# Patient Record
Sex: FEMALE | Race: WHITE | NOT HISPANIC OR LATINO | Employment: UNEMPLOYED | URBAN - METROPOLITAN AREA
[De-identification: names, ages, dates, MRNs, and addresses within clinical notes are randomized per-mention and may not be internally consistent; named-entity substitution may affect disease eponyms.]

---

## 2024-01-01 ENCOUNTER — HOSPITAL ENCOUNTER (INPATIENT)
Facility: HOSPITAL | Age: 79
LOS: 12 days | End: 2024-08-26
Attending: INTERNAL MEDICINE | Admitting: INTERNAL MEDICINE
Payer: COMMERCIAL

## 2024-01-01 PROCEDURE — 25010000002 HYDROMORPHONE 1 MG/ML SOLUTION: Performed by: NURSE PRACTITIONER

## 2024-01-01 PROCEDURE — 25010000002 HYDROMORPHONE PER 4 MG: Performed by: NURSE PRACTITIONER

## 2024-01-01 PROCEDURE — 25010000002 MIDAZOLAM PER 1 MG: Performed by: NURSE PRACTITIONER

## 2024-01-01 PROCEDURE — 25010000002 HALOPERIDOL LACTATE PER 5 MG: Performed by: NURSE PRACTITIONER

## 2024-01-01 PROCEDURE — 25010000002 FUROSEMIDE PER 20 MG: Performed by: NURSE PRACTITIONER

## 2024-01-01 PROCEDURE — 25010000002 ONDANSETRON PER 1 MG: Performed by: NURSE PRACTITIONER

## 2024-01-01 RX ORDER — POLYETHYLENE GLYCOL 3350 17 G/17G
17 POWDER, FOR SOLUTION ORAL DAILY PRN
Status: DISCONTINUED | OUTPATIENT
Start: 2024-01-01 | End: 2024-01-01

## 2024-01-01 RX ORDER — HYDROMORPHONE HYDROCHLORIDE 1 MG/ML
0.5 INJECTION, SOLUTION INTRAMUSCULAR; INTRAVENOUS; SUBCUTANEOUS EVERY 4 HOURS
Status: DISCONTINUED | OUTPATIENT
Start: 2024-01-01 | End: 2024-01-01 | Stop reason: HOSPADM

## 2024-01-01 RX ORDER — HYDROMORPHONE HYDROCHLORIDE 1 MG/ML
0.25 INJECTION, SOLUTION INTRAMUSCULAR; INTRAVENOUS; SUBCUTANEOUS
Status: DISCONTINUED | OUTPATIENT
Start: 2024-01-01 | End: 2024-01-01

## 2024-01-01 RX ORDER — FUROSEMIDE 10 MG/ML
20 INJECTION INTRAMUSCULAR; INTRAVENOUS EVERY 6 HOURS PRN
Status: DISCONTINUED | OUTPATIENT
Start: 2024-01-01 | End: 2024-01-01 | Stop reason: HOSPADM

## 2024-01-01 RX ORDER — MIDAZOLAM HYDROCHLORIDE 1 MG/ML
1 INJECTION INTRAMUSCULAR; INTRAVENOUS EVERY 4 HOURS PRN
Status: DISCONTINUED | OUTPATIENT
Start: 2024-01-01 | End: 2024-01-01

## 2024-01-01 RX ORDER — ONDANSETRON 2 MG/ML
4 INJECTION INTRAMUSCULAR; INTRAVENOUS EVERY 6 HOURS PRN
Status: DISCONTINUED | OUTPATIENT
Start: 2024-01-01 | End: 2024-01-01 | Stop reason: HOSPADM

## 2024-01-01 RX ORDER — ACETAMINOPHEN 325 MG/1
650 TABLET ORAL EVERY 4 HOURS PRN
Status: DISCONTINUED | OUTPATIENT
Start: 2024-01-01 | End: 2024-01-01 | Stop reason: HOSPADM

## 2024-01-01 RX ORDER — BISACODYL 5 MG/1
5 TABLET, DELAYED RELEASE ORAL DAILY PRN
Status: DISCONTINUED | OUTPATIENT
Start: 2024-01-01 | End: 2024-01-01

## 2024-01-01 RX ORDER — NICOTINE 21 MG/24HR
1 PATCH, TRANSDERMAL 24 HOURS TRANSDERMAL
Status: DISCONTINUED | OUTPATIENT
Start: 2024-01-01 | End: 2024-01-01

## 2024-01-01 RX ORDER — SODIUM CHLORIDE 0.9 % (FLUSH) 0.9 %
10 SYRINGE (ML) INJECTION AS NEEDED
Status: DISCONTINUED | OUTPATIENT
Start: 2024-01-01 | End: 2024-01-01 | Stop reason: HOSPADM

## 2024-01-01 RX ORDER — HALOPERIDOL 5 MG/ML
1 INJECTION INTRAMUSCULAR EVERY 6 HOURS PRN
Status: DISCONTINUED | OUTPATIENT
Start: 2024-01-01 | End: 2024-01-01

## 2024-01-01 RX ORDER — MIDAZOLAM HYDROCHLORIDE 1 MG/ML
0.5 INJECTION INTRAMUSCULAR; INTRAVENOUS EVERY 6 HOURS SCHEDULED
Status: DISCONTINUED | OUTPATIENT
Start: 2024-01-01 | End: 2024-01-01

## 2024-01-01 RX ORDER — AMOXICILLIN 250 MG
2 CAPSULE ORAL 2 TIMES DAILY PRN
Status: DISCONTINUED | OUTPATIENT
Start: 2024-01-01 | End: 2024-01-01

## 2024-01-01 RX ORDER — SCOLOPAMINE TRANSDERMAL SYSTEM 1 MG/1
1 PATCH, EXTENDED RELEASE TRANSDERMAL
Status: DISCONTINUED | OUTPATIENT
Start: 2024-01-01 | End: 2024-01-01 | Stop reason: HOSPADM

## 2024-01-01 RX ORDER — BISACODYL 10 MG
10 SUPPOSITORY, RECTAL RECTAL DAILY PRN
Status: DISCONTINUED | OUTPATIENT
Start: 2024-01-01 | End: 2024-01-01

## 2024-01-01 RX ORDER — WATER 10 ML/10ML
INJECTION INTRAMUSCULAR; INTRAVENOUS; SUBCUTANEOUS
Status: ACTIVE
Start: 2024-01-01 | End: 2024-01-01

## 2024-01-01 RX ORDER — ACETAMINOPHEN 160 MG/5ML
650 SOLUTION ORAL EVERY 4 HOURS PRN
Status: DISCONTINUED | OUTPATIENT
Start: 2024-01-01 | End: 2024-01-01 | Stop reason: HOSPADM

## 2024-01-01 RX ORDER — ACETAMINOPHEN 325 MG/1
650 TABLET ORAL EVERY 4 HOURS PRN
Status: DISCONTINUED | OUTPATIENT
Start: 2024-01-01 | End: 2024-01-01 | Stop reason: SDUPTHER

## 2024-01-01 RX ORDER — HYDROMORPHONE HYDROCHLORIDE 1 MG/ML
0.5 INJECTION, SOLUTION INTRAMUSCULAR; INTRAVENOUS; SUBCUTANEOUS
Status: DISCONTINUED | OUTPATIENT
Start: 2024-01-01 | End: 2024-01-01 | Stop reason: HOSPADM

## 2024-01-01 RX ORDER — MIDAZOLAM HYDROCHLORIDE 1 MG/ML
1 INJECTION INTRAMUSCULAR; INTRAVENOUS EVERY 6 HOURS SCHEDULED
Status: DISCONTINUED | OUTPATIENT
Start: 2024-01-01 | End: 2024-01-01 | Stop reason: HOSPADM

## 2024-01-01 RX ORDER — MIDAZOLAM HYDROCHLORIDE 1 MG/ML
1 INJECTION INTRAMUSCULAR; INTRAVENOUS
Status: DISCONTINUED | OUTPATIENT
Start: 2024-01-01 | End: 2024-01-01 | Stop reason: HOSPADM

## 2024-01-01 RX ORDER — HYDROMORPHONE HYDROCHLORIDE 1 MG/ML
0.25 INJECTION, SOLUTION INTRAMUSCULAR; INTRAVENOUS; SUBCUTANEOUS EVERY 8 HOURS
Status: DISCONTINUED | OUTPATIENT
Start: 2024-01-01 | End: 2024-01-01

## 2024-01-01 RX ORDER — HALOPERIDOL 5 MG/ML
2 INJECTION INTRAMUSCULAR EVERY 6 HOURS PRN
Status: DISCONTINUED | OUTPATIENT
Start: 2024-01-01 | End: 2024-01-01 | Stop reason: HOSPADM

## 2024-01-01 RX ORDER — HALOPERIDOL 5 MG/ML
0.5 INJECTION INTRAMUSCULAR EVERY 6 HOURS PRN
Status: DISCONTINUED | OUTPATIENT
Start: 2024-01-01 | End: 2024-01-01

## 2024-01-01 RX ORDER — HYDROMORPHONE HYDROCHLORIDE 1 MG/ML
0.5 INJECTION, SOLUTION INTRAMUSCULAR; INTRAVENOUS; SUBCUTANEOUS EVERY 6 HOURS
Status: DISCONTINUED | OUTPATIENT
Start: 2024-01-01 | End: 2024-01-01

## 2024-01-01 RX ORDER — MIRTAZAPINE 15 MG/1
15 TABLET, FILM COATED ORAL NIGHTLY
Status: DISCONTINUED | OUTPATIENT
Start: 2024-01-01 | End: 2024-01-01

## 2024-01-01 RX ORDER — MIDAZOLAM HYDROCHLORIDE 1 MG/ML
0.5 INJECTION INTRAMUSCULAR; INTRAVENOUS EVERY 4 HOURS PRN
Status: DISCONTINUED | OUTPATIENT
Start: 2024-01-01 | End: 2024-01-01

## 2024-01-01 RX ORDER — ACETAMINOPHEN 650 MG/1
650 SUPPOSITORY RECTAL EVERY 4 HOURS PRN
Status: DISCONTINUED | OUTPATIENT
Start: 2024-01-01 | End: 2024-01-01 | Stop reason: HOSPADM

## 2024-01-01 RX ORDER — PROCHLORPERAZINE EDISYLATE 5 MG/ML
5 INJECTION INTRAMUSCULAR; INTRAVENOUS EVERY 6 HOURS PRN
Status: DISCONTINUED | OUTPATIENT
Start: 2024-01-01 | End: 2024-01-01

## 2024-01-01 RX ADMIN — HYDROMORPHONE HYDROCHLORIDE 0.5 MG: 1 INJECTION, SOLUTION INTRAMUSCULAR; INTRAVENOUS; SUBCUTANEOUS at 22:29

## 2024-01-01 RX ADMIN — MIDAZOLAM HYDROCHLORIDE 0.5 MG: 1 INJECTION, SOLUTION INTRAMUSCULAR; INTRAVENOUS at 05:10

## 2024-01-01 RX ADMIN — HYDROMORPHONE HYDROCHLORIDE 0.25 MG: 1 INJECTION, SOLUTION INTRAMUSCULAR; INTRAVENOUS; SUBCUTANEOUS at 03:16

## 2024-01-01 RX ADMIN — HYDROMORPHONE HYDROCHLORIDE 0.5 MG: 1 INJECTION, SOLUTION INTRAMUSCULAR; INTRAVENOUS; SUBCUTANEOUS at 21:22

## 2024-01-01 RX ADMIN — FUROSEMIDE 20 MG: 10 INJECTION, SOLUTION INTRAMUSCULAR; INTRAVENOUS at 01:12

## 2024-01-01 RX ADMIN — MIDAZOLAM HYDROCHLORIDE 1 MG: 1 INJECTION, SOLUTION INTRAMUSCULAR; INTRAVENOUS at 11:26

## 2024-01-01 RX ADMIN — HYDROMORPHONE HYDROCHLORIDE 0.5 MG: 1 INJECTION, SOLUTION INTRAMUSCULAR; INTRAVENOUS; SUBCUTANEOUS at 13:53

## 2024-01-01 RX ADMIN — HYDROMORPHONE HYDROCHLORIDE 0.5 MG: 1 INJECTION, SOLUTION INTRAMUSCULAR; INTRAVENOUS; SUBCUTANEOUS at 08:51

## 2024-01-01 RX ADMIN — Medication 5 MG: at 21:00

## 2024-01-01 RX ADMIN — MIRTAZAPINE 15 MG: 15 TABLET, FILM COATED ORAL at 21:00

## 2024-01-01 RX ADMIN — MIDAZOLAM HYDROCHLORIDE 1 MG: 1 INJECTION, SOLUTION INTRAMUSCULAR; INTRAVENOUS at 03:46

## 2024-01-01 RX ADMIN — MIDAZOLAM HYDROCHLORIDE 1 MG: 1 INJECTION, SOLUTION INTRAMUSCULAR; INTRAVENOUS at 09:22

## 2024-01-01 RX ADMIN — HYDROMORPHONE HYDROCHLORIDE 0.5 MG: 1 INJECTION, SOLUTION INTRAMUSCULAR; INTRAVENOUS; SUBCUTANEOUS at 05:10

## 2024-01-01 RX ADMIN — HYDROMORPHONE HYDROCHLORIDE 0.5 MG: 1 INJECTION, SOLUTION INTRAMUSCULAR; INTRAVENOUS; SUBCUTANEOUS at 13:44

## 2024-01-01 RX ADMIN — HYDROMORPHONE HYDROCHLORIDE 0.5 MG: 1 INJECTION, SOLUTION INTRAMUSCULAR; INTRAVENOUS; SUBCUTANEOUS at 04:22

## 2024-01-01 RX ADMIN — HYDROMORPHONE HYDROCHLORIDE 0.25 MG: 1 INJECTION, SOLUTION INTRAMUSCULAR; INTRAVENOUS; SUBCUTANEOUS at 08:59

## 2024-01-01 RX ADMIN — MIRTAZAPINE 15 MG: 15 TABLET, FILM COATED ORAL at 22:47

## 2024-01-01 RX ADMIN — HYDROMORPHONE HYDROCHLORIDE 0.5 MG: 1 INJECTION, SOLUTION INTRAMUSCULAR; INTRAVENOUS; SUBCUTANEOUS at 10:43

## 2024-01-01 RX ADMIN — HYDROMORPHONE HYDROCHLORIDE 0.5 MG: 1 INJECTION, SOLUTION INTRAMUSCULAR; INTRAVENOUS; SUBCUTANEOUS at 23:32

## 2024-01-01 RX ADMIN — ONDANSETRON 4 MG: 2 INJECTION INTRAMUSCULAR; INTRAVENOUS at 10:42

## 2024-01-01 RX ADMIN — HYDROMORPHONE HYDROCHLORIDE 0.5 MG: 1 INJECTION, SOLUTION INTRAMUSCULAR; INTRAVENOUS; SUBCUTANEOUS at 10:42

## 2024-01-01 RX ADMIN — HYDROMORPHONE HYDROCHLORIDE 0.5 MG: 1 INJECTION, SOLUTION INTRAMUSCULAR; INTRAVENOUS; SUBCUTANEOUS at 11:44

## 2024-01-01 RX ADMIN — HYDROMORPHONE HYDROCHLORIDE 0.5 MG: 1 INJECTION, SOLUTION INTRAMUSCULAR; INTRAVENOUS; SUBCUTANEOUS at 21:00

## 2024-01-01 RX ADMIN — MIDAZOLAM HYDROCHLORIDE 1 MG: 1 INJECTION, SOLUTION INTRAMUSCULAR; INTRAVENOUS at 23:32

## 2024-01-01 RX ADMIN — HYDROMORPHONE HYDROCHLORIDE 0.25 MG: 1 INJECTION, SOLUTION INTRAMUSCULAR; INTRAVENOUS; SUBCUTANEOUS at 11:29

## 2024-01-01 RX ADMIN — MIDAZOLAM HYDROCHLORIDE 0.5 MG: 1 INJECTION, SOLUTION INTRAMUSCULAR; INTRAVENOUS at 17:12

## 2024-01-01 RX ADMIN — HYDROMORPHONE HYDROCHLORIDE 0.5 MG: 1 INJECTION, SOLUTION INTRAMUSCULAR; INTRAVENOUS; SUBCUTANEOUS at 10:31

## 2024-01-01 RX ADMIN — MIRTAZAPINE 15 MG: 15 TABLET, FILM COATED ORAL at 20:29

## 2024-01-01 RX ADMIN — HYDROMORPHONE HYDROCHLORIDE 0.5 MG: 1 INJECTION, SOLUTION INTRAMUSCULAR; INTRAVENOUS; SUBCUTANEOUS at 18:17

## 2024-01-01 RX ADMIN — FUROSEMIDE 20 MG: 10 INJECTION, SOLUTION INTRAMUSCULAR; INTRAVENOUS at 11:29

## 2024-01-01 RX ADMIN — MIDAZOLAM HYDROCHLORIDE 1 MG: 1 INJECTION, SOLUTION INTRAMUSCULAR; INTRAVENOUS at 18:25

## 2024-01-01 RX ADMIN — BISACODYL 10 MG: 10 SUPPOSITORY RECTAL at 10:01

## 2024-01-01 RX ADMIN — MIDAZOLAM HYDROCHLORIDE 1 MG: 1 INJECTION, SOLUTION INTRAMUSCULAR; INTRAVENOUS at 17:51

## 2024-01-01 RX ADMIN — HYDROMORPHONE HYDROCHLORIDE 0.25 MG: 1 INJECTION, SOLUTION INTRAMUSCULAR; INTRAVENOUS; SUBCUTANEOUS at 05:44

## 2024-01-01 RX ADMIN — MIDAZOLAM HYDROCHLORIDE 0.5 MG: 1 INJECTION, SOLUTION INTRAMUSCULAR; INTRAVENOUS at 03:16

## 2024-01-01 RX ADMIN — MIDAZOLAM HYDROCHLORIDE 1 MG: 1 INJECTION, SOLUTION INTRAMUSCULAR; INTRAVENOUS at 04:12

## 2024-01-01 RX ADMIN — MIDAZOLAM HYDROCHLORIDE 0.5 MG: 1 INJECTION, SOLUTION INTRAMUSCULAR; INTRAVENOUS at 17:54

## 2024-01-01 RX ADMIN — HYDROMORPHONE HYDROCHLORIDE 0.5 MG: 1 INJECTION, SOLUTION INTRAMUSCULAR; INTRAVENOUS; SUBCUTANEOUS at 22:34

## 2024-01-01 RX ADMIN — MIDAZOLAM HYDROCHLORIDE 1 MG: 1 INJECTION, SOLUTION INTRAMUSCULAR; INTRAVENOUS at 23:04

## 2024-01-01 RX ADMIN — MIRTAZAPINE 15 MG: 15 TABLET, FILM COATED ORAL at 21:22

## 2024-01-01 RX ADMIN — HYDROMORPHONE HYDROCHLORIDE 0.5 MG: 1 INJECTION, SOLUTION INTRAMUSCULAR; INTRAVENOUS; SUBCUTANEOUS at 23:36

## 2024-01-01 RX ADMIN — MIDAZOLAM HYDROCHLORIDE 1 MG: 1 INJECTION, SOLUTION INTRAMUSCULAR; INTRAVENOUS at 13:27

## 2024-01-01 RX ADMIN — HYDROMORPHONE HYDROCHLORIDE 0.5 MG: 1 INJECTION, SOLUTION INTRAMUSCULAR; INTRAVENOUS; SUBCUTANEOUS at 14:40

## 2024-01-01 RX ADMIN — MIDAZOLAM HYDROCHLORIDE 1 MG: 1 INJECTION, SOLUTION INTRAMUSCULAR; INTRAVENOUS at 22:29

## 2024-01-01 RX ADMIN — MIDAZOLAM HYDROCHLORIDE 1 MG: 1 INJECTION, SOLUTION INTRAMUSCULAR; INTRAVENOUS at 04:11

## 2024-01-01 RX ADMIN — MIDAZOLAM HYDROCHLORIDE 1 MG: 1 INJECTION, SOLUTION INTRAMUSCULAR; INTRAVENOUS at 20:27

## 2024-01-01 RX ADMIN — HYDROMORPHONE HYDROCHLORIDE 0.5 MG: 1 INJECTION, SOLUTION INTRAMUSCULAR; INTRAVENOUS; SUBCUTANEOUS at 21:19

## 2024-01-01 RX ADMIN — NICOTINE 1 PATCH: 21 PATCH TRANSDERMAL at 09:46

## 2024-01-01 RX ADMIN — HYDROMORPHONE HYDROCHLORIDE 0.5 MG: 1 INJECTION, SOLUTION INTRAMUSCULAR; INTRAVENOUS; SUBCUTANEOUS at 16:48

## 2024-01-01 RX ADMIN — HYDROMORPHONE HYDROCHLORIDE 0.5 MG: 1 INJECTION, SOLUTION INTRAMUSCULAR; INTRAVENOUS; SUBCUTANEOUS at 17:10

## 2024-01-01 RX ADMIN — HYDROMORPHONE HYDROCHLORIDE 0.25 MG: 1 INJECTION, SOLUTION INTRAMUSCULAR; INTRAVENOUS; SUBCUTANEOUS at 22:48

## 2024-01-01 RX ADMIN — MIDAZOLAM HYDROCHLORIDE 1 MG: 1 INJECTION, SOLUTION INTRAMUSCULAR; INTRAVENOUS at 05:41

## 2024-01-01 RX ADMIN — MIDAZOLAM HYDROCHLORIDE 1 MG: 1 INJECTION, SOLUTION INTRAMUSCULAR; INTRAVENOUS at 22:34

## 2024-01-01 RX ADMIN — MINERAL OIL: 1000 LIQUID ORAL at 12:04

## 2024-01-01 RX ADMIN — MIDAZOLAM HYDROCHLORIDE 0.5 MG: 1 INJECTION, SOLUTION INTRAMUSCULAR; INTRAVENOUS at 23:31

## 2024-01-01 RX ADMIN — HYDROMORPHONE HYDROCHLORIDE 0.5 MG: 1 INJECTION, SOLUTION INTRAMUSCULAR; INTRAVENOUS; SUBCUTANEOUS at 10:36

## 2024-01-01 RX ADMIN — MIDAZOLAM HYDROCHLORIDE 1 MG: 1 INJECTION, SOLUTION INTRAMUSCULAR; INTRAVENOUS at 10:18

## 2024-01-01 RX ADMIN — HYDROMORPHONE HYDROCHLORIDE 0.5 MG: 1 INJECTION, SOLUTION INTRAMUSCULAR; INTRAVENOUS; SUBCUTANEOUS at 17:51

## 2024-01-01 RX ADMIN — HYDROMORPHONE HYDROCHLORIDE 0.5 MG: 1 INJECTION, SOLUTION INTRAMUSCULAR; INTRAVENOUS; SUBCUTANEOUS at 15:47

## 2024-01-01 RX ADMIN — HYDROMORPHONE HYDROCHLORIDE 0.5 MG: 1 INJECTION, SOLUTION INTRAMUSCULAR; INTRAVENOUS; SUBCUTANEOUS at 03:53

## 2024-01-01 RX ADMIN — HYDROMORPHONE HYDROCHLORIDE 0.5 MG: 1 INJECTION, SOLUTION INTRAMUSCULAR; INTRAVENOUS; SUBCUTANEOUS at 10:41

## 2024-01-01 RX ADMIN — HYDROMORPHONE HYDROCHLORIDE 0.5 MG: 1 INJECTION, SOLUTION INTRAMUSCULAR; INTRAVENOUS; SUBCUTANEOUS at 18:25

## 2024-01-01 RX ADMIN — MIDAZOLAM HYDROCHLORIDE 1 MG: 1 INJECTION, SOLUTION INTRAMUSCULAR; INTRAVENOUS at 18:17

## 2024-01-01 RX ADMIN — SCOPOLAMINE 1 PATCH: 1.5 PATCH, EXTENDED RELEASE TRANSDERMAL at 13:27

## 2024-01-01 RX ADMIN — HYDROMORPHONE HYDROCHLORIDE 0.5 MG: 1 INJECTION, SOLUTION INTRAMUSCULAR; INTRAVENOUS; SUBCUTANEOUS at 15:49

## 2024-01-01 RX ADMIN — MIDAZOLAM HYDROCHLORIDE 1 MG: 1 INJECTION, SOLUTION INTRAMUSCULAR; INTRAVENOUS at 13:42

## 2024-01-01 RX ADMIN — HALOPERIDOL LACTATE 0.5 MG: 5 INJECTION, SOLUTION INTRAMUSCULAR at 09:11

## 2024-01-01 RX ADMIN — MIDAZOLAM HYDROCHLORIDE 1 MG: 1 INJECTION, SOLUTION INTRAMUSCULAR; INTRAVENOUS at 15:47

## 2024-01-01 RX ADMIN — HYDROMORPHONE HYDROCHLORIDE 0.5 MG: 1 INJECTION, SOLUTION INTRAMUSCULAR; INTRAVENOUS; SUBCUTANEOUS at 01:07

## 2024-01-01 RX ADMIN — MIDAZOLAM HYDROCHLORIDE 1 MG: 1 INJECTION, SOLUTION INTRAMUSCULAR; INTRAVENOUS at 09:28

## 2024-01-01 RX ADMIN — HYDROMORPHONE HYDROCHLORIDE 0.25 MG: 1 INJECTION, SOLUTION INTRAMUSCULAR; INTRAVENOUS; SUBCUTANEOUS at 17:12

## 2024-01-01 RX ADMIN — HYDROMORPHONE HYDROCHLORIDE 0.5 MG: 1 INJECTION, SOLUTION INTRAMUSCULAR; INTRAVENOUS; SUBCUTANEOUS at 03:01

## 2024-01-01 RX ADMIN — HYDROMORPHONE HYDROCHLORIDE 0.5 MG: 1 INJECTION, SOLUTION INTRAMUSCULAR; INTRAVENOUS; SUBCUTANEOUS at 20:27

## 2024-01-01 RX ADMIN — HYDROMORPHONE HYDROCHLORIDE 0.5 MG: 1 INJECTION, SOLUTION INTRAMUSCULAR; INTRAVENOUS; SUBCUTANEOUS at 23:00

## 2024-01-01 RX ADMIN — HYDROMORPHONE HYDROCHLORIDE 0.5 MG: 1 INJECTION, SOLUTION INTRAMUSCULAR; INTRAVENOUS; SUBCUTANEOUS at 17:54

## 2024-01-01 RX ADMIN — MIDAZOLAM HYDROCHLORIDE 1 MG: 1 INJECTION, SOLUTION INTRAMUSCULAR; INTRAVENOUS at 01:06

## 2024-01-01 RX ADMIN — MIDAZOLAM HYDROCHLORIDE 1 MG: 1 INJECTION, SOLUTION INTRAMUSCULAR; INTRAVENOUS at 02:58

## 2024-01-01 RX ADMIN — HYDROMORPHONE HYDROCHLORIDE 0.5 MG: 1 INJECTION, SOLUTION INTRAMUSCULAR; INTRAVENOUS; SUBCUTANEOUS at 22:58

## 2024-01-01 RX ADMIN — HYDROMORPHONE HYDROCHLORIDE 0.5 MG: 1 INJECTION, SOLUTION INTRAMUSCULAR; INTRAVENOUS; SUBCUTANEOUS at 22:14

## 2024-01-01 RX ADMIN — MIDAZOLAM HYDROCHLORIDE 1 MG: 1 INJECTION, SOLUTION INTRAMUSCULAR; INTRAVENOUS at 15:11

## 2024-01-01 RX ADMIN — HYDROMORPHONE HYDROCHLORIDE 0.5 MG: 1 INJECTION, SOLUTION INTRAMUSCULAR; INTRAVENOUS; SUBCUTANEOUS at 01:06

## 2024-01-01 RX ADMIN — HYDROMORPHONE HYDROCHLORIDE 0.5 MG: 1 INJECTION, SOLUTION INTRAMUSCULAR; INTRAVENOUS; SUBCUTANEOUS at 05:41

## 2024-01-01 RX ADMIN — FUROSEMIDE 20 MG: 10 INJECTION, SOLUTION INTRAMUSCULAR; INTRAVENOUS at 18:00

## 2024-01-01 RX ADMIN — Medication 10 ML: at 11:26

## 2024-01-01 RX ADMIN — MIDAZOLAM HYDROCHLORIDE 1 MG: 1 INJECTION, SOLUTION INTRAMUSCULAR; INTRAVENOUS at 19:56

## 2024-01-01 RX ADMIN — HYDROMORPHONE HYDROCHLORIDE 0.5 MG: 1 INJECTION, SOLUTION INTRAMUSCULAR; INTRAVENOUS; SUBCUTANEOUS at 09:59

## 2024-01-01 RX ADMIN — MIDAZOLAM HYDROCHLORIDE 1 MG: 1 INJECTION, SOLUTION INTRAMUSCULAR; INTRAVENOUS at 20:42

## 2024-01-01 RX ADMIN — Medication 5 MG: at 21:22

## 2024-01-01 RX ADMIN — HYDROMORPHONE HYDROCHLORIDE 0.5 MG: 1 INJECTION, SOLUTION INTRAMUSCULAR; INTRAVENOUS; SUBCUTANEOUS at 01:18

## 2024-01-01 RX ADMIN — HYDROMORPHONE HYDROCHLORIDE 0.5 MG: 1 INJECTION, SOLUTION INTRAMUSCULAR; INTRAVENOUS; SUBCUTANEOUS at 17:56

## 2024-01-01 RX ADMIN — HYDROMORPHONE HYDROCHLORIDE 0.5 MG: 1 INJECTION, SOLUTION INTRAMUSCULAR; INTRAVENOUS; SUBCUTANEOUS at 10:52

## 2024-01-01 RX ADMIN — Medication 10 ML: at 07:53

## 2024-01-01 RX ADMIN — NICOTINE 1 PATCH: 21 PATCH TRANSDERMAL at 09:01

## 2024-01-01 RX ADMIN — MIDAZOLAM HYDROCHLORIDE 1 MG: 1 INJECTION, SOLUTION INTRAMUSCULAR; INTRAVENOUS at 12:04

## 2024-01-01 RX ADMIN — HYDROMORPHONE HYDROCHLORIDE 0.5 MG: 1 INJECTION, SOLUTION INTRAMUSCULAR; INTRAVENOUS; SUBCUTANEOUS at 09:28

## 2024-01-01 RX ADMIN — MIDAZOLAM HYDROCHLORIDE 1 MG: 1 INJECTION, SOLUTION INTRAMUSCULAR; INTRAVENOUS at 03:53

## 2024-01-01 RX ADMIN — MIDAZOLAM HYDROCHLORIDE 1 MG: 1 INJECTION, SOLUTION INTRAMUSCULAR; INTRAVENOUS at 05:14

## 2024-01-01 RX ADMIN — HALOPERIDOL LACTATE 1 MG: 5 INJECTION, SOLUTION INTRAMUSCULAR at 20:21

## 2024-01-01 RX ADMIN — MINERAL OIL: 1000 LIQUID ORAL at 17:51

## 2024-01-01 RX ADMIN — HYDROMORPHONE HYDROCHLORIDE 0.5 MG: 1 INJECTION, SOLUTION INTRAMUSCULAR; INTRAVENOUS; SUBCUTANEOUS at 05:14

## 2024-01-01 RX ADMIN — HYDROMORPHONE HYDROCHLORIDE 0.5 MG: 1 INJECTION, SOLUTION INTRAMUSCULAR; INTRAVENOUS; SUBCUTANEOUS at 09:16

## 2024-01-01 RX ADMIN — HYDROMORPHONE HYDROCHLORIDE 0.5 MG: 1 INJECTION, SOLUTION INTRAMUSCULAR; INTRAVENOUS; SUBCUTANEOUS at 04:45

## 2024-01-01 RX ADMIN — MIDAZOLAM HYDROCHLORIDE 1 MG: 1 INJECTION, SOLUTION INTRAMUSCULAR; INTRAVENOUS at 01:50

## 2024-01-01 RX ADMIN — HYDROMORPHONE HYDROCHLORIDE 0.5 MG: 1 INJECTION, SOLUTION INTRAMUSCULAR; INTRAVENOUS; SUBCUTANEOUS at 07:54

## 2024-01-01 RX ADMIN — HYDROMORPHONE HYDROCHLORIDE 0.5 MG: 1 INJECTION, SOLUTION INTRAMUSCULAR; INTRAVENOUS; SUBCUTANEOUS at 17:11

## 2024-01-01 RX ADMIN — HYDROMORPHONE HYDROCHLORIDE 0.5 MG: 1 INJECTION, SOLUTION INTRAMUSCULAR; INTRAVENOUS; SUBCUTANEOUS at 04:12

## 2024-01-01 RX ADMIN — NICOTINE 1 PATCH: 21 PATCH TRANSDERMAL at 09:09

## 2024-01-01 RX ADMIN — FUROSEMIDE 20 MG: 10 INJECTION, SOLUTION INTRAMUSCULAR; INTRAVENOUS at 19:56

## 2024-01-01 RX ADMIN — MIDAZOLAM HYDROCHLORIDE 1 MG: 1 INJECTION, SOLUTION INTRAMUSCULAR; INTRAVENOUS at 13:54

## 2024-01-01 RX ADMIN — HYDROMORPHONE HYDROCHLORIDE 0.5 MG: 1 INJECTION, SOLUTION INTRAMUSCULAR; INTRAVENOUS; SUBCUTANEOUS at 10:01

## 2024-01-01 RX ADMIN — HALOPERIDOL LACTATE 1 MG: 5 INJECTION, SOLUTION INTRAMUSCULAR at 12:05

## 2024-01-01 RX ADMIN — HYDROMORPHONE HYDROCHLORIDE 0.5 MG: 1 INJECTION, SOLUTION INTRAMUSCULAR; INTRAVENOUS; SUBCUTANEOUS at 15:18

## 2024-01-01 RX ADMIN — NICOTINE 1 PATCH: 21 PATCH TRANSDERMAL at 10:20

## 2024-01-01 RX ADMIN — FUROSEMIDE 20 MG: 10 INJECTION, SOLUTION INTRAMUSCULAR; INTRAVENOUS at 22:29

## 2024-01-01 RX ADMIN — HYDROMORPHONE HYDROCHLORIDE 0.5 MG: 1 INJECTION, SOLUTION INTRAMUSCULAR; INTRAVENOUS; SUBCUTANEOUS at 21:37

## 2024-01-01 RX ADMIN — HALOPERIDOL LACTATE 1 MG: 5 INJECTION, SOLUTION INTRAMUSCULAR at 17:20

## 2024-01-01 RX ADMIN — HYDROMORPHONE HYDROCHLORIDE 0.5 MG: 1 INJECTION, SOLUTION INTRAMUSCULAR; INTRAVENOUS; SUBCUTANEOUS at 03:57

## 2024-01-01 RX ADMIN — ONDANSETRON 4 MG: 2 INJECTION INTRAMUSCULAR; INTRAVENOUS at 14:09

## 2024-01-01 RX ADMIN — HYDROMORPHONE HYDROCHLORIDE 0.5 MG: 1 INJECTION, SOLUTION INTRAMUSCULAR; INTRAVENOUS; SUBCUTANEOUS at 20:42

## 2024-01-01 RX ADMIN — HYDROMORPHONE HYDROCHLORIDE 0.5 MG: 1 INJECTION, SOLUTION INTRAMUSCULAR; INTRAVENOUS; SUBCUTANEOUS at 22:06

## 2024-01-01 RX ADMIN — HYDROMORPHONE HYDROCHLORIDE 0.5 MG: 1 INJECTION, SOLUTION INTRAMUSCULAR; INTRAVENOUS; SUBCUTANEOUS at 03:46

## 2024-01-01 RX ADMIN — MIDAZOLAM HYDROCHLORIDE 1 MG: 1 INJECTION, SOLUTION INTRAMUSCULAR; INTRAVENOUS at 14:44

## 2024-01-01 RX ADMIN — MIRTAZAPINE 15 MG: 15 TABLET, FILM COATED ORAL at 20:21

## 2024-01-01 RX ADMIN — HYDROMORPHONE HYDROCHLORIDE 0.25 MG: 1 INJECTION, SOLUTION INTRAMUSCULAR; INTRAVENOUS; SUBCUTANEOUS at 20:29

## 2024-01-01 RX ADMIN — HYDROMORPHONE HYDROCHLORIDE 0.5 MG: 1 INJECTION, SOLUTION INTRAMUSCULAR; INTRAVENOUS; SUBCUTANEOUS at 13:27

## 2024-01-01 RX ADMIN — HYDROMORPHONE HYDROCHLORIDE 0.5 MG: 1 INJECTION, SOLUTION INTRAMUSCULAR; INTRAVENOUS; SUBCUTANEOUS at 10:39

## 2024-06-28 ENCOUNTER — HOSPITAL ENCOUNTER (INPATIENT)
Facility: HOSPITAL | Age: 79
LOS: 44 days | DRG: 884 | End: 2024-08-14
Attending: EMERGENCY MEDICINE | Admitting: STUDENT IN AN ORGANIZED HEALTH CARE EDUCATION/TRAINING PROGRAM
Payer: MEDICARE

## 2024-06-28 DIAGNOSIS — R55 SYNCOPE, UNSPECIFIED SYNCOPE TYPE: Primary | ICD-10-CM

## 2024-06-28 DIAGNOSIS — E88.89 KETOSIS: ICD-10-CM

## 2024-06-28 PROCEDURE — 93005 ELECTROCARDIOGRAM TRACING: CPT | Performed by: EMERGENCY MEDICINE

## 2024-06-28 PROCEDURE — 99285 EMERGENCY DEPT VISIT HI MDM: CPT

## 2024-06-28 PROCEDURE — P9612 CATHETERIZE FOR URINE SPEC: HCPCS

## 2024-06-28 RX ORDER — SODIUM CHLORIDE 0.9 % (FLUSH) 0.9 %
10 SYRINGE (ML) INJECTION AS NEEDED
Status: DISCONTINUED | OUTPATIENT
Start: 2024-06-28 | End: 2024-07-03

## 2024-06-28 NOTE — Clinical Note
Level of Care: Telemetry [5]   Diagnosis: Syncope [206001]   Admitting Physician: KATHLEEN BURLESON [[id number]]   Attending Physician: KATHLEEN BURLESON [[id number]]

## 2024-06-28 NOTE — LETTER
[facility]  [address] [facility] [address]  [phone]        July 23, 2024      Patient: Arcelia Walter  YOB: 1945  Date of Visit: 6/28/2024      For admission to inpatient hospice  Referring Leela Mays  Certifying Dr Raisa Lopez

## 2024-06-29 ENCOUNTER — APPOINTMENT (OUTPATIENT)
Dept: CT IMAGING | Facility: HOSPITAL | Age: 79
DRG: 884 | End: 2024-06-29
Payer: MEDICARE

## 2024-06-29 ENCOUNTER — APPOINTMENT (OUTPATIENT)
Dept: GENERAL RADIOLOGY | Facility: HOSPITAL | Age: 79
DRG: 884 | End: 2024-06-29
Payer: MEDICARE

## 2024-06-29 PROBLEM — R70.0 ELEVATED SED RATE: Status: ACTIVE | Noted: 2024-06-29

## 2024-06-29 PROBLEM — E87.6 HYPOKALEMIA: Status: ACTIVE | Noted: 2024-01-01

## 2024-06-29 PROBLEM — R79.89 ELEVATED TROPONIN: Status: ACTIVE | Noted: 2024-01-01

## 2024-06-29 PROBLEM — R79.82 ELEVATED C-REACTIVE PROTEIN (CRP): Status: ACTIVE | Noted: 2024-01-01

## 2024-06-29 PROBLEM — R55 SYNCOPE: Status: ACTIVE | Noted: 2024-06-29

## 2024-06-29 PROBLEM — D72.829 LEUKOCYTOSIS: Status: ACTIVE | Noted: 2024-01-01

## 2024-06-29 LAB
25(OH)D3 SERPL-MCNC: 9.6 NG/ML (ref 30–100)
ALBUMIN SERPL-MCNC: 2.6 G/DL (ref 3.5–5.2)
ALBUMIN/GLOB SERPL: 0.9 G/DL
ALP SERPL-CCNC: 127 U/L (ref 39–117)
ALT SERPL W P-5'-P-CCNC: 15 U/L (ref 1–33)
ANION GAP SERPL CALCULATED.3IONS-SCNC: 14 MMOL/L (ref 5–15)
ANION GAP SERPL CALCULATED.3IONS-SCNC: 15 MMOL/L (ref 5–15)
APTT PPP: 40.4 SECONDS (ref 60–90)
AST SERPL-CCNC: 28 U/L (ref 1–32)
BASOPHILS # BLD AUTO: 0.06 10*3/MM3 (ref 0–0.2)
BASOPHILS # BLD AUTO: 0.09 10*3/MM3 (ref 0–0.2)
BASOPHILS NFR BLD AUTO: 0.4 % (ref 0–1.5)
BASOPHILS NFR BLD AUTO: 0.4 % (ref 0–1.5)
BILIRUB SERPL-MCNC: 0.3 MG/DL (ref 0–1.2)
BILIRUB UR QL STRIP: NEGATIVE
BUN SERPL-MCNC: 10 MG/DL (ref 8–23)
BUN SERPL-MCNC: 10 MG/DL (ref 8–23)
BUN/CREAT SERPL: 29.4 (ref 7–25)
BUN/CREAT SERPL: 34.5 (ref 7–25)
CALCIUM SPEC-SCNC: 8.3 MG/DL (ref 8.6–10.5)
CALCIUM SPEC-SCNC: 8.4 MG/DL (ref 8.6–10.5)
CHLORIDE SERPL-SCNC: 100 MMOL/L (ref 98–107)
CHLORIDE SERPL-SCNC: 101 MMOL/L (ref 98–107)
CHOLEST SERPL-MCNC: 173 MG/DL (ref 0–200)
CK SERPL-CCNC: 117 U/L (ref 20–180)
CLARITY UR: CLEAR
CO2 SERPL-SCNC: 23 MMOL/L (ref 22–29)
CO2 SERPL-SCNC: 25 MMOL/L (ref 22–29)
COLOR UR: YELLOW
CREAT SERPL-MCNC: 0.29 MG/DL (ref 0.57–1)
CREAT SERPL-MCNC: 0.34 MG/DL (ref 0.57–1)
CRP SERPL-MCNC: 16.13 MG/DL (ref 0–0.5)
D-LACTATE SERPL-SCNC: 1.8 MMOL/L (ref 0.5–2)
DEPRECATED RDW RBC AUTO: 52.7 FL (ref 37–54)
DEPRECATED RDW RBC AUTO: 53.1 FL (ref 37–54)
EGFRCR SERPLBLD CKD-EPI 2021: 105.5 ML/MIN/1.73
EGFRCR SERPLBLD CKD-EPI 2021: 109.6 ML/MIN/1.73
EOSINOPHIL # BLD AUTO: 0.01 10*3/MM3 (ref 0–0.4)
EOSINOPHIL # BLD AUTO: 0.02 10*3/MM3 (ref 0–0.4)
EOSINOPHIL NFR BLD AUTO: 0 % (ref 0.3–6.2)
EOSINOPHIL NFR BLD AUTO: 0.1 % (ref 0.3–6.2)
ERYTHROCYTE [DISTWIDTH] IN BLOOD BY AUTOMATED COUNT: 15.3 % (ref 12.3–15.4)
ERYTHROCYTE [DISTWIDTH] IN BLOOD BY AUTOMATED COUNT: 15.5 % (ref 12.3–15.4)
ERYTHROCYTE [SEDIMENTATION RATE] IN BLOOD: 40 MM/HR (ref 0–30)
ETHANOL BLD-MCNC: <10 MG/DL (ref 0–10)
GEN 5 2HR TROPONIN T REFLEX: 59 NG/L
GLOBULIN UR ELPH-MCNC: 3 GM/DL
GLUCOSE BLDC GLUCOMTR-MCNC: 162 MG/DL (ref 70–130)
GLUCOSE SERPL-MCNC: 174 MG/DL (ref 65–99)
GLUCOSE SERPL-MCNC: 90 MG/DL (ref 65–99)
GLUCOSE UR STRIP-MCNC: ABNORMAL MG/DL
HBA1C MFR BLD: 5.2 % (ref 4.8–5.6)
HCT VFR BLD AUTO: 33.4 % (ref 34–46.6)
HCT VFR BLD AUTO: 37.5 % (ref 34–46.6)
HDLC SERPL-MCNC: 54 MG/DL (ref 40–60)
HGB BLD-MCNC: 11.2 G/DL (ref 12–15.9)
HGB BLD-MCNC: 12.3 G/DL (ref 12–15.9)
HGB UR QL STRIP.AUTO: NEGATIVE
HOLD SPECIMEN: NORMAL
IMM GRANULOCYTES # BLD AUTO: 0.08 10*3/MM3 (ref 0–0.05)
IMM GRANULOCYTES # BLD AUTO: 0.1 10*3/MM3 (ref 0–0.05)
IMM GRANULOCYTES NFR BLD AUTO: 0.5 % (ref 0–0.5)
IMM GRANULOCYTES NFR BLD AUTO: 0.5 % (ref 0–0.5)
INR PPP: 1.09 (ref 0.89–1.12)
KETONES UR QL STRIP: ABNORMAL
LDLC SERPL CALC-MCNC: 93 MG/DL (ref 0–100)
LDLC/HDLC SERPL: 1.66 {RATIO}
LEUKOCYTE ESTERASE UR QL STRIP.AUTO: NEGATIVE
LIPASE SERPL-CCNC: 15 U/L (ref 13–60)
LYMPHOCYTES # BLD AUTO: 0.82 10*3/MM3 (ref 0.7–3.1)
LYMPHOCYTES # BLD AUTO: 0.91 10*3/MM3 (ref 0.7–3.1)
LYMPHOCYTES NFR BLD AUTO: 4.4 % (ref 19.6–45.3)
LYMPHOCYTES NFR BLD AUTO: 5.3 % (ref 19.6–45.3)
MAGNESIUM SERPL-MCNC: 1.8 MG/DL (ref 1.6–2.4)
MAGNESIUM SERPL-MCNC: 2.1 MG/DL (ref 1.6–2.4)
MCH RBC QN AUTO: 30.6 PG (ref 26.6–33)
MCH RBC QN AUTO: 31.2 PG (ref 26.6–33)
MCHC RBC AUTO-ENTMCNC: 32.8 G/DL (ref 31.5–35.7)
MCHC RBC AUTO-ENTMCNC: 33.5 G/DL (ref 31.5–35.7)
MCV RBC AUTO: 93 FL (ref 79–97)
MCV RBC AUTO: 93.3 FL (ref 79–97)
MONOCYTES # BLD AUTO: 0.41 10*3/MM3 (ref 0.1–0.9)
MONOCYTES # BLD AUTO: 0.54 10*3/MM3 (ref 0.1–0.9)
MONOCYTES NFR BLD AUTO: 2.6 % (ref 5–12)
MONOCYTES NFR BLD AUTO: 2.6 % (ref 5–12)
NEUTROPHILS NFR BLD AUTO: 14.21 10*3/MM3 (ref 1.7–7)
NEUTROPHILS NFR BLD AUTO: 19.22 10*3/MM3 (ref 1.7–7)
NEUTROPHILS NFR BLD AUTO: 91.1 % (ref 42.7–76)
NEUTROPHILS NFR BLD AUTO: 92.1 % (ref 42.7–76)
NEUTS VAC BLD QL SMEAR: NORMAL
NITRITE UR QL STRIP: NEGATIVE
NRBC BLD AUTO-RTO: 0 /100 WBC (ref 0–0.2)
NRBC BLD AUTO-RTO: 0 /100 WBC (ref 0–0.2)
NT-PROBNP SERPL-MCNC: 4377 PG/ML (ref 0–1800)
PH UR STRIP.AUTO: 5.5 [PH] (ref 5–8)
PHOSPHATE SERPL-MCNC: 2.1 MG/DL (ref 2.5–4.5)
PHOSPHATE SERPL-MCNC: 2.2 MG/DL (ref 2.5–4.5)
PHOSPHATE SERPL-MCNC: 2.6 MG/DL (ref 2.5–4.5)
PLAT MORPH BLD: NORMAL
PLATELET # BLD AUTO: 429 10*3/MM3 (ref 140–450)
PLATELET # BLD AUTO: 510 10*3/MM3 (ref 140–450)
PMV BLD AUTO: 10.1 FL (ref 6–12)
PMV BLD AUTO: 10.1 FL (ref 6–12)
POTASSIUM SERPL-SCNC: 3.1 MMOL/L (ref 3.5–5.2)
POTASSIUM SERPL-SCNC: 3.4 MMOL/L (ref 3.5–5.2)
POTASSIUM SERPL-SCNC: 4.3 MMOL/L (ref 3.5–5.2)
PROCALCITONIN SERPL-MCNC: 0.15 NG/ML (ref 0–0.25)
PROT SERPL-MCNC: 5.6 G/DL (ref 6–8.5)
PROT UR QL STRIP: ABNORMAL
PROTHROMBIN TIME: 14.2 SECONDS (ref 12.2–14.5)
RBC # BLD AUTO: 3.59 10*6/MM3 (ref 3.77–5.28)
RBC # BLD AUTO: 4.02 10*6/MM3 (ref 3.77–5.28)
RBC MORPH BLD: NORMAL
RBC MORPH BLD: NORMAL
SMALL PLATELETS BLD QL SMEAR: ADEQUATE
SODIUM SERPL-SCNC: 137 MMOL/L (ref 136–145)
SODIUM SERPL-SCNC: 141 MMOL/L (ref 136–145)
SP GR UR STRIP: 1.02 (ref 1–1.03)
TRIGL SERPL-MCNC: 147 MG/DL (ref 0–150)
TROPONIN T DELTA: -2 NG/L
TROPONIN T SERPL HS-MCNC: 61 NG/L
TSH SERPL DL<=0.05 MIU/L-ACNC: 2.17 UIU/ML (ref 0.27–4.2)
UROBILINOGEN UR QL STRIP: ABNORMAL
VIT B12 BLD-MCNC: 1186 PG/ML (ref 211–946)
VLDLC SERPL-MCNC: 26 MG/DL (ref 5–40)
WBC MORPH BLD: NORMAL
WBC NRBC COR # BLD AUTO: 15.6 10*3/MM3 (ref 3.4–10.8)
WBC NRBC COR # BLD AUTO: 20.87 10*3/MM3 (ref 3.4–10.8)
WHOLE BLOOD HOLD COAG: NORMAL
WHOLE BLOOD HOLD SPECIMEN: NORMAL

## 2024-06-29 PROCEDURE — 85007 BL SMEAR W/DIFF WBC COUNT: CPT | Performed by: EMERGENCY MEDICINE

## 2024-06-29 PROCEDURE — 83605 ASSAY OF LACTIC ACID: CPT | Performed by: EMERGENCY MEDICINE

## 2024-06-29 PROCEDURE — 82550 ASSAY OF CK (CPK): CPT | Performed by: EMERGENCY MEDICINE

## 2024-06-29 PROCEDURE — G0378 HOSPITAL OBSERVATION PER HR: HCPCS

## 2024-06-29 PROCEDURE — 86140 C-REACTIVE PROTEIN: CPT | Performed by: EMERGENCY MEDICINE

## 2024-06-29 PROCEDURE — 85007 BL SMEAR W/DIFF WBC COUNT: CPT | Performed by: NURSE PRACTITIONER

## 2024-06-29 PROCEDURE — 80053 COMPREHEN METABOLIC PANEL: CPT | Performed by: EMERGENCY MEDICINE

## 2024-06-29 PROCEDURE — 85730 THROMBOPLASTIN TIME PARTIAL: CPT | Performed by: EMERGENCY MEDICINE

## 2024-06-29 PROCEDURE — 97161 PT EVAL LOW COMPLEX 20 MIN: CPT

## 2024-06-29 PROCEDURE — 82607 VITAMIN B-12: CPT | Performed by: NURSE PRACTITIONER

## 2024-06-29 PROCEDURE — 93005 ELECTROCARDIOGRAM TRACING: CPT | Performed by: EMERGENCY MEDICINE

## 2024-06-29 PROCEDURE — 84443 ASSAY THYROID STIM HORMONE: CPT | Performed by: NURSE PRACTITIONER

## 2024-06-29 PROCEDURE — 85610 PROTHROMBIN TIME: CPT | Performed by: EMERGENCY MEDICINE

## 2024-06-29 PROCEDURE — 85025 COMPLETE CBC W/AUTO DIFF WBC: CPT | Performed by: NURSE PRACTITIONER

## 2024-06-29 PROCEDURE — 83880 ASSAY OF NATRIURETIC PEPTIDE: CPT | Performed by: EMERGENCY MEDICINE

## 2024-06-29 PROCEDURE — 84100 ASSAY OF PHOSPHORUS: CPT | Performed by: STUDENT IN AN ORGANIZED HEALTH CARE EDUCATION/TRAINING PROGRAM

## 2024-06-29 PROCEDURE — 82948 REAGENT STRIP/BLOOD GLUCOSE: CPT

## 2024-06-29 PROCEDURE — 70450 CT HEAD/BRAIN W/O DYE: CPT

## 2024-06-29 PROCEDURE — 82077 ASSAY SPEC XCP UR&BREATH IA: CPT | Performed by: NURSE PRACTITIONER

## 2024-06-29 PROCEDURE — 85652 RBC SED RATE AUTOMATED: CPT | Performed by: EMERGENCY MEDICINE

## 2024-06-29 PROCEDURE — 83735 ASSAY OF MAGNESIUM: CPT | Performed by: NURSE PRACTITIONER

## 2024-06-29 PROCEDURE — 81003 URINALYSIS AUTO W/O SCOPE: CPT | Performed by: EMERGENCY MEDICINE

## 2024-06-29 PROCEDURE — 84100 ASSAY OF PHOSPHORUS: CPT | Performed by: NURSE PRACTITIONER

## 2024-06-29 PROCEDURE — 82306 VITAMIN D 25 HYDROXY: CPT | Performed by: NURSE PRACTITIONER

## 2024-06-29 PROCEDURE — 80061 LIPID PANEL: CPT | Performed by: NURSE PRACTITIONER

## 2024-06-29 PROCEDURE — 84484 ASSAY OF TROPONIN QUANT: CPT | Performed by: EMERGENCY MEDICINE

## 2024-06-29 PROCEDURE — 84100 ASSAY OF PHOSPHORUS: CPT | Performed by: EMERGENCY MEDICINE

## 2024-06-29 PROCEDURE — 83690 ASSAY OF LIPASE: CPT | Performed by: EMERGENCY MEDICINE

## 2024-06-29 PROCEDURE — 97530 THERAPEUTIC ACTIVITIES: CPT

## 2024-06-29 PROCEDURE — 83735 ASSAY OF MAGNESIUM: CPT | Performed by: EMERGENCY MEDICINE

## 2024-06-29 PROCEDURE — 83036 HEMOGLOBIN GLYCOSYLATED A1C: CPT | Performed by: NURSE PRACTITIONER

## 2024-06-29 PROCEDURE — 36415 COLL VENOUS BLD VENIPUNCTURE: CPT

## 2024-06-29 PROCEDURE — 84132 ASSAY OF SERUM POTASSIUM: CPT | Performed by: STUDENT IN AN ORGANIZED HEALTH CARE EDUCATION/TRAINING PROGRAM

## 2024-06-29 PROCEDURE — 72170 X-RAY EXAM OF PELVIS: CPT

## 2024-06-29 PROCEDURE — 99222 1ST HOSP IP/OBS MODERATE 55: CPT | Performed by: NURSE PRACTITIONER

## 2024-06-29 PROCEDURE — 84145 PROCALCITONIN (PCT): CPT | Performed by: EMERGENCY MEDICINE

## 2024-06-29 PROCEDURE — 85025 COMPLETE CBC W/AUTO DIFF WBC: CPT | Performed by: EMERGENCY MEDICINE

## 2024-06-29 PROCEDURE — 71045 X-RAY EXAM CHEST 1 VIEW: CPT

## 2024-06-29 RX ORDER — BISACODYL 10 MG
10 SUPPOSITORY, RECTAL RECTAL DAILY PRN
Status: DISCONTINUED | OUTPATIENT
Start: 2024-06-29 | End: 2024-08-14 | Stop reason: HOSPADM

## 2024-06-29 RX ORDER — AMOXICILLIN 250 MG
2 CAPSULE ORAL 2 TIMES DAILY PRN
Status: DISCONTINUED | OUTPATIENT
Start: 2024-06-29 | End: 2024-08-14 | Stop reason: HOSPADM

## 2024-06-29 RX ORDER — SODIUM CHLORIDE 0.9 % (FLUSH) 0.9 %
10 SYRINGE (ML) INJECTION AS NEEDED
Status: DISCONTINUED | OUTPATIENT
Start: 2024-06-29 | End: 2024-08-14 | Stop reason: HOSPADM

## 2024-06-29 RX ORDER — BISACODYL 5 MG/1
5 TABLET, DELAYED RELEASE ORAL DAILY PRN
Status: DISCONTINUED | OUTPATIENT
Start: 2024-06-29 | End: 2024-08-14 | Stop reason: HOSPADM

## 2024-06-29 RX ORDER — SODIUM CHLORIDE 0.9 % (FLUSH) 0.9 %
10 SYRINGE (ML) INJECTION EVERY 12 HOURS SCHEDULED
Status: DISCONTINUED | OUTPATIENT
Start: 2024-06-29 | End: 2024-08-11

## 2024-06-29 RX ORDER — POLYETHYLENE GLYCOL 3350 17 G/17G
17 POWDER, FOR SOLUTION ORAL DAILY PRN
Status: DISCONTINUED | OUTPATIENT
Start: 2024-06-29 | End: 2024-08-14 | Stop reason: HOSPADM

## 2024-06-29 RX ORDER — DEXTROSE MONOHYDRATE AND SODIUM CHLORIDE 5; .45 G/100ML; G/100ML
100 INJECTION, SOLUTION INTRAVENOUS CONTINUOUS
Status: DISCONTINUED | OUTPATIENT
Start: 2024-06-29 | End: 2024-07-02

## 2024-06-29 RX ORDER — SODIUM CHLORIDE 9 MG/ML
40 INJECTION, SOLUTION INTRAVENOUS AS NEEDED
Status: DISCONTINUED | OUTPATIENT
Start: 2024-06-29 | End: 2024-08-14 | Stop reason: HOSPADM

## 2024-06-29 RX ORDER — ACETAMINOPHEN 325 MG/1
650 TABLET ORAL EVERY 4 HOURS PRN
Status: DISCONTINUED | OUTPATIENT
Start: 2024-06-29 | End: 2024-08-14 | Stop reason: HOSPADM

## 2024-06-29 RX ORDER — POTASSIUM CHLORIDE 20 MEQ/1
40 TABLET, EXTENDED RELEASE ORAL EVERY 4 HOURS
Status: DISPENSED | OUTPATIENT
Start: 2024-06-29 | End: 2024-06-29

## 2024-06-29 RX ADMIN — POTASSIUM CHLORIDE 40 MEQ: 1500 TABLET, EXTENDED RELEASE ORAL at 11:43

## 2024-06-29 RX ADMIN — THIAMINE HCL TAB 100 MG 100 MG: 100 TAB at 08:23

## 2024-06-29 RX ADMIN — POTASSIUM CHLORIDE 40 MEQ: 1500 TABLET, EXTENDED RELEASE ORAL at 08:23

## 2024-06-29 RX ADMIN — DEXTROSE AND SODIUM CHLORIDE 100 ML/HR: 5; 450 INJECTION, SOLUTION INTRAVENOUS at 05:01

## 2024-06-29 RX ADMIN — Medication 10 ML: at 20:58

## 2024-06-29 RX ADMIN — Medication 10 ML: at 08:23

## 2024-06-29 RX ADMIN — POTASSIUM & SODIUM PHOSPHATES POWDER PACK 280-160-250 MG 2 PACKET: 280-160-250 PACK at 08:23

## 2024-06-29 RX ADMIN — DEXTROSE AND SODIUM CHLORIDE 100 ML/HR: 5; 450 INJECTION, SOLUTION INTRAVENOUS at 15:16

## 2024-06-29 NOTE — ED NOTES
Arcelia Walter    Nursing Report ED to Floor:  Mental status: a&ox4, sometimes to situation or year  Ambulatory status: assist x2  Oxygen Therapy:  ra  Cardiac Rhythm: sbrady   Admitted from: home   Safety Concerns:  fall risk   Social Issues: na  ED Room #:  06    ED Nurse Phone Extension - [phone] or may call [phone].      HPI:   Chief Complaint   Patient presents with    Hypoglycemia       Past Medical History:  No past medical history on file.     Past Surgical History:  No past surgical history on file.     Admitting Doctor:   Antonino Salvador MD    Consulting Provider(s):  Consults       No orders found for last 30 day(s).             Admitting Diagnosis:   There were no encounter diagnoses.    Most Recent Vitals:   Vitals:    06/29/24 0215 06/29/24 0218 06/29/24 0300 06/29/24 0315   BP: 117/71  146/70 153/71   Pulse: 60 64 55 55   Resp:       Temp:       TempSrc:       SpO2:    97%   Weight:       Height:           Active LDAs/IV Access:   Lines, Drains & Airways       Active LDAs       Name Placement date Placement time Site Days    Peripheral IV Anterior;Left Forearm --  --  Forearm  --    Peripheral IV Right Antecubital --  --  Antecubital  --                    Labs (abnormal labs have a star):   Labs Reviewed   COMPREHENSIVE METABOLIC PANEL - Abnormal; Notable for the following components:       Result Value    Glucose 174 (*)     Creatinine 0.34 (*)     Potassium 3.1 (*)     Calcium 8.4 (*)     Total Protein 5.6 (*)     Albumin 2.6 (*)     Alkaline Phosphatase 127 (*)     BUN/Creatinine Ratio 29.4 (*)     All other components within normal limits    Narrative:     GFR Normal >60  Chronic Kidney Disease <60  Kidney Failure <15    The GFR formula is only valid for adults with stable renal function between ages 18 and 70.   APTT - Abnormal; Notable for the following components:    PTT 40.4 (*)     All other components within normal limits    Narrative:     PTT = The equivalent PTT values for the therapeutic range  of heparin levels at 0.3 to 0.5 U/ml are 60 to 70 seconds.   URINALYSIS W/ MICROSCOPIC IF INDICATED (NO CULTURE) - Abnormal; Notable for the following components:    Glucose,  mg/dL (Trace) (*)     Ketones, UA 15 mg/dL (1+) (*)     Protein, UA Trace (*)     All other components within normal limits    Narrative:     Urine microscopic not indicated.   BNP (IN-HOUSE) - Abnormal; Notable for the following components:    proBNP 4,377.0 (*)     All other components within normal limits    Narrative:     This assay is used as an aid in the diagnosis of individuals suspected of having heart failure. It can be used as an aid in the diagnosis of acute decompensated heart failure (ADHF) in patients presenting with signs and symptoms of ADHF to the emergency department (ED). In addition, NT-proBNP of <300 pg/mL indicates ADHF is not likely.    Age Range Result Interpretation  NT-proBNP Concentration (pg/mL:      <50             Positive            >450                   Gray                 300-450                    Negative             <300    50-75           Positive            >900                  Gray                300-900                  Negative            <300      >75             Positive            >1800                  Gray                300-1800                  Negative            <300   TROPONIN - Abnormal; Notable for the following components:    HS Troponin T 61 (*)     All other components within normal limits    Narrative:     High Sensitive Troponin T Reference Range:  <14.0 ng/L- Negative Female for AMI  <22.0 ng/L- Negative Male for AMI  >=14 - Abnormal Female indicating possible myocardial injury.  >=22 - Abnormal Male indicating possible myocardial injury.   Clinicians would have to utilize clinical acumen, EKG, Troponin, and serial changes to determine if it is an Acute Myocardial Infarction or myocardial injury due to an underlying chronic condition.        SEDIMENTATION RATE - Abnormal;  Notable for the following components:    Sed Rate 40 (*)     All other components within normal limits   C-REACTIVE PROTEIN - Abnormal; Notable for the following components:    C-Reactive Protein 16.13 (*)     All other components within normal limits   PHOSPHORUS - Abnormal; Notable for the following components:    Phosphorus 2.1 (*)     All other components within normal limits   CBC WITH AUTO DIFFERENTIAL - Abnormal; Notable for the following components:    WBC 15.60 (*)     Platelets 510 (*)     Neutrophil % 91.1 (*)     Lymphocyte % 5.3 (*)     Monocyte % 2.6 (*)     Eosinophil % 0.1 (*)     Neutrophils, Absolute 14.21 (*)     Immature Grans, Absolute 0.08 (*)     All other components within normal limits   HIGH SENSITIVITIY TROPONIN T 2HR - Abnormal; Notable for the following components:    HS Troponin T 59 (*)     All other components within normal limits    Narrative:     High Sensitive Troponin T Reference Range:  <14.0 ng/L- Negative Female for AMI  <22.0 ng/L- Negative Male for AMI  >=14 - Abnormal Female indicating possible myocardial injury.  >=22 - Abnormal Male indicating possible myocardial injury.   Clinicians would have to utilize clinical acumen, EKG, Troponin, and serial changes to determine if it is an Acute Myocardial Infarction or myocardial injury due to an underlying chronic condition.        POCT GLUCOSE FINGERSTICK - Abnormal; Notable for the following components:    Glucose 162 (*)     All other components within normal limits   PROTIME-INR - Normal   LIPASE - Normal   LACTIC ACID, PLASMA - Normal   PROCALCITONIN - Normal    Narrative:     As a Marker for Sepsis (Non-Neonates):    1. <0.5 ng/mL represents a low risk of severe sepsis and/or septic shock.  2. >2 ng/mL represents a high risk of severe sepsis and/or septic shock.    As a Marker for Lower Respiratory Tract Infections that require antibiotic therapy:    PCT on Admission    Antibiotic Therapy       6-12 Hrs later    >0.5          "       Strongly Recommended  >0.25 - <0.5        Recommended   0.1 - 0.25          Discouraged              Remeasure/reassess PCT  <0.1                Strongly Discouraged     Remeasure/reassess PCT    As 28 day mortality risk marker: \"Change in Procalcitonin Result\" (>80% or <=80%) if Day 0 (or Day 1) and Day 4 values are available. Refer to http://www.[employer] [facility]-pct-calculator.com    Change in PCT <=80%  A decrease of PCT levels below or equal to 80% defines a positive change in PCT test result representing a higher risk for 28-day all-cause mortality of patients diagnosed with severe sepsis for septic shock.    Change in PCT >80%  A decrease of PCT levels of more than 80% defines a negative change in PCT result representing a lower risk for 28-day all-cause mortality of patients diagnosed with severe sepsis or septic shock.      CK - Normal   MAGNESIUM - Normal   SCAN SLIDE - Normal   RAINBOW DRAW    Narrative:     The following orders were created for panel order [place] Draw.  Procedure                               Abnormality         Status                     ---------                               -----------         ------                     Green Top (Gel)[[id number]]                                  Final result               Lavender Top[[id number]]                                     Final result               Gold Top - SST[[id number]]                                   Final result               Delong Top[[id number]]                                         Final result               Light Blue Top[[id number]]                                   Final result                 Please view results for these tests on the individual orders.   GREEN TOP   LAVENDER TOP   GOLD TOP - SST   GRAY TOP   LIGHT BLUE TOP   CBC AND DIFFERENTIAL    Narrative:     The following orders were created for panel order CBC & Differential.  Procedure                               Abnormality         Status                     ---------   "                             -----------         ------                     CBC Auto Differential[[id number]]        Abnormal            Final result               Scan Slide[[id number]]                   Normal              Final result                 Please view results for these tests on the individual orders.       Meds Given in ED:   Medications   sodium chloride 0.9 % flush 10 mL (has no administration in time range)           Last NIH score:                                                          Dysphagia screening results:  Patient Factors Component (Dysphagia:Stroke or Rule-out)  Best Eye Response: 4-->(E4) spontaneous (06/28/24 2359)  Best Motor Response: 6-->(M6) obeys commands (06/28/24 2359)  Best Verbal Response: 5-->(V5) oriented (06/28/24 2359)  [place] Coma Scale Score: 15 (06/28/24 2359)     Chi Coma Scale:  No data recorded     CIWA:        Restraint Type:            Isolation Status:  No active isolations

## 2024-06-29 NOTE — PLAN OF CARE
Problem: Fall Injury Risk  Goal: Absence of Fall and Fall-Related Injury  Intervention: Promote Injury-Free Environment  Recent Flowsheet Documentation  Taken 6/29/2024 0605 by Jake Ritchie RN  Safety Promotion/Fall Prevention: safety round/check completed  Taken 6/29/2024 0518 by Jake Ritchie RN  Safety Promotion/Fall Prevention: safety round/check completed     Problem: Adult Inpatient Plan of Care  Goal: Absence of Hospital-Acquired Illness or Injury  Intervention: Identify and Manage Fall Risk  Recent Flowsheet Documentation  Taken 6/29/2024 0605 by Jake Ritchie RN  Safety Promotion/Fall Prevention: safety round/check completed  Taken 6/29/2024 0518 by Jake Ritchie RN  Safety Promotion/Fall Prevention: safety round/check completed  Intervention: Prevent Skin Injury  Recent Flowsheet Documentation  Taken 6/29/2024 0605 by Jake Ritchie RN  Body Position: position changed independently  Skin Protection:   adhesive use limited   incontinence pads utilized  Taken 6/29/2024 0518 by Jake Ritchie RN  Body Position: position changed independently  Skin Protection:   adhesive use limited   incontinence pads utilized  Intervention: Prevent and Manage VTE (Venous Thromboembolism) Risk  Recent Flowsheet Documentation  Taken 6/29/2024 0518 by Jake Ritchie RN  Activity Management: activity minimized  Range of Motion: active ROM (range of motion) encouraged  Goal: Optimal Comfort and Wellbeing  Intervention: Provide Person-Centered Care  Recent Flowsheet Documentation  Taken 6/29/2024 0518 by Jake Ritchie RN  Trust Relationship/Rapport:   care explained   questions encouraged   reassurance provided   thoughts/feelings acknowledged  Goal: Readiness for Transition of Care  Intervention: Mutually Develop Transition Plan  Recent Flowsheet Documentation  Taken 6/29/2024 0519 by Jake Ritchie RN  Equipment Currently Used at Home: none  Taken 6/29/2024 0518 by Jake Ritchie  RN  Transportation Anticipated: health plan transportation  Patient/Family Anticipated Services at Transition:   Patient/Family Anticipates Transition to: home     Problem: Skin Injury Risk Increased  Goal: Skin Health and Integrity  Intervention: Optimize Skin Protection  Recent Flowsheet Documentation  Taken 6/29/2024 0605 by Jake Ritchie RN  Pressure Reduction Techniques: frequent weight shift encouraged  Head of Bed (HOB) Positioning: HOB elevated  Pressure Reduction Devices:   pressure-redistributing mattress utilized   specialty bed utilized  Skin Protection:   adhesive use limited   incontinence pads utilized  Taken 6/29/2024 0518 by Jake Ritchie RN  Pressure Reduction Techniques:   frequent weight shift encouraged   weight shift assistance provided  Head of Bed (HOB) Positioning: HOB elevated  Pressure Reduction Devices:   pressure-redistributing mattress utilized   specialty bed utilized  Skin Protection:   adhesive use limited   incontinence pads utilized   Goal Outcome Evaluation:

## 2024-06-29 NOTE — CONSULTS
"          Clinical Nutrition Assessment     Patient Name: Arcelia Walter  YOB: 1945  MRN: [id number]  Date of Encounter: 06/29/24 09:32 EDT  Admission date: 6/28/2024  Reason for Visit: BMI, Malnutrition Severity Assessment, Nurse practioner/physician assistant consult    Assessment   Nutrition Assessment   Admission Diagnosis:  Syncope [R55]    Problem List:    Syncope    Hypokalemia    Elevated troponin    Elevated C-reactive protein (CRP)    Elevated sed rate    Leukocytosis      PMH:   She  has no past medical history on file.    PSH:  She  has a past surgical history that includes Hysterectomy.    Applicable Nutrition History:   +3-4 pitting edema to melonie feet     Anthropometrics     Height: Height: 149.9 cm (59\")  Last Filed Weight: Weight: 39.9 kg (88 lb) (06/29/24 5498)  Method: Weight Method: Stated  BMI: BMI (Calculated): 17.8    UBW:  ? 103# per pt report  Weight change:  reported 15# wt loss (? Unsure of duration of loss)    Nutrition Focused Physical Exam    Date:  6/29       Patient meets criteria for malnutrition diagnosis, see MSA note.     Subjective   Reported/Observed/Food/Nutrition Related History:     Pt up in bed eating breakfast at time of visit, able to provide some nutrition/wt hx however difficulty elaborating when asked. Reports cooking meals for self - states having 3 eggs every other day, but sometimes daily. ? If this is the only meal consumed. Severe cachexia - ? Starvation vs cardiac. No visible difficulties chewing/swallowing. Pt endorsed ~15# wt loss \"at the beginning of summer\" however unable to discern timeframe for notable weight change. Reports having BM every other day without aide from bowel meds. NKFA - strong dislike for tea.     Current Nutrition Prescription   PO: Diet: Regular/House; Fluid Consistency: Thin (IDDSI 0)  Oral Nutrition Supplement: N/A  Intake: Insufficient data    Assessment & Plan   Nutrition Diagnosis   Date:  6/29            Updated:    Problem " Malnutrition  chronic, severe   Etiology ? Energy in < energy out vs cardiac cacehxia   Signs/Symptoms Severe muscle wasting and subcutaneous fat loss   Status: New    Date:  6/29    Updated:     Problem Underweight   Etiology Inadequate protein-energy intake   Signs/Symptoms BMI 17.77   Status: New    Goal:   Nutrition to support treatment and Increase intake      Nutrition Intervention      Follow treatment progress, Care plan reviewed, Menu provided, Encourage intake, Supplement provided    Encouraged PO intake on current diet as tolerated  Provide Boost VHC 1x daily    ? Need for f/u for possible food insecurity    Monitoring/Evaluation:   Per protocol, I&O, PO intake, Pertinent labs, Weight, Symptoms    Nani Kay MS,RD,LD  Time Spent: 25min

## 2024-06-29 NOTE — H&P
[facility] Medicine Services  HISTORY AND PHYSICAL    Patient Name: Arcelia Walter  : 1945  MRN: [id number]  Primary Care Physician: Provider, No Known  Date of admission: 2024    Subjective   Subjective     Chief Complaint:  Found in floor     HPI:  Arcelia Walter is a 79 y.o. female with unknown PMH who presents to the ED per EMS after neighbors called for a welfare check.  EMS reports finding patient and floor.  She reportedly informed them that she had slid to the floor 2 days ago.  She was noted to be hypoglycemic with BG of 45.  25 g of D10 was administered per EMS with improvement of blood glucose to 91.  Upon arrival to the ED, patient is unable to contribute to her history.  There is no emergency contact on file.  During this exam she is oriented to self only.  Labs are concerning for hypokalemia, elevated troponin, leukocytosis, elevated CRP, elevated sed rate.  EKG notes arrhythmia with nonspecific ST and T wave abnormality and prolonged QT.  CXR and pelvic x-ray are negative for acute findings  She will be admitted to hospital medicine for further evaluation.    Review of Systems   Unable to perform ROS: Mental status change          Personal History     No past medical history on file.    No past surgical history on file.    Family History:  Family history is unknown by patient.     Social History:    Social History     Social History Narrative    Not on file       Medications:       No Known Allergies    Objective   Objective     Vital Signs:   Temp:  [97.5 °F (36.4 °C)] 97.5 °F (36.4 °C)  Heart Rate:  [51-67] 55  Resp:  [18] 18  BP: (117-153)/(58-86) 153/71    Physical Exam   Constitutional: Awake, alert, no acute distress, thin limbs, muscle wasting  Eyes: PERRLA, sclerae anicteric, no conjunctival injection  HENT: NCAT, mucous membranes moist  Neck: Supple, no thyromegaly, no lymphadenopathy, trachea midline  Respiratory: Clear to auscultation bilaterally, nonlabored  respirations   Cardiovascular: bradycardic, irregular, no murmurs, rubs, or gallops, palpable pedal pulses bilaterally  Gastrointestinal: Positive bowel sounds, soft, nontender, nondistended  Musculoskeletal: +2 pedal edema, no clubbing or cyanosis to extremities  Psychiatric: Appropriate affect, cooperative  Neurologic: Oriented to self only during this exam, nursing previously reported oriented to self and place, strength symmetric in all extremities, Cranial Nerves grossly intact to confrontation, speech clear  Skin: No rashes      Result Review:  I have personally reviewed the results from the time of this admission to 6/29/2024 04:51 EDT and agree with these findings:  [x]  Laboratory list / accordion  [x]  Microbiology  [x]  Radiology  []  EKG/Telemetry   []  Cardiology/Vascular   []  Pathology  [x]  Old records  []  Other:  Most notable findings include:     LAB RESULTS:      Lab 06/29/24 0033   WBC 15.60*   HEMOGLOBIN 12.3   HEMATOCRIT 37.5   PLATELETS 510*   NEUTROS ABS 14.21*   IMMATURE GRANS (ABS) 0.08*   LYMPHS ABS 0.82   MONOS ABS 0.41   EOS ABS 0.02   MCV 93.3   SED RATE 40*   CRP 16.13*   PROCALCITONIN 0.15   LACTATE 1.8   PROTIME 14.2   APTT 40.4*   CK TOTAL 117         Lab 06/29/24  0033   SODIUM 141   POTASSIUM 3.1*   CHLORIDE 101   CO2 25.0   ANION GAP 15.0   BUN 10   CREATININE 0.34*   EGFR 105.5   GLUCOSE 174*   CALCIUM 8.4*   MAGNESIUM 1.8   PHOSPHORUS 2.1*         Lab 06/29/24  0033   TOTAL PROTEIN 5.6*   ALBUMIN 2.6*   GLOBULIN 3.0   ALT (SGPT) 15   AST (SGOT) 28   BILIRUBIN 0.3   ALK PHOS 127*   LIPASE 15         Lab 06/29/24  0240 06/29/24  0033   PROBNP  --  4,377.0*   HSTROP T 59* 61*   PROTIME  --  14.2   INR  --  1.09                 Brief Urine Lab Results  (Last result in the past 365 days)        Color   Clarity   Blood   Leuk Est   Nitrite   Protein   CREAT   Urine HCG        06/29/24 0038 Yellow   Clear   Negative   Negative   Negative   Trace                 Microbiology Results  (last 10 days)       ** No results found for the last 240 hours. **            XR Pelvis 1 or 2 View    Result Date: 6/29/2024  XR PELVIS 1 OR 2 VW Date of Exam: 6/29/2024 12:02 AM EDT Indication: fall Comparison: None available. Findings: There is no evidence of an acute fracture. There is no evidence of hip dislocation. There are no lytic or destructive bony lesions.     Impression: Impression: No acute bony abnormality. Electronically Signed: Curly Jack MD  6/29/2024 12:44 AM EDT  Workstation ID: [id number]    XR Chest 1 View    Result Date: 6/29/2024  XR CHEST 1 VW Date of Exam: 6/29/2024 12:01 AM EDT Indication: fall Comparison: None available. Findings: The heart size and pulmonary vascular markings are normal. There is diffuse emphysema. There are no focal consolidations to indicate pneumonia. The osseous structures are normal for age.     Impression: Impression: Emphysema. No active disease. Electronically Signed: Curly Jakc MD  6/29/2024 12:42 AM EDT  Workstation ID: [id number]         Assessment & Plan   Assessment & Plan       Syncope    Hypokalemia    Elevated troponin    Elevated C-reactive protein (CRP)    Elevated sed rate    Leukocytosis    79-year-old female brought in per EMS after being found down during a welfare check.  Unclear of exact events.  No emergency contact on file.  Patient with altered mental status.    Syncope  Altered mental status  - Unclear exact events, originally reported to EMS on the floor x 2 days, later states that she is unsure what happened  - CT head pending  - Ethanol pending  - UDS pending  - CBC, BMP, hemoglobin A1c, B12, TSH in a.m.    Elevated troponin  - Patient currently denies chest pain  - EKG without acute ischemia  - Trending down  - Lipid panel in the a.m.    Leukocytosis  Elevated CRP  Elevated sed rate  - Procalcitonin, lactic normal  - BC x 2 pending  - CBC in the a.m.  - UA negative for acute findings  - CXR negative for acute findings    Hypokalemia  -  Electrolyte replacement    Malnourishment  Failure to thrive  - No emergency contact, unclear living situation  - Case management/ consult      DVT prophylaxis: SCDs    CODE STATUS:    Code Status (Patient has no pulse and is not breathing): CPR (Attempt to Resuscitate)  Medical Interventions (Patient has pulse or is breathing): Full Support      Expected Discharge  Expected discharge date/ time has not been documented.    Signature: Electronically signed by CIARAN Reddy, 06/29/24, 4:55 AM EDT.

## 2024-06-29 NOTE — THERAPY EVALUATION
Patient Name: Arcelia Walter  : 1945    MRN: [id number]                              Today's Date: 2024       Admit Date: 2024    Visit Dx:     ICD-10-CM ICD-9-CM   1. Syncope, unspecified syncope type  R55 780.2   2. Ketosis  E88.89 276.2     Patient Active Problem List   Diagnosis    Syncope    Hypokalemia    Elevated troponin    Elevated C-reactive protein (CRP)    Elevated sed rate    Leukocytosis     History reviewed. No pertinent past medical history.  Past Surgical History:   Procedure Laterality Date    HYSTERECTOMY        General Information       Row Name 24 1409          Physical Therapy Time and Intention    Document Type evaluation  -KG     Mode of Treatment physical therapy  -KG       Row Name 24 140          General Information    Patient Profile Reviewed yes  -KG     Prior Level of Function independent:;all household mobility;gait;transfer;ADL's;dressing;bathing  pt is limited historian; TBA later  -KG     Existing Precautions/Restrictions fall;other (see comments)  confusion  -KG     Barriers to Rehab cognitive status  -KG       Row Name 24 140          Living Environment    People in Home alone  -KG       Row Name 24 140          Home Main Entrance    Number of Stairs, Main Entrance --  pt is limited historian; unable to recall; TBA later  -KG       Row Name 24 140          Cognition    Orientation Status (Cognition) oriented to;person;time  -KG       Row Name 24 140          Safety Issues, Functional Mobility    Safety Issues Affecting Function (Mobility) ability to follow commands;awareness of need for assistance;insight into deficits/self-awareness;safety precaution awareness;safety precautions follow-through/compliance;sequencing abilities  -KG     Impairments Affecting Function (Mobility) balance;cognition;coordination;endurance/activity tolerance;postural/trunk control;strength  -KG     Cognitive Impairments, Mobility Safety/Performance  attention;awareness, need for assistance;insight into deficits/self-awareness;safety precaution awareness;safety precaution follow-through;sequencing abilities  -KG               User Key  (r) = Recorded By, (t) = Taken By, (c) = Cosigned By      Initials Name Provider Type    Misty Mueller, PT Physical Therapist                   Mobility       Row Name 06/29/24 1410          Bed Mobility    Bed Mobility scooting/bridging;supine-sit;sit-supine  -KG     Scooting/Bridging [place] (Bed Mobility) moderate assist (50% patient effort);2 person assist;verbal cues  -KG     Supine-Sit [place] (Bed Mobility) minimum assist (75% patient effort);verbal cues  -KG     Sit-Supine [place] (Bed Mobility) moderate assist (50% patient effort);verbal cues  -KG     Assistive Device (Bed Mobility) draw sheet;head of bed elevated  -KG     Comment, (Bed Mobility) VC's for sequencing and technique. Pt required increased time and effort to complete; easily distracted from task. Pt required assistance with B LE when returning to supine.  -KG       Row Name 06/29/24 1410          Transfers    Comment, (Transfers) VC's for sequencing and safe hand placement. Pt very unsteady upon initial stand.  -KG       Row Name 06/29/24 1410          Sit-Stand Transfer    Sit-Stand [place] (Transfers) minimum assist (75% patient effort);verbal cues  -KG     Assistive Device (Sit-Stand Transfers) other (see comments)  UE support  -KG       Row Name 06/29/24 1410          Gait/Stairs (Locomotion)    [place] Level (Gait) minimum assist (75% patient effort);verbal cues  -KG     Assistive Device (Gait) other (see comments)  UE support  -KG     Distance in Feet (Gait) 40  -KG     Deviations/Abnormal Patterns (Gait) bilateral deviations;base of support, narrow;[place] decreased;festinating/shuffling;stride length decreased  -KG     Bilateral Gait Deviations forward flexed posture;heel strike decreased  -KG     Comment,  (Gait/Stairs) Pt demonstrated step to gait pattern with very slow [place] and short, shuffled steps. Pt demonstrated very forward flexed posture with increased melonie knee flexion throughout ambulation; unable to correct despite cues. Pt very unsteady; UE support for increased stability, but pt with tendency to reach out for walls. Would benefit from use of RW. Deferred additional ambulation due to worsening balance.  -KG               User Key  (r) = Recorded By, (t) = Taken By, (c) = Cosigned By      Initials Name Provider Type    KG Misty Shipley, PT Physical Therapist                   Obj/Interventions       Row Name 06/29/24 1413          Range of Motion Comprehensive    General Range of Motion no range of motion deficits identified  -KG     Comment, General Range of Motion B LE WFL  -KG       Row Name 06/29/24 1413          Strength Comprehensive (MMT)    Comment, General Manual Muscle Testing (MMT) Assessment B LE grossly 3+/5  -KG       Row Name 06/29/24 1413          Balance    Balance Assessment sitting static balance;standing static balance;standing dynamic balance  -KG     Static Sitting Balance contact guard  -KG     Position, Sitting Balance unsupported;sitting edge of bed  -KG     Static Standing Balance minimal assist  -KG     Dynamic Standing Balance minimal assist  -KG     Position/Device Used, Standing Balance supported  -KG       Row Name 06/29/24 1413          Sensory Assessment (Somatosensory)    Sensory Assessment (Somatosensory) unable/difficult to assess  -KG               User Key  (r) = Recorded By, (t) = Taken By, (c) = Cosigned By      Initials Name Provider Type    KG Misty Shipley, PT Physical Therapist                   Goals/Plan       Row Name 06/29/24 1414          Bed Mobility Goal 1 (PT)    Activity/Assistive Device (Bed Mobility Goal 1, PT) sit to supine;supine to sit  -KG     [place] Level/Cues Needed (Bed Mobility Goal 1, PT) standby assist  -KG     Time  Frame (Bed Mobility Goal 1, PT) short term goal (STG);3 days  -KG     Progress/Outcomes (Bed Mobility Goal 1, PT) goal ongoing  -KG       Row Name 06/29/24 1414          Transfer Goal 1 (PT)    Activity/Assistive Device (Transfer Goal 1, PT) sit-to-stand/stand-to-sit;bed-to-chair/chair-to-bed;walker, rolling  -KG     [place] Level/Cues Needed (Transfer Goal 1, PT) standby assist  -KG     Time Frame (Transfer Goal 1, PT) long term goal (LTG);1 week  -KG     Progress/Outcome (Transfer Goal 1, PT) goal ongoing  -KG       Row Name 06/29/24 1414          Gait Training Goal 1 (PT)    Activity/Assistive Device (Gait Training Goal 1, PT) gait (walking locomotion);assistive device use;walker, rolling  -KG     [place] Level (Gait Training Goal 1, PT) contact guard required  -KG     Distance (Gait Training Goal 1, PT) 100 feet  -KG     Time Frame (Gait Training Goal 1, PT) long term goal (LTG);1 week  -KG     Progress/Outcome (Gait Training Goal 1, PT) goal ongoing  -KG       Row Name 06/29/24 1414          Therapy Assessment/Plan (PT)    Planned Therapy Interventions (PT) balance training;bed mobility training;gait training;strengthening;transfer training  -KG               User Key  (r) = Recorded By, (t) = Taken By, (c) = Cosigned By      Initials Name Provider Type    KG Misty Shipley N, PT Physical Therapist                   Clinical Impression       Row Name 06/29/24 1413          Pain    Pretreatment Pain Rating 0/10 - no pain  -KG     Posttreatment Pain Rating 0/10 - no pain  -KG       Row Name 06/29/24 1413          Plan of Care Review    Plan of Care Reviewed With patient  -KG     Outcome Evaluation PT initial evaluation completed for pt presenting with generalized weakness, impaired balance and coordination, increased confusion, poor safety awareness, and decreased functional mobility. Pt ambulated 40ft with Rod and UE support. Pt's decreased independence warrants PT skilled care. Recommend D/C to  SNF.  -KG       Row Name 06/29/24 1413          Therapy Assessment/Plan (PT)    Patient/Family Therapy Goals Statement (PT) pt unable to state  -KG     Rehab Potential (PT) fair, will monitor progress closely  -KG     Criteria for Skilled Interventions Met (PT) yes;skilled treatment is necessary  -KG     Therapy Frequency (PT) daily  -KG     Predicted Duration of Therapy Intervention (PT) 7 days  -KG       Row Name 06/29/24 1413          Vital Signs    Pre Systolic BP Rehab 144  -KG     Pre Treatment Diastolic BP 80  -KG     Intra Systolic BP Rehab 143  -KG     Intra Treatment Diastolic BP 51  -KG     Post Systolic BP Rehab 144  -KG     Post Treatment Diastolic BP 92  -KG     Pretreatment Heart Rate (beats/min) 59  -KG     Posttreatment Heart Rate (beats/min) 69  -KG     O2 Delivery Pre Treatment room air  -KG     O2 Delivery Post Treatment room air  -KG     Pre Patient Position Supine  -KG     Intra Patient Position Standing  -KG     Post Patient Position Supine  -KG       Row Name 06/29/24 1413          Positioning and Restraints    Pre-Treatment Position in bed  -KG     Post Treatment Position bed  -KG     In Bed notified nsg;supine;call light within reach;encouraged to call for assist;exit alarm on;side rails up x2  -KG               User Key  (r) = Recorded By, (t) = Taken By, (c) = Cosigned By      Initials Name Provider Type    KG Misty Shipley, PT Physical Therapist                   Outcome Measures       Row Name 06/29/24 1415 06/29/24 0800       How much help from another person do you currently need...    Turning from your back to your side while in flat bed without using bedrails? 3  -KG 3  -CB    Moving from lying on back to sitting on the side of a flat bed without bedrails? 3  -KG 3  -CB    Moving to and from a bed to a chair (including a wheelchair)? 3  -KG 3  -CB    Standing up from a chair using your arms (e.g., wheelchair, bedside chair)? 3  -KG 3  -CB    Climbing 3-5 steps with a  railing? 2  -KG 1  -CB    To walk in hospital room? 2  -KG 2  -CB    AM-PAC 6 Clicks Score (PT) 16  -KG 15  -CB    Highest Level of Mobility Goal 5 --> Static standing  -KG 4 --> Transfer to chair/commode  -CB      Row Name 06/29/24 0518 06/29/24 0515       How much help from another person do you currently need...    Turning from your back to your side while in flat bed without using bedrails? 3  -SM 3  -SM    Moving from lying on back to sitting on the side of a flat bed without bedrails? 3  -SM 3  -SM    Moving to and from a bed to a chair (including a wheelchair)? 3  -SM 3  -SM    Standing up from a chair using your arms (e.g., wheelchair, bedside chair)? 3  -SM 3  -SM    Climbing 3-5 steps with a railing? 3  -SM 3  -SM    To walk in hospital room? 3  -SM 3  -SM    AM-PAC 6 Clicks Score (PT) 18  -SM 18  -SM    Highest Level of Mobility Goal 6 --> Walk 10 steps or more  -SM 6 --> Walk 10 steps or more  -SM      Row Name 06/29/24 1415          Functional Assessment    Outcome Measure Options AM-PAC 6 Clicks Basic Mobility (PT)  -KG               User Key  (r) = Recorded By, (t) = Taken By, (c) = Cosigned By      Initials Name Provider Type    Norah Gomez, RN Registered Nurse    KG Misty Shipley, PT Physical Therapist     Jake Ritchie RN Registered Nurse                                 Physical Therapy Education       Title: PT OT SLP Therapies (In Progress)       Topic: Physical Therapy (In Progress)       Point: Mobility training (In Progress)       Learning Progress Summary             Patient Acceptance, E, NR by KG at 6/29/2024 1001                         Point: Home exercise program (Not Started)       Learner Progress:  Not documented in this visit.              Point: Body mechanics (In Progress)       Learning Progress Summary             Patient Acceptance, E, NR by KG at 6/29/2024 1001                         Point: Precautions (In Progress)       Learning Progress Summary              Patient Acceptance, E, NR by KG at 6/29/2024 1001                                         User Key       Initials Effective Dates Name Provider Type Discipline    KG 05/22/20 -  Misty Shipley, PT Physical Therapist PT                  PT Recommendation and Plan  Planned Therapy Interventions (PT): balance training, bed mobility training, gait training, strengthening, transfer training  Plan of Care Reviewed With: patient  Outcome Evaluation: PT initial evaluation completed for pt presenting with generalized weakness, impaired balance and coordination, increased confusion, poor safety awareness, and decreased functional mobility. Pt ambulated 40ft with Rod and UE support. Pt's decreased independence warrants PT skilled care. Recommend D/C to SNF.     Time Calculation:   PT Evaluation Complexity  History, PT Evaluation Complexity: 1-2 personal factors and/or comorbidities  Examination of Body Systems (PT Eval Complexity): total of 3 or more elements  Clinical Presentation (PT Evaluation Complexity): stable  Clinical Decision Making (PT Evaluation Complexity): low complexity  Overall Complexity (PT Evaluation Complexity): low complexity     PT Charges       Row Name 06/29/24 1001             Time Calculation    Start Time 1001  -KG      PT Received On 06/29/24  -KG      PT Goal Re-Cert Due Date 07/09/24  -KG         Time Calculation- PT    Total Timed Code Minutes- PT 8 minute(s)  -KG         Timed Charges    75765 - PT Therapeutic Activity Minutes 8  -KG         Untimed Charges    PT Eval/Re-eval Minutes 46  -KG         Total Minutes    Timed Charges Total Minutes 8  -KG      Untimed Charges Total Minutes 46  -KG       Total Minutes 54  -KG                User Key  (r) = Recorded By, (t) = Taken By, (c) = Cosigned By      Initials Name Provider Type    KG Misty Shipley, PT Physical Therapist                  Therapy Charges for Today       Code Description Service Date Service Provider Modifiers Qty     39346489848 HC PT THERAPEUTIC ACT EA 15 MIN 6/29/2024 Misty Shipley, PT GP 1    76249109115 HC PT EVAL LOW COMPLEXITY 4 6/29/2024 Misty Shipley, PT GP 1            PT G-Codes  Outcome Measure Options: AM-PAC 6 Clicks Basic Mobility (PT)  AM-PAC 6 Clicks Score (PT): 16  PT Discharge Summary  Anticipated Discharge Disposition (PT): skilled nursing facility    Lauren Shipley, PT  6/29/2024

## 2024-06-29 NOTE — CONSULTS
Malnutrition Severity Assessment    Patient Name:  Arcelia Walter  YOB: 1945  MRN: [id number]  Admit Date:  6/28/2024    Patient meets criteria for : Severe Malnutrition    Comments:  Pt meets criteria for chronic, severe malnutrition with the indicators of severe muscle wasting and subcutaneous fat loss. 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     Malnutrition Severity Assessment  Malnutrition Type: Chronic Disease - Related Malnutrition  Malnutrition Type (Last 8 Hours)       Malnutrition Severity Assessment       Row Name 06/29/24 1030       Malnutrition Severity Assessment    Malnutrition Type Chronic Disease - Related Malnutrition      Row Name 06/29/24 1030       Muscle Loss    Loss of Muscle Mass Findings Severe    [place] Moderate - slight depression    Clavicle Bone Region Severe - protruding prominent bone    Acromion Bone Region Severe - squared shoulders, bones, and acromion process protrusion prominent    Scapular Bone Region Severe - prominent bones, depressions easily visible between ribs, scapula, spine, shoulders    Dorsal Hand Region Severe - prominent depression    Patellar Region Severe - prominent bone, square looking, very little muscle definition    Anterior Thigh Region Severe - line/depression along thigh, obviously thin    Posterior Calf Region Severe - thin with very little definition/firmness      Row Name 06/29/24 1030       Fat Loss    Subcutaneous Fat Loss Findings Severe    Orbital Region  Moderate -  somewhat hollowness, slightly dark circles    Upper Arm Region Severe - mostly skin, very little space between folds, fingers touch    Thoracic & Lumbar Region Severe - ribs visible with prominent depressions, iliac crest very prominent      Row Name 06/29/24 1030       Fluid Accumulation (Edema)    Fluid Accumulation  Severe equals 3+ or 4+ pitting edema  [place] feet      Row Name 06/29/24 1030       Criteria  Met (Must meet criteria for severity in at least 2 of these categories: M Wasting, Fat Loss, Fluid, Secondary Signs, Wt. Status, Intake)    Patient meets criteria for  Severe Malnutrition                    Electronically signed by:  Nani Kay MS,RD,LD  06/29/24 10:39 EDT

## 2024-06-29 NOTE — ED PROVIDER NOTES
Subjective   History of Present Illness  This is a 79-year-old female presenting to the emergency department after a fall.  The patient states that she fell about 2 days ago.  She does not remember falling or if she passed out.  However she did lay on the ground for approximately 2 days.  A neighbor called to have someone check on her and EMS fall and her on the ground.  She was severely hypoglycemic.  They did administer D10.  Patient states that she is not having any pain.  She just feels weak overall.  Denies any headache or change in vision.  No focal weakness.  No fevers or chills.  No chest pain or shortness of breath.  No abdominal pain or vomiting.    History provided by:  Patient   used: No        Review of Systems   Constitutional:  Positive for fatigue. Negative for chills and fever.   HENT:  Negative for congestion, ear pain and sore throat.    Eyes:  Negative for visual disturbance.   Respiratory:  Negative for shortness of breath.    Cardiovascular:  Negative for chest pain.   Gastrointestinal:  Negative for abdominal pain.   Genitourinary:  Negative for difficulty urinating.   Musculoskeletal:  Negative for arthralgias.   Skin:  Negative for rash.   Neurological:  Negative for dizziness, weakness and numbness.   Psychiatric/Behavioral:  Negative for agitation.        No past medical history on file.    No Known Allergies    No past surgical history on file.    No family history on file.    Social History     Socioeconomic History    Marital status: Single           Objective   Physical Exam  Vitals and nursing note reviewed.   Constitutional:       General: She is not in acute distress.     Appearance: She is not ill-appearing or toxic-appearing.   HENT:      Mouth/Throat:      Pharynx: No posterior oropharyngeal erythema.   Eyes:      Conjunctiva/sclera: Conjunctivae normal.      Pupils: Pupils are equal, round, and reactive to light.   Cardiovascular:      Rate and Rhythm:  Regular rhythm. Bradycardia present.   Pulmonary:      Effort: Pulmonary effort is normal. No respiratory distress.   Abdominal:      General: Abdomen is flat. There is no distension.      Palpations: There is no mass.      Tenderness: There is no abdominal tenderness. There is no guarding or rebound.   Musculoskeletal:         General: No deformity. Normal range of motion.   Skin:     General: Skin is warm.      Findings: No rash.   Neurological:      General: No focal deficit present.      Mental Status: She is alert and oriented to person, place, and time.      Motor: No weakness.         ECG 12 Lead      Date/Time: 6/29/2024 12:47 AM    Performed by: Shree Dougherty MD  Authorized by: Shree Dougherty MD  Interpreted by ED physician  Previous ECG: no previous ECG available  Rhythm: sinus bradycardia  Rate: bradycardic  BPM: 58  QRS axis: left  Conduction: conduction normal  Other findings: prolonged QTc interval  Other findings comments: Nonspecific ST changes  Clinical impression: non-specific ECG and low voltage  Comments: Interpretation:  EKG was directly visualized by myself, interpretations as documented in hospital course.               ED Course  ED Course as of 06/29/24 0400   Sat Jun 29, 2024   0048 BP: 147/68 [JK]   0048 Temp: 97.5 °F (36.4 °C) [JK]   0048 Temp src: Oral [JK]   0048 Heart Rate: 67 [JK]   0048 Resp: 18 [JK]   0048 SpO2: 96 %  Interpretation:  Patient's repeat vitals, telemetry tracing, and pulse oximetry tracing were directly viewed and interpreted by myself.  Normal sinus rhythm [JK]   0358 CBC & Differential(!) [JK]   0358 Sedimentation Rate(!) [JK]   0358 Urinalysis With Microscopic If Indicated (No Culture) - Straight Cath(!) [JK]   0358 High Sensitivity Troponin T 2Hr(!!) [JK]   0358 High Sensitivity Troponin T(!!) [JK]   0358 Procalcitonin [JK]   0358 aPTT(!) [JK]   0358 Phosphorus(!) [JK]   0358 Protime-INR [JK]   0358 Magnesium [JK]   0358 CK [JK]   0358 C-reactive  Protein(!) [JK]   0358 BNP(!) [JK]   0358 Comprehensive Metabolic Panel(!) [JK]   0358 Lactic Acid, Plasma  Interpretation:  Laboratory studies were reviewed and interpreted directly by myself.  CMP showed some minor hypokalemia with potassium 3.1, hypocalcemia with a calcium of 8.4, CBC showed leukocytosis with white blood cell count of 15.6, sed rate was elevated at 40, urinalysis showed some ketosis, initial troponin was 61, repeat was 59 with a delta Of -2, Pro-Isac normal, coags normal, CK normal, lipase normal, BNP was elevated 4377, lactic normal, [JK]   0359 XR Pelvis 1 or 2 View [JK]   0359 XR Chest 1 View  Interpretation:  Imaging was directly visualized by myself, per my interpretations, chest x-ray was unremarkable.  Pelvis x-ray showed some chronic changes. [JK]   0359 On reevaluation, the patient appears to be at her baseline.  However given the syncopal episode and prolonged downtime on the floor, I am concerned for her living status at home.  I would also like to monitor for any worsening kidney function elevation CK.  Patient be admitted to the hospital for further evaluation and treatment [JK]   0359 Based on the patient's presentation, history and diffuse work-up in the emergency department, the patient is deemed appropriate for admission to the hospital for further evaluation and treatment.  This was discussed with the patient at bedside.  They are in agreement with the current medical management.    Admitting physician: Dr. Salvador    Discussion was had with admitting physician regarding the laboratory and imaging findings.  We did discuss current therapeutics in the emergency department and progression of the patient.  Working diagnosis was conveyed to the admitting physician, as well as current status and prognosis for the patient.  They are in agreement with these findings and have accepted admission.    Shared decision making:   After full review of the patient's clinical presentation,  review of any work-up including but not limited to laboratory studies and radiology obtained, I had a discussion with the patient.  Treatment options were discussed as well as the risks, benefits and consequences.  I discussed all findings with the patient and family members if available.  During the discussion, treatment goals were understood by all as well as any misconceptions which were addressed with the patient.  Ample time was given for any questions they may have had.  They are in agreement with the treatment plan as well as final disposition. [JK]      ED Course User Index  [JK] Shree Dougherty MD                                             Medical Decision Making  This is a 79-year-old female presented to the emergency department after an accidental fall.  The patient had a fall 2 days ago and was laid on the ground since.  This was not witnessed.  Patient unsure if she had syncope or not.  She appeared to be significantly hypoglycemic by EMS on initial presentation.  I am concerned for rhabdomyolysis and metabolic derangement.  Overall, the patient is nontoxic.  Afebrile.  IV access was established and the patient.  Placed on continuous telemetry monitoring.  Given the patient's presentation, differential is broad and will require further evaluation.  Workup initiated.      Differential diagnosis: Acute kidney injury, electrolyte abnormality, rhabdomyolysis, hyperglycemia, intracranial bleeding, pneumonia      Amount and/or Complexity of Data Reviewed  Independent Historian: EMS  Labs: ordered. Decision-making details documented in ED Course.  Radiology: ordered and independent interpretation performed. Decision-making details documented in ED Course.  ECG/medicine tests: ordered and independent interpretation performed. Decision-making details documented in ED Course.    Risk  Prescription drug management.  Decision regarding hospitalization.        Final diagnoses:   Syncope, unspecified syncope  type   Ketosis       ED Disposition  ED Disposition       ED Disposition   Decision to Admit    Condition   --    Comment   Level of Care: Telemetry [5]   Diagnosis: Syncope [541245]   Admitting Physician: KATHLEEN BURLESON [[id number]]   Attending Physician: KATHLEEN BURLESON [[id number]]                 No follow-up provider specified.       Medication List      No changes were made to your prescriptions during this visit.            Shree Dougherty MD  06/29/24 0404

## 2024-06-29 NOTE — PROGRESS NOTES
[facility] Medicine Services  ADMISSION FOLLOW-UP NOTE          Patient admitted after midnight, H&P by my partner performed earlier on today's date reviewed.  Interim findings, labs, and charting also reviewed.        [facility] Hospital Problem List has been managed and updated to include any new diagnoses:  Active Hospital Problems    Diagnosis  POA    **Syncope [R55]  Yes    Hypokalemia [E87.6]  Yes    Elevated troponin [R79.89]  Yes    Elevated C-reactive protein (CRP) [R79.82]  Yes    Elevated sed rate [R70.0]  Yes    Leukocytosis [D72.829]  Yes      Resolved Hospital Problems   No resolved problems to display.         ADDITIONAL PLAN:  - detailed assessment and plan from admission reviewed  - Arcelia Walter is a 79-year-old female brought in per EMS after being found down during a welfare check.  Unclear of exact events.  No emergency contact on file.       Syncope  Altered mental status  - Unclear exact events, originally reported to EMS on the floor x 2 days, later states that she is unsure what happened  - CT head w atrophy and chronic microvascular ischemic change but no acute intracranial process  - Ethanol neg  - UDS pending  - A1c and TSH wnl     Elevated troponin  - Patient currently denies chest pain  - EKG without acute ischemia  - Trending down       Leukocytosis  Elevated CRP  Elevated sed rate  - Procalcitonin, lactic normal  - BC x 2 pending  - CBC in the a.m.  - UA negative for acute findings  - CXR negative for acute findings  --hold on abx currently     Hypokalemia  - Electrolyte replacement     Malnourishment  Failure to thrive  - No emergency contact, unclear living situation  - Case management/ consult  --PT/OT  --nutrition consult    Expected Discharge   Expected Discharge Date: 7/3/2024; Expected Discharge Time:      Kaitlynn Chu MD  06/29/24

## 2024-06-29 NOTE — PLAN OF CARE
Goal Outcome Evaluation:  Plan of Care Reviewed With: patient           Outcome Evaluation: PT initial evaluation completed for pt presenting with generalized weakness, impaired balance and coordination, increased confusion, poor safety awareness, and decreased functional mobility. Pt ambulated 40ft with Rod and UE support. Pt's decreased independence warrants PT skilled care. Recommend D/C to SNF.      Anticipated Discharge Disposition (PT): skilled nursing facility

## 2024-06-30 ENCOUNTER — APPOINTMENT (OUTPATIENT)
Dept: CARDIOLOGY | Facility: HOSPITAL | Age: 79
DRG: 884 | End: 2024-06-30
Payer: MEDICARE

## 2024-06-30 LAB
ALBUMIN SERPL-MCNC: 2.3 G/DL (ref 3.5–5.2)
ALBUMIN/GLOB SERPL: 1 G/DL
ALP SERPL-CCNC: 112 U/L (ref 39–117)
ALT SERPL W P-5'-P-CCNC: 14 U/L (ref 1–33)
ANION GAP SERPL CALCULATED.3IONS-SCNC: 9 MMOL/L (ref 5–15)
AST SERPL-CCNC: 19 U/L (ref 1–32)
BASOPHILS # BLD MANUAL: 0 10*3/MM3 (ref 0–0.2)
BASOPHILS NFR BLD MANUAL: 0 % (ref 0–1.5)
BILIRUB SERPL-MCNC: 0.2 MG/DL (ref 0–1.2)
BUN SERPL-MCNC: 7 MG/DL (ref 8–23)
BUN/CREAT SERPL: 23.3 (ref 7–25)
CALCIUM SPEC-SCNC: 7.8 MG/DL (ref 8.6–10.5)
CHLORIDE SERPL-SCNC: 103 MMOL/L (ref 98–107)
CO2 SERPL-SCNC: 25 MMOL/L (ref 22–29)
CREAT SERPL-MCNC: 0.3 MG/DL (ref 0.57–1)
DEPRECATED RDW RBC AUTO: 53.2 FL (ref 37–54)
EGFRCR SERPLBLD CKD-EPI 2021: 108.7 ML/MIN/1.73
EOSINOPHIL # BLD MANUAL: 0.17 10*3/MM3 (ref 0–0.4)
EOSINOPHIL NFR BLD MANUAL: 1 % (ref 0.3–6.2)
ERYTHROCYTE [DISTWIDTH] IN BLOOD BY AUTOMATED COUNT: 15.4 % (ref 12.3–15.4)
GLOBULIN UR ELPH-MCNC: 2.3 GM/DL
GLUCOSE SERPL-MCNC: 88 MG/DL (ref 65–99)
HCT VFR BLD AUTO: 34.5 % (ref 34–46.6)
HGB BLD-MCNC: 11.2 G/DL (ref 12–15.9)
LYMPHOCYTES # BLD MANUAL: 2.88 10*3/MM3 (ref 0.7–3.1)
LYMPHOCYTES NFR BLD MANUAL: 2 % (ref 5–12)
MCH RBC QN AUTO: 30.6 PG (ref 26.6–33)
MCHC RBC AUTO-ENTMCNC: 32.5 G/DL (ref 31.5–35.7)
MCV RBC AUTO: 94.3 FL (ref 79–97)
MONOCYTES # BLD: 0.34 10*3/MM3 (ref 0.1–0.9)
NEUTROPHILS # BLD AUTO: 13.56 10*3/MM3 (ref 1.7–7)
NEUTROPHILS NFR BLD MANUAL: 63 % (ref 42.7–76)
NEUTS BAND NFR BLD MANUAL: 17 % (ref 0–5)
PLAT MORPH BLD: NORMAL
PLATELET # BLD AUTO: 482 10*3/MM3 (ref 140–450)
PMV BLD AUTO: 10 FL (ref 6–12)
POTASSIUM SERPL-SCNC: 4.6 MMOL/L (ref 3.5–5.2)
PROT SERPL-MCNC: 4.6 G/DL (ref 6–8.5)
RBC # BLD AUTO: 3.66 10*6/MM3 (ref 3.77–5.28)
RBC MORPH BLD: NORMAL
SODIUM SERPL-SCNC: 137 MMOL/L (ref 136–145)
VARIANT LYMPHS NFR BLD MANUAL: 17 % (ref 19.6–45.3)
WBC MORPH BLD: NORMAL
WBC NRBC COR # BLD AUTO: 16.95 10*3/MM3 (ref 3.4–10.8)

## 2024-06-30 PROCEDURE — 93306 TTE W/DOPPLER COMPLETE: CPT

## 2024-06-30 PROCEDURE — G0378 HOSPITAL OBSERVATION PER HR: HCPCS

## 2024-06-30 PROCEDURE — 85025 COMPLETE CBC W/AUTO DIFF WBC: CPT | Performed by: STUDENT IN AN ORGANIZED HEALTH CARE EDUCATION/TRAINING PROGRAM

## 2024-06-30 PROCEDURE — 85007 BL SMEAR W/DIFF WBC COUNT: CPT | Performed by: STUDENT IN AN ORGANIZED HEALTH CARE EDUCATION/TRAINING PROGRAM

## 2024-06-30 PROCEDURE — 99232 SBSQ HOSP IP/OBS MODERATE 35: CPT | Performed by: STUDENT IN AN ORGANIZED HEALTH CARE EDUCATION/TRAINING PROGRAM

## 2024-06-30 PROCEDURE — 80053 COMPREHEN METABOLIC PANEL: CPT | Performed by: STUDENT IN AN ORGANIZED HEALTH CARE EDUCATION/TRAINING PROGRAM

## 2024-06-30 PROCEDURE — 93306 TTE W/DOPPLER COMPLETE: CPT | Performed by: INTERNAL MEDICINE

## 2024-06-30 RX ADMIN — THIAMINE HCL TAB 100 MG 100 MG: 100 TAB at 08:08

## 2024-06-30 RX ADMIN — DEXTROSE AND SODIUM CHLORIDE 100 ML/HR: 5; 450 INJECTION, SOLUTION INTRAVENOUS at 14:43

## 2024-06-30 RX ADMIN — DEXTROSE AND SODIUM CHLORIDE 100 ML/HR: 5; 450 INJECTION, SOLUTION INTRAVENOUS at 01:23

## 2024-06-30 RX ADMIN — ACETAMINOPHEN 650 MG: 325 TABLET ORAL at 20:11

## 2024-06-30 NOTE — PROGRESS NOTES
[facility] Medicine Services  PROGRESS NOTE    Patient Name: Arcelia Walter  : 1945  MRN: [id number]    Date of Admission: 2024  Primary Care Physician: Provider, No Known    Subjective   Subjective     CC:  F/u AMS    HPI:  No new overnight events. We discussed malnutrition and needing to inc PO intake. She seems persistently confused but comfortable      Objective   Objective     Vital Signs:   Temp:  [97.9 °F (36.6 °C)-98.1 °F (36.7 °C)] 98.1 °F (36.7 °C)  Heart Rate:  [57-75] 57  Resp:  [15-17] 16  BP: (135-169)/(62-86) 148/62     Physical Exam:  Constitutional: elderly, frail and thin appearing  HENT: NCAT, mucous membranes moist  Respiratory: Clear to auscultation bilaterally, respiratory effort normal   Cardiovascular: RRR, no murmurs, rubs, or gallops  Gastrointestinal: Positive bowel sounds, soft, nontender, nondistended  Musculoskeletal: No bilateral ankle edema  Psychiatric: flat affect, cooperative  Neurologic: Oriented to person only, no other focal deficits, follows commands, globally weak  Skin: No rashes      Results Reviewed:  LAB RESULTS:      Lab 24  0728 24  18024  0033   WBC 16.95* 20.87* 15.60*   HEMOGLOBIN 11.2* 11.2* 12.3   HEMATOCRIT 34.5 33.4* 37.5   PLATELETS 482* 429 510*   NEUTROS ABS 13.56* 19.22* 14.21*   IMMATURE GRANS (ABS)  --  0.10* 0.08*   LYMPHS ABS  --  0.91 0.82   MONOS ABS  --  0.54 0.41   EOS ABS 0.17 0.01 0.02   MCV 94.3 93.0 93.3   SED RATE  --   --  40*   CRP  --   --  16.13*   PROCALCITONIN  --   --  0.15   LACTATE  --   --  1.8   PROTIME  --   --  14.2   APTT  --   --  40.4*         Lab 24  0728 24  1806 24  1048 24  0711 24  0033   SODIUM 137  --   --  137 141   POTASSIUM 4.6 4.3  --  3.4* 3.1*   CHLORIDE 103  --   --  100 101   CO2 25.0  --   --  23.0 25.0   ANION GAP 9.0  --   --  14.0 15.0   BUN 7*  --   --  10 10   CREATININE 0.30*  --   --  0.29* 0.34*   EGFR 108.7  --   --  109.6  105.5   GLUCOSE 88  --   --  90 174*   CALCIUM 7.8*  --   --  8.3* 8.4*   MAGNESIUM  --   --   --  2.1 1.8   PHOSPHORUS  --   --  2.6 2.2* 2.1*   HEMOGLOBIN A1C  --   --   --   --  5.20   TSH  --   --   --  2.170  --          Lab 06/30/24  0728 06/29/24  0033   TOTAL PROTEIN 4.6* 5.6*   ALBUMIN 2.3* 2.6*   GLOBULIN 2.3 3.0   ALT (SGPT) 14 15   AST (SGOT) 19 28   BILIRUBIN 0.2 0.3   ALK PHOS 112 127*   LIPASE  --  15         Lab 06/29/24  0240 06/29/24  0033   PROBNP  --  4,377.0*   HSTROP T 59* 61*   PROTIME  --  14.2   INR  --  1.09         Lab 06/29/24  0711   CHOLESTEROL 173   LDL CHOL 93   HDL CHOL 54   TRIGLYCERIDES 147         Lab 06/29/24  1049   VITAMIN B 12 1,186*         Brief Urine Lab Results  (Last result in the past 365 days)        Color   Clarity   Blood   Leuk Est   Nitrite   Protein   CREAT   Urine HCG        06/29/24 0038 Yellow   Clear   Negative   Negative   Negative   Trace                   Microbiology Results Abnormal       None            CT Head Without Contrast    Result Date: 6/29/2024  CT HEAD WO CONTRAST Date of Exam: 6/29/2024 4:36 AM EDT Indication: syncope, fall. Comparison: None available. Technique: Axial CT images were obtained of the head without contrast administration.  Automated exposure control and iterative construction methods were used. Findings: There is mild diffuse generalized atrophy. There are low-attenuation areas in the periventricular white matter consistent with chronic microvascular ischemic change. There is no mass, mass effect or midline shift. There are no abnormal extra-axial fluid collections or areas of acute hemorrhage. The paranasal sinuses are clear. The mastoid air cells are clear.     Impression: Impression: Atrophy and chronic microvascular ischemic change. No acute intracranial process. Electronically Signed: Curly Jack MD  6/29/2024 4:55 AM EDT  Workstation ID: [id number]    XR Pelvis 1 or 2 View    Result Date: 6/29/2024  XR PELVIS 1 OR 2 VW Date  of Exam: 6/29/2024 12:02 AM EDT Indication: fall Comparison: None available. Findings: There is no evidence of an acute fracture. There is no evidence of hip dislocation. There are no lytic or destructive bony lesions.     Impression: Impression: No acute bony abnormality. Electronically Signed: Curly Jack MD  6/29/2024 12:44 AM EDT  Workstation ID: [id number]    XR Chest 1 View    Result Date: 6/29/2024  XR CHEST 1 VW Date of Exam: 6/29/2024 12:01 AM EDT Indication: fall Comparison: None available. Findings: The heart size and pulmonary vascular markings are normal. There is diffuse emphysema. There are no focal consolidations to indicate pneumonia. The osseous structures are normal for age.     Impression: Impression: Emphysema. No active disease. Electronically Signed: Curly Jack MD  6/29/2024 12:42 AM EDT  Workstation ID: [id number]         Current medications:  Scheduled Meds:sodium chloride, 10 mL, Intravenous, Q12H  thiamine, 100 mg, Oral, Daily      Continuous Infusions:dextrose 5 % and sodium chloride 0.45 %, 100 mL/hr, Last Rate: 100 mL/hr (06/30/24 1346)      PRN Meds:.  acetaminophen    senna-docusate sodium **AND** polyethylene glycol **AND** bisacodyl **AND** bisacodyl    Calcium Replacement - Follow Nurse / BPA Driven Protocol    Magnesium Standard Dose Replacement - Follow Nurse / BPA Driven Protocol    Phosphorus Replacement - Follow Nurse / BPA Driven Protocol    Potassium Replacement - Follow Nurse / BPA Driven Protocol    [COMPLETED] Insert Peripheral IV **AND** sodium chloride    sodium chloride    sodium chloride    Assessment & Plan   Assessment & Plan     Active Hospital Problems    Diagnosis  POA    **Syncope [R55]  Yes    Hypokalemia [E87.6]  Yes    Elevated troponin [R79.89]  Yes    Elevated C-reactive protein (CRP) [R79.82]  Yes    Elevated sed rate [R70.0]  Yes    Leukocytosis [D72.829]  Yes      Resolved Hospital Problems   No resolved problems to display.        Brief Hospital Course  to date:  Arcelia Walter is a 79 y.o. female brought in per EMS after being found down during a welfare check.  Unclear of exact events.  No emergency contact on file.       Syncope  Altered mental status  - Unclear exact events, originally reported to EMS on the floor x 2 days, later states that she is unsure what happened  - CT head w atrophy and chronic microvascular ischemic change but no acute intracranial process  - Ethanol neg  - UDS pending  --echo pending  - A1c and TSH wnl     Elevated troponin  - Patient currently denies chest pain  - EKG without acute ischemia  - Trending down        Leukocytosis  Elevated CRP  Elevated sed rate  - Procalcitonin, lactic normal  - BC x 2 in process  - CBC in the a.m.  - UA negative for acute findings  - CXR negative for acute findings  --hold on abx currently     Hypokalemia  - Electrolyte replacement     Malnourishment  Failure to thrive  - No emergency contact, unclear living situation  - Case management/ consult  --PT/OT  --nutrition consult         Expected Discharge Location and Transportation: placement likely  Expected Discharge   Expected Discharge Date: 7/3/2024; Expected Discharge Time:      VTE Prophylaxis:  Mechanical VTE prophylaxis orders are present.         AM-PAC 6 Clicks Score (PT): 16 (06/30/24 0810)    CODE STATUS:   Code Status and Medical Interventions:   Ordered at: 06/29/24 0459     Code Status (Patient has no pulse and is not breathing):    CPR (Attempt to Resuscitate)     Medical Interventions (Patient has pulse or is breathing):    Full Support       Kaitlynn Chu MD  06/30/24

## 2024-06-30 NOTE — PLAN OF CARE
Goal Outcome Evaluation:  Plan of Care Reviewed With: patient        Progress: no change  Outcome Evaluation: Poor ability to sleep through the night, Up several times, removes purwick and is incontinent of urine. Disoriented to place and situation. Repositioned several times through the night. IV patent with coban to protect site.

## 2024-07-01 PROBLEM — R41.82 AMS (ALTERED MENTAL STATUS): Status: ACTIVE | Noted: 2024-01-01

## 2024-07-01 LAB
ANION GAP SERPL CALCULATED.3IONS-SCNC: 13 MMOL/L (ref 5–15)
BASOPHILS # BLD MANUAL: 0 10*3/MM3 (ref 0–0.2)
BASOPHILS NFR BLD MANUAL: 0 % (ref 0–1.5)
BH CV ECHO MEAS - AI P1/2T: 661.8 MSEC
BH CV ECHO MEAS - AO MAX PG: 7.1 MMHG
BH CV ECHO MEAS - AO MEAN PG: 4 MMHG
BH CV ECHO MEAS - AO ROOT DIAM: 2.7 CM
BH CV ECHO MEAS - AO V2 MAX: 133 CM/SEC
BH CV ECHO MEAS - AO V2 VTI: 25 CM
BH CV ECHO MEAS - AVA(I,D): 2.13 CM2
BH CV ECHO MEAS - EDV(CUBED): 42.9 ML
BH CV ECHO MEAS - EDV(MOD-SP2): 55.9 ML
BH CV ECHO MEAS - EDV(MOD-SP4): 69.2 ML
BH CV ECHO MEAS - EF(MOD-BP): 57.8 %
BH CV ECHO MEAS - EF(MOD-SP2): 53.7 %
BH CV ECHO MEAS - EF(MOD-SP4): 57.8 %
BH CV ECHO MEAS - ESV(CUBED): 15.6 ML
BH CV ECHO MEAS - ESV(MOD-SP2): 25.9 ML
BH CV ECHO MEAS - ESV(MOD-SP4): 29.2 ML
BH CV ECHO MEAS - FS: 28.6 %
BH CV ECHO MEAS - IVS/LVPW: 1.17 CM
BH CV ECHO MEAS - IVSD: 0.7 CM
BH CV ECHO MEAS - LA DIMENSION: 2.7 CM
BH CV ECHO MEAS - LAT PEAK E' VEL: 11 CM/SEC
BH CV ECHO MEAS - LV DIASTOLIC VOL/BSA (35-75): 53.2 CM2
BH CV ECHO MEAS - LV MASS(C)D: 56.9 GRAMS
BH CV ECHO MEAS - LV MAX PG: 3 MMHG
BH CV ECHO MEAS - LV MEAN PG: 2 MMHG
BH CV ECHO MEAS - LV SYSTOLIC VOL/BSA (12-30): 22.4 CM2
BH CV ECHO MEAS - LV V1 MAX: 87 CM/SEC
BH CV ECHO MEAS - LV V1 VTI: 18.8 CM
BH CV ECHO MEAS - LVIDD: 3.5 CM
BH CV ECHO MEAS - LVIDS: 2.5 CM
BH CV ECHO MEAS - LVOT AREA: 2.8 CM2
BH CV ECHO MEAS - LVOT DIAM: 1.9 CM
BH CV ECHO MEAS - LVPWD: 0.6 CM
BH CV ECHO MEAS - MED PEAK E' VEL: 7 CM/SEC
BH CV ECHO MEAS - MR MAX PG: 131.8 MMHG
BH CV ECHO MEAS - MR MAX VEL: 574 CM/SEC
BH CV ECHO MEAS - MR MEAN PG: 105 MMHG
BH CV ECHO MEAS - MR MEAN VEL: 497 CM/SEC
BH CV ECHO MEAS - MR VTI: 167 CM
BH CV ECHO MEAS - MV A MAX VEL: 84.9 CM/SEC
BH CV ECHO MEAS - MV DEC SLOPE: 218 CM/SEC2
BH CV ECHO MEAS - MV DEC TIME: 0.26 SEC
BH CV ECHO MEAS - MV E MAX VEL: 51.4 CM/SEC
BH CV ECHO MEAS - MV E/A: 0.61
BH CV ECHO MEAS - MV MAX PG: 3.1 MMHG
BH CV ECHO MEAS - MV MEAN PG: 1 MMHG
BH CV ECHO MEAS - MV P1/2T: 77.4 MSEC
BH CV ECHO MEAS - MV V2 VTI: 21.6 CM
BH CV ECHO MEAS - MVA(P1/2T): 2.8 CM2
BH CV ECHO MEAS - MVA(VTI): 2.47 CM2
BH CV ECHO MEAS - PA ACC TIME: 0.12 SEC
BH CV ECHO MEAS - PA V2 MAX: 118 CM/SEC
BH CV ECHO MEAS - RAP SYSTOLE: 3 MMHG
BH CV ECHO MEAS - RVSP: 33 MMHG
BH CV ECHO MEAS - SV(LVOT): 53.3 ML
BH CV ECHO MEAS - SV(MOD-SP2): 30 ML
BH CV ECHO MEAS - SV(MOD-SP4): 40 ML
BH CV ECHO MEAS - SVI(LVOT): 41 ML/M2
BH CV ECHO MEAS - SVI(MOD-SP2): 23.1 ML/M2
BH CV ECHO MEAS - SVI(MOD-SP4): 30.7 ML/M2
BH CV ECHO MEAS - TAPSE (>1.6): 1.96 CM
BH CV ECHO MEAS - TR MAX PG: 29.8 MMHG
BH CV ECHO MEAS - TR MAX VEL: 273 CM/SEC
BH CV ECHO MEASUREMENTS AVERAGE E/E' RATIO: 5.71
BH CV XLRA - RV BASE: 3 CM
BH CV XLRA - RV LENGTH: 5.5 CM
BH CV XLRA - RV MID: 2.1 CM
BH CV XLRA - TDI S': 17.1 CM/SEC
BUN SERPL-MCNC: 4 MG/DL (ref 8–23)
BUN/CREAT SERPL: 13.3 (ref 7–25)
CALCIUM SPEC-SCNC: 7.9 MG/DL (ref 8.6–10.5)
CHLORIDE SERPL-SCNC: 101 MMOL/L (ref 98–107)
CO2 SERPL-SCNC: 24 MMOL/L (ref 22–29)
CREAT SERPL-MCNC: 0.3 MG/DL (ref 0.57–1)
DEPRECATED RDW RBC AUTO: 54.2 FL (ref 37–54)
EGFRCR SERPLBLD CKD-EPI 2021: 108.7 ML/MIN/1.73
EOSINOPHIL # BLD MANUAL: 0 10*3/MM3 (ref 0–0.4)
EOSINOPHIL NFR BLD MANUAL: 0 % (ref 0.3–6.2)
ERYTHROCYTE [DISTWIDTH] IN BLOOD BY AUTOMATED COUNT: 15.5 % (ref 12.3–15.4)
GLUCOSE SERPL-MCNC: 79 MG/DL (ref 65–99)
HCT VFR BLD AUTO: 37.8 % (ref 34–46.6)
HGB BLD-MCNC: 12.4 G/DL (ref 12–15.9)
LYMPHOCYTES # BLD MANUAL: 0.56 10*3/MM3 (ref 0.7–3.1)
LYMPHOCYTES NFR BLD MANUAL: 2 % (ref 5–12)
MCH RBC QN AUTO: 31.2 PG (ref 26.6–33)
MCHC RBC AUTO-ENTMCNC: 32.8 G/DL (ref 31.5–35.7)
MCV RBC AUTO: 95.2 FL (ref 79–97)
MONOCYTES # BLD: 0.37 10*3/MM3 (ref 0.1–0.9)
NEUTROPHILS # BLD AUTO: 17.58 10*3/MM3 (ref 1.7–7)
NEUTROPHILS NFR BLD MANUAL: 73 % (ref 42.7–76)
NEUTS BAND NFR BLD MANUAL: 22 % (ref 0–5)
NRBC SPEC MANUAL: 0 /100 WBC (ref 0–0.2)
PLAT MORPH BLD: NORMAL
PLATELET # BLD AUTO: 419 10*3/MM3 (ref 140–450)
PMV BLD AUTO: 10.1 FL (ref 6–12)
POTASSIUM SERPL-SCNC: 3.7 MMOL/L (ref 3.5–5.2)
QT INTERVAL: 472 MS
QT INTERVAL: 500 MS
QTC INTERVAL: 475 MS
QTC INTERVAL: 482 MS
RBC # BLD AUTO: 3.97 10*6/MM3 (ref 3.77–5.28)
RBC MORPH BLD: NORMAL
SODIUM SERPL-SCNC: 138 MMOL/L (ref 136–145)
VARIANT LYMPHS NFR BLD MANUAL: 3 % (ref 19.6–45.3)
WBC MORPH BLD: NORMAL
WBC NRBC COR # BLD AUTO: 18.51 10*3/MM3 (ref 3.4–10.8)

## 2024-07-01 PROCEDURE — 99232 SBSQ HOSP IP/OBS MODERATE 35: CPT | Performed by: STUDENT IN AN ORGANIZED HEALTH CARE EDUCATION/TRAINING PROGRAM

## 2024-07-01 PROCEDURE — 97535 SELF CARE MNGMENT TRAINING: CPT

## 2024-07-01 PROCEDURE — 87040 BLOOD CULTURE FOR BACTERIA: CPT | Performed by: STUDENT IN AN ORGANIZED HEALTH CARE EDUCATION/TRAINING PROGRAM

## 2024-07-01 PROCEDURE — 97166 OT EVAL MOD COMPLEX 45 MIN: CPT

## 2024-07-01 PROCEDURE — 85007 BL SMEAR W/DIFF WBC COUNT: CPT | Performed by: STUDENT IN AN ORGANIZED HEALTH CARE EDUCATION/TRAINING PROGRAM

## 2024-07-01 PROCEDURE — 85025 COMPLETE CBC W/AUTO DIFF WBC: CPT | Performed by: STUDENT IN AN ORGANIZED HEALTH CARE EDUCATION/TRAINING PROGRAM

## 2024-07-01 PROCEDURE — 97530 THERAPEUTIC ACTIVITIES: CPT

## 2024-07-01 PROCEDURE — 80048 BASIC METABOLIC PNL TOTAL CA: CPT | Performed by: STUDENT IN AN ORGANIZED HEALTH CARE EDUCATION/TRAINING PROGRAM

## 2024-07-01 RX ORDER — MIRTAZAPINE 15 MG/1
15 TABLET, FILM COATED ORAL NIGHTLY
Status: DISCONTINUED | OUTPATIENT
Start: 2024-07-01 | End: 2024-08-14 | Stop reason: HOSPADM

## 2024-07-01 RX ADMIN — DEXTROSE AND SODIUM CHLORIDE 100 ML/HR: 5; 450 INJECTION, SOLUTION INTRAVENOUS at 00:50

## 2024-07-01 RX ADMIN — DEXTROSE AND SODIUM CHLORIDE 100 ML/HR: 5; 450 INJECTION, SOLUTION INTRAVENOUS at 22:12

## 2024-07-01 RX ADMIN — Medication 10 ML: at 08:01

## 2024-07-01 RX ADMIN — THIAMINE HCL TAB 100 MG 100 MG: 100 TAB at 08:01

## 2024-07-01 RX ADMIN — Medication 10 ML: at 22:13

## 2024-07-01 RX ADMIN — MIRTAZAPINE 15 MG: 15 TABLET, FILM COATED ORAL at 22:12

## 2024-07-01 NOTE — THERAPY TREATMENT NOTE
Patient Name: Arcelia Walter  : 1945    MRN: [id number]                              Today's Date: 2024       Admit Date: 2024    Visit Dx:     ICD-10-CM ICD-9-CM   1. Syncope, unspecified syncope type  R55 780.2   2. Ketosis  E88.89 276.2     Patient Active Problem List   Diagnosis    Syncope    Hypokalemia    Elevated troponin    Elevated C-reactive protein (CRP)    Elevated sed rate    Leukocytosis     History reviewed. No pertinent past medical history.  Past Surgical History:   Procedure Laterality Date    HYSTERECTOMY        General Information       Row Name 24 1444          Physical Therapy Time and Intention    Document Type therapy note (daily note)  -KG     Mode of Treatment physical therapy  -KG       Row Name 24 144          General Information    Existing Precautions/Restrictions fall;other (see comments)  confusion  -KG     Barriers to Rehab cognitive status  -       Row Name 24 1444          Cognition    Orientation Status (Cognition) oriented to;person  -KG       Row Name 24 1443          Safety Issues, Functional Mobility    Safety Issues Affecting Function (Mobility) ability to follow commands;awareness of need for assistance;insight into deficits/self-awareness;safety precaution awareness;safety precautions follow-through/compliance;sequencing abilities  -KG     Impairments Affecting Function (Mobility) balance;cognition;coordination;endurance/activity tolerance;postural/trunk control;strength  -KG     Cognitive Impairments, Mobility Safety/Performance attention;awareness, need for assistance;insight into deficits/self-awareness;safety precaution awareness;safety precaution follow-through;sequencing abilities  -KG               User Key  (r) = Recorded By, (t) = Taken By, (c) = Cosigned By      Initials Name Provider Type    KG Misty Shipley, PT Physical Therapist                   Mobility       Row Name 24 1445          Bed Mobility     Comment, (Bed Mobility) UIC  -KG       Row Name 07/01/24 1445          Transfers    Comment, (Transfers) VC's for sequencing and safe hand placement. Cues to push up from chair prior to reaching for RW.  -KG       Row Name 07/01/24 1445          Sit-Stand Transfer    Sit-Stand [place] (Transfers) minimum assist (75% patient effort);verbal cues  -KG     Assistive Device (Sit-Stand Transfers) walker, front-wheeled  -KG       Row Name 07/01/24 1445          Gait/Stairs (Locomotion)    [place] Level (Gait) minimum assist (75% patient effort);verbal cues  -KG     Assistive Device (Gait) walker, front-wheeled  -KG     Distance in Feet (Gait) 100  -KG     Deviations/Abnormal Patterns (Gait) bilateral deviations;base of support, narrow;[place] decreased;festinating/shuffling;stride length decreased  -KG     Bilateral Gait Deviations forward flexed posture;heel strike decreased  -KG     Comment, (Gait/Stairs) Pt demonstrated step to gait pattern with very slow linwood and short, shuffled steps. Max cueing for upright posture with forward gaze and increased stride length. Pt continued to demonstrate mild instability, but improved with use of RW. Pt required physical assistance to steer RW to avoid obstacles in marie. Ambulation distance limited by increased fatigue. Worsening gait mechanics with onset of fatigue.  -KG               User Key  (r) = Recorded By, (t) = Taken By, (c) = Cosigned By      Initials Name Provider Type    KG Misty Shipley, PT Physical Therapist                   Obj/Interventions       Row Name 07/01/24 1449          Balance    Balance Assessment sitting static balance;standing static balance;standing dynamic balance  -KG     Static Sitting Balance standby assist  -KG     Position, Sitting Balance unsupported;sitting in chair  -KG     Static Standing Balance contact guard  -KG     Dynamic Standing Balance minimal assist  -KG     Position/Device Used, Standing Balance supported;walker,  rolling  -KG               User Key  (r) = Recorded By, (t) = Taken By, (c) = Cosigned By      Initials Name Provider Type    Misty Mueller, PT Physical Therapist                   Goals/Plan    No documentation.                  Clinical Impression       [facility] Name 07/01/24 1446          Pain    Pretreatment Pain Rating 0/10 - no pain  -KG     Posttreatment Pain Rating 0/10 - no pain  -KG       Row Name 07/01/24 1446          Plan of Care Review    Plan of Care Reviewed With patient  -KG     Progress improving  -KG     Outcome Evaluation Pt increased ambulation distance to 100ft with RW and Rod. Frequent cues for upright posture with forward gaze. Improved balance with use of RW, but pt required physical assistance to steer RW to navigate around obstacles. Pt continues to be limited by confusion and poor safety awareness. Ambulation distance limited by fatigue. Continue to recommend PT skilled care and D/C to SNF.  -KG       [facility] Name 07/01/24 1446          Vital Signs    Pre Systolic BP Rehab 114  -KG     Pre Treatment Diastolic BP 77  -KG     Pretreatment Heart Rate (beats/min) 79  -KG     Posttreatment Heart Rate (beats/min) 100  -KG     O2 Delivery Pre Treatment room air  -KG     O2 Delivery Post Treatment room air  -KG     Pre Patient Position Sitting  -KG     Intra Patient Position Standing  -KG     Post Patient Position Sitting  -KG       [facility] Name 07/01/24 1446          Positioning and Restraints    Pre-Treatment Position sitting in chair/recliner  -KG     Post Treatment Position chair  -KG     In Chair notified nsg;reclined;call light within reach;encouraged to call for assist;exit alarm on;legs elevated  -KG               User Key  (r) = Recorded By, (t) = Taken By, (c) = Cosigned By      Initials Name Provider Type    Misty Mueller, PT Physical Therapist                   Outcome Measures       Row Name 07/01/24 1448 07/01/24 0815       How much help from another person do you  currently need...    Turning from your back to your side while in flat bed without using bedrails? 3  -KG 3  -CB    Moving from lying on back to sitting on the side of a flat bed without bedrails? 3  -KG 3  -CB    Moving to and from a bed to a chair (including a wheelchair)? 3  -KG 3  -CB    Standing up from a chair using your arms (e.g., wheelchair, bedside chair)? 3  -KG 3  -CB    Climbing 3-5 steps with a railing? 2  -KG 2  -CB    To walk in hospital room? 2  -KG 2  -CB    AM-PAC 6 Clicks Score (PT) 16  -KG 16  -CB    Highest Level of Mobility Goal 5 --> Static standing  -KG 5 --> Static standing  -CB      Row Name 07/01/24 1448 07/01/24 1014       Functional Assessment    Outcome Measure Options AM-PAC 6 Clicks Basic Mobility (PT)  -KG AM-PAC 6 Clicks Daily Activity (OT)  -MR              User Key  (r) = Recorded By, (t) = Taken By, (c) = Cosigned By      Initials Name Provider Type    Norah Gomez, RN Registered Nurse    KG Misty Shipley, PT Physical Therapist    Emily Montez, OT Occupational Therapist                                 Physical Therapy Education       Title: PT OT SLP Therapies (In Progress)       Topic: Physical Therapy (In Progress)       Point: Mobility training (In Progress)       Learning Progress Summary             Patient Acceptance, E, NR by KG at 7/1/2024 1040    Acceptance, E, NR by KG at 6/29/2024 1001                         Point: Home exercise program (In Progress)       Learning Progress Summary             Patient Acceptance, E, NR by KG at 7/1/2024 1040                         Point: Body mechanics (In Progress)       Learning Progress Summary             Patient Acceptance, E, NR by KG at 7/1/2024 1040    Acceptance, E, NR by KG at 6/29/2024 1001                         Point: Precautions (In Progress)       Learning Progress Summary             Patient Acceptance, E, NR by KG at 7/1/2024 1040    Acceptance, E, NR by KG at 6/29/2024 1001                                          User Key       Initials Effective Dates Name Provider Type Discipline    KG 05/22/20 -  Misty Shipley, PT Physical Therapist PT                  PT Recommendation and Plan  Planned Therapy Interventions (PT): balance training, bed mobility training, gait training, strengthening, transfer training  Plan of Care Reviewed With: patient  Progress: improving  Outcome Evaluation: Pt increased ambulation distance to 100ft with RW and Rod. Frequent cues for upright posture with forward gaze. Improved balance with use of RW, but pt required physical assistance to steer RW to navigate around obstacles. Pt continues to be limited by confusion and poor safety awareness. Ambulation distance limited by fatigue. Continue to recommend PT skilled care and D/C to SNF.     Time Calculation:   PT Evaluation Complexity  History, PT Evaluation Complexity: 1-2 personal factors and/or comorbidities  Examination of Body Systems (PT Eval Complexity): total of 3 or more elements  Clinical Presentation (PT Evaluation Complexity): stable  Clinical Decision Making (PT Evaluation Complexity): low complexity  Overall Complexity (PT Evaluation Complexity): low complexity     PT Charges       Row Name 07/01/24 1040             Time Calculation    Start Time 1040  -KG      PT Received On 07/01/24  -KG      PT Goal Re-Cert Due Date 07/09/24  -KG         Time Calculation- PT    Total Timed Code Minutes- PT 14 minute(s)  -KG         Timed Charges    87634 - PT Therapeutic Activity Minutes 14  -KG         Total Minutes    Timed Charges Total Minutes 14  -KG       Total Minutes 14  -KG                User Key  (r) = Recorded By, (t) = Taken By, (c) = Cosigned By      Initials Name Provider Type    KG Misty Shipley, PT Physical Therapist                  Therapy Charges for Today       Code Description Service Date Service Provider Modifiers Qty    [id number]  PT THERAPEUTIC ACT EA 15 MIN 7/1/2024 Misty Shipley,  PT GP 1            PT G-Codes  Outcome Measure Options: AM-PAC 6 Clicks Basic Mobility (PT)  AM-PAC 6 Clicks Score (PT): 16  AM-PAC 6 Clicks Score (OT): 15  PT Discharge Summary  Anticipated Discharge Disposition (PT): skilled nursing facility    Lauren Shipley, PT  7/1/2024

## 2024-07-01 NOTE — THERAPY EVALUATION
Patient Name: Arcelia Walter  : 1945    MRN: [id number]                              Today's Date: 2024       Admit Date: 2024    Visit Dx:     ICD-10-CM ICD-9-CM   1. Syncope, unspecified syncope type  R55 780.2   2. Ketosis  E88.89 276.2     Patient Active Problem List   Diagnosis    Syncope    Hypokalemia    Elevated troponin    Elevated C-reactive protein (CRP)    Elevated sed rate    Leukocytosis     History reviewed. No pertinent past medical history.  Past Surgical History:   Procedure Laterality Date    HYSTERECTOMY        General Information       Row Name 24 0951          OT Time and Intention    Document Type evaluation  -MR     Mode of Treatment occupational therapy  -MR       Row Name 2451          General Information    Patient Profile Reviewed yes  -MR     Prior Level of Function independent:;all household mobility;gait;transfer;ADL's;bathing;dressing  Pt reporting at baseline she is I w/ ADLs. Utilizes furniture to walk around her home. Endorses recent falls. Neighbor checks in on pt a few times a week.  -MR     Existing Precautions/Restrictions fall;other (see comments)  confusion  -MR     Barriers to Rehab cognitive status  -MR       Row Name 2451          Living Environment    People in Home alone  -MR       Row Name 2451          Home Main Entrance    Number of Stairs, Main Entrance other (see comments)  pt is limited historian; unable to recall; TBA later  -MR       Row Name 2451          Cognition    Orientation Status (Cognition) oriented to;person;time  -MR       Row Name 24          Safety Issues, Functional Mobility    Safety Issues Affecting Function (Mobility) ability to follow commands;awareness of need for assistance;insight into deficits/self-awareness;safety precaution awareness;safety precautions follow-through/compliance;sequencing abilities  -MR     Impairments Affecting Function (Mobility)  balance;cognition;coordination;endurance/activity tolerance;postural/trunk control;strength  -MR     Cognitive Impairments, Mobility Safety/Performance attention;awareness, need for assistance;insight into deficits/self-awareness;safety precaution awareness;safety precaution follow-through;sequencing abilities  -MR               User Key  (r) = Recorded By, (t) = Taken By, (c) = Cosigned By      Initials Name Provider Type    Emily Montez, OT Occupational Therapist                     Mobility/ADL's       Row Name 07/01/24 0953          Bed Mobility    Bed Mobility supine-sit;scooting/bridging  -MR     Scooting/Bridging [place] (Bed Mobility) moderate assist (50% patient effort);verbal cues;nonverbal cues (demo/gesture)  -MR     Supine-Sit [place] (Bed Mobility) moderate assist (50% patient effort);verbal cues;nonverbal cues (demo/gesture)  -MR     Assistive Device (Bed Mobility) draw sheet;head of bed elevated  -MR     Comment, (Bed Mobility) v/c for sequencing and technique. Pt utilized therapist hands to pull self to upright sitting. V/c to utilize bed rails.  -MR       Row Name 07/01/24 0953          Transfers    Transfers sit-stand transfer;toilet transfer  -MR     Comment, (Transfers) v/c for hand placement, sequencing and safety. Pt demonstrating tendency to pull up on RW to come to standing. Pt able to be redirected w/ v/c and [place] assist.  -MR       Row Name 07/01/24 0953          Sit-Stand Transfer    Sit-Stand [place] (Transfers) minimum assist (75% patient effort);verbal cues  -MR     Assistive Device (Sit-Stand Transfers) walker, front-wheeled  -MR       Row Name 07/01/24 0953          Toilet Transfer    Type (Toilet Transfer) sit-stand;stand-sit  -MR     [place] Level (Toilet Transfer) moderate assist (50% patient effort);verbal cues;nonverbal cues (demo/gesture)  -MR     Assistive Device (Toilet Transfer) commode;walker, front-wheeled;grab bars/safety frame  -MR       Row Name  07/01/24 0953          Functional Mobility    Functional Mobility- Ind. Level verbal cues required;nonverbal cues required (demo/gesture);minimum assist (75% patient effort)  -MR     Functional Mobility- Device walker, front-wheeled  -MR     Functional Mobility-Distance (Feet) --  HH distances. Bed > restroom; restroom > recliner  -MR     Functional Mobility- Comment Pt requiring assist guiding RW during mobility.  -MR     Patient was able to Ambulate yes  -MR       Row Name 07/01/24 0953          Activities of Daily Living    BADL Assessment/Intervention lower body dressing;toileting;grooming  -MR       Row Name 07/01/24 0953          Lower Body Dressing Assessment/Training    [place] Level (Lower Body Dressing) don;socks;maximum assist (25% patient effort)  -MR     Position (Lower Body Dressing) supine  -MR       Row Name 07/01/24 0953          Toileting Assessment/Training    [place] Level (Toileting) adjust/manage clothing;maximum assist (25% patient effort);perform perineal hygiene  -MR     Assistive Devices (Toileting) commode  -MR     Position (Toileting) supported sitting;supported standing  -MR       Row Name 07/01/24 0953          Grooming Assessment/Training    [place] Level (Grooming) wash face, hands;set up;hair care, combing/brushing;maximum assist (25% patient effort)  -MR     Position (Grooming) supported sitting;supported standing  -MR               User Key  (r) = Recorded By, (t) = Taken By, (c) = Cosigned By      Initials Name Provider Type     Emily Jimenes OT Occupational Therapist                   Obj/Interventions       Row Name 07/01/24 1000          Sensory Assessment (Somatosensory)    Sensory Assessment (Somatosensory) UE sensation intact  -MR       Row Name 07/01/24 1000          Vision Assessment/Intervention    Visual Impairment/Limitations WFL;corrective lenses full-time  -MR       Row Name 07/01/24 1000          Range of Motion Comprehensive    General Range of  Motion no range of motion deficits identified  -MR       Row Name 07/01/24 1000          Strength Comprehensive (MMT)    Comment, General Manual Muscle Testing (MMT) Assessment B UE grossly 3+/5 in all functional planes.  -MR       Row Name 07/01/24 1000          Balance    Balance Assessment sitting static balance;sitting dynamic balance;standing static balance;standing dynamic balance  -MR     Static Sitting Balance standby assist  -MR     Dynamic Sitting Balance contact guard  -MR     Position, Sitting Balance unsupported;sitting edge of bed;other (see comments)  commode  -MR     Static Standing Balance contact guard  -MR     Dynamic Standing Balance minimal assist  -MR     Position/Device Used, Standing Balance supported  -MR     Balance Interventions sitting;standing;sit to stand;supported;static;dynamic;minimal challenge;occupation based/functional task  -MR     Comment, Balance Pt demonstrating posterior LOB when transitioning from sitting to standing. Pt able to correct w/ Barbra and extended time.  -MR               User Key  (r) = Recorded By, (t) = Taken By, (c) = Cosigned By      Initials Name Provider Type    MR Emily Jimenes, OT Occupational Therapist                   Goals/Plan       Row Name 07/01/24 1012          Bed Mobility Goal 1 (OT)    Activity/Assistive Device (Bed Mobility Goal 1, OT) sit to supine;supine to sit  -MR     [place] Level/Cues Needed (Bed Mobility Goal 1, OT) minimum assist (75% or more patient effort);tactile cues required;verbal cues required  -MR     Time Frame (Bed Mobility Goal 1, OT) short term goal (STG);3 days  -MR     Progress/Outcomes (Bed Mobility Goal 1, OT) new goal  -MR       Row Name 07/01/24 1012          Transfer Goal 1 (OT)    Activity/Assistive Device (Transfer Goal 1, OT) bed-to-chair/chair-to-bed;sit-to-stand/stand-to-sit  -MR     [place] Level/Cues Needed (Transfer Goal 1, OT) contact guard required;tactile cues required;verbal cues required  -MR      Time Frame (Transfer Goal 1, OT) long term goal (LTG);10 days  -MR     Progress/Outcome (Transfer Goal 1, OT) new goal  -MR       Row Name 07/01/24 1012          Dressing Goal 1 (OT)    Activity/Device (Dressing Goal 1, OT) lower body dressing  -MR     [place]/Cues Needed (Dressing Goal 1, OT) moderate assist (50-74% patient effort)  -MR     Time Frame (Dressing Goal 1, OT) long term goal (LTG);10 days  -MR     Progress/Outcome (Dressing Goal 1, OT) new goal  -MR       Row Name 07/01/24 1012          Toileting Goal 1 (OT)    Activity/Device (Toileting Goal 1, OT) adjust/manage clothing;perform perineal hygiene;commode  -MR     [place] Level/Cues Needed (Toileting Goal 1, OT) minimum assist (75% or more patient effort);tactile cues required;verbal cues required  -MR     Time Frame (Toileting Goal 1, OT) long term goal (LTG);10 days  -MR       Row Name 07/01/24 1012          Therapy Assessment/Plan (OT)    Planned Therapy Interventions (OT) activity tolerance training;adaptive equipment training;BADL retraining;IADL retraining;patient/caregiver education/training;occupation/activity based interventions;functional balance retraining;strengthening exercise;transfer/mobility retraining;ROM/therapeutic exercise  -MR               User Key  (r) = Recorded By, (t) = Taken By, (c) = Cosigned By      Initials Name Provider Type    Emily Montez, OT Occupational Therapist                   Clinical Impression       Row Name 07/01/24 1002          Pain Assessment    Pretreatment Pain Rating 0/10 - no pain  -MR     Posttreatment Pain Rating 3/10  -MR     Pain Location generalized  -MR     Pain Location - abdomen  -MR     Pain Intervention(s) Repositioned;Ambulation/increased activity  -MR       Row Name 07/01/24 1002          Plan of Care Review    Plan of Care Reviewed With patient  -MR     Progress no change  Initial Eval  -MR     Outcome Evaluation Pt presenting below her functional baseline w/ bed mobility,  transfers, functional mobility and ADLs. Pt requiring assist this session w/ bed mobility, Barbra for functional mobility w/ RW and MaxA for toileting tasks. Pt would benefit from IP OT skilled services. Recommend SNF at d/c for best functional outcome.  -       Row Name 07/01/24 1002          Therapy Assessment/Plan (OT)    Patient/Family Therapy Goal Statement (OT) Return to PLOF  -MR     Rehab Potential (OT) good, to achieve stated therapy goals  -MR     Criteria for Skilled Therapeutic Interventions Met (OT) yes;meets criteria;skilled treatment is necessary  -MR     Therapy Frequency (OT) daily  -MR     Predicted Duration of Therapy Intervention (OT) 10 days  -MR       Row Name 07/01/24 1002          Therapy Plan Review/Discharge Plan (OT)    Anticipated Discharge Disposition (OT) skilled nursing facility  -MR       Row Name 07/01/24 1002          Vital Signs    Pre Systolic BP Rehab 128  -MR     Pre Treatment Diastolic BP 82  -MR     Post Systolic BP Rehab 114  -MR     Post Treatment Diastolic BP 77  -MR     O2 Delivery Pre Treatment room air  -MR     O2 Delivery Intra Treatment room air  -MR     O2 Delivery Post Treatment room air  -MR     Pre Patient Position Supine  -MR     Intra Patient Position Standing  -MR     Post Patient Position Sitting  -MR       Row Name 07/01/24 1002          Positioning and Restraints    Pre-Treatment Position in bed  -MR     Post Treatment Position chair  -MR     In Chair notified nsg;reclined;sitting;call light within reach;encouraged to call for assist;exit alarm on  -MR               User Key  (r) = Recorded By, (t) = Taken By, (c) = Cosigned By      Initials Name Provider Type    MR Emily Jimenes, OT Occupational Therapist                   Outcome Measures       Row Name 07/01/24 1014          How much help from another is currently needed...    Putting on and taking off regular lower body clothing? 2  -MR     Bathing (including washing, rinsing, and drying) 2  -MR      Toileting (which includes using toilet bed pan or urinal) 2  -MR     Putting on and taking off regular upper body clothing 3  -MR     Taking care of personal grooming (such as brushing teeth) 3  -MR     Eating meals 3  -MR     AM-PAC 6 Clicks Score (OT) 15  -MR       Row Name 07/01/24 0815          How much help from another person do you currently need...    Turning from your back to your side while in flat bed without using bedrails? 3  -CB     Moving from lying on back to sitting on the side of a flat bed without bedrails? 3  -CB     Moving to and from a bed to a chair (including a wheelchair)? 3  -CB     Standing up from a chair using your arms (e.g., wheelchair, bedside chair)? 3  -CB     Climbing 3-5 steps with a railing? 2  -CB     To walk in hospital room? 2  -CB     AM-PAC 6 Clicks Score (PT) 16  -CB     Highest Level of Mobility Goal 5 --> Static standing  -CB       Row Name 07/01/24 1014          Functional Assessment    Outcome Measure Options AM-PAC 6 Clicks Daily Activity (OT)  -MR               User Key  (r) = Recorded By, (t) = Taken By, (c) = Cosigned By      Initials Name Provider Type    Norah Gomez, RN Registered Nurse    MR Emily Jimenes, OT Occupational Therapist                    Occupational Therapy Education       Title: PT OT SLP Therapies (In Progress)       Topic: Occupational Therapy (Done)       Point: ADL training (Done)       Description:   Instruct learner(s) on proper safety adaptation and remediation techniques during self care or transfers.   Instruct in proper use of assistive devices.                  Learning Progress Summary             Patient Acceptance, E, MARILYN WARNER,NR by MR at 7/1/2024 1015                         Point: Home exercise program (Done)       Description:   Instruct learner(s) on appropriate technique for monitoring, assisting and/or progressing therapeutic exercises/activities.                  Learning Progress Summary             Patient Acceptance, E,  ALANA,MARILYN,NR by MR at 7/1/2024 1015                         Point: Precautions (Done)       Description:   Instruct learner(s) on prescribed precautions during self-care and functional transfers.                  Learning Progress Summary             Patient Acceptance, ANURAG, MARILYN WARNER,NR by MR at 7/1/2024 1015                         Point: Body mechanics (Done)       Description:   Instruct learner(s) on proper positioning and spine alignment during self-care, functional mobility activities and/or exercises.                  Learning Progress Summary             Patient Acceptance, E, MARILYN WARNER,NR by MR at 7/1/2024 1015                                         User Key       Initials Effective Dates Name Provider Type Discipline    MR 09/22/22 -  Emily Jimenes, OT Occupational Therapist OT                  OT Recommendation and Plan  Planned Therapy Interventions (OT): activity tolerance training, adaptive equipment training, BADL retraining, IADL retraining, patient/caregiver education/training, occupation/activity based interventions, functional balance retraining, strengthening exercise, transfer/mobility retraining, ROM/therapeutic exercise  Therapy Frequency (OT): daily  Plan of Care Review  Plan of Care Reviewed With: patient  Progress: no change (Initial Eval)  Outcome Evaluation: Pt presenting below her functional baseline w/ bed mobility, transfers, functional mobility and ADLs. Pt requiring assist this session w/ bed mobility, [place] for functional mobility w/ RW and MaxA for toileting tasks. Pt would benefit from IP OT skilled services. Recommend SNF at d/c for best functional outcome.     Time Calculation:   Evaluation Complexity (OT)  Review Occupational Profile/Medical/Therapy History Complexity: expanded/moderate complexity  Assessment, Occupational Performance/Identification of Deficit Complexity: 3-5 performance deficits  Clinical Decision Making Complexity (OT): detailed assessment/moderate complexity  Overall  Complexity of Evaluation (OT): moderate complexity     Time Calculation- OT       Row Name 07/01/24 1015             Time Calculation- OT    OT Start Time 0827  -MR      OT Received On 07/01/24  -MR      OT Goal Re-Cert Due Date 07/11/24  -MR         Timed Charges    35065 - OT Self Care/Mgmt Minutes 8  -MR         Untimed Charges    OT Eval/Re-eval Minutes 46  -MR         Total Minutes    Timed Charges Total Minutes 8  -MR      Untimed Charges Total Minutes 46  -MR       Total Minutes 54  -MR                User Key  (r) = Recorded By, (t) = Taken By, (c) = Cosigned By      Initials Name Provider Type    MR Emily Jimenes OT Occupational Therapist                  Therapy Charges for Today       Code Description Service Date Service Provider Modifiers Qty    [id number] HC OT SELF CARE/MGMT/TRAIN EA 15 MIN 7/1/2024 Emily Jimenes OT GO 1    [id number]  OT EVAL MOD COMPLEXITY 4 7/1/2024 Emily Jimenes OT GO 1                 Emily Jimenes OT  7/1/2024

## 2024-07-01 NOTE — PROGRESS NOTES
[facility] Medicine Services  PROGRESS NOTE    Patient Name: Arcelia Walter  : 1945  MRN: [id number]    Date of Admission: 2024  Primary Care Physician: Provider, No Known    Subjective   Subjective     CC:  F/u AMS    HPI:  No new overnight issues, remains confused. Resting in chair, watching TV. Appetite poor      Objective   Objective     Vital Signs:   Temp:  [97.1 °F (36.2 °C)-97.8 °F (36.6 °C)] 97.2 °F (36.2 °C)  Heart Rate:  [] 74  Resp:  [16-17] 17  BP: (114-162)/(65-96) 125/65     Physical Exam:  Constitutional: elderly, frail and thin appearing  HENT: NCAT, mucous membranes moist  Respiratory: Clear to auscultation bilaterally, respiratory effort normal   Cardiovascular: RRR, no murmurs, rubs, or gallops  Gastrointestinal: Positive bowel sounds, soft, nontender, nondistended  Musculoskeletal: No bilateral ankle edema  Psychiatric: flat affect, cooperative  Neurologic: Oriented to person only, no other focal deficits, follows commands, globally weak  Skin: No rashes      Results Reviewed:  LAB RESULTS:      Lab 24  1020 24  0728 24  1806 24  0033   WBC 18.51* 16.95* 20.87* 15.60*   HEMOGLOBIN 12.4 11.2* 11.2* 12.3   HEMATOCRIT 37.8 34.5 33.4* 37.5   PLATELETS 419 482* 429 510*   NEUTROS ABS 17.58* 13.56* 19.22* 14.21*   IMMATURE GRANS (ABS)  --   --  0.10* 0.08*   LYMPHS ABS  --   --  0.91 0.82   MONOS ABS  --   --  0.54 0.41   EOS ABS 0.00 0.17 0.01 0.02   MCV 95.2 94.3 93.0 93.3   SED RATE  --   --   --  40*   CRP  --   --   --  16.13*   PROCALCITONIN  --   --   --  0.15   LACTATE  --   --   --  1.8   PROTIME  --   --   --  14.2   APTT  --   --   --  40.4*         Lab 24  1020 24  0728 24  1806 24  1048 24  0711 24  0033   SODIUM 138 137  --   --  137 141   POTASSIUM 3.7 4.6 4.3  --  3.4* 3.1*   CHLORIDE 101 103  --   --  100 101   CO2 24.0 25.0  --   --  23.0 25.0   ANION GAP 13.0 9.0  --   --  14.0 15.0    BUN 4* 7*  --   --  10 10   CREATININE 0.30* 0.30*  --   --  0.29* 0.34*   EGFR 108.7 108.7  --   --  109.6 105.5   GLUCOSE 79 88  --   --  90 174*   CALCIUM 7.9* 7.8*  --   --  8.3* 8.4*   MAGNESIUM  --   --   --   --  2.1 1.8   PHOSPHORUS  --   --   --  2.6 2.2* 2.1*   HEMOGLOBIN A1C  --   --   --   --   --  5.20   TSH  --   --   --   --  2.170  --          Lab 06/30/24  0728 06/29/24  0033   TOTAL PROTEIN 4.6* 5.6*   ALBUMIN 2.3* 2.6*   GLOBULIN 2.3 3.0   ALT (SGPT) 14 15   AST (SGOT) 19 28   BILIRUBIN 0.2 0.3   ALK PHOS 112 127*   LIPASE  --  15         Lab 06/29/24  0240 06/29/24  0033   PROBNP  --  4,377.0*   HSTROP T 59* 61*   PROTIME  --  14.2   INR  --  1.09         Lab 06/29/24  0711   CHOLESTEROL 173   LDL CHOL 93   HDL CHOL 54   TRIGLYCERIDES 147         Lab 06/29/24  1049   VITAMIN B 12 1,186*         Brief Urine Lab Results  (Last result in the past 365 days)        Color   Clarity   Blood   Leuk Est   Nitrite   Protein   CREAT   Urine HCG        06/29/24 0038 Yellow   Clear   Negative   Negative   Negative   Trace                   Microbiology Results Abnormal       None            Adult Transthoracic Echo Complete W/ Cont if Necessary Per Protocol    Result Date: 7/1/2024    Left ventricular systolic function is normal. Left ventricular ejection fraction appears to be 61 - 65%.   There is mild calcification of the aortic valve.   Mild aortic valve regurgitation is present.   Mild mitral annular calcification is present.   Mild to moderate mitral valve regurgitation is present.   Mild tricuspid valve regurgitation is present.      Results for orders placed during the hospital encounter of 06/28/24    Adult Transthoracic Echo Complete W/ Cont if Necessary Per Protocol    Interpretation Summary    Left ventricular systolic function is normal. Left ventricular ejection fraction appears to be 61 - 65%.    There is mild calcification of the aortic valve.    Mild aortic valve regurgitation is present.     Mild mitral annular calcification is present.    Mild to moderate mitral valve regurgitation is present.    Mild tricuspid valve regurgitation is present.      Current medications:  Scheduled Meds:sodium chloride, 10 mL, Intravenous, Q12H  thiamine, 100 mg, Oral, Daily      Continuous Infusions:dextrose 5 % and sodium chloride 0.45 %, 100 mL/hr, Last Rate: 100 mL/hr (07/01/24 1318)      PRN Meds:.  acetaminophen    senna-docusate sodium **AND** polyethylene glycol **AND** bisacodyl **AND** bisacodyl    Calcium Replacement - Follow Nurse / BPA Driven Protocol    Magnesium Standard Dose Replacement - Follow Nurse / BPA Driven Protocol    Phosphorus Replacement - Follow Nurse / BPA Driven Protocol    Potassium Replacement - Follow Nurse / BPA Driven Protocol    [COMPLETED] Insert Peripheral IV **AND** sodium chloride    sodium chloride    sodium chloride    Assessment & Plan   Assessment & Plan     Active Hospital Problems    Diagnosis  POA    **Syncope [R55]  Yes    Hypokalemia [E87.6]  Yes    Elevated troponin [R79.89]  Yes    Elevated C-reactive protein (CRP) [R79.82]  Yes    Elevated sed rate [R70.0]  Yes    Leukocytosis [D72.829]  Yes      Resolved Hospital Problems   No resolved problems to display.        Brief Hospital Course to date:  Arcelia Walter is a 79 y.o. female brought in per EMS after being found down during a welfare check.  Unclear of exact events.  No emergency contact on file.       Syncope  Altered mental status  - Unclear exact events, originally reported to EMS on the floor x 2 days, later states that she is unsure what happened  - CT head w atrophy and chronic microvascular ischemic change but no acute intracranial process  - Ethanol neg  - UDS pending  --echo w nl EF and no significant valvular abnormalities  - A1c and TSH wnl     Elevated troponin  - Patient currently denies chest pain  - EKG without acute ischemia  - Trending down        Leukocytosis  Elevated CRP  Elevated sed rate  -  Procalcitonin, lactic normal  - infectious w/u neg, will obtain blood cx. No fevers, hold on abx. No clear infectious source     Hypokalemia  - Electrolyte replacement     Malnourishment  Failure to thrive  - No emergency contact, unclear living situation  - Case management/ consult  --PT/OT  --nutrition following  --will add mirtazipine        Expected Discharge Location and Transportation: placement likely  Expected Discharge   Expected Discharge Date: 7/3/2024; Expected Discharge Time:      VTE Prophylaxis:  Mechanical VTE prophylaxis orders are present.         AM-PAC 6 Clicks Score (PT): 16 (07/01/24 5614)    CODE STATUS:   Code Status and Medical Interventions:   Ordered at: 06/29/24 5201     Code Status (Patient has no pulse and is not breathing):    CPR (Attempt to Resuscitate)     Medical Interventions (Patient has pulse or is breathing):    Full Support       Kaitlynn Chu MD  07/01/24

## 2024-07-01 NOTE — CASE MANAGEMENT/SOCIAL WORK
Continued Stay Note  [facility]     Patient Name: Arcelia Walter  MRN: [id number]  Today's Date: 7/1/2024    Admit Date: 6/28/2024    Plan: TBD   Discharge Plan       Row Name 07/01/24 1209       Plan    Plan TBD    Patient/Family in Agreement with Plan yes    Plan Comments Attempt made to speak with patient at bedside for IDP. Pt knew her name but that is the only orientation question she was able to answer correctly. No one listed in contacts to call, SW made aware and discussed in MDR. CM will cont to follow.                   Discharge Codes    No documentation.                 Expected Discharge Date and Time       Expected Discharge Date Expected Discharge Time    Jul 3, 2024               Adeline Rahman RN

## 2024-07-01 NOTE — PLAN OF CARE
Goal Outcome Evaluation:  Plan of Care Reviewed With: patient        Progress: no change (Initial Eval)  Outcome Evaluation: Pt presenting below her functional baseline w/ bed mobility, transfers, functional mobility and ADLs. Pt requiring assist this session w/ bed mobility, Barbra for functional mobility w/ RW and MaxA for toileting tasks. Pt would benefit from IP OT skilled services. Recommend SNF at d/c for best functional outcome.      Anticipated Discharge Disposition (OT): skilled nursing facility

## 2024-07-01 NOTE — PLAN OF CARE
Goal Outcome Evaluation:  Plan of Care Reviewed With: patient        Progress: improving  Outcome Evaluation: Pt increased ambulation distance to 100ft with RW and Rod. Frequent cues for upright posture with forward gaze. Improved balance with use of RW, but pt required physical assistance to steer RW to navigate around obstacles. Pt continues to be limited by confusion and poor safety awareness. Ambulation distance limited by fatigue. Continue to recommend PT skilled care and D/C to SNF.      Anticipated Discharge Disposition (PT): skilled nursing facility

## 2024-07-02 LAB
ALBUMIN SERPL-MCNC: 2.4 G/DL (ref 3.5–5.2)
ALBUMIN/GLOB SERPL: 1 G/DL
ALP SERPL-CCNC: 143 U/L (ref 39–117)
ALT SERPL W P-5'-P-CCNC: 16 U/L (ref 1–33)
ANION GAP SERPL CALCULATED.3IONS-SCNC: 13 MMOL/L (ref 5–15)
AST SERPL-CCNC: 22 U/L (ref 1–32)
BASOPHILS # BLD AUTO: 0.06 10*3/MM3 (ref 0–0.2)
BASOPHILS NFR BLD AUTO: 0.3 % (ref 0–1.5)
BILIRUB SERPL-MCNC: 0.3 MG/DL (ref 0–1.2)
BUN SERPL-MCNC: 4 MG/DL (ref 8–23)
BUN/CREAT SERPL: 12.5 (ref 7–25)
CALCIUM SPEC-SCNC: 7.8 MG/DL (ref 8.6–10.5)
CHLORIDE SERPL-SCNC: 105 MMOL/L (ref 98–107)
CO2 SERPL-SCNC: 23 MMOL/L (ref 22–29)
CREAT SERPL-MCNC: 0.32 MG/DL (ref 0.57–1)
DEPRECATED RDW RBC AUTO: 55.1 FL (ref 37–54)
EGFRCR SERPLBLD CKD-EPI 2021: 107.1 ML/MIN/1.73
EOSINOPHIL # BLD AUTO: 0.01 10*3/MM3 (ref 0–0.4)
EOSINOPHIL NFR BLD AUTO: 0.1 % (ref 0.3–6.2)
ERYTHROCYTE [DISTWIDTH] IN BLOOD BY AUTOMATED COUNT: 15.6 % (ref 12.3–15.4)
GLOBULIN UR ELPH-MCNC: 2.5 GM/DL
GLUCOSE SERPL-MCNC: 90 MG/DL (ref 65–99)
HCT VFR BLD AUTO: 35.3 % (ref 34–46.6)
HGB BLD-MCNC: 11.2 G/DL (ref 12–15.9)
IMM GRANULOCYTES # BLD AUTO: 0.1 10*3/MM3 (ref 0–0.05)
IMM GRANULOCYTES NFR BLD AUTO: 0.5 % (ref 0–0.5)
LYMPHOCYTES # BLD AUTO: 0.89 10*3/MM3 (ref 0.7–3.1)
LYMPHOCYTES NFR BLD AUTO: 4.8 % (ref 19.6–45.3)
MCH RBC QN AUTO: 30.5 PG (ref 26.6–33)
MCHC RBC AUTO-ENTMCNC: 31.7 G/DL (ref 31.5–35.7)
MCV RBC AUTO: 96.2 FL (ref 79–97)
MONOCYTES # BLD AUTO: 0.71 10*3/MM3 (ref 0.1–0.9)
MONOCYTES NFR BLD AUTO: 3.9 % (ref 5–12)
NEUTROPHILS NFR BLD AUTO: 16.66 10*3/MM3 (ref 1.7–7)
NEUTROPHILS NFR BLD AUTO: 90.4 % (ref 42.7–76)
NRBC BLD AUTO-RTO: 0 /100 WBC (ref 0–0.2)
PLATELET # BLD AUTO: 382 10*3/MM3 (ref 140–450)
PMV BLD AUTO: 9.9 FL (ref 6–12)
POTASSIUM SERPL-SCNC: 3.3 MMOL/L (ref 3.5–5.2)
POTASSIUM SERPL-SCNC: 3.7 MMOL/L (ref 3.5–5.2)
PROT SERPL-MCNC: 4.9 G/DL (ref 6–8.5)
RBC # BLD AUTO: 3.67 10*6/MM3 (ref 3.77–5.28)
SODIUM SERPL-SCNC: 141 MMOL/L (ref 136–145)
WBC NRBC COR # BLD AUTO: 18.43 10*3/MM3 (ref 3.4–10.8)

## 2024-07-02 PROCEDURE — 99232 SBSQ HOSP IP/OBS MODERATE 35: CPT | Performed by: STUDENT IN AN ORGANIZED HEALTH CARE EDUCATION/TRAINING PROGRAM

## 2024-07-02 PROCEDURE — 80053 COMPREHEN METABOLIC PANEL: CPT | Performed by: STUDENT IN AN ORGANIZED HEALTH CARE EDUCATION/TRAINING PROGRAM

## 2024-07-02 PROCEDURE — 85060 BLOOD SMEAR INTERPRETATION: CPT | Performed by: STUDENT IN AN ORGANIZED HEALTH CARE EDUCATION/TRAINING PROGRAM

## 2024-07-02 PROCEDURE — 85025 COMPLETE CBC W/AUTO DIFF WBC: CPT | Performed by: STUDENT IN AN ORGANIZED HEALTH CARE EDUCATION/TRAINING PROGRAM

## 2024-07-02 PROCEDURE — 97530 THERAPEUTIC ACTIVITIES: CPT

## 2024-07-02 PROCEDURE — 84132 ASSAY OF SERUM POTASSIUM: CPT | Performed by: STUDENT IN AN ORGANIZED HEALTH CARE EDUCATION/TRAINING PROGRAM

## 2024-07-02 RX ORDER — POTASSIUM CHLORIDE 20 MEQ/1
40 TABLET, EXTENDED RELEASE ORAL EVERY 4 HOURS
Status: COMPLETED | OUTPATIENT
Start: 2024-07-02 | End: 2024-07-02

## 2024-07-02 RX ADMIN — Medication 10 ML: at 21:24

## 2024-07-02 RX ADMIN — POTASSIUM CHLORIDE 40 MEQ: 1500 TABLET, EXTENDED RELEASE ORAL at 17:46

## 2024-07-02 RX ADMIN — MIRTAZAPINE 15 MG: 15 TABLET, FILM COATED ORAL at 21:24

## 2024-07-02 RX ADMIN — THIAMINE HCL TAB 100 MG 100 MG: 100 TAB at 08:52

## 2024-07-02 RX ADMIN — POTASSIUM CHLORIDE 40 MEQ: 1500 TABLET, EXTENDED RELEASE ORAL at 13:53

## 2024-07-02 NOTE — PROGRESS NOTES
[facility] Medicine Services  PROGRESS NOTE    Patient Name: Arcelia Walter  : 1945  MRN: [id number]    Date of Admission: 2024  Primary Care Physician: Provider, No Known    Subjective   Subjective     CC:  F/u AMS    HPI:  Eating breakfast, endorsing a good appetite today. No acute overnight events. Resting comfortably, still confused      Objective   Objective     Vital Signs:   Temp:  [97.9 °F (36.6 °C)-98.8 °F (37.1 °C)] 98.8 °F (37.1 °C)  Heart Rate:  [63-87] 71  Resp:  [18-20] 18  BP: (104-153)/() 104/94     Physical Exam:  Constitutional: elderly, frail and thin appearing  HENT: NCAT, mucous membranes moist  Respiratory: Clear to auscultation bilaterally, respiratory effort normal   Cardiovascular: RRR, no murmurs, rubs, or gallops  Gastrointestinal: Positive bowel sounds, soft, nontender, nondistended  Musculoskeletal: No bilateral ankle edema  Psychiatric: flat affect, cooperative  Neurologic: Oriented to person only, no other focal deficits, follows commands, globally weak  Skin: No rashes     No change since     Results Reviewed:  LAB RESULTS:      Lab 24  0743 24  1020 24  0728 24  1806 24  0033   WBC 18.43* 18.51* 16.95* 20.87* 15.60*   HEMOGLOBIN 11.2* 12.4 11.2* 11.2* 12.3   HEMATOCRIT 35.3 37.8 34.5 33.4* 37.5   PLATELETS 382 419 482* 429 510*   NEUTROS ABS 16.66* 17.58* 13.56* 19.22* 14.21*   IMMATURE GRANS (ABS) 0.10*  --   --  0.10* 0.08*   LYMPHS ABS 0.89  --   --  0.91 0.82   MONOS ABS 0.71  --   --  0.54 0.41   EOS ABS 0.01 0.00 0.17 0.01 0.02   MCV 96.2 95.2 94.3 93.0 93.3   SED RATE  --   --   --   --  40*   CRP  --   --   --   --  16.13*   PROCALCITONIN  --   --   --   --  0.15   LACTATE  --   --   --   --  1.8   PROTIME  --   --   --   --  14.2   APTT  --   --   --   --  40.4*         Lab 24  1018 24  1020 24  0728 24  1806 24  1048 24  0711 24  0033   SODIUM 141 138 137  --    --  137 141   POTASSIUM 3.3* 3.7 4.6 4.3  --  3.4* 3.1*   CHLORIDE 105 101 103  --   --  100 101   CO2 23.0 24.0 25.0  --   --  23.0 25.0   ANION GAP 13.0 13.0 9.0  --   --  14.0 15.0   BUN 4* 4* 7*  --   --  10 10   CREATININE 0.32* 0.30* 0.30*  --   --  0.29* 0.34*   EGFR 107.1 108.7 108.7  --   --  109.6 105.5   GLUCOSE 90 79 88  --   --  90 174*   CALCIUM 7.8* 7.9* 7.8*  --   --  8.3* 8.4*   MAGNESIUM  --   --   --   --   --  2.1 1.8   PHOSPHORUS  --   --   --   --  2.6 2.2* 2.1*   HEMOGLOBIN A1C  --   --   --   --   --   --  5.20   TSH  --   --   --   --   --  2.170  --          Lab 07/02/24  1018 06/30/24  0728 06/29/24  0033   TOTAL PROTEIN 4.9* 4.6* 5.6*   ALBUMIN 2.4* 2.3* 2.6*   GLOBULIN 2.5 2.3 3.0   ALT (SGPT) 16 14 15   AST (SGOT) 22 19 28   BILIRUBIN 0.3 0.2 0.3   ALK PHOS 143* 112 127*   LIPASE  --   --  15         Lab 06/29/24  0240 06/29/24  0033   PROBNP  --  4,377.0*   HSTROP T 59* 61*   PROTIME  --  14.2   INR  --  1.09         Lab 06/29/24  0711   CHOLESTEROL 173   LDL CHOL 93   HDL CHOL 54   TRIGLYCERIDES 147         Lab 06/29/24  1049   VITAMIN B 12 1,186*         Brief Urine Lab Results  (Last result in the past 365 days)        Color   Clarity   Blood   Leuk Est   Nitrite   Protein   CREAT   Urine HCG        06/29/24 0038 Yellow   Clear   Negative   Negative   Negative   Trace                   Microbiology Results Abnormal       None            Adult Transthoracic Echo Complete W/ Cont if Necessary Per Protocol    Result Date: 7/1/2024    Left ventricular systolic function is normal. Left ventricular ejection fraction appears to be 61 - 65%.   There is mild calcification of the aortic valve.   Mild aortic valve regurgitation is present.   Mild mitral annular calcification is present.   Mild to moderate mitral valve regurgitation is present.   Mild tricuspid valve regurgitation is present.      Results for orders placed during the hospital encounter of 06/28/24    Adult Transthoracic Echo  Complete W/ Cont if Necessary Per Protocol    Interpretation Summary    Left ventricular systolic function is normal. Left ventricular ejection fraction appears to be 61 - 65%.    There is mild calcification of the aortic valve.    Mild aortic valve regurgitation is present.    Mild mitral annular calcification is present.    Mild to moderate mitral valve regurgitation is present.    Mild tricuspid valve regurgitation is present.      Current medications:  Scheduled Meds:mirtazapine, 15 mg, Oral, Nightly  potassium chloride ER, 40 mEq, Oral, Q4H  sodium chloride, 10 mL, Intravenous, Q12H  thiamine, 100 mg, Oral, Daily      Continuous Infusions:dextrose 5 % and sodium chloride 0.45 %, 100 mL/hr, Last Rate: 100 mL/hr (07/02/24 0857)      PRN Meds:.  acetaminophen    senna-docusate sodium **AND** polyethylene glycol **AND** bisacodyl **AND** bisacodyl    Calcium Replacement - Follow Nurse / BPA Driven Protocol    Magnesium Standard Dose Replacement - Follow Nurse / BPA Driven Protocol    Phosphorus Replacement - Follow Nurse / BPA Driven Protocol    Potassium Replacement - Follow Nurse / BPA Driven Protocol    [COMPLETED] Insert Peripheral IV **AND** sodium chloride    sodium chloride    sodium chloride    Assessment & Plan   Assessment & Plan     Active Hospital Problems    Diagnosis  POA    **Syncope [R55]  Yes    AMS (altered mental status) [R41.82]  Yes    Hypokalemia [E87.6]  Yes    Elevated troponin [R79.89]  Yes    Elevated C-reactive protein (CRP) [R79.82]  Yes    Elevated sed rate [R70.0]  Yes    Leukocytosis [D72.829]  Yes      Resolved Hospital Problems   No resolved problems to display.        Brief Hospital Course to date:  Arcelia Walter is a 79 y.o. female brought in per EMS after being found down during a welfare check.  Unclear of exact events.  No emergency contact on file.       Syncope  Altered mental status  - Unclear exact events, originally reported to EMS on the floor x 2 days, later states that she  is unsure what happened  - CT head w atrophy and chronic microvascular ischemic change but no acute intracranial process  - Ethanol neg  - UDS pending  --echo w nl EF and no significant valvular abnormalities  - A1c and TSH wnl     Elevated troponin  - Patient currently denies chest pain  - EKG without acute ischemia  - Trending down        Leukocytosis  Elevated CRP  Elevated sed rate  - Procalcitonin, lactic normal  - infectious w/u neg,  blood cx pending. No fevers, hold on abx. No clear infectious source  --peripheral smear     Hypokalemia  - Electrolyte replacement     Malnourishment  Failure to thrive  - No emergency contact, unclear living situation  - Case management/ consult  --PT/OT  --nutrition following  --added mirtazipine  --monitor for refeeding      Expected Discharge Location and Transportation: placement likely  Expected Discharge   Expected Discharge Date: 7/3/2024; Expected Discharge Time:      VTE Prophylaxis:  Mechanical VTE prophylaxis orders are present.         AM-PAC 6 Clicks Score (PT): 14 (07/02/24 0810)    CODE STATUS:   Code Status and Medical Interventions:   Ordered at: 06/29/24 0451     Code Status (Patient has no pulse and is not breathing):    CPR (Attempt to Resuscitate)     Medical Interventions (Patient has pulse or is breathing):    Full Support       Kaitlynn Chu MD  07/02/24

## 2024-07-02 NOTE — THERAPY TREATMENT NOTE
Patient Name: Arcelia Walter  : 1945    MRN: [id number]                              Today's Date: 2024       Admit Date: 2024    Visit Dx:     ICD-10-CM ICD-9-CM   1. Syncope, unspecified syncope type  R55 780.2   2. Ketosis  E88.89 276.2     Patient Active Problem List   Diagnosis    Syncope    Hypokalemia    Elevated troponin    Elevated C-reactive protein (CRP)    Elevated sed rate    Leukocytosis    AMS (altered mental status)     History reviewed. No pertinent past medical history.  Past Surgical History:   Procedure Laterality Date    HYSTERECTOMY        General Information       Row Name 24 1522          Physical Therapy Time and Intention    Document Type therapy note (daily note)  -JOVITA     Mode of Treatment physical therapy  -       Row Name 24 1522          General Information    Patient Profile Reviewed yes  -JOVITA     Existing Precautions/Restrictions fall  -JOVITA     Barriers to Rehab cognitive status  -       Row Name 24 1522          Cognition    Orientation Status (Cognition) oriented to;person  -       Row Name 24 1522          Safety Issues, Functional Mobility    Safety Issues Affecting Function (Mobility) safety precautions follow-through/compliance  -JOVITA     Impairments Affecting Function (Mobility) balance;cognition;endurance/activity tolerance;strength  -JOVITA     Cognitive Impairments, Mobility Safety/Performance safety precaution awareness;awareness, need for assistance  -JOVITA               User Key  (r) = Recorded By, (t) = Taken By, (c) = Cosigned By      Initials Name Provider Type    JOVITA Mary Kay Valencia PT Physical Therapist                   Mobility       Row Name 24 1523          Bed Mobility    Bed Mobility rolling left;rolling right;scooting/bridging;supine-sit;sit-supine  -JOVITA     Rolling Left [place] (Bed Mobility) minimum assist (75% patient effort)  -JOVITA     Rolling Right [place] (Bed Mobility) minimum assist (75% patient effort)   -JOVITA     Scooting/Bridging [place] (Bed Mobility) minimum assist (75% patient effort)  -JOVITA     Supine-Sit [place] (Bed Mobility) minimum assist (75% patient effort)  -JOVITA     Sit-Supine [place] (Bed Mobility) minimum assist (75% patient effort)  -JOVITA     Assistive Device (Bed Mobility) draw sheet;head of bed elevated  -JOVITA     Comment, (Bed Mobility) patient needs assist with all bed mobility  -JOVITA       Row Name 07/02/24 1523          Bed-Chair Transfer    Bed-Chair [place] (Transfers) minimum assist (75% patient effort)  -JOVITA     Assistive Device (Bed-Chair Transfers) walker, front-wheeled  -JOVITA       Row Name 07/02/24 1523          Sit-Stand Transfer    Sit-Stand [place] (Transfers) minimum assist (75% patient effort)  -JOVITA     Assistive Device (Sit-Stand Transfers) walker, front-wheeled  -JOVITA       Row Name 07/02/24 1523          Gait/Stairs (Locomotion)    [place] Level (Gait) minimum assist (75% patient effort)  -JOVITA     Assistive Device (Gait) walker, front-wheeled  -JOVITA     Distance in Feet (Gait) 120  -JOVITA     Deviations/Abnormal Patterns (Gait) [place] decreased;festinating/shuffling;stride length decreased  -JOVITA     Bilateral Gait Deviations forward flexed posture  -JOVITA     Comment, (Gait/Stairs) patient taking short shuffle steps but no LOB with rolling walker.  -JOVITA               User Key  (r) = Recorded By, (t) = Taken By, (c) = Cosigned By      Initials Name Provider Type    Mary Kay Paul PT Physical Therapist                   Obj/Interventions       Row Name 07/02/24 1525          Balance    Balance Assessment sitting static balance;sitting dynamic balance;standing static balance;standing dynamic balance  -JOVITA     Static Sitting Balance independent  -JOVITA     Dynamic Sitting Balance standby assist  -JOVITA     Position, Sitting Balance unsupported;sitting edge of bed  -JOVITA     Static Standing Balance contact guard  -JOVITA     Dynamic Standing Balance minimal assist  -JOVITA      Position/Device Used, Standing Balance supported;walker, front-wheeled  -JOVITA               User Key  (r) = Recorded By, (t) = Taken By, (c) = Cosigned By      Initials Name Provider Type    Mary Kay Paul PT Physical Therapist                   Goals/Plan       Row Name 07/02/24 1528          Therapy Assessment/Plan (PT)    Planned Therapy Interventions (PT) balance training;bed mobility training;gait training;home exercise program;transfer training;strengthening  -JOVITA               User Key  (r) = Recorded By, (t) = Taken By, (c) = Cosigned By      Initials Name Provider Type    Mary Kay Paul PT Physical Therapist                   Clinical Impression       Row Name 07/02/24 1525          Pain    Pretreatment Pain Rating 0/10 - no pain  -JOVITA     Posttreatment Pain Rating 0/10 - no pain  -[facility] Name 07/02/24 1525          Plan of Care Review    Plan of Care Reviewed With patient  -JOVITA     Progress improving  -JOVITA     Outcome Evaluation patient was able to increase ambulation to 120 ft with rolling walker and min assist patient needs assist maneuvering walker around turns. recommend D/C to SNF at D/C  -       Row Name 07/02/24 1525          Therapy Assessment/Plan (PT)    Patient/Family Therapy Goals Statement (PT) unable to state  -JOVITA     Rehab Potential (PT) good, to achieve stated therapy goals  -JOVITA     Criteria for Skilled Interventions Met (PT) yes;skilled treatment is necessary  -     Therapy Frequency (PT) daily  -[facility] Name 07/02/24 1525          Vital Signs    Pre Patient Position Supine  -JOVITA     Intra Patient Position Standing  -JOVITA     Post Patient Position Supine  -[facility] Name 07/02/24 1525          Positioning and Restraints    Pre-Treatment Position in bed  -JOVITA     Post Treatment Position bed  -JOVITA     In Bed side lying right;call light within reach;encouraged to call for assist;exit alarm on  -JOVITA               User Key  (r) = Recorded By, (t) = Taken By, (c) = Cosigned  By      Initials Name Provider Type    Mary Kay Paul, PT Physical Therapist                   Outcome Measures       Row Name 07/02/24 1528 07/02/24 0810       How much help from another person do you currently need...    Turning from your back to your side while in flat bed without using bedrails? 3  -JOVITA 3  -LC    Moving from lying on back to sitting on the side of a flat bed without bedrails? 3  -JOVITA 3  -LC    Moving to and from a bed to a chair (including a wheelchair)? 3  -JOVITA 2  -LC    Standing up from a chair using your arms (e.g., wheelchair, bedside chair)? 3  -JOVITA 2  -LC    Climbing 3-5 steps with a railing? 3  -JOVITA 2  -LC    To walk in hospital room? 3  -JOVITA 2  -LC    AM-PAC 6 Clicks Score (PT) 18  -JOVITA 14  -LC    Highest Level of Mobility Goal 6 --> Walk 10 steps or more  -JOVITA 4 --> Transfer to chair/commode  -LC              User Key  (r) = Recorded By, (t) = Taken By, (c) = Cosigned By      Initials Name Provider Type    Mary Kay Paul, PT Physical Therapist    Caridad Casey RN Registered Nurse                                 Physical Therapy Education       Title: PT OT SLP Therapies (In Progress)       Topic: Physical Therapy (In Progress)       Point: Mobility training (In Progress)       Learning Progress Summary             Patient Acceptance, E, NR by JOVITA at 7/2/2024 1400    Acceptance, E, NR by KG at 7/1/2024 1040    Acceptance, E, NR by KG at 6/29/2024 1001                         Point: Home exercise program (In Progress)       Learning Progress Summary             Patient Acceptance, E, NR by JOVITA at 7/2/2024 1400    Acceptance, E, NR by KG at 7/1/2024 1040                         Point: Body mechanics (In Progress)       Learning Progress Summary             Patient Acceptance, E, NR by JOVITA at 7/2/2024 1400    Acceptance, E, NR by KG at 7/1/2024 1040    Acceptance, E, NR by KG at 6/29/2024 1001                         Point: Precautions (In Progress)       Learning Progress Summary              Patient Acceptance, E, NR by JOVITA at 7/2/2024 1400    Acceptance, E, NR by KG at 7/1/2024 1040    Acceptance, E, NR by KG at 6/29/2024 1001                                         User Key       Initials Effective Dates Name Provider Type Discipline    JOVITA 02/03/23 -  Mary Kay Valencia, PT Physical Therapist PT    KG 05/22/20 -  Misty Shipley, PT Physical Therapist PT                  PT Recommendation and Plan  Planned Therapy Interventions (PT): balance training, bed mobility training, gait training, home exercise program, transfer training, strengthening  Plan of Care Reviewed With: patient  Progress: improving  Outcome Evaluation: patient was able to increase ambulation to 120 ft with rolling walker and min assist patient needs assist maneuvering walker around turns. recommend D/C to SNF at D/C     Time Calculation:         PT Charges       Row Name 07/02/24 1529             Time Calculation    Start Time 1400  -JOVITA      PT Received On 07/02/24  -JOVITA      PT Goal Re-Cert Due Date 07/09/24  -JOVITA         Time Calculation- PT    Total Timed Code Minutes- PT 23 minute(s)  -JOVITA         Timed Charges    23690 - PT Therapeutic Activity Minutes 23  -JOVITA         Total Minutes    Timed Charges Total Minutes 23  -JOVITA       Total Minutes 23  -JOVITA                User Key  (r) = Recorded By, (t) = Taken By, (c) = Cosigned By      Initials Name Provider Type    Mary Kay Palu, PT Physical Therapist                  Therapy Charges for Today       Code Description Service Date Service Provider Modifiers Qty    51017029521 HC PT THERAPEUTIC ACT EA 15 MIN 7/2/2024 Mary Kay Valencia, PT GP 2    80468909213 HC PT THER SUPP EA 15 MIN 7/2/2024 Mary Kay Valencia, PT GP 2            PT G-Codes  Outcome Measure Options: AM-PAC 6 Clicks Basic Mobility (PT)  AM-PAC 6 Clicks Score (PT): 18  AM-PAC 6 Clicks Score (OT): 15  PT Discharge Summary  Anticipated Discharge Disposition (PT): skilled nursing facility    Mary Kay DANIELSON  Annie, PT  7/2/2024

## 2024-07-02 NOTE — CONSULTS
"          Clinical Nutrition Assessment     Patient Name: Arcelia Walter  YOB: 1945  MRN: [id number]  Date of Encounter: 07/02/24 16:13 EDT  Admission date: 6/28/2024  Reason for Visit: Follow-up protocol    Assessment   Nutrition Assessment   Admission Diagnosis:  Syncope [R55]  AMS (altered mental status) [R41.82]    Problem List:    Syncope    Hypokalemia    Elevated troponin    Elevated C-reactive protein (CRP)    Elevated sed rate    Leukocytosis    AMS (altered mental status)      PMH:   She  has no past medical history on file.    PSH:  She  has a past surgical history that includes Hysterectomy.    Applicable Nutrition History:   +3-4 pitting edema to melonie feet     Anthropometrics     Height: Height: 149.9 cm (59.02\")  Last Filed Weight: Weight: 39.9 kg (87 lb 15.4 oz) (06/30/24 1531)  Method: Weight Method: Stated  BMI: BMI (Calculated): 17.8    UBW:  ? 103# per pt report  Weight change:  reported 15# wt loss (? Unsure of duration of loss)    Nutrition Focused Physical Exam    Date:  6/29       Patient meets criteria for malnutrition diagnosis, see MSA note.     Subjective   Reported/Observed/Food/Nutrition Related History:     07/02/24  Patient reports good appetite. PO intake increasing. Patient remains confused.    6/29  Pt up in bed eating breakfast at time of visit, able to provide some nutrition/wt hx however difficulty elaborating when asked. Reports cooking meals for self - states having 3 eggs every other day, but sometimes daily. ? If this is the only meal consumed. Severe cachexia - ? Starvation vs cardiac. No visible difficulties chewing/swallowing. Pt endorsed ~15# wt loss \"at the beginning of summer\" however unable to discern timeframe for notable weight change. Reports having BM every other day without aide from bowel meds. NKFA - strong dislike for tea.     Current Nutrition Prescription   PO: Diet: Regular/House; Fluid Consistency: Thin (IDDSI 0)  Oral Nutrition Supplement: " N/A  Intake: 0-100% PO intake documented since previous visit    Assessment & Plan   Nutrition Diagnosis   Date:  6/29            Updated:    Problem Malnutrition  chronic, severe   Etiology ? Energy in < energy out vs cardiac cacehxia   Signs/Symptoms Severe muscle wasting and subcutaneous fat loss   Status: New    Date:  6/29    Updated:     Problem Underweight   Etiology Inadequate protein-energy intake   Signs/Symptoms BMI 17.77   Status: New    Goal:   Nutrition to support treatment and Continue positive trend      Nutrition Intervention      Follow treatment progress, Care plan reviewed, Menu provided, Encourage intake, Supplement provided    Continue to follow to encourage adequate PO intake    Monitoring/Evaluation:   Per protocol, PO intake, Supplement intake, Weight, Symptoms, POC/GOC    Britt Rosa, MS,RD,LD  Time Spent: 25min

## 2024-07-02 NOTE — PLAN OF CARE
Goal Outcome Evaluation:  Plan of Care Reviewed With: patient        Progress: improving  Outcome Evaluation: patient was able to increase ambulation to 120 ft with rolling walker and min assist patient needs assist maneuvering walker around turns. recommend D/C to SNF at D/C      Anticipated Discharge Disposition (PT): skilled nursing facility

## 2024-07-02 NOTE — CASE MANAGEMENT/SOCIAL WORK
Continued Stay Note   [facility]     Patient Name: Arcelia Walter  MRN: [id number]  Today's Date: 7/2/2024    Admit Date: 6/28/2024    Plan: TBD   Discharge Plan       Row Name 07/02/24 0932       Plan    Plan Comments MSW followed up with report. It was accepted an assigned to a worker. MSW left a message with the report hotline for the worker assigned to call MSW.                   Discharge Codes    No documentation.                 Expected Discharge Date and Time       Expected Discharge Date Expected Discharge Time    Jul 3, 2024               SHANTI Ramsey

## 2024-07-02 NOTE — PLAN OF CARE
Goal Outcome Evaluation:      No changes overnight, patient oriented to self, VSS, no skin break down noted, bony prominences padded. Patient got out of bed by herself this morning, staff assisted to bathroom, she ambulated well with staff assist.

## 2024-07-02 NOTE — PLAN OF CARE
Problem: Fall Injury Risk  Goal: Absence of Fall and Fall-Related Injury  Outcome: Ongoing, Progressing  Intervention: Identify and Manage Contributors  Recent Flowsheet Documentation  Taken 7/2/2024 1400 by Caridad Woodruff RN  Self-Care Promotion:   independence encouraged   BADL personal objects within reach   BADL personal routines maintained   safe use of adaptive equipment encouraged  Taken 7/2/2024 1200 by Caridad Woodruff RN  Self-Care Promotion:   independence encouraged   BADL personal objects within reach   BADL personal routines maintained   safe use of adaptive equipment encouraged   meal set-up provided  Taken 7/2/2024 1000 by Caridad Woodruff RN  Self-Care Promotion:   independence encouraged   BADL personal objects within reach   BADL personal routines maintained   safe use of adaptive equipment encouraged  Taken 7/2/2024 0810 by Caridad Woodruff RN  Medication Review/Management: medications reviewed  Self-Care Promotion:   independence encouraged   BADL personal objects within reach   BADL personal routines maintained   meal set-up provided   other (see comments)  Intervention: Promote Injury-Free Environment  Recent Flowsheet Documentation  Taken 7/2/2024 1400 by Caridad Woodruff RN  Safety Promotion/Fall Prevention:   activity supervised   assistive device/personal items within reach   clutter free environment maintained   fall prevention program maintained   nonskid shoes/slippers when out of bed   mobility aid in reach   room organization consistent   safety round/check completed  Taken 7/2/2024 1200 by Caridad Woodruff RN  Safety Promotion/Fall Prevention:   activity supervised   assistive device/personal items within reach   clutter free environment maintained   fall prevention program maintained   nonskid shoes/slippers when out of bed   room organization consistent   safety round/check completed  Taken 7/2/2024 1000 by Caridad Woodruff RN  Safety Promotion/Fall Prevention:   activity supervised    assistive device/personal items within reach   clutter free environment maintained   fall prevention program maintained   nonskid shoes/slippers when out of bed   room organization consistent   safety round/check completed  Taken 7/2/2024 0810 by Caridad Woodruff RN  Safety Promotion/Fall Prevention:   activity supervised   assistive device/personal items within reach   clutter free environment maintained   fall prevention program maintained   nonskid shoes/slippers when out of bed   room organization consistent   safety round/check completed   mobility aid in reach   Goal Outcome Evaluation:  Plan of Care Reviewed With: patient        Progress: no change

## 2024-07-03 LAB
ANION GAP SERPL CALCULATED.3IONS-SCNC: 7 MMOL/L (ref 5–15)
BASOPHILS # BLD MANUAL: 0 10*3/MM3 (ref 0–0.2)
BASOPHILS NFR BLD MANUAL: 0 % (ref 0–1.5)
BUN SERPL-MCNC: 8 MG/DL (ref 8–23)
BUN/CREAT SERPL: 32 (ref 7–25)
CALCIUM SPEC-SCNC: 7.6 MG/DL (ref 8.6–10.5)
CHLORIDE SERPL-SCNC: 107 MMOL/L (ref 98–107)
CO2 SERPL-SCNC: 24 MMOL/L (ref 22–29)
CREAT SERPL-MCNC: 0.25 MG/DL (ref 0.57–1)
CYTOLOGIST CVX/VAG CYTO: NORMAL
DEPRECATED RDW RBC AUTO: 52.7 FL (ref 37–54)
EGFRCR SERPLBLD CKD-EPI 2021: 113.6 ML/MIN/1.73
EOSINOPHIL # BLD MANUAL: 0 10*3/MM3 (ref 0–0.4)
EOSINOPHIL NFR BLD MANUAL: 0 % (ref 0.3–6.2)
ERYTHROCYTE [DISTWIDTH] IN BLOOD BY AUTOMATED COUNT: 15.4 % (ref 12.3–15.4)
GLUCOSE SERPL-MCNC: 88 MG/DL (ref 65–99)
HCT VFR BLD AUTO: 28.9 % (ref 34–46.6)
HGB BLD-MCNC: 9.5 G/DL (ref 12–15.9)
LYMPHOCYTES # BLD MANUAL: 0.47 10*3/MM3 (ref 0.7–3.1)
LYMPHOCYTES NFR BLD MANUAL: 2 % (ref 5–12)
MCH RBC QN AUTO: 30.7 PG (ref 26.6–33)
MCHC RBC AUTO-ENTMCNC: 32.9 G/DL (ref 31.5–35.7)
MCV RBC AUTO: 93.5 FL (ref 79–97)
MONOCYTES # BLD: 0.31 10*3/MM3 (ref 0.1–0.9)
NEUTROPHILS # BLD AUTO: 14.74 10*3/MM3 (ref 1.7–7)
NEUTROPHILS NFR BLD MANUAL: 87 % (ref 42.7–76)
NEUTS BAND NFR BLD MANUAL: 8 % (ref 0–5)
NRBC SPEC MANUAL: 0 /100 WBC (ref 0–0.2)
PATH INTERP BLD-IMP: NORMAL
PHOSPHATE SERPL-MCNC: 1.7 MG/DL (ref 2.5–4.5)
PHOSPHATE SERPL-MCNC: 2.6 MG/DL (ref 2.5–4.5)
PLAT MORPH BLD: NORMAL
PLATELET # BLD AUTO: 340 10*3/MM3 (ref 140–450)
PMV BLD AUTO: 9.6 FL (ref 6–12)
POTASSIUM SERPL-SCNC: 4.4 MMOL/L (ref 3.5–5.2)
RBC # BLD AUTO: 3.09 10*6/MM3 (ref 3.77–5.28)
RBC MORPH BLD: NORMAL
SODIUM SERPL-SCNC: 138 MMOL/L (ref 136–145)
VARIANT LYMPHS NFR BLD MANUAL: 3 % (ref 19.6–45.3)
WBC MORPH BLD: NORMAL
WBC NRBC COR # BLD AUTO: 15.52 10*3/MM3 (ref 3.4–10.8)

## 2024-07-03 PROCEDURE — 85025 COMPLETE CBC W/AUTO DIFF WBC: CPT | Performed by: STUDENT IN AN ORGANIZED HEALTH CARE EDUCATION/TRAINING PROGRAM

## 2024-07-03 PROCEDURE — 80048 BASIC METABOLIC PNL TOTAL CA: CPT | Performed by: STUDENT IN AN ORGANIZED HEALTH CARE EDUCATION/TRAINING PROGRAM

## 2024-07-03 PROCEDURE — 25810000003 SODIUM CHLORIDE 0.9 % SOLUTION 250 ML FLEX CONT: Performed by: STUDENT IN AN ORGANIZED HEALTH CARE EDUCATION/TRAINING PROGRAM

## 2024-07-03 PROCEDURE — 84100 ASSAY OF PHOSPHORUS: CPT | Performed by: STUDENT IN AN ORGANIZED HEALTH CARE EDUCATION/TRAINING PROGRAM

## 2024-07-03 PROCEDURE — 97530 THERAPEUTIC ACTIVITIES: CPT

## 2024-07-03 PROCEDURE — 99232 SBSQ HOSP IP/OBS MODERATE 35: CPT | Performed by: STUDENT IN AN ORGANIZED HEALTH CARE EDUCATION/TRAINING PROGRAM

## 2024-07-03 PROCEDURE — 85007 BL SMEAR W/DIFF WBC COUNT: CPT | Performed by: STUDENT IN AN ORGANIZED HEALTH CARE EDUCATION/TRAINING PROGRAM

## 2024-07-03 RX ADMIN — Medication 10 ML: at 09:14

## 2024-07-03 RX ADMIN — MIRTAZAPINE 15 MG: 15 TABLET, FILM COATED ORAL at 20:19

## 2024-07-03 RX ADMIN — THIAMINE HCL TAB 100 MG 100 MG: 100 TAB at 09:14

## 2024-07-03 RX ADMIN — SODIUM PHOSPHATE, MONOBASIC, MONOHYDRATE AND SODIUM PHOSPHATE, DIBASIC, ANHYDROUS 15 MMOL: 142; 276 INJECTION, SOLUTION INTRAVENOUS at 09:11

## 2024-07-03 RX ADMIN — Medication 5 MG: at 00:52

## 2024-07-03 RX ADMIN — Medication 5 MG: at 20:19

## 2024-07-03 NOTE — PLAN OF CARE
Goal Outcome Evaluation:  Plan of Care Reviewed With: patient        Progress: no change    Patient vital signs stable and on room air. Patient denies pain, nausea, and shortness of air. Patient oriented to self only. Patient awake and restless all night and morning. Patient was redirected and reoriented frequently. Fall and safety precautions maintained.                  Presenting with pmh of mild intermittent asthma here with difficulty breathing in setting of URI sx, no fever. On exam is non-toxic with RSS 10 with tachypnea, biphasic wheeze, spo2 92 and diffuse retractions. Cardiac exam with tachycardia, otherwise normal. Well-hydrated. No sign of SBI, consistent with acute asthma exacerbation. Plan for albuterol/atrovent x 3 and decadron, monitor, reassess

## 2024-07-03 NOTE — PROGRESS NOTES
[facility] Medicine Services  PROGRESS NOTE    Patient Name: Arcelia Walter  : 1945  MRN: [id number]    Date of Admission: 2024  Primary Care Physician: Provider, No Known    Subjective   Subjective     CC:  F/u AMS    HPI:  Sleepy this morning, discussed with RN.  Overnight she was a little bit agitated and did not sleep until 4 AM.  No current complaints, she is resting comfortably      Objective   Objective     Vital Signs:   Temp:  [98 °F (36.7 °C)-99.5 °F (37.5 °C)] 98 °F (36.7 °C)  Heart Rate:  [] 69  Resp:  [16-18] 16  BP: (104-138)/(46-94) 138/68     Physical Exam:  Constitutional: elderly, frail and thin appearing  HENT: NCAT, mucous membranes moist  Respiratory: Clear to auscultation bilaterally, respiratory effort normal   Cardiovascular: RRR, no murmurs, rubs, or gallops  Gastrointestinal: Positive bowel sounds, soft, nontender, nondistended  Musculoskeletal: No bilateral ankle edema  Psychiatric: flat affect, cooperative  Neurologic: Oriented to person only, no other focal deficits, follows commands, globally weak. More drowsy today  Skin: No rashes         Results Reviewed:  LAB RESULTS:      Lab 24  0624 24  0743 24  1020 24  0728 24  1806 24  0033   WBC 15.52* 18.43* 18.51* 16.95* 20.87* 15.60*   HEMOGLOBIN 9.5* 11.2* 12.4 11.2* 11.2* 12.3   HEMATOCRIT 28.9* 35.3 37.8 34.5 33.4* 37.5   PLATELETS 340 382 419 482* 429 510*   NEUTROS ABS 14.74* 16.66* 17.58* 13.56* 19.22* 14.21*   IMMATURE GRANS (ABS)  --  0.10*  --   --  0.10* 0.08*   LYMPHS ABS  --  0.89  --   --  0.91 0.82   MONOS ABS  --  0.71  --   --  0.54 0.41   EOS ABS 0.00 0.01 0.00 0.17 0.01 0.02   MCV 93.5 96.2 95.2 94.3 93.0 93.3   SED RATE  --   --   --   --   --  40*   CRP  --   --   --   --   --  16.13*   PROCALCITONIN  --   --   --   --   --  0.15   LACTATE  --   --   --   --   --  1.8   PROTIME  --   --   --   --   --  14.2   APTT  --   --   --   --   --   40.4*         Lab 07/03/24  0624 07/02/24  2055 07/02/24  1018 07/01/24  1020 06/30/24  0728 06/29/24  1806 06/29/24  1048 06/29/24  0711 06/29/24  0033   SODIUM 138  --  141 138 137  --   --  137 141   POTASSIUM 4.4 3.7 3.3* 3.7 4.6   < >  --  3.4* 3.1*   CHLORIDE 107  --  105 101 103  --   --  100 101   CO2 24.0  --  23.0 24.0 25.0  --   --  23.0 25.0   ANION GAP 7.0  --  13.0 13.0 9.0  --   --  14.0 15.0   BUN 8  --  4* 4* 7*  --   --  10 10   CREATININE 0.25*  --  0.32* 0.30* 0.30*  --   --  0.29* 0.34*   EGFR 113.6  --  107.1 108.7 108.7  --   --  109.6 105.5   GLUCOSE 88  --  90 79 88  --   --  90 174*   CALCIUM 7.6*  --  7.8* 7.9* 7.8*  --   --  8.3* 8.4*   MAGNESIUM  --   --   --   --   --   --   --  2.1 1.8   PHOSPHORUS 1.7*  --   --   --   --   --  2.6 2.2* 2.1*   HEMOGLOBIN A1C  --   --   --   --   --   --   --   --  5.20   TSH  --   --   --   --   --   --   --  2.170  --     < > = values in this interval not displayed.         Lab 07/02/24  1018 06/30/24  0728 06/29/24  0033   TOTAL PROTEIN 4.9* 4.6* 5.6*   ALBUMIN 2.4* 2.3* 2.6*   GLOBULIN 2.5 2.3 3.0   ALT (SGPT) 16 14 15   AST (SGOT) 22 19 28   BILIRUBIN 0.3 0.2 0.3   ALK PHOS 143* 112 127*   LIPASE  --   --  15         Lab 06/29/24  0240 06/29/24  0033   PROBNP  --  4,377.0*   HSTROP T 59* 61*   PROTIME  --  14.2   INR  --  1.09         Lab 06/29/24  0711   CHOLESTEROL 173   LDL CHOL 93   HDL CHOL 54   TRIGLYCERIDES 147         Lab 06/29/24  1049   VITAMIN B 12 1,186*         Brief Urine Lab Results  (Last result in the past 365 days)        Color   Clarity   Blood   Leuk Est   Nitrite   Protein   CREAT   Urine HCG        06/29/24 0038 Yellow   Clear   Negative   Negative   Negative   Trace                   Microbiology Results Abnormal       Procedure Component Value - Date/Time    Blood Culture - Blood, Hand, Right [[id number]]  (Normal) Collected: 07/01/24 1558    Lab Status: Preliminary result Specimen: Blood from Hand, Right Updated: 07/02/24  1731     Blood Culture No growth at 24 hours    Narrative:      Aerobic bottle only  Less than seven (7) mL's of blood was collected.  Insufficient quantity may yield false negative results.    Blood Culture - Blood, Hand, Left [[id number]]  (Normal) Collected: 07/01/24 1558    Lab Status: Preliminary result Specimen: Blood from Hand, Left Updated: 07/02/24 1731     Blood Culture No growth at 24 hours            No radiology results from the last 24 hrs    Results for orders placed during the hospital encounter of 06/28/24    Adult Transthoracic Echo Complete W/ Cont if Necessary Per Protocol    Interpretation Summary    Left ventricular systolic function is normal. Left ventricular ejection fraction appears to be 61 - 65%.    There is mild calcification of the aortic valve.    Mild aortic valve regurgitation is present.    Mild mitral annular calcification is present.    Mild to moderate mitral valve regurgitation is present.    Mild tricuspid valve regurgitation is present.      Current medications:  Scheduled Meds:mirtazapine, 15 mg, Oral, Nightly  sodium chloride, 10 mL, Intravenous, Q12H  sodium phosphate, 15 mmol, Intravenous, Once  thiamine, 100 mg, Oral, Daily      Continuous Infusions:     PRN Meds:.  acetaminophen    senna-docusate sodium **AND** polyethylene glycol **AND** bisacodyl **AND** bisacodyl    Calcium Replacement - Follow Nurse / BPA Driven Protocol    Magnesium Standard Dose Replacement - Follow Nurse / BPA Driven Protocol    melatonin    Phosphorus Replacement - Follow Nurse / BPA Driven Protocol    Potassium Replacement - Follow Nurse / BPA Driven Protocol    [COMPLETED] Insert Peripheral IV **AND** sodium chloride    sodium chloride    sodium chloride    Assessment & Plan   Assessment & Plan     Active Hospital Problems    Diagnosis  POA    **Syncope [R55]  Yes    AMS (altered mental status) [R41.82]  Yes    Hypokalemia [E87.6]  Yes    Elevated troponin [R79.89]  Yes    Elevated C-reactive  protein (CRP) [R79.82]  Yes    Elevated sed rate [R70.0]  Yes    Leukocytosis [D72.829]  Yes      Resolved Hospital Problems   No resolved problems to display.        Brief Hospital Course to date:  Arcelia Walter is a 79 y.o. female brought in per EMS after being found down during a welfare check.  Unclear of exact events.  No emergency contact on file.       Syncope  Altered mental status  - Unclear exact events, originally reported to EMS on the floor x 2 days, later states that she is unsure what happened  - CT head w atrophy and chronic microvascular ischemic change but no acute intracranial process  - Ethanol neg  --echo w nl EF and no significant valvular abnormalities  - A1c and TSH wnl     Elevated troponin  - Patient currently denies chest pain  - EKG without acute ischemia  - Trended down        Leukocytosis  Elevated CRP  Elevated sed rate  - Procalcitonin, lactic normal  - infectious w/u neg,  blood cx pending. No fevers, hold on abx. No clear infectious source  --peripheral smear pending     Hypokalemia  - Electrolyte replacement     Malnourishment  Failure to thrive  - No emergency contact, unclear living situation  - Case management/ consult  --PT/OT  --nutrition following  --added mirtazipine  --monitor for refeeding. Replacing phos today      Expected Discharge Location and Transportation: placement likely  Expected Discharge   Expected Discharge Date: 7/3/2024; Expected Discharge Time:      VTE Prophylaxis:  Mechanical VTE prophylaxis orders are present.         AM-PAC 6 Clicks Score (PT): 18 (07/02/24 2000)    CODE STATUS:   Code Status and Medical Interventions:   Ordered at: 06/29/24 0451     Code Status (Patient has no pulse and is not breathing):    CPR (Attempt to Resuscitate)     Medical Interventions (Patient has pulse or is breathing):    Full Support       Kaitlynn Chu MD  07/03/24

## 2024-07-03 NOTE — CASE MANAGEMENT/SOCIAL WORK
"Continued Stay Note   [place]     Patient Name: Arcelia Walter  MRN: [id number]  Today's Date: 7/3/2024    Admit Date: 6/28/2024    Plan: [employer] report, no findings   Discharge Plan       Row Name 07/03/24 1059       Plan    Plan [employer] report, no findings    Plan Comments SW Manager attempted to run a [employer] report in search of relatives of pt.  Report was run 3 times and results were \"no records found\".  MARLA has contacted the APS supervisor to see if an APS worker has been assigned to pt's case.  No response at this time.    Update:  Pt's worker is ELISABETH Patricio, however he and his supervisor are both out of office until 7/5/24.  MARLA will be able to follow-up at that time.      Final Discharge Disposition Code 30 - still a patient                   Discharge Codes    No documentation.                 Expected Discharge Date and Time       Expected Discharge Date Expected Discharge Time    Jul 5, 2024               SHANTI Delgado    "

## 2024-07-03 NOTE — PLAN OF CARE
Goal Outcome Evaluation:  Plan of Care Reviewed With: patient        Progress: no change  Outcome Evaluation: Patient able to ambulate 45' CGA with FWW requiring less assistance today, very distractible requiring frequent redirection to task. IPPT remains indicated to address current deficits resulting in functional mobility below baseline. Continue to recommend D/C to SNF.      Anticipated Discharge Disposition (PT): skilled nursing facility

## 2024-07-03 NOTE — THERAPY TREATMENT NOTE
Patient Name: Arcelia Walter  : 1945    MRN: [id number]                              Today's Date: 7/3/2024       Admit Date: 2024    Visit Dx:     ICD-10-CM ICD-9-CM   1. Syncope, unspecified syncope type  R55 780.2   2. Ketosis  E88.89 276.2     Patient Active Problem List   Diagnosis    Syncope    Hypokalemia    Elevated troponin    Elevated C-reactive protein (CRP)    Elevated sed rate    Leukocytosis    AMS (altered mental status)     History reviewed. No pertinent past medical history.  Past Surgical History:   Procedure Laterality Date    HYSTERECTOMY        General Information       Row Name 24 1535          Physical Therapy Time and Intention    Document Type therapy note (daily note)  -CK     Mode of Treatment physical therapy;individual therapy  -CK       Row Name 24 0275          General Information    Patient Profile Reviewed yes  -CK     Existing Precautions/Restrictions fall;other (see comments)  confusion  -CK     Barriers to Rehab cognitive status  -CK       Row Name 24 1795          Cognition    Orientation Status (Cognition) oriented to;person  -CK       Row Name 24 1534          Safety Issues, Functional Mobility    Safety Issues Affecting Function (Mobility) awareness of need for assistance;insight into deficits/self-awareness;safety precaution awareness;safety precautions follow-through/compliance;sequencing abilities  -CK     Impairments Affecting Function (Mobility) balance;cognition;endurance/activity tolerance;strength;postural/trunk control  -CK               User Key  (r) = Recorded By, (t) = Taken By, (c) = Cosigned By      Initials Name Provider Type    CK Juana Velasquez PT Physical Therapist                   Mobility       Row Name 24 1536          Bed Mobility    Bed Mobility supine-sit  -CK     Supine-Sit [place] (Bed Mobility) minimum assist (75% patient effort);1 person assist;verbal cues  -CK     Assistive Device (Bed Mobility) bed  rails;draw sheet  -CK     Comment, (Bed Mobility) cues provided for sequencing, patient able to reach for bedrail, required assist to bring BLEs off EOB. Increased time and effort for all bed mobility  -CK       Row Name 07/03/24 1535          Sit-Stand Transfer    Sit-Stand [place] (Transfers) minimum assist (75% patient effort);1 person assist;verbal cues  -CK     Assistive Device (Sit-Stand Transfers) walker, front-wheeled  -CK     Comment, (Sit-Stand Transfer) Rod for steadying with transitioning hands from bed to walker  -CK       Row Name 07/03/24 1535          Gait/Stairs (Locomotion)    [place] Level (Gait) contact guard;1 person assist;verbal cues  -CK     Assistive Device (Gait) walker, front-wheeled  -CK     Distance in Feet (Gait) 45  -CK     Deviations/Abnormal Patterns (Gait) bilateral deviations;base of support, narrow;[place] decreased;festinating/shuffling;gait speed decreased;stride length decreased  -CK     Bilateral Gait Deviations forward flexed posture  -CK     Comment, (Gait/Stairs) Patient ambulated in marie with shuffled step through gait pattern at very slow pace. She is very distractible and requires frequent redirection to task. She demonstrates good sequencing with AD and no overt LOB. Increased time required to complete ambulation.  -CK               User Key  (r) = Recorded By, (t) = Taken By, (c) = Cosigned By      Initials Name Provider Type    CK Juana Velasquez PT Physical Therapist                   Obj/Interventions       Row Name 07/03/24 3694          Balance    Balance Assessment sitting static balance;standing static balance;standing dynamic balance  -CK     Static Sitting Balance standby assist  -CK     Position, Sitting Balance unsupported;sitting edge of bed  -CK     Static Standing Balance contact guard  -CK     Dynamic Standing Balance contact guard  -CK     Position/Device Used, Standing Balance supported;walker, front-wheeled  -CK     Comment, Balance no  overt LOB  -CK               User Key  (r) = Recorded By, (t) = Taken By, (c) = Cosigned By      Initials Name Provider Type    Juana Sparks PT Physical Therapist                   Goals/Plan    No documentation.                  Clinical Impression       Row Name 07/03/24 1551          Pain    Additional Documentation Pain Scale: FACES Pre/Post-Treatment (Group)  -CK       Row Name 07/03/24 1551          Pain Scale: FACES Pre/Post-Treatment    Pain: FACES Scale, Pretreatment 2-->hurts little bit  -CK     Posttreatment Pain Rating 2-->hurts little bit  -CK     Pain Location generalized  -CK     Pain Location - abdomen  -CK     Pre/Posttreatment Pain Comment c/o some abdominal pain due to hiccups, RN aware  -CK       Row Name 07/03/24 1551          Plan of Care Review    Plan of Care Reviewed With patient  -CK     Progress no change  -CK     Outcome Evaluation Patient able to ambulate 45' CGA with FWW requiring less assistance today, very distractible requiring frequent redirection to task. IPPT remains indicated to address current deficits resulting in functional mobility below baseline. Continue to recommend D/C to SNF.  -CK       Row Name 07/03/24 1551          Vital Signs    O2 Delivery Pre Treatment room air  -CK     O2 Delivery Intra Treatment room air  -CK     O2 Delivery Post Treatment room air  -CK     Pre Patient Position Supine  -CK     Post Patient Position Sitting  -CK       Row Name 07/03/24 1551          Positioning and Restraints    Pre-Treatment Position in bed  -CK     Post Treatment Position chair  -CK     In Chair reclined;call light within reach;encouraged to call for assist;exit alarm on;waffle cushion;notified nsg;patient within staff view  -CK               User Key  (r) = Recorded By, (t) = Taken By, (c) = Cosigned By      Initials Name Provider Type    Juana Sparks PT Physical Therapist                   Outcome Measures       Row Name 07/03/24 1554 07/03/24 0844        How much help from another person do you currently need...    Turning from your back to your side while in flat bed without using bedrails? 3  -CK 3  -AM    Moving from lying on back to sitting on the side of a flat bed without bedrails? 3  -CK 3  -AM    Moving to and from a bed to a chair (including a wheelchair)? 3  -CK 3  -AM    Standing up from a chair using your arms (e.g., wheelchair, bedside chair)? 3  -CK 3  -AM    Climbing 3-5 steps with a railing? 2  -CK 3  -AM    To walk in hospital room? 3  -CK 3  -AM    AM-PAC 6 Clicks Score (PT) 17  -CK 18  -AM    Highest Level of Mobility Goal 5 --> Static standing  -CK 6 --> Walk 10 steps or more  -AM      Row Name 07/03/24 1554          Functional Assessment    Outcome Measure Options AM-PAC 6 Clicks Basic Mobility (PT)  -CK               User Key  (r) = Recorded By, (t) = Taken By, (c) = Cosigned By      Initials Name Provider Type    AM Michelle Bower, RN Registered Nurse    CK Juana Velasquez, PT Physical Therapist                                 Physical Therapy Education       Title: PT OT SLP Therapies (In Progress)       Topic: Physical Therapy (In Progress)       Point: Mobility training (In Progress)       Learning Progress Summary             Patient Acceptance, E, NR by CK at 7/3/2024 1554    Acceptance, E, NR by JOVITA at 7/2/2024 1400    Acceptance, E, NR by KG at 7/1/2024 1040    Acceptance, E, NR by KG at 6/29/2024 1001                         Point: Home exercise program (In Progress)       Learning Progress Summary             Patient Acceptance, E, NR by JOVITA at 7/2/2024 1400    Acceptance, E, NR by KG at 7/1/2024 1040                         Point: Body mechanics (In Progress)       Learning Progress Summary             Patient Acceptance, E, NR by CK at 7/3/2024 1554    Acceptance, E, NR by JOVITA at 7/2/2024 1400    Acceptance, E, NR by KG at 7/1/2024 1040    Acceptance, E, NR by KG at 6/29/2024 1001                         Point: Precautions (In  Progress)       Learning Progress Summary             Patient Acceptance, E, NR by CK at 7/3/2024 1554    Acceptance, E, NR by JOVITA at 7/2/2024 1400    Acceptance, E, NR by KG at 7/1/2024 1040    Acceptance, E, NR by KG at 6/29/2024 1001                                         User Key       Initials Effective Dates Name Provider Type Discipline    JOVITA 02/03/23 -  Mary Kay Valencia, PT Physical Therapist PT    KG 05/22/20 -  Misty Shipley PT Physical Therapist PT    CK 02/06/24 -  Juana Velasquez PT Physical Therapist PT                  PT Recommendation and Plan     Plan of Care Reviewed With: patient  Progress: no change  Outcome Evaluation: Patient able to ambulate 45' CGA with FWW requiring less assistance today, very distractible requiring frequent redirection to task. IPPT remains indicated to address current deficits resulting in functional mobility below baseline. Continue to recommend D/C to SNF.     Time Calculation:         PT Charges       Row Name 07/03/24 1554             Time Calculation    Start Time 1452  -CK      PT Received On 07/03/24  -CK         Timed Charges    06813 - PT Therapeutic Activity Minutes 23  -CK         Total Minutes    Timed Charges Total Minutes 23  -CK       Total Minutes 23  -CK                User Key  (r) = Recorded By, (t) = Taken By, (c) = Cosigned By      Initials Name Provider Type    CK Juana Velasquez, PT Physical Therapist                  Therapy Charges for Today       Code Description Service Date Service Provider Modifiers Qty    60052493028 HC PT THERAPEUTIC ACT EA 15 MIN 7/3/2024 Juana Velasquez, PT GP 2            PT G-Codes  Outcome Measure Options: AM-PAC 6 Clicks Basic Mobility (PT)  AM-PAC 6 Clicks Score (PT): 17  AM-PAC 6 Clicks Score (OT): 15  PT Discharge Summary  Anticipated Discharge Disposition (PT): skilled nursing facility    Juana Velasquez PT  7/3/2024

## 2024-07-04 LAB
ALBUMIN SERPL-MCNC: 1.9 G/DL (ref 3.5–5.2)
ALBUMIN/GLOB SERPL: 0.7 G/DL
ALP SERPL-CCNC: 134 U/L (ref 39–117)
ALT SERPL W P-5'-P-CCNC: 14 U/L (ref 1–33)
ANION GAP SERPL CALCULATED.3IONS-SCNC: 13 MMOL/L (ref 5–15)
AST SERPL-CCNC: 16 U/L (ref 1–32)
BASOPHILS # BLD MANUAL: 0 10*3/MM3 (ref 0–0.2)
BASOPHILS NFR BLD MANUAL: 0 % (ref 0–1.5)
BILIRUB SERPL-MCNC: 0.4 MG/DL (ref 0–1.2)
BUN SERPL-MCNC: 8 MG/DL (ref 8–23)
BUN/CREAT SERPL: 34.8 (ref 7–25)
CALCIUM SPEC-SCNC: 7.5 MG/DL (ref 8.6–10.5)
CHLORIDE SERPL-SCNC: 106 MMOL/L (ref 98–107)
CO2 SERPL-SCNC: 21 MMOL/L (ref 22–29)
CREAT SERPL-MCNC: 0.23 MG/DL (ref 0.57–1)
DEPRECATED RDW RBC AUTO: 53.2 FL (ref 37–54)
EGFRCR SERPLBLD CKD-EPI 2021: 115.9 ML/MIN/1.73
EOSINOPHIL # BLD MANUAL: 0 10*3/MM3 (ref 0–0.4)
EOSINOPHIL NFR BLD MANUAL: 0 % (ref 0.3–6.2)
ERYTHROCYTE [DISTWIDTH] IN BLOOD BY AUTOMATED COUNT: 15.5 % (ref 12.3–15.4)
GLOBULIN UR ELPH-MCNC: 2.7 GM/DL
GLUCOSE SERPL-MCNC: 76 MG/DL (ref 65–99)
HCT VFR BLD AUTO: 27.8 % (ref 34–46.6)
HGB BLD-MCNC: 9.2 G/DL (ref 12–15.9)
LYMPHOCYTES # BLD MANUAL: 1.03 10*3/MM3 (ref 0.7–3.1)
LYMPHOCYTES NFR BLD MANUAL: 3 % (ref 5–12)
MCH RBC QN AUTO: 31.1 PG (ref 26.6–33)
MCHC RBC AUTO-ENTMCNC: 33.1 G/DL (ref 31.5–35.7)
MCV RBC AUTO: 93.9 FL (ref 79–97)
MONOCYTES # BLD: 0.39 10*3/MM3 (ref 0.1–0.9)
NEUTROPHILS # BLD AUTO: 11.47 10*3/MM3 (ref 1.7–7)
NEUTROPHILS NFR BLD MANUAL: 55 % (ref 42.7–76)
NEUTS BAND NFR BLD MANUAL: 34 % (ref 0–5)
NRBC SPEC MANUAL: 0 /100 WBC (ref 0–0.2)
PHOSPHATE SERPL-MCNC: 2.7 MG/DL (ref 2.5–4.5)
PLAT MORPH BLD: NORMAL
PLATELET # BLD AUTO: 301 10*3/MM3 (ref 140–450)
PMV BLD AUTO: 9.8 FL (ref 6–12)
POTASSIUM SERPL-SCNC: 3.7 MMOL/L (ref 3.5–5.2)
PROT SERPL-MCNC: 4.6 G/DL (ref 6–8.5)
RBC # BLD AUTO: 2.96 10*6/MM3 (ref 3.77–5.28)
RBC MORPH BLD: NORMAL
SODIUM SERPL-SCNC: 140 MMOL/L (ref 136–145)
VARIANT LYMPHS NFR BLD MANUAL: 8 % (ref 19.6–45.3)
WBC MORPH BLD: NORMAL
WBC NRBC COR # BLD AUTO: 12.89 10*3/MM3 (ref 3.4–10.8)

## 2024-07-04 PROCEDURE — 80053 COMPREHEN METABOLIC PANEL: CPT | Performed by: STUDENT IN AN ORGANIZED HEALTH CARE EDUCATION/TRAINING PROGRAM

## 2024-07-04 PROCEDURE — 99232 SBSQ HOSP IP/OBS MODERATE 35: CPT | Performed by: STUDENT IN AN ORGANIZED HEALTH CARE EDUCATION/TRAINING PROGRAM

## 2024-07-04 PROCEDURE — 97530 THERAPEUTIC ACTIVITIES: CPT

## 2024-07-04 PROCEDURE — 97535 SELF CARE MNGMENT TRAINING: CPT

## 2024-07-04 PROCEDURE — 85007 BL SMEAR W/DIFF WBC COUNT: CPT | Performed by: STUDENT IN AN ORGANIZED HEALTH CARE EDUCATION/TRAINING PROGRAM

## 2024-07-04 PROCEDURE — 85025 COMPLETE CBC W/AUTO DIFF WBC: CPT | Performed by: STUDENT IN AN ORGANIZED HEALTH CARE EDUCATION/TRAINING PROGRAM

## 2024-07-04 PROCEDURE — 84100 ASSAY OF PHOSPHORUS: CPT | Performed by: STUDENT IN AN ORGANIZED HEALTH CARE EDUCATION/TRAINING PROGRAM

## 2024-07-04 RX ORDER — NICOTINE 21 MG/24HR
1 PATCH, TRANSDERMAL 24 HOURS TRANSDERMAL
Status: DISCONTINUED | OUTPATIENT
Start: 2024-07-04 | End: 2024-08-14 | Stop reason: HOSPADM

## 2024-07-04 RX ADMIN — Medication 1 PATCH: at 20:18

## 2024-07-04 RX ADMIN — Medication 5 MG: at 20:18

## 2024-07-04 RX ADMIN — THIAMINE HCL TAB 100 MG 100 MG: 100 TAB at 08:32

## 2024-07-04 RX ADMIN — MIRTAZAPINE 15 MG: 15 TABLET, FILM COATED ORAL at 20:19

## 2024-07-04 RX ADMIN — ACETAMINOPHEN 650 MG: 325 TABLET ORAL at 20:18

## 2024-07-04 RX ADMIN — Medication 10 ML: at 17:23

## 2024-07-04 NOTE — THERAPY TREATMENT NOTE
Patient Name: Arcelia Walter  : 1945    MRN: [id number]                              Today's Date: 2024       Admit Date: 2024    Visit Dx:     ICD-10-CM ICD-9-CM   1. Syncope, unspecified syncope type  R55 780.2   2. Ketosis  E88.89 276.2     Patient Active Problem List   Diagnosis    Syncope    Hypokalemia    Elevated troponin    Elevated C-reactive protein (CRP)    Elevated sed rate    Leukocytosis    AMS (altered mental status)     History reviewed. No pertinent past medical history.  Past Surgical History:   Procedure Laterality Date    HYSTERECTOMY        General Information       Row Name 24 1140          OT Time and Intention    Document Type therapy note (daily note)  -AN     Mode of Treatment occupational therapy  -AN       Row Name 24 1140          General Information    Patient Profile Reviewed yes  -AN     Existing Precautions/Restrictions fall;other (see comments)  AMS  -AN     Barriers to Rehab medically complex;cognitive status  -AN       Row Name 24 1140          Cognition    Orientation Status (Cognition) oriented to;person;disoriented to;place;situation;time;verbal cues/prompts needed for orientation  -AN       Row Name 24 1140          Safety Issues, Functional Mobility    Safety Issues Affecting Function (Mobility) safety precautions follow-through/compliance;safety precaution awareness;insight into deficits/self-awareness;sequencing abilities;awareness of need for assistance;problem-solving;judgment;positioning of assistive device  -AN     Impairments Affecting Function (Mobility) balance;cognition;endurance/activity tolerance;strength;postural/trunk control  -AN     Cognitive Impairments, Mobility Safety/Performance insight into deficits/self-awareness;judgment;problem-solving/reasoning;safety precaution awareness;sequencing abilities;safety precaution follow-through;awareness, need for assistance  -AN               User Key  (r) = Recorded By, (t) = Taken  By, (c) = Cosigned By      Initials Name Provider Type    AN Theresa Moe OT Occupational Therapist                     Mobility/ADL's       Row Name 07/04/24 1141          Bed Mobility    Bed Mobility supine-sit  -AN     Supine-Sit [place] (Bed Mobility) verbal cues;minimum assist (75% patient effort);1 person assist  -AN     Bed Mobility, Safety Issues decreased use of arms for pushing/pulling;decreased use of legs for bridging/pushing;impaired trunk control for bed mobility  -AN     Assistive Device (Bed Mobility) head of bed elevated  -AN       Row Name 07/04/24 1141          Transfers    Transfers sit-stand transfer;stand-sit transfer;toilet transfer  -AN     Comment, (Transfers) Cues for hand placement and transfer technique using the RW  -AN       Row Name 07/04/24 1141          Sit-Stand Transfer    Sit-Stand [place] (Transfers) verbal cues;minimum assist (75% patient effort);1 person assist  -AN     Assistive Device (Sit-Stand Transfers) walker, front-wheeled  -AN       Row Name 07/04/24 1141          Stand-Sit Transfer    Stand-Sit [place] (Transfers) verbal cues;minimum assist (75% patient effort);1 person assist  -AN     Assistive Device (Stand-Sit Transfers) walker, front-wheeled  -AN       Row Name 07/04/24 1141          Toilet Transfer    Type (Toilet Transfer) sit-stand;stand-sit  -AN     [place] Level (Toilet Transfer) verbal cues;moderate assist (50% patient effort);1 person assist  -AN     Assistive Device (Toilet Transfer) commode;walker, front-wheeled  -AN       Row Name 07/04/24 1141          Functional Mobility    Functional Mobility- Ind. Level minimum assist (75% patient effort);1 person  -AN     Functional Mobility- Device walker, front-wheeled  -AN     Functional Mobility-Distance (Feet) --  household distance  -AN     Functional Mobility- Safety Issues sequencing ability decreased;step length decreased  -AN     Functional Mobility- Comment Pt ambulated household  distance in prep for ADLs using the RW with CGA for balance.  -AN       Row Name 07/04/24 1141          Activities of Daily Living    BADL Assessment/Intervention upper body dressing;lower body dressing;grooming;toileting  -AN       Row Name 07/04/24 1141          Lower Body Dressing Assessment/Training    [place] Level (Lower Body Dressing) doff;pants/bottoms;verbal cues  -AN     Position (Lower Body Dressing) edge of bed sitting  -AN       Row Name 07/04/24 1141          Toileting Assessment/Training    [place] Level (Toileting) adjust/manage clothing;perform perineal hygiene;moderate assist (50% patient effort)  -AN     Assistive Devices (Toileting) commode  -AN     Position (Toileting) supported standing;unsupported sitting  -AN       Row Name 07/04/24 1141          Grooming Assessment/Training    [place] Level (Grooming) hair care, combing/brushing;wash face, hands;verbal cues;minimum assist (75% patient effort)  -AN     Position (Grooming) sink side  -AN       Row Name 07/04/24 1141          Upper Body Dressing Assessment/Training    [place] Level (Upper Body Dressing) don;pajama/robe;minimum assist (75% patient effort)  -AN     Position (Upper Body Dressing) edge of bed sitting  -AN               User Key  (r) = Recorded By, (t) = Taken By, (c) = Cosigned By      Initials Name Provider Type    Theresa Aponte OT Occupational Therapist                   Obj/Interventions       Row Name 07/04/24 1145          Balance    Balance Assessment sitting static balance;sitting dynamic balance;sit to stand dynamic balance;standing dynamic balance;standing static balance  -AN     Static Sitting Balance standby assist  -AN     Dynamic Sitting Balance standby assist  -AN     Position, Sitting Balance sitting edge of bed  -AN     Sit to Stand Dynamic Balance verbal cues;moderate assist;1-person assist  -AN     Static Standing Balance verbal cues;contact guard  -AN     Dynamic Standing Balance  verbal cues;minimal assist;1-person assist  -AN     Position/Device Used, Standing Balance supported;walker, rolling  -AN     Balance Interventions standing;sit to stand;supported;static;dynamic;minimal challenge;occupation based/functional task  -AN               User Key  (r) = Recorded By, (t) = Taken By, (c) = Cosigned By      Initials Name Provider Type    AN Theresa Moe, OT Occupational Therapist                   Goals/Plan    No documentation.                  Clinical Impression       Row Name 07/04/24 1145          Pain Assessment    Pretreatment Pain Rating 0/10 - no pain  -AN     Posttreatment Pain Rating 0/10 - no pain  -AN     Pre/Posttreatment Pain Comment asymptomatic  -AN     Pain Intervention(s) Repositioned;Ambulation/increased activity  -AN       Row Name 07/04/24 1145          Plan of Care Review    Plan of Care Reviewed With patient  -AN     Progress improving  -AN     Outcome Evaluation Pt continues to present with deficits including generalized weakness, impaired balance, confusion, and impaired ADLs warranting skilled OT services. Pt performed in room ambulation in prep for ADLs using the RW with Min A. Mod A to stand from toilet. Oriented to self only. Cont POC.  -AN       Row Name 07/04/24 1145          Therapy Plan Review/Discharge Plan (OT)    Anticipated Discharge Disposition (OT) skilled nursing facility  -AN       [facility] Name 07/04/24 1145          Vital Signs    O2 Delivery Pre Treatment room air  -AN     O2 Delivery Intra Treatment room air  -AN     O2 Delivery Post Treatment room air  -AN     Pre Patient Position Supine  -AN     Intra Patient Position Standing  -AN     Post Patient Position Sitting  -AN       Row Name 07/04/24 1145          Positioning and Restraints    Pre-Treatment Position in bed  -AN     Post Treatment Position chair  -AN     In Chair notified nsg;reclined;call light within reach;encouraged to call for assist;exit alarm on;legs elevated;waffle cushion  -AN                User Key  (r) = Recorded By, (t) = Taken By, (c) = Cosigned By      Initials Name Provider Type    Theresa Aponte OT Occupational Therapist                   Outcome Measures       Row Name 07/04/24 1154          How much help from another is currently needed...    Putting on and taking off regular lower body clothing? 2  -AN     Bathing (including washing, rinsing, and drying) 2  -AN     Toileting (which includes using toilet bed pan or urinal) 3  -AN     Putting on and taking off regular upper body clothing 3  -AN     Taking care of personal grooming (such as brushing teeth) 3  -AN     Eating meals 4  -AN     AM-PAC 6 Clicks Score (OT) 17  -AN       Row Name 07/04/24 0830          How much help from another person do you currently need...    Turning from your back to your side while in flat bed without using bedrails? 3  -CB     Moving from lying on back to sitting on the side of a flat bed without bedrails? 3  -CB     Moving to and from a bed to a chair (including a wheelchair)? 3  -CB     Standing up from a chair using your arms (e.g., wheelchair, bedside chair)? 3  -CB     Climbing 3-5 steps with a railing? 2  -CB     To walk in hospital room? 3  -CB     AM-PAC 6 Clicks Score (PT) 17  -CB     Highest Level of Mobility Goal 5 --> Static standing  -CB       Row Name 07/04/24 1154          Functional Assessment    Outcome Measure Options AM-PAC 6 Clicks Daily Activity (OT)  -AN               User Key  (r) = Recorded By, (t) = Taken By, (c) = Cosigned By      Initials Name Provider Type    Norah Gomez, RN Registered Nurse    Theresa Aponte OT Occupational Therapist                    Occupational Therapy Education       Title: PT OT SLP Therapies (In Progress)       Topic: Occupational Therapy (Done)       Point: ADL training (Done)       Description:   Instruct learner(s) on proper safety adaptation and remediation techniques during self care or transfers.   Instruct in proper use of  assistive devices.                  Learning Progress Summary             Patient Acceptance, E, VU by AN at 7/4/2024 1155    Acceptance, E, VU,DU,NR by MR at 7/1/2024 1015                         Point: Home exercise program (Done)       Description:   Instruct learner(s) on appropriate technique for monitoring, assisting and/or progressing therapeutic exercises/activities.                  Learning Progress Summary             Patient Acceptance, E, VU,DU,NR by MR at 7/1/2024 1015                         Point: Precautions (Done)       Description:   Instruct learner(s) on prescribed precautions during self-care and functional transfers.                  Learning Progress Summary             Patient Acceptance, E, VU by AN at 7/4/2024 1155    Acceptance, E, VU,DU,NR by MR at 7/1/2024 1015                         Point: Body mechanics (Done)       Description:   Instruct learner(s) on proper positioning and spine alignment during self-care, functional mobility activities and/or exercises.                  Learning Progress Summary             Patient Acceptance, E, VU by AN at 7/4/2024 1155    Acceptance, E, VU,DU,NR by MR at 7/1/2024 1015                                         User Key       Initials Effective Dates Name Provider Type Discipline     09/21/21 -  Theresa Moe, OT Occupational Therapist OT    MR 09/22/22 -  Emily Jimenes OT Occupational Therapist OT                  OT Recommendation and Plan     Plan of Care Review  Plan of Care Reviewed With: patient  Progress: improving  Outcome Evaluation: Pt continues to present with deficits including generalized weakness, impaired balance, confusion, and impaired ADLs warranting skilled OT services. Pt performed in room ambulation in prep for ADLs using the RW with Min A. Mod A to stand from toilet. Oriented to self only. Cont POC.     Time Calculation:         Time Calculation- OT       Row Name 07/04/24 1155             Time Calculation- OT    OT  Start Time 1050  -AN      OT Received On 07/04/24  -AN         Timed Charges    84838 - OT Therapeutic Activity Minutes 10  -AN      61695 - OT Self Care/Mgmt Minutes 15  -AN         Total Minutes    Timed Charges Total Minutes 25  -AN       Total Minutes 25  -AN                User Key  (r) = Recorded By, (t) = Taken By, (c) = Cosigned By      Initials Name Provider Type    AN Theresa Moe OT Occupational Therapist                  Therapy Charges for Today       Code Description Service Date Service Provider Modifiers Qty    61532695141 HC OT THERAPEUTIC ACT EA 15 MIN 7/4/2024 Theresa Moe, OT GO 1    06697301862 HC OT SELF CARE/MGMT/TRAIN EA 15 MIN 7/4/2024 Theresa Moe OT GO 1                 Theresa Moe OT  7/4/2024

## 2024-07-04 NOTE — PLAN OF CARE
Goal Outcome Evaluation:  Plan of Care Reviewed With: patient        Progress: no change   Patient vital signs stable and on room air. Patient denies pain, nausea, and shortness of air. Fall and safety precautions maintained.

## 2024-07-04 NOTE — PROGRESS NOTES
[facility] Medicine Services  PROGRESS NOTE    Patient Name: Arcelia Walter  : 1945  MRN: [id number]    Date of Admission: 2024  Primary Care Physician: Provider, No Known    Subjective   Subjective     CC:  F/u AMS    HPI:  Less sleepy today. No overnight issues      Objective   Objective     Vital Signs:   Temp:  [97.7 °F (36.5 °C)-98 °F (36.7 °C)] 97.7 °F (36.5 °C)  Heart Rate:  [] 87  Resp:  [12-16] 12  BP: ()/(49-81) 114/61     Physical Exam:  Constitutional: elderly, frail and thin appearing  HENT: NCAT, mucous membranes moist  Respiratory: Clear to auscultation bilaterally, respiratory effort normal   Cardiovascular: RRR, no murmurs, rubs, or gallops  Gastrointestinal: Positive bowel sounds, soft, nontender, nondistended  Musculoskeletal: No bilateral ankle edema  Psychiatric: flat affect, cooperative  Neurologic: Oriented to person only, no other focal deficits, follows commands, globally weak.   Skin: No rashes         Results Reviewed:  LAB RESULTS:      Lab 24  1007 24  0624 24  0743 24  1020 24  0728 24  1806 24  0033   WBC 12.89* 15.52* 18.43* 18.51* 16.95* 20.87* 15.60*   HEMOGLOBIN 9.2* 9.5* 11.2* 12.4 11.2* 11.2* 12.3   HEMATOCRIT 27.8* 28.9* 35.3 37.8 34.5 33.4* 37.5   PLATELETS 301 340 382 419 482* 429 510*   NEUTROS ABS 11.47* 14.74* 16.66* 17.58* 13.56* 19.22* 14.21*   IMMATURE GRANS (ABS)  --   --  0.10*  --   --  0.10* 0.08*   LYMPHS ABS  --   --  0.89  --   --  0.91 0.82   MONOS ABS  --   --  0.71  --   --  0.54 0.41   EOS ABS 0.00 0.00 0.01 0.00 0.17 0.01 0.02   MCV 93.9 93.5 96.2 95.2 94.3 93.0 93.3   SED RATE  --   --   --   --   --   --  40*   CRP  --   --   --   --   --   --  16.13*   PROCALCITONIN  --   --   --   --   --   --  0.15   LACTATE  --   --   --   --   --   --  1.8   PROTIME  --   --   --   --   --   --  14.2   APTT  --   --   --   --   --   --  40.4*         Lab 24  0609  07/03/24  1729 07/03/24 0624 07/02/24  2055 07/02/24  1018 07/01/24  1020 06/30/24 0728 06/29/24  1806 06/29/24  1048 06/29/24  0711 06/29/24  0033   SODIUM 140  --  138  --  141 138 137  --   --  137 141   POTASSIUM 3.7  --  4.4 3.7 3.3* 3.7 4.6   < >  --  3.4* 3.1*   CHLORIDE 106  --  107  --  105 101 103  --   --  100 101   CO2 21.0*  --  24.0  --  23.0 24.0 25.0  --   --  23.0 25.0   ANION GAP 13.0  --  7.0  --  13.0 13.0 9.0  --   --  14.0 15.0   BUN 8  --  8  --  4* 4* 7*  --   --  10 10   CREATININE 0.23*  --  0.25*  --  0.32* 0.30* 0.30*  --   --  0.29* 0.34*   EGFR 115.9  --  113.6  --  107.1 108.7 108.7  --   --  109.6 105.5   GLUCOSE 76  --  88  --  90 79 88  --   --  90 174*   CALCIUM 7.5*  --  7.6*  --  7.8* 7.9* 7.8*  --   --  8.3* 8.4*   MAGNESIUM  --   --   --   --   --   --   --   --   --  2.1 1.8   PHOSPHORUS 2.7 2.6 1.7*  --   --   --   --   --  2.6 2.2* 2.1*   HEMOGLOBIN A1C  --   --   --   --   --   --   --   --   --   --  5.20   TSH  --   --   --   --   --   --   --   --   --  2.170  --     < > = values in this interval not displayed.         Lab 07/04/24  0609 07/02/24  1018 06/30/24 0728 06/29/24  0033   TOTAL PROTEIN 4.6* 4.9* 4.6* 5.6*   ALBUMIN 1.9* 2.4* 2.3* 2.6*   GLOBULIN 2.7 2.5 2.3 3.0   ALT (SGPT) 14 16 14 15   AST (SGOT) 16 22 19 28   BILIRUBIN 0.4 0.3 0.2 0.3   ALK PHOS 134* 143* 112 127*   LIPASE  --   --   --  15         Lab 06/29/24  0240 06/29/24  0033   PROBNP  --  4,377.0*   HSTROP T 59* 61*   PROTIME  --  14.2   INR  --  1.09         Lab 06/29/24  0711   CHOLESTEROL 173   LDL CHOL 93   HDL CHOL 54   TRIGLYCERIDES 147         Lab 06/29/24  1049   VITAMIN B 12 1,186*         Brief Urine Lab Results  (Last result in the past 365 days)        Color   Clarity   Blood   Leuk Est   Nitrite   Protein   CREAT   Urine HCG        06/29/24 0038 Yellow   Clear   Negative   Negative   Negative   Trace                   Microbiology Results Abnormal       Procedure Component Value -  Date/Time    Blood Culture - Blood, Hand, Right [[id number]]  (Normal) Collected: 07/01/24 1558    Lab Status: Preliminary result Specimen: Blood from Hand, Right Updated: 07/03/24 1731     Blood Culture No growth at 2 days    Narrative:      Aerobic bottle only  Less than seven (7) mL's of blood was collected.  Insufficient quantity may yield false negative results.    Blood Culture - Blood, Hand, Left [[id number]]  (Normal) Collected: 07/01/24 1558    Lab Status: Preliminary result Specimen: Blood from Hand, Left Updated: 07/03/24 1731     Blood Culture No growth at 2 days            No radiology results from the last 24 hrs    Results for orders placed during the hospital encounter of 06/28/24    Adult Transthoracic Echo Complete W/ Cont if Necessary Per Protocol    Interpretation Summary    Left ventricular systolic function is normal. Left ventricular ejection fraction appears to be 61 - 65%.    There is mild calcification of the aortic valve.    Mild aortic valve regurgitation is present.    Mild mitral annular calcification is present.    Mild to moderate mitral valve regurgitation is present.    Mild tricuspid valve regurgitation is present.      Current medications:  Scheduled Meds:mirtazapine, 15 mg, Oral, Nightly  sodium chloride, 10 mL, Intravenous, Q12H  thiamine, 100 mg, Oral, Daily      Continuous Infusions:     PRN Meds:.  acetaminophen    senna-docusate sodium **AND** polyethylene glycol **AND** bisacodyl **AND** bisacodyl    Calcium Replacement - Follow Nurse / BPA Driven Protocol    Magnesium Standard Dose Replacement - Follow Nurse / BPA Driven Protocol    melatonin    Phosphorus Replacement - Follow Nurse / BPA Driven Protocol    Potassium Replacement - Follow Nurse / BPA Driven Protocol    sodium chloride    sodium chloride    Assessment & Plan   Assessment & Plan     Active Hospital Problems    Diagnosis  POA    **Syncope [R55]  Yes    AMS (altered mental status) [R41.82]  Yes    Hypokalemia  [E87.6]  Yes    Elevated troponin [R79.89]  Yes    Elevated C-reactive protein (CRP) [R79.82]  Yes    Elevated sed rate [R70.0]  Yes    Leukocytosis [D72.829]  Yes      Resolved Hospital Problems   No resolved problems to display.        Brief Hospital Course to date:  Arcelia Walter is a 79 y.o. female brought in per EMS after being found down during a welfare check.  Unclear of exact events.  No emergency contact on file.       Syncope  Altered mental status  - Unclear exact events, originally reported to EMS on the floor x 2 days, later states that she is unsure what happened  - CT head w atrophy and chronic microvascular ischemic change but no acute intracranial process  - Ethanol neg  --echo w nl EF and no significant valvular abnormalities  - A1c and TSH wnl     Elevated troponin  - Patient currently denies chest pain  - EKG without acute ischemia  - Trended down        Leukocytosis  Elevated CRP  Elevated sed rate  - Procalcitonin, lactic normal  - infectious w/u neg,  blood cx pending. No fevers, hold on abx. No clear infectious source  --peripheral smear without significant abnormalities     Hypokalemia  - Electrolyte replacement     Malnourishment  Failure to thrive  - No emergency contact, unclear living situation  - Case management/ consult  --PT/OT  --nutrition following  --added mirtazipine  --monitor for refeeding. Replacing phos today      Expected Discharge Location and Transportation: placement likely  Expected Discharge   Expected Discharge Date: 7/5/2024; Expected Discharge Time:      VTE Prophylaxis:  Mechanical VTE prophylaxis orders are present.         AM-PAC 6 Clicks Score (PT): 17 (07/04/24 4530)    CODE STATUS:   Code Status and Medical Interventions:   Ordered at: 06/29/24 0453     Code Status (Patient has no pulse and is not breathing):    CPR (Attempt to Resuscitate)     Medical Interventions (Patient has pulse or is breathing):    Full Support       Kaitlynn Chu,  MD  07/04/24

## 2024-07-04 NOTE — PLAN OF CARE
Goal Outcome Evaluation:  Plan of Care Reviewed With: patient        Progress: improving  Outcome Evaluation: Pt continues to present with deficits including generalized weakness, impaired balance, confusion, and impaired ADLs warranting skilled OT services. Pt performed in room ambulation in prep for ADLs using the RW with Min A. Mod A to stand from toilet. Oriented to self only. Cont POC.      Anticipated Discharge Disposition (OT): skilled nursing facility

## 2024-07-05 ENCOUNTER — APPOINTMENT (OUTPATIENT)
Dept: ULTRASOUND IMAGING | Facility: HOSPITAL | Age: 79
DRG: 884 | End: 2024-07-05
Payer: MEDICARE

## 2024-07-05 LAB
ANION GAP SERPL CALCULATED.3IONS-SCNC: 9 MMOL/L (ref 5–15)
ANISOCYTOSIS BLD QL: ABNORMAL
BASOPHILS # BLD MANUAL: 0 10*3/MM3 (ref 0–0.2)
BASOPHILS NFR BLD MANUAL: 0 % (ref 0–1.5)
BUN SERPL-MCNC: 14 MG/DL (ref 8–23)
BUN/CREAT SERPL: 50 (ref 7–25)
CALCIUM SPEC-SCNC: 7.8 MG/DL (ref 8.6–10.5)
CHLORIDE SERPL-SCNC: 107 MMOL/L (ref 98–107)
CO2 SERPL-SCNC: 24 MMOL/L (ref 22–29)
CREAT SERPL-MCNC: 0.28 MG/DL (ref 0.57–1)
DEPRECATED RDW RBC AUTO: 52.9 FL (ref 37–54)
EGFRCR SERPLBLD CKD-EPI 2021: 110.6 ML/MIN/1.73
EOSINOPHIL # BLD MANUAL: 0 10*3/MM3 (ref 0–0.4)
EOSINOPHIL NFR BLD MANUAL: 0 % (ref 0.3–6.2)
ERYTHROCYTE [DISTWIDTH] IN BLOOD BY AUTOMATED COUNT: 15.5 % (ref 12.3–15.4)
GLUCOSE SERPL-MCNC: 98 MG/DL (ref 65–99)
HCT VFR BLD AUTO: 27.3 % (ref 34–46.6)
HGB BLD-MCNC: 9.1 G/DL (ref 12–15.9)
LYMPHOCYTES # BLD MANUAL: 0.61 10*3/MM3 (ref 0.7–3.1)
LYMPHOCYTES NFR BLD MANUAL: 3 % (ref 5–12)
MCH RBC QN AUTO: 31.3 PG (ref 26.6–33)
MCHC RBC AUTO-ENTMCNC: 33.3 G/DL (ref 31.5–35.7)
MCV RBC AUTO: 93.8 FL (ref 79–97)
MONOCYTES # BLD: 0.46 10*3/MM3 (ref 0.1–0.9)
NEUTROPHILS # BLD AUTO: 14.15 10*3/MM3 (ref 1.7–7)
NEUTROPHILS NFR BLD MANUAL: 52 % (ref 42.7–76)
NEUTS BAND NFR BLD MANUAL: 41 % (ref 0–5)
PLATELET # BLD AUTO: 300 10*3/MM3 (ref 140–450)
PMV BLD AUTO: 10 FL (ref 6–12)
POTASSIUM SERPL-SCNC: 3.5 MMOL/L (ref 3.5–5.2)
POTASSIUM SERPL-SCNC: 4 MMOL/L (ref 3.5–5.2)
RBC # BLD AUTO: 2.91 10*6/MM3 (ref 3.77–5.28)
SMALL PLATELETS BLD QL SMEAR: ADEQUATE
SODIUM SERPL-SCNC: 140 MMOL/L (ref 136–145)
VARIANT LYMPHS NFR BLD MANUAL: 4 % (ref 19.6–45.3)
WBC MORPH BLD: NORMAL
WBC NRBC COR # BLD AUTO: 15.22 10*3/MM3 (ref 3.4–10.8)

## 2024-07-05 PROCEDURE — 99232 SBSQ HOSP IP/OBS MODERATE 35: CPT | Performed by: STUDENT IN AN ORGANIZED HEALTH CARE EDUCATION/TRAINING PROGRAM

## 2024-07-05 PROCEDURE — 80048 BASIC METABOLIC PNL TOTAL CA: CPT | Performed by: STUDENT IN AN ORGANIZED HEALTH CARE EDUCATION/TRAINING PROGRAM

## 2024-07-05 PROCEDURE — 84132 ASSAY OF SERUM POTASSIUM: CPT | Performed by: STUDENT IN AN ORGANIZED HEALTH CARE EDUCATION/TRAINING PROGRAM

## 2024-07-05 PROCEDURE — 76882 US LMTD JT/FCL EVL NVASC XTR: CPT

## 2024-07-05 PROCEDURE — 85007 BL SMEAR W/DIFF WBC COUNT: CPT | Performed by: STUDENT IN AN ORGANIZED HEALTH CARE EDUCATION/TRAINING PROGRAM

## 2024-07-05 PROCEDURE — 97530 THERAPEUTIC ACTIVITIES: CPT

## 2024-07-05 PROCEDURE — 85025 COMPLETE CBC W/AUTO DIFF WBC: CPT | Performed by: STUDENT IN AN ORGANIZED HEALTH CARE EDUCATION/TRAINING PROGRAM

## 2024-07-05 RX ORDER — POTASSIUM CHLORIDE 20 MEQ/1
40 TABLET, EXTENDED RELEASE ORAL EVERY 4 HOURS
Status: COMPLETED | OUTPATIENT
Start: 2024-07-05 | End: 2024-07-05

## 2024-07-05 RX ORDER — DOXYCYCLINE 100 MG/1
100 CAPSULE ORAL EVERY 12 HOURS SCHEDULED
Status: ACTIVE | OUTPATIENT
Start: 2024-07-05 | End: 2024-07-12

## 2024-07-05 RX ADMIN — DOXYCYCLINE 100 MG: 100 CAPSULE ORAL at 12:16

## 2024-07-05 RX ADMIN — DOXYCYCLINE 100 MG: 100 CAPSULE ORAL at 20:53

## 2024-07-05 RX ADMIN — Medication 5 MG: at 20:53

## 2024-07-05 RX ADMIN — Medication 1 PATCH: at 08:34

## 2024-07-05 RX ADMIN — MIRTAZAPINE 15 MG: 15 TABLET, FILM COATED ORAL at 20:53

## 2024-07-05 RX ADMIN — Medication 10 ML: at 08:35

## 2024-07-05 RX ADMIN — ACETAMINOPHEN 650 MG: 325 TABLET ORAL at 20:54

## 2024-07-05 RX ADMIN — POTASSIUM CHLORIDE 40 MEQ: 1500 TABLET, EXTENDED RELEASE ORAL at 11:13

## 2024-07-05 RX ADMIN — Medication 10 ML: at 20:54

## 2024-07-05 RX ADMIN — THIAMINE HCL TAB 100 MG 100 MG: 100 TAB at 08:34

## 2024-07-05 RX ADMIN — POTASSIUM CHLORIDE 40 MEQ: 1500 TABLET, EXTENDED RELEASE ORAL at 08:34

## 2024-07-05 NOTE — CASE MANAGEMENT/SOCIAL WORK
Continued Stay Note   [facility]     Patient Name: Arcelia Walter  MRN: [id number]  Today's Date: 7/5/2024    Admit Date: 6/28/2024    Plan: Ongoing   Discharge Plan       Row Name 07/05/24 1010       Plan    Plan Ongoing    Patient/Family in Agreement with Plan other (see comments)    Plan Comments  is following pt. CM spoke with [facility] She states that [facility] is working on getting in touch with [facility]. Staff unable to confirm if pt has any family available. Pt is confused and disoriented.  will continue to follow for discharge planning needs.    Final Discharge Disposition Code 01 - home or self-care                   Discharge Codes    No documentation.                 Expected Discharge Date and Time       Expected Discharge Date Expected Discharge Time    Jul 6, 2024               Aisha Roach RN

## 2024-07-05 NOTE — CASE MANAGEMENT/SOCIAL WORK
Continued Stay Note  [facility]     Patient Name: Arcelia Walter  MRN: [id number]  Today's Date: 7/5/2024    Admit Date: 6/28/2024    Plan: Ongoing   Discharge Plan       Row Name 07/05/24 1313       Plan    Plan Comments MSW was contacted by pt's worker, WISAM ECHEVARRIA reports he is going to initiate the guardianship process.      Row Name 07/05/24 1140       Plan    Plan Comments MSW called and left a voicemail for pt's state worker, ELISABETH Patricio, at [phone].                   Discharge Codes    No documentation.                 Expected Discharge Date and Time       Expected Discharge Date Expected Discharge Time    Jul 8, 2024               SHANTI Ramsey

## 2024-07-05 NOTE — PLAN OF CARE
Goal Outcome Evaluation:  Plan of Care Reviewed With: patient        Progress: improving  Outcome Evaluation: patient was able to increase ambulation to 100 ft with assist of 1 and use of rolling walker patient needs assist to maneuver walker around corners. anticipate patient to need SNF placement at D/C      Anticipated Discharge Disposition (PT): skilled nursing facility

## 2024-07-05 NOTE — CASE MANAGEMENT/SOCIAL WORK
Continued Stay Note  [facility]     Patient Name: Arcelia Walter  MRN: [id number]  Today's Date: 7/5/2024    Admit Date: 6/28/2024    Plan: Ongoing   Discharge Plan       Row Name 07/05/24 1140       Plan    Plan Comments MSW called and left a voicemail for pt's state worker, ELISABETH Patricio, at [phone].                                                     Discharge Codes    No documentation.                 Expected Discharge Date and Time       Expected Discharge Date Expected Discharge Time    Jul 8, 2024               SHANTI Ramsey

## 2024-07-05 NOTE — PROGRESS NOTES
[facility]  PROGRESS NOTE    Patient Name: Arcelia Walter  : 1945  MRN: [id number]    Date of Admission: 2024  Primary Care Physician: Provider, No Known    Subjective   Subjective     CC:  F/u AMS    HPI:  Discovered to have an area of inguinal tenderness/fluctuance. Sleepy this am, no other overnight acute events      Objective   Objective     Vital Signs:   Temp:  [97.7 °F (36.5 °C)-98.5 °F (36.9 °C)] 97.7 °F (36.5 °C)  Heart Rate:  [59-94] 77  Resp:  [12-18] 16  BP: (105-127)/(59-74) 116/74     Physical Exam:  Constitutional: elderly, frail and thin appearing  HENT: NCAT, mucous membranes moist  Respiratory: Clear to auscultation bilaterally, respiratory effort normal   Cardiovascular: RRR, no murmurs, rubs, or gallops  Gastrointestinal: Positive bowel sounds, soft, nontender, nondistended  Musculoskeletal: No bilateral ankle edema  Psychiatric: flat affect, cooperative  Neurologic: Oriented to person only, no other focal deficits, follows commands, globally weak.   Skin: R inguinal erythema w area of fluctuance/tenderness    Results Reviewed:  LAB RESULTS:      Lab 24  0454 24  1007 24  0624 24  0743 24  1020 24  0728 24  1806 24  0033   WBC 15.22* 12.89* 15.52* 18.43* 18.51*   < > 20.87* 15.60*   HEMOGLOBIN 9.1* 9.2* 9.5* 11.2* 12.4   < > 11.2* 12.3   HEMATOCRIT 27.3* 27.8* 28.9* 35.3 37.8   < > 33.4* 37.5   PLATELETS 300 301 340 382 419   < > 429 510*   NEUTROS ABS 14.15* 11.47* 14.74* 16.66* 17.58*   < > 19.22* 14.21*   IMMATURE GRANS (ABS)  --   --   --  0.10*  --   --  0.10* 0.08*   LYMPHS ABS  --   --   --  0.89  --   --  0.91 0.82   MONOS ABS  --   --   --  0.71  --   --  0.54 0.41   EOS ABS 0.00 0.00 0.00 0.01 0.00   < > 0.01 0.02   MCV 93.8 93.9 93.5 96.2 95.2   < > 93.0 93.3   SED RATE  --   --   --   --   --   --   --  40*   CRP  --   --   --   --   --   --   --  16.13*   PROCALCITONIN  --   --   --    --   --   --   --  0.15   LACTATE  --   --   --   --   --   --   --  1.8   PROTIME  --   --   --   --   --   --   --  14.2   APTT  --   --   --   --   --   --   --  40.4*    < > = values in this interval not displayed.         Lab 07/05/24  0451 07/04/24  0609 07/03/24  1729 07/03/24  0624 07/02/24  2055 07/02/24  1018 07/01/24  1020 06/29/24  1806 06/29/24  1048 06/29/24  0711 06/29/24  0033   SODIUM 140 140  --  138  --  141 138   < >  --  137 141   POTASSIUM 3.5 3.7  --  4.4 3.7 3.3* 3.7   < >  --  3.4* 3.1*   CHLORIDE 107 106  --  107  --  105 101   < >  --  100 101   CO2 24.0 21.0*  --  24.0  --  23.0 24.0   < >  --  23.0 25.0   ANION GAP 9.0 13.0  --  7.0  --  13.0 13.0   < >  --  14.0 15.0   BUN 14 8  --  8  --  4* 4*   < >  --  10 10   CREATININE 0.28* 0.23*  --  0.25*  --  0.32* 0.30*   < >  --  0.29* 0.34*   EGFR 110.6 115.9  --  113.6  --  107.1 108.7   < >  --  109.6 105.5   GLUCOSE 98 76  --  88  --  90 79   < >  --  90 174*   CALCIUM 7.8* 7.5*  --  7.6*  --  7.8* 7.9*   < >  --  8.3* 8.4*   MAGNESIUM  --   --   --   --   --   --   --   --   --  2.1 1.8   PHOSPHORUS  --  2.7 2.6 1.7*  --   --   --   --  2.6 2.2* 2.1*   HEMOGLOBIN A1C  --   --   --   --   --   --   --   --   --   --  5.20   TSH  --   --   --   --   --   --   --   --   --  2.170  --     < > = values in this interval not displayed.         Lab 07/04/24  0609 07/02/24  1018 06/30/24  0728 06/29/24  0033   TOTAL PROTEIN 4.6* 4.9* 4.6* 5.6*   ALBUMIN 1.9* 2.4* 2.3* 2.6*   GLOBULIN 2.7 2.5 2.3 3.0   ALT (SGPT) 14 16 14 15   AST (SGOT) 16 22 19 28   BILIRUBIN 0.4 0.3 0.2 0.3   ALK PHOS 134* 143* 112 127*   LIPASE  --   --   --  15         Lab 06/29/24  0240 06/29/24  0033   PROBNP  --  4,377.0*   HSTROP T 59* 61*   PROTIME  --  14.2   INR  --  1.09         Lab 06/29/24  0711   CHOLESTEROL 173   LDL CHOL 93   HDL CHOL 54   TRIGLYCERIDES 147         Lab 06/29/24  1049   VITAMIN B 12 1,186*         Brief Urine Lab Results  (Last result in the  past 365 days)        Color   Clarity   Blood   Leuk Est   Nitrite   Protein   CREAT   Urine HCG        06/29/24 0038 Yellow   Clear   Negative   Negative   Negative   Trace                   Microbiology Results Abnormal       Procedure Component Value - Date/Time    Blood Culture - Blood, Hand, Right [[id number]]  (Normal) Collected: 07/01/24 1558    Lab Status: Preliminary result Specimen: Blood from Hand, Right Updated: 07/04/24 1730     Blood Culture No growth at 3 days    Narrative:      Aerobic bottle only  Less than seven (7) mL's of blood was collected.  Insufficient quantity may yield false negative results.    Blood Culture - Blood, Hand, Left [[id number]]  (Normal) Collected: 07/01/24 1558    Lab Status: Preliminary result Specimen: Blood from Hand, Left Updated: 07/04/24 1730     Blood Culture No growth at 3 days            No radiology results from the last 24 hrs    Results for orders placed during the hospital encounter of 06/28/24    Adult Transthoracic Echo Complete W/ Cont if Necessary Per Protocol    Interpretation Summary    Left ventricular systolic function is normal. Left ventricular ejection fraction appears to be 61 - 65%.    There is mild calcification of the aortic valve.    Mild aortic valve regurgitation is present.    Mild mitral annular calcification is present.    Mild to moderate mitral valve regurgitation is present.    Mild tricuspid valve regurgitation is present.      Current medications:  Scheduled Meds:mirtazapine, 15 mg, Oral, Nightly  nicotine, 1 patch, Transdermal, Q24H  sodium chloride, 10 mL, Intravenous, Q12H  thiamine, 100 mg, Oral, Daily      Continuous Infusions:     PRN Meds:.  acetaminophen    senna-docusate sodium **AND** polyethylene glycol **AND** bisacodyl **AND** bisacodyl    Calcium Replacement - Follow Nurse / BPA Driven Protocol    Magnesium Standard Dose Replacement - Follow Nurse / BPA Driven Protocol    melatonin    Phosphorus Replacement - Follow Nurse /  BPA Driven Protocol    Potassium Replacement - Follow Nurse / BPA Driven Protocol    sodium chloride    sodium chloride    Assessment & Plan   Assessment & Plan     Active Hospital Problems    Diagnosis  POA    **Syncope [R55]  Yes    AMS (altered mental status) [R41.82]  Yes    Hypokalemia [E87.6]  Yes    Elevated troponin [R79.89]  Yes    Elevated C-reactive protein (CRP) [R79.82]  Yes    Elevated sed rate [R70.0]  Yes    Leukocytosis [D72.829]  Yes      Resolved Hospital Problems   No resolved problems to display.        Brief Hospital Course to date:  Arcelia Walter is a 79 y.o. female brought in per EMS after being found down during a welfare check.  Unclear of exact events.  No emergency contact on file.       Syncope  Altered mental status  - Unclear exact events, originally reported to EMS on the floor x 2 days, later states that she is unsure what happened  - CT head w atrophy and chronic microvascular ischemic change but no acute intracranial process  - Ethanol neg  --echo w nl EF and no significant valvular abnormalities  - A1c and TSH wnl     Elevated troponin  - Patient currently denies chest pain  - EKG without acute ischemia  - Trended down        Leukocytosis  Elevated CRP  Elevated sed rate  - Procalcitonin, lactic normal  - infectious w/u neg,  blood cx pending. No fevers. Possible cellulitis w R groin erythema/tenderness  --peripheral smear without significant abnormalities  --discovered R inguinal tenderness/fluctuance, CT ordered. Place on doxy to cover for cellulitis     Hypokalemia  - Electrolyte replacement     Malnourishment  Failure to thrive  - No emergency contact, unclear living situation  - Case management/ consult  --PT/OT  --nutrition following  --added mirtazipine  --monitor for refeeding.       Expected Discharge Location and Transportation: placement likely  Expected Discharge   Expected Discharge Date: 7/8/2024; Expected Discharge Time:      VTE Prophylaxis:  Mechanical  VTE prophylaxis orders are present.         AM-PAC 6 Clicks Score (PT): 17 (07/05/24 0815)    CODE STATUS:   Code Status and Medical Interventions:   Ordered at: 06/29/24 0451     Code Status (Patient has no pulse and is not breathing):    CPR (Attempt to Resuscitate)     Medical Interventions (Patient has pulse or is breathing):    Full Support       Kaitlynn Chu MD  07/05/24

## 2024-07-05 NOTE — THERAPY TREATMENT NOTE
Patient Name: Arcelia Walter  : 1945    MRN: [id number]                              Today's Date: 2024       Admit Date: 2024    Visit Dx:     ICD-10-CM ICD-9-CM   1. Syncope, unspecified syncope type  R55 780.2   2. Ketosis  E88.89 276.2     Patient Active Problem List   Diagnosis    Syncope    Hypokalemia    Elevated troponin    Elevated C-reactive protein (CRP)    Elevated sed rate    Leukocytosis    AMS (altered mental status)     History reviewed. No pertinent past medical history.  Past Surgical History:   Procedure Laterality Date    HYSTERECTOMY        General Information       Row Name 24 1509          Physical Therapy Time and Intention    Document Type therapy note (daily note)  -     Mode of Treatment physical therapy  -       Row Name 24 1509          General Information    Patient Profile Reviewed yes  -JOVITA     Existing Precautions/Restrictions fall  -       Row Name 24 1509          Living Environment    People in Home alone  -       Row Name 24 1509          Cognition    Orientation Status (Cognition) oriented to;person;place  -       Row Name 24 1509          Safety Issues, Functional Mobility    Safety Issues Affecting Function (Mobility) safety precautions follow-through/compliance;safety precaution awareness  -JOVITA     Impairments Affecting Function (Mobility) balance;cognition;endurance/activity tolerance;strength  -               User Key  (r) = Recorded By, (t) = Taken By, (c) = Cosigned By      Initials Name Provider Type    Mary Kay Paul PT Physical Therapist                   Mobility       Row Name 24 1510          Bed Mobility    Bed Mobility rolling left;rolling right;scooting/bridging;supine-sit;sit-supine  -JOVITA     Rolling Left [place] (Bed Mobility) minimum assist (75% patient effort)  -JOVITA     Rolling Right [place] (Bed Mobility) minimum assist (75% patient effort)  -JOVITA     Scooting/Bridging [place] (Bed  Mobility) minimum assist (75% patient effort)  -JOVITA     Supine-Sit [place] (Bed Mobility) minimum assist (75% patient effort)  -JOVITA     Sit-Supine [place] (Bed Mobility) minimum assist (75% patient effort)  -JOVITA     Assistive Device (Bed Mobility) head of bed elevated;draw sheet  -JOVITA       Row Name 07/05/24 1510          Bed-Chair Transfer    Bed-Chair [place] (Transfers) minimum assist (75% patient effort)  -JOVITA     Assistive Device (Bed-Chair Transfers) walker, front-wheeled  -JOVITA       Row Name 07/05/24 1510          Sit-Stand Transfer    Sit-Stand [place] (Transfers) minimum assist (75% patient effort)  -JOVITA     Assistive Device (Sit-Stand Transfers) walker, front-wheeled  -JOVITA       Row Name 07/05/24 1510          Gait/Stairs (Locomotion)    [place] Level (Gait) contact guard  -JOVITA     Assistive Device (Gait) walker, front-wheeled  -JOVITA     Distance in Feet (Gait) 100  -JOVITA     Deviations/Abnormal Patterns (Gait) base of support, narrow;[place] decreased;gait speed decreased;stride length decreased  -JOVITA     Comment, (Gait/Stairs) slow [place]. needs cues for increased step length  -JOVITA               User Key  (r) = Recorded By, (t) = Taken By, (c) = Cosigned By      Initials Name Provider Type    Mary Kay Paul PT Physical Therapist                   Obj/Interventions       Row Name 07/05/24 1511          Balance    Balance Assessment sitting static balance;sitting dynamic balance;standing static balance;standing dynamic balance  -JOVITA     Static Sitting Balance independent  -JOVITA     Dynamic Sitting Balance independent  -JOVITA     Position, Sitting Balance unsupported;sitting edge of bed  -JOVITA     Static Standing Balance contact guard  -JOVITA     Dynamic Standing Balance minimal assist  -JOVITA     Position/Device Used, Standing Balance supported;walker, front-wheeled  -JOVITA               User Key  (r) = Recorded By, (t) = Taken By, (c) = Cosigned By      Initials Name Provider Type    Mary Kay Paul  A, PT Physical Therapist                   Goals/Plan       Row Name 07/05/24 1514          Therapy Assessment/Plan (PT)    Planned Therapy Interventions (PT) balance training;bed mobility training;gait training;home exercise program;strengthening;transfer training  -JOVITA               User Key  (r) = Recorded By, (t) = Taken By, (c) = Cosigned By      Initials Name Provider Type    JOVITA Mary Kay Valencia A, PT Physical Therapist                   Clinical Impression       Row Name 07/05/24 1512          Pain    Pretreatment Pain Rating 0/10 - no pain  -JOVITA     Posttreatment Pain Rating 4/10  -JOVITA     Pain Location - Side/Orientation Right  -JOVITA     Pain Location - hip  -JOIVTA     Pre/Posttreatment Pain Comment patient with movement and being touched  -JOVITA     Pain Intervention(s) Repositioned;Ambulation/increased activity;Elevated  -JOVITA       Row Name 07/05/24 1512          Plan of Care Review    Plan of Care Reviewed With patient  -JOVITA     Progress improving  -JOVITA     Outcome Evaluation patient was able to increase ambulation to 100 ft with assist of 1 and use of rolling walker patient needs assist to maneuver walker around corners. anticipate patient to need SNF placement at D/C  -       Row Name 07/05/24 1512          Therapy Assessment/Plan (PT)    Patient/Family Therapy Goals Statement (PT) get stronger  -JOVITA     Rehab Potential (PT) good, to achieve stated therapy goals  -JOVITA     Criteria for Skilled Interventions Met (PT) yes;skilled treatment is necessary  -JOVITA     Therapy Frequency (PT) daily  -JOVITA       Row Name 07/05/24 1512          Vital Signs    Pre Patient Position Supine  -JOVITA     Intra Patient Position Standing  -JOVITA     Post Patient Position Supine  -JOVITA       Row Name 07/05/24 1512          Positioning and Restraints    Pre-Treatment Position in bed  -JOVITA     Post Treatment Position bed  -JOVITA     In Bed notified nsg;supine;call light within reach;encouraged to call for assist;exit alarm on  -JOVITA               User Key   (r) = Recorded By, (t) = Taken By, (c) = Cosigned By      Initials Name Provider Type    Mary Kay Paul PT Physical Therapist                   Outcome Measures       Row Name 07/05/24 1515 07/05/24 0815       How much help from another person do you currently need...    Turning from your back to your side while in flat bed without using bedrails? 3  -JOVITA 3  -CB    Moving from lying on back to sitting on the side of a flat bed without bedrails? 3  -JOVITA 3  -CB    Moving to and from a bed to a chair (including a wheelchair)? 3  -JOVITA 3  -CB    Standing up from a chair using your arms (e.g., wheelchair, bedside chair)? 3  -JOVITA 3  -CB    Climbing 3-5 steps with a railing? 2  -JOVITA 2  -CB    To walk in hospital room? 3  -JOVITA 3  -CB    AM-PAC 6 Clicks Score (PT) 17  -JOVITA 17  -CB    Highest Level of Mobility Goal 5 --> Static standing  -JOVITA 5 --> Static standing  -CB              User Key  (r) = Recorded By, (t) = Taken By, (c) = Cosigned By      Initials Name Provider Type    Mary Kay Paul PT Physical Therapist    Norah Gomez RN Registered Nurse                                 Physical Therapy Education       Title: PT OT SLP Therapies (In Progress)       Topic: Physical Therapy (In Progress)       Point: Mobility training (In Progress)       Learning Progress Summary             Patient Acceptance, E, NR by JOVITA at 7/5/2024 1330    Acceptance, E, NR by CK at 7/3/2024 1554    Acceptance, E, NR by JOVITA at 7/2/2024 1400    Acceptance, E, NR by KG at 7/1/2024 1040    Acceptance, E, NR by KG at 6/29/2024 1001                         Point: Home exercise program (In Progress)       Learning Progress Summary             Patient Acceptance, E, NR by JOVITA at 7/5/2024 1330    Acceptance, E, NR by JOVITA at 7/2/2024 1400    Acceptance, E, NR by KG at 7/1/2024 1040                         Point: Body mechanics (In Progress)       Learning Progress Summary             Patient Acceptance, E, NR by JOVITA at 7/5/2024 1330    Acceptance,  E, NR by CK at 7/3/2024 1554    Acceptance, E, NR by JOVITA at 7/2/2024 1400    Acceptance, E, NR by KG at 7/1/2024 1040    Acceptance, E, NR by KG at 6/29/2024 1001                         Point: Precautions (In Progress)       Learning Progress Summary             Patient Acceptance, E, NR by JOVITA at 7/5/2024 1330    Acceptance, E, NR by CK at 7/3/2024 1554    Acceptance, E, NR by JOVITA at 7/2/2024 1400    Acceptance, E, NR by KG at 7/1/2024 1040    Acceptance, E, NR by KG at 6/29/2024 1001                                         User Key       Initials Effective Dates Name Provider Type Discipline    JOVITA 02/03/23 -  Mary Kay Valencia, PT Physical Therapist PT    KG 05/22/20 -  Misty Shipley PT Physical Therapist PT    CK 02/06/24 -  Juana Velasquez, PT Physical Therapist PT                  PT Recommendation and Plan  Planned Therapy Interventions (PT): balance training, bed mobility training, gait training, home exercise program, strengthening, transfer training  Plan of Care Reviewed With: patient  Progress: improving  Outcome Evaluation: patient was able to increase ambulation to 100 ft with assist of 1 and use of rolling walker patient needs assist to maneuver walker around corners. anticipate patient to need SNF placement at D/C     Time Calculation:         PT Charges       Row Name 07/05/24 1515             Time Calculation    Start Time 1330  -JOVITA      PT Received On 07/05/24  -JOVITA      PT Goal Re-Cert Due Date 07/09/24  -JOVITA         Time Calculation- PT    Total Timed Code Minutes- PT 23 minute(s)  -JOVITA         Timed Charges    23287 - PT Therapeutic Activity Minutes 23  -JOVITA         Total Minutes    Timed Charges Total Minutes 23  -JOVITA       Total Minutes 23  -JOVITA                User Key  (r) = Recorded By, (t) = Taken By, (c) = Cosigned By      Initials Name Provider Type    Mary Kay Paul, PT Physical Therapist                  Therapy Charges for Today       Code Description Service Date Service  Provider Modifiers Qty    [id number]  PT THERAPEUTIC ACT EA 15 MIN 7/5/2024 Mary Kay Valencia, PT GP 2            PT G-Codes  Outcome Measure Options: AM-PAC 6 Clicks Daily Activity (OT)  AM-PAC 6 Clicks Score (PT): 17  AM-PAC 6 Clicks Score (OT): 17  PT Discharge Summary  Anticipated Discharge Disposition (PT): skilled nursing facility    Mary Kay Valencia, PT  7/5/2024

## 2024-07-06 ENCOUNTER — APPOINTMENT (OUTPATIENT)
Dept: CT IMAGING | Facility: HOSPITAL | Age: 79
DRG: 884 | End: 2024-07-06
Payer: MEDICARE

## 2024-07-06 PROBLEM — E43 SEVERE MALNUTRITION: Status: ACTIVE | Noted: 2024-01-01

## 2024-07-06 LAB
ANION GAP SERPL CALCULATED.3IONS-SCNC: 7 MMOL/L (ref 5–15)
BACTERIA SPEC AEROBE CULT: NORMAL
BACTERIA SPEC AEROBE CULT: NORMAL
BASOPHILS # BLD MANUAL: 0 10*3/MM3 (ref 0–0.2)
BASOPHILS NFR BLD MANUAL: 0 % (ref 0–1.5)
BUN SERPL-MCNC: 13 MG/DL (ref 8–23)
BUN/CREAT SERPL: 46.4 (ref 7–25)
CALCIUM SPEC-SCNC: 7.7 MG/DL (ref 8.6–10.5)
CHLORIDE SERPL-SCNC: 108 MMOL/L (ref 98–107)
CO2 SERPL-SCNC: 24 MMOL/L (ref 22–29)
CREAT SERPL-MCNC: 0.28 MG/DL (ref 0.57–1)
DEPRECATED RDW RBC AUTO: 53.1 FL (ref 37–54)
EGFRCR SERPLBLD CKD-EPI 2021: 110.6 ML/MIN/1.73
EOSINOPHIL # BLD MANUAL: 0 10*3/MM3 (ref 0–0.4)
EOSINOPHIL NFR BLD MANUAL: 0 % (ref 0.3–6.2)
ERYTHROCYTE [DISTWIDTH] IN BLOOD BY AUTOMATED COUNT: 15.7 % (ref 12.3–15.4)
GLUCOSE SERPL-MCNC: 86 MG/DL (ref 65–99)
HCT VFR BLD AUTO: 24.9 % (ref 34–46.6)
HGB BLD-MCNC: 8.3 G/DL (ref 12–15.9)
LYMPHOCYTES # BLD MANUAL: 0.67 10*3/MM3 (ref 0.7–3.1)
LYMPHOCYTES NFR BLD MANUAL: 4 % (ref 5–12)
MCH RBC QN AUTO: 31.1 PG (ref 26.6–33)
MCHC RBC AUTO-ENTMCNC: 33.3 G/DL (ref 31.5–35.7)
MCV RBC AUTO: 93.3 FL (ref 79–97)
MONOCYTES # BLD: 0.53 10*3/MM3 (ref 0.1–0.9)
NEUTROPHILS # BLD AUTO: 12.17 10*3/MM3 (ref 1.7–7)
NEUTROPHILS NFR BLD MANUAL: 54 % (ref 42.7–76)
NEUTS BAND NFR BLD MANUAL: 37 % (ref 0–5)
PLATELET # BLD AUTO: 301 10*3/MM3 (ref 140–450)
PMV BLD AUTO: 10.4 FL (ref 6–12)
POTASSIUM SERPL-SCNC: 4.2 MMOL/L (ref 3.5–5.2)
RBC # BLD AUTO: 2.67 10*6/MM3 (ref 3.77–5.28)
RBC MORPH BLD: NORMAL
SMALL PLATELETS BLD QL SMEAR: ADEQUATE
SODIUM SERPL-SCNC: 139 MMOL/L (ref 136–145)
VARIANT LYMPHS NFR BLD MANUAL: 5 % (ref 19.6–45.3)
WBC MORPH BLD: NORMAL
WBC NRBC COR # BLD AUTO: 13.37 10*3/MM3 (ref 3.4–10.8)

## 2024-07-06 PROCEDURE — 85025 COMPLETE CBC W/AUTO DIFF WBC: CPT | Performed by: STUDENT IN AN ORGANIZED HEALTH CARE EDUCATION/TRAINING PROGRAM

## 2024-07-06 PROCEDURE — 85007 BL SMEAR W/DIFF WBC COUNT: CPT | Performed by: STUDENT IN AN ORGANIZED HEALTH CARE EDUCATION/TRAINING PROGRAM

## 2024-07-06 PROCEDURE — 80048 BASIC METABOLIC PNL TOTAL CA: CPT | Performed by: STUDENT IN AN ORGANIZED HEALTH CARE EDUCATION/TRAINING PROGRAM

## 2024-07-06 PROCEDURE — 74176 CT ABD & PELVIS W/O CONTRAST: CPT

## 2024-07-06 PROCEDURE — 99232 SBSQ HOSP IP/OBS MODERATE 35: CPT | Performed by: INTERNAL MEDICINE

## 2024-07-06 RX ADMIN — DOXYCYCLINE 100 MG: 100 CAPSULE ORAL at 20:22

## 2024-07-06 RX ADMIN — THIAMINE HCL TAB 100 MG 100 MG: 100 TAB at 08:53

## 2024-07-06 RX ADMIN — Medication 10 ML: at 20:24

## 2024-07-06 RX ADMIN — DOXYCYCLINE 100 MG: 100 CAPSULE ORAL at 08:53

## 2024-07-06 RX ADMIN — MIRTAZAPINE 15 MG: 15 TABLET, FILM COATED ORAL at 20:22

## 2024-07-06 RX ADMIN — Medication 10 ML: at 09:00

## 2024-07-06 RX ADMIN — ACETAMINOPHEN 650 MG: 325 TABLET ORAL at 18:11

## 2024-07-06 NOTE — PROGRESS NOTES
[facility] Medicine Services  PROGRESS NOTE    Patient Name: Arcelia Walter  : 1945  MRN: [id number]    Date of Admission: 2024  Primary Care Physician: Provider, No Known    Subjective   Subjective     CC:  F/u AMS    HPI:    No complaints today.  Denies right hip or inguinal pain      Objective   Objective     Vital Signs:   Temp:  [97.6 °F (36.4 °C)-99 °F (37.2 °C)] 98.5 °F (36.9 °C)  Heart Rate:  [63-94] 66  Resp:  [16-18] 18  BP: (101-146)/() 132/65     Physical Exam:  Frail, in bed  MM moist  RRR  Breath sounds grossly clear bilaterally  Abd soft, NT  Right inguinal induration with very mild erythema  Awake, speech clear  Confused  Slightly flat affect    Results Reviewed:  LAB RESULTS:      Lab 24  0624  0454 24  1007 24  0624 24  0743 24  0728 24  1806   WBC 13.37* 15.22* 12.89* 15.52* 18.43*   < > 20.87*   HEMOGLOBIN 8.3* 9.1* 9.2* 9.5* 11.2*   < > 11.2*   HEMATOCRIT 24.9* 27.3* 27.8* 28.9* 35.3   < > 33.4*   PLATELETS 301 300 301 340 382   < > 429   NEUTROS ABS 12.17* 14.15* 11.47* 14.74* 16.66*   < > 19.22*   IMMATURE GRANS (ABS)  --   --   --   --  0.10*  --  0.10*   LYMPHS ABS  --   --   --   --  0.89  --  0.91   MONOS ABS  --   --   --   --  0.71  --  0.54   EOS ABS 0.00 0.00 0.00 0.00 0.01   < > 0.01   MCV 93.3 93.8 93.9 93.5 96.2   < > 93.0    < > = values in this interval not displayed.         Lab 24  0617 24  1515 24  0451 24  0609 24  1729 24  0624 24  1018   SODIUM 139  --  140 140  --  138  --  141   POTASSIUM 4.2 4.0 3.5 3.7  --  4.4   < > 3.3*   CHLORIDE 108*  --  107 106  --  107  --  105   CO2 24.0  --  24.0 21.0*  --  24.0  --  23.0   ANION GAP 7.0  --  9.0 13.0  --  7.0  --  13.0   BUN 13  --  14 8  --  8  --  4*   CREATININE 0.28*  --  0.28* 0.23*  --  0.25*  --  0.32*   EGFR 110.6  --  110.6 115.9  --  113.6  --  107.1   GLUCOSE 86  --  98 76   --  88  --  90   CALCIUM 7.7*  --  7.8* 7.5*  --  7.6*  --  7.8*   PHOSPHORUS  --   --   --  2.7 2.6 1.7*  --   --     < > = values in this interval not displayed.         Lab 07/04/24  0609 07/02/24  1018 06/30/24  0728   TOTAL PROTEIN 4.6* 4.9* 4.6*   ALBUMIN 1.9* 2.4* 2.3*   GLOBULIN 2.7 2.5 2.3   ALT (SGPT) 14 16 14   AST (SGOT) 16 22 19   BILIRUBIN 0.4 0.3 0.2   ALK PHOS 134* 143* 112                           Brief Urine Lab Results  (Last result in the past 365 days)        Color   Clarity   Blood   Leuk Est   Nitrite   Protein   CREAT   Urine HCG        06/29/24 0038 Yellow   Clear   Negative   Negative   Negative   Trace                   Microbiology Results Abnormal       Procedure Component Value - Date/Time    Blood Culture - Blood, Hand, Right [[id number]]  (Normal) Collected: 07/01/24 1558    Lab Status: Preliminary result Specimen: Blood from Hand, Right Updated: 07/05/24 1731     Blood Culture No growth at 4 days    Narrative:      Aerobic bottle only  Less than seven (7) mL's of blood was collected.  Insufficient quantity may yield false negative results.    Blood Culture - Blood, Hand, Left [[id number]]  (Normal) Collected: 07/01/24 1558    Lab Status: Preliminary result Specimen: Blood from Hand, Left Updated: 07/05/24 1731     Blood Culture No growth at 4 days            US Nonvascular Extremity Limited    Result Date: 7/5/2024  US NONVASCULAR EXTREMITY LIMITED Date of Exam: 7/5/2024 7:47 PM EDT Indication: R inguinal area w erythema and fluctuance. Would like to eval for underlying fluid collection/abscess. clinical picture on admission concerning for infection w/o clear source identified. Comparison: No comparisons available. Technique: Grayscale and color Doppler ultrasound evaluation of the right inguinal area was performed.  Real time scanning was performed with image documentation of the area of interest. Findings: There is a heterogeneous more complex area in the right inguinal region. This  measures about 5.7 x 6.2 x 1.4 cm. There are hyperechoic areas that might reflect gas/air. An abscess could not be excluded. There is edema in the subcutaneous soft tissues.     Impression: Impression: 1.Abnormal appearance to the area of interest in the right inguinal area with findings that might reflect an underlying abscess. CT with contrast could be helpful to further define anatomy as thought clinically appropriate Electronically Signed: Darian Freed MD  7/5/2024 9:47 PM EDT  Workstation ID: [id number]     Results for orders placed during the hospital encounter of 06/28/24    Adult Transthoracic Echo Complete W/ Cont if Necessary Per Protocol    Interpretation Summary    Left ventricular systolic function is normal. Left ventricular ejection fraction appears to be 61 - 65%.    There is mild calcification of the aortic valve.    Mild aortic valve regurgitation is present.    Mild mitral annular calcification is present.    Mild to moderate mitral valve regurgitation is present.    Mild tricuspid valve regurgitation is present.      Current medications:  Scheduled Meds:doxycycline, 100 mg, Oral, Q12H  mirtazapine, 15 mg, Oral, Nightly  nicotine, 1 patch, Transdermal, Q24H  sodium chloride, 10 mL, Intravenous, Q12H  thiamine, 100 mg, Oral, Daily      Continuous Infusions:     PRN Meds:.  acetaminophen    senna-docusate sodium **AND** polyethylene glycol **AND** bisacodyl **AND** bisacodyl    Calcium Replacement - Follow Nurse / BPA Driven Protocol    Magnesium Standard Dose Replacement - Follow Nurse / BPA Driven Protocol    melatonin    Phosphorus Replacement - Follow Nurse / BPA Driven Protocol    Potassium Replacement - Follow Nurse / BPA Driven Protocol    sodium chloride    sodium chloride    Assessment & Plan   Assessment & Plan     Active Hospital Problems    Diagnosis  POA    **Syncope [R55]  Yes    Severe malnutrition [E43]  Yes    AMS (altered mental status) [R41.82]  Yes    Hypokalemia [E87.6]  Yes     Elevated troponin [R79.89]  Yes    Elevated C-reactive protein (CRP) [R79.82]  Yes    Elevated sed rate [R70.0]  Yes    Leukocytosis [D72.829]  Yes      Resolved Hospital Problems   No resolved problems to display.        Brief Hospital Course to date:  Arcelia Walter is a 79 y.o. female brought in per EMS after being found down during a welfare check.  Unclear of exact events.  No emergency contact on file.       Syncope  Altered mental status  - Unclear exact events, originally reported to EMS on the floor x 2 days, later states that she is unsure what happened  - CT head w atrophy and chronic microvascular ischemic change but no acute intracranial process  - Ethanol neg  --echo w nl EF and no significant valvular abnormalities  - A1c and TSH wnl     Elevated troponin  - EKG without acute ischemia, troponin trending down    Leukocytosis  Elevated CRP  Elevated sed rate  - Procalcitonin, lactic normal at admit  - Possible cellulitis w R groin erythema/tenderness/induration  --Check CT imating  - continue doxycyline, check cbc, cmp, procal and crp am     Hypokalemia  - Electrolyte replacement     Malnourishment  Failure to thrive  - No emergency contact, unclear living situation  - Case management/ consult  --PT/OT  --nutrition following  --added mirtazipine  --monitor for refeeding.       Expected Discharge Location and Transportation: placement likely  Expected Discharge   Expected Discharge Date: 7/8/2024; Expected Discharge Time:      VTE Prophylaxis:  Mechanical VTE prophylaxis orders are present.         AM-PAC 6 Clicks Score (PT): 17 (07/05/24 2000)    CODE STATUS:   Code Status and Medical Interventions:   Ordered at: 06/29/24 0451     Code Status (Patient has no pulse and is not breathing):    CPR (Attempt to Resuscitate)     Medical Interventions (Patient has pulse or is breathing):    Full Support       Braeden Campos MD  07/06/24

## 2024-07-06 NOTE — PLAN OF CARE
Goal Outcome Evaluation:  Plan of Care Reviewed With: patient        Progress: no change  Outcome Evaluation: VSS on RA. Oriented to self only. Poor PO intake despite encouragement. Incontinent. Adequate UOP. No BM. Turns conducted. Pt slept well.

## 2024-07-07 LAB
ALBUMIN SERPL-MCNC: 2 G/DL (ref 3.5–5.2)
ALBUMIN/GLOB SERPL: 0.8 G/DL
ALP SERPL-CCNC: 129 U/L (ref 39–117)
ALT SERPL W P-5'-P-CCNC: 13 U/L (ref 1–33)
ANION GAP SERPL CALCULATED.3IONS-SCNC: 10 MMOL/L (ref 5–15)
AST SERPL-CCNC: 13 U/L (ref 1–32)
BASOPHILS # BLD MANUAL: 0 10*3/MM3 (ref 0–0.2)
BASOPHILS NFR BLD MANUAL: 0 % (ref 0–1.5)
BILIRUB SERPL-MCNC: 0.4 MG/DL (ref 0–1.2)
BUN SERPL-MCNC: 10 MG/DL (ref 8–23)
BUN/CREAT SERPL: 32.3 (ref 7–25)
BURR CELLS BLD QL SMEAR: ABNORMAL
CALCIUM SPEC-SCNC: 7.5 MG/DL (ref 8.6–10.5)
CHLORIDE SERPL-SCNC: 105 MMOL/L (ref 98–107)
CO2 SERPL-SCNC: 24 MMOL/L (ref 22–29)
CREAT SERPL-MCNC: 0.31 MG/DL (ref 0.57–1)
CRP SERPL-MCNC: 16.01 MG/DL (ref 0–0.5)
DEPRECATED RDW RBC AUTO: 52.9 FL (ref 37–54)
EGFRCR SERPLBLD CKD-EPI 2021: 107.9 ML/MIN/1.73
EOSINOPHIL # BLD MANUAL: 0 10*3/MM3 (ref 0–0.4)
EOSINOPHIL NFR BLD MANUAL: 0 % (ref 0.3–6.2)
ERYTHROCYTE [DISTWIDTH] IN BLOOD BY AUTOMATED COUNT: 15.7 % (ref 12.3–15.4)
GLOBULIN UR ELPH-MCNC: 2.4 GM/DL
GLUCOSE SERPL-MCNC: 72 MG/DL (ref 65–99)
HCT VFR BLD AUTO: 26.7 % (ref 34–46.6)
HGB BLD-MCNC: 8.8 G/DL (ref 12–15.9)
LYMPHOCYTES # BLD MANUAL: 0.5 10*3/MM3 (ref 0.7–3.1)
LYMPHOCYTES NFR BLD MANUAL: 7 % (ref 5–12)
MCH RBC QN AUTO: 30.3 PG (ref 26.6–33)
MCHC RBC AUTO-ENTMCNC: 33 G/DL (ref 31.5–35.7)
MCV RBC AUTO: 92.1 FL (ref 79–97)
METAMYELOCYTES NFR BLD MANUAL: 1 % (ref 0–0)
MONOCYTES # BLD: 0.59 10*3/MM3 (ref 0.1–0.9)
MYELOCYTES NFR BLD MANUAL: 1 % (ref 0–0)
NEUTROPHILS # BLD AUTO: 7.15 10*3/MM3 (ref 1.7–7)
NEUTROPHILS NFR BLD MANUAL: 55 % (ref 42.7–76)
NEUTS BAND NFR BLD MANUAL: 30 % (ref 0–5)
NRBC SPEC MANUAL: 0 /100 WBC (ref 0–0.2)
PLAT MORPH BLD: NORMAL
PLATELET # BLD AUTO: 340 10*3/MM3 (ref 140–450)
PMV BLD AUTO: 10.2 FL (ref 6–12)
POTASSIUM SERPL-SCNC: 3.5 MMOL/L (ref 3.5–5.2)
POTASSIUM SERPL-SCNC: 4 MMOL/L (ref 3.5–5.2)
PREALB SERPL-MCNC: <3 MG/DL (ref 20–40)
PROCALCITONIN SERPL-MCNC: 0.3 NG/ML (ref 0–0.25)
PROT SERPL-MCNC: 4.4 G/DL (ref 6–8.5)
RBC # BLD AUTO: 2.9 10*6/MM3 (ref 3.77–5.28)
SODIUM SERPL-SCNC: 139 MMOL/L (ref 136–145)
VARIANT LYMPHS NFR BLD MANUAL: 6 % (ref 19.6–45.3)
WBC MORPH BLD: NORMAL
WBC NRBC COR # BLD AUTO: 8.41 10*3/MM3 (ref 3.4–10.8)

## 2024-07-07 PROCEDURE — 85007 BL SMEAR W/DIFF WBC COUNT: CPT | Performed by: INTERNAL MEDICINE

## 2024-07-07 PROCEDURE — 85025 COMPLETE CBC W/AUTO DIFF WBC: CPT | Performed by: INTERNAL MEDICINE

## 2024-07-07 PROCEDURE — 84134 ASSAY OF PREALBUMIN: CPT | Performed by: SURGERY

## 2024-07-07 PROCEDURE — 84132 ASSAY OF SERUM POTASSIUM: CPT | Performed by: INTERNAL MEDICINE

## 2024-07-07 PROCEDURE — 86140 C-REACTIVE PROTEIN: CPT | Performed by: INTERNAL MEDICINE

## 2024-07-07 PROCEDURE — 99232 SBSQ HOSP IP/OBS MODERATE 35: CPT | Performed by: INTERNAL MEDICINE

## 2024-07-07 PROCEDURE — 84145 PROCALCITONIN (PCT): CPT | Performed by: INTERNAL MEDICINE

## 2024-07-07 PROCEDURE — 80053 COMPREHEN METABOLIC PANEL: CPT | Performed by: INTERNAL MEDICINE

## 2024-07-07 RX ORDER — POTASSIUM CHLORIDE 20 MEQ/1
40 TABLET, EXTENDED RELEASE ORAL EVERY 4 HOURS
Status: COMPLETED | OUTPATIENT
Start: 2024-07-07 | End: 2024-07-07

## 2024-07-07 RX ORDER — HYDROCODONE BITARTRATE AND ACETAMINOPHEN 5; 325 MG/1; MG/1
1 TABLET ORAL ONCE
Status: COMPLETED | OUTPATIENT
Start: 2024-07-07 | End: 2024-07-07

## 2024-07-07 RX ORDER — HYDROXYZINE HYDROCHLORIDE 10 MG/1
10 TABLET, FILM COATED ORAL ONCE
Status: COMPLETED | OUTPATIENT
Start: 2024-07-07 | End: 2024-07-08

## 2024-07-07 RX ADMIN — POTASSIUM CHLORIDE 40 MEQ: 1500 TABLET, EXTENDED RELEASE ORAL at 12:32

## 2024-07-07 RX ADMIN — Medication 1 PATCH: at 08:28

## 2024-07-07 RX ADMIN — Medication 10 ML: at 08:26

## 2024-07-07 RX ADMIN — DOXYCYCLINE 100 MG: 100 CAPSULE ORAL at 20:58

## 2024-07-07 RX ADMIN — Medication 5 MG: at 20:58

## 2024-07-07 RX ADMIN — DOXYCYCLINE 100 MG: 100 CAPSULE ORAL at 08:25

## 2024-07-07 RX ADMIN — ACETAMINOPHEN 650 MG: 325 TABLET ORAL at 20:57

## 2024-07-07 RX ADMIN — THIAMINE HCL TAB 100 MG 100 MG: 100 TAB at 08:25

## 2024-07-07 RX ADMIN — HYDROCODONE BITARTRATE AND ACETAMINOPHEN 1 TABLET: 5; 325 TABLET ORAL at 22:19

## 2024-07-07 RX ADMIN — MIRTAZAPINE 15 MG: 15 TABLET, FILM COATED ORAL at 20:57

## 2024-07-07 RX ADMIN — POTASSIUM CHLORIDE 40 MEQ: 1500 TABLET, EXTENDED RELEASE ORAL at 17:16

## 2024-07-07 NOTE — PLAN OF CARE
Goal Outcome Evaluation:                   Pt oriented to self only. Pt incontinent and wearing brief. Pt voiding adequately. Took meds whole 1 at a time. Pt resting comfortably. VSS on RA. No known issues at this time.

## 2024-07-07 NOTE — PLAN OF CARE
Goal Outcome Evaluation:  Plan of Care Reviewed With: patient        Progress: improving  Outcome Evaluation: AOx1. VSS on RA. No complaints of pain or nausea. Fall and skin percautions in place. MASD found on coccyx during skin assessment. WOC consulted for AM. Tolerating diet. Discussed plan of care with patient who verbalizes understanding.

## 2024-07-07 NOTE — PROGRESS NOTES
[facility] Medicine Services  PROGRESS NOTE    Patient Name: Arcelia Walter  : 1945  MRN: [id number]    Date of Admission: 2024  Primary Care Physician: Provider, No Known    Subjective   Subjective     CC:  F/u AMS    HPI:    Says right inguinal area is a little bothersome this morning.  Say this has been ongoing for about a week.  Unaware of any hernia.    No fever or chills.     Slight nausea this morning that she attributes to the food.    Denies cough    No dysuria    No loose stool.      Objective   Objective     Vital Signs:   Temp:  [97.6 °F (36.4 °C)-98.5 °F (36.9 °C)] 97.8 °F (36.6 °C)  Heart Rate:  [63-87] 87  Resp:  [16-18] 18  BP: (120-151)/(65-82) 146/70     Physical Exam:  Frail, in bed  MM moist  RRR  CTAB  Abd soft, NT  Right femoral hernia soft, NT  Mild erythema over hernia area improved  Awake, speech clear, some confusion.  Knows year but not place.  States she lives in [place].    Slightly flat affect    Results Reviewed:  LAB RESULTS:      Lab 24  0518 24  0617 24  0454 24  1007 24  0624 24  0743   WBC 8.41 13.37* 15.22* 12.89* 15.52* 18.43*   HEMOGLOBIN 8.8* 8.3* 9.1* 9.2* 9.5* 11.2*   HEMATOCRIT 26.7* 24.9* 27.3* 27.8* 28.9* 35.3   PLATELETS 340 301 300 301 340 382   NEUTROS ABS 7.15* 12.17* 14.15* 11.47* 14.74* 16.66*   IMMATURE GRANS (ABS)  --   --   --   --   --  0.10*   LYMPHS ABS  --   --   --   --   --  0.89   MONOS ABS  --   --   --   --   --  0.71   EOS ABS 0.00 0.00 0.00 0.00 0.00 0.01   MCV 92.1 93.3 93.8 93.9 93.5 96.2   CRP 16.01*  --   --   --   --   --    PROCALCITONIN 0.30*  --   --   --   --   --          Lab 24  0518 24  0617 24  1515 24  0451 24  0609 24  1729 24  06   SODIUM 139 139  --  140 140  --  138   POTASSIUM 3.5 4.2 4.0 3.5 3.7  --  4.4   CHLORIDE 105 108*  --  107 106  --  107   CO2 24.0 24.0  --  24.0 21.0*  --  24.0   ANION GAP 10.0 7.0  --  9.0  13.0  --  7.0   BUN 10 13  --  14 8  --  8   CREATININE 0.31* 0.28*  --  0.28* 0.23*  --  0.25*   EGFR 107.9 110.6  --  110.6 115.9  --  113.6   GLUCOSE 72 86  --  98 76  --  88   CALCIUM 7.5* 7.7*  --  7.8* 7.5*  --  7.6*   PHOSPHORUS  --   --   --   --  2.7 2.6 1.7*         Lab 07/07/24  0518 07/04/24  0609 07/02/24  1018   TOTAL PROTEIN 4.4* 4.6* 4.9*   ALBUMIN 2.0* 1.9* 2.4*   GLOBULIN 2.4 2.7 2.5   ALT (SGPT) 13 14 16   AST (SGOT) 13 16 22   BILIRUBIN 0.4 0.4 0.3   ALK PHOS 129* 134* 143*                           Brief Urine Lab Results  (Last result in the past 365 days)        Color   Clarity   Blood   Leuk Est   Nitrite   Protein   CREAT   Urine HCG        06/29/24 0038 Yellow   Clear   Negative   Negative   Negative   Trace                   Microbiology Results Abnormal       Procedure Component Value - Date/Time    Blood Culture - Blood, Hand, Right [[id number]]  (Normal) Collected: 07/01/24 1558    Lab Status: Final result Specimen: Blood from Hand, Right Updated: 07/06/24 1731     Blood Culture No growth at 5 days    Narrative:      Aerobic bottle only  Less than seven (7) mL's of blood was collected.  Insufficient quantity may yield false negative results.    Blood Culture - Blood, Hand, Left [[id number]]  (Normal) Collected: 07/01/24 1558    Lab Status: Final result Specimen: Blood from Hand, Left Updated: 07/06/24 1731     Blood Culture No growth at 5 days            CT Abdomen Pelvis Without Contrast    Result Date: 7/6/2024  CT ABDOMEN PELVIS WO CONTRAST Date of Exam: 7/6/2024 5:54 PM EDT Indication: right inguinal induration. Comparison: None available. Technique: Axial CT images were obtained of the abdomen and pelvis without the administration of contrast. Reconstructed coronal and sagittal images were also obtained. Automated exposure control and iterative construction methods were used. Findings: LUNG BASES: Small to moderate bilateral pleural effusions with basal airspace disease likely  atelectasis. LIVER:  Unremarkable parenchyma without focal lesion. BILIARY/GALLBLADDER:  Unremarkable SPLEEN:  Unremarkable PANCREAS:  Unremarkable ADRENAL:  Unremarkable KIDNEYS:  Unremarkable parenchyma with no solid mass identified. No obstruction.  No calculus identified. GASTROINTESTINAL/MESENTERY: There is a right femoral hernia containing the terminal ileum and cecum. No evidence of inflammation nor obstruction. No evidence of obstruction nor inflammation. Moderate to large rectal fecal load. Correlate for signs of constipation. AORTA/IVC:  Normal caliber. RETROPERITONEUM/LYMPH NODES:  Unremarkable REPRODUCTIVE:  Unremarkable BLADDER:  Unremarkable OSSEUS STRUCTURES:  Typical for age with no acute process identified. There is small to moderate volume ascites most pronounced in the right upper quadrant and pelvis. There is generalized subcutaneous edema     Impression: Impression: 1.There is a right femoral hernia containing the terminal ileum and cecum. No evidence of obstruction or inflammation. 2.Nonspecific third spacing with pleural effusions, ascites, and subcutaneous edema. Correlate with history and symptoms. Electronically Signed: Migue Haskins MD  7/6/2024 6:41 PM EDT  Workstation ID: [id number]    US Nonvascular Extremity Limited    Result Date: 7/5/2024  US NONVASCULAR EXTREMITY LIMITED Date of Exam: 7/5/2024 7:47 PM EDT Indication: R inguinal area w erythema and fluctuance. Would like to eval for underlying fluid collection/abscess. clinical picture on admission concerning for infection w/o clear source identified. Comparison: No comparisons available. Technique: Grayscale and color Doppler ultrasound evaluation of the right inguinal area was performed.  Real time scanning was performed with image documentation of the area of interest. Findings: There is a heterogeneous more complex area in the right inguinal region. This measures about 5.7 x 6.2 x 1.4 cm. There are hyperechoic areas that might  reflect gas/air. An abscess could not be excluded. There is edema in the subcutaneous soft tissues.     Impression: Impression: 1.Abnormal appearance to the area of interest in the right inguinal area with findings that might reflect an underlying abscess. CT with contrast could be helpful to further define anatomy as thought clinically appropriate Electronically Signed: Darian Freed MD  7/5/2024 9:47 PM EDT  Workstation ID: [id number]     Results for orders placed during the hospital encounter of 06/28/24    Adult Transthoracic Echo Complete W/ Cont if Necessary Per Protocol    Interpretation Summary    Left ventricular systolic function is normal. Left ventricular ejection fraction appears to be 61 - 65%.    There is mild calcification of the aortic valve.    Mild aortic valve regurgitation is present.    Mild mitral annular calcification is present.    Mild to moderate mitral valve regurgitation is present.    Mild tricuspid valve regurgitation is present.      Current medications:  Scheduled Meds:doxycycline, 100 mg, Oral, Q12H  mirtazapine, 15 mg, Oral, Nightly  nicotine, 1 patch, Transdermal, Q24H  potassium chloride ER, 40 mEq, Oral, Q4H  sodium chloride, 10 mL, Intravenous, Q12H  thiamine, 100 mg, Oral, Daily      Continuous Infusions:     PRN Meds:.  acetaminophen    senna-docusate sodium **AND** polyethylene glycol **AND** bisacodyl **AND** bisacodyl    Calcium Replacement - Follow Nurse / BPA Driven Protocol    Magnesium Standard Dose Replacement - Follow Nurse / BPA Driven Protocol    melatonin    Phosphorus Replacement - Follow Nurse / BPA Driven Protocol    Potassium Replacement - Follow Nurse / BPA Driven Protocol    sodium chloride    sodium chloride    Assessment & Plan   Assessment & Plan     Active Hospital Problems    Diagnosis  POA    **Syncope [R55]  Yes    Severe malnutrition [E43]  Yes    AMS (altered mental status) [R41.82]  Yes    Hypokalemia [E87.6]  Yes    Elevated troponin [R79.89]  Yes     Elevated C-reactive protein (CRP) [R79.82]  Yes    Elevated sed rate [R70.0]  Yes    Leukocytosis [D72.829]  Yes      Resolved Hospital Problems   No resolved problems to display.        Brief Hospital Course to date:  Arcelia Walter is a 79 y.o. female brought in per EMS after being found down during a welfare check.  Unclear of exact events.  No emergency contact on file.       Syncope  Altered mental status  - Unclear exact events, originally reported to EMS on the floor x 2 days, later states that she is unsure what happened  - CT head w atrophy and chronic microvascular ischemic change but no acute intracranial process  - Ethanol neg  --echo w nl EF and no significant valvular abnormalities  - A1c and TSH wnl     Elevated troponin  - EKG without acute ischemia, troponin trending down    Leukocytosis  Elevated CRP  Elevated sed rate  Right inguinal erythema  -- WBC 20 at admission  - Procalcitonin, lactic normal at admit  - Possible cellulitis w R groin erythema/tenderness/induration  -- CRP 16, procal 0.3, leukocytosis resolved  - continue doxycyline    Femoral hernia  - unclear chronicity, patient a poor historian but denies prior knowledge of a hernia.  No phone contacts listed to obtain background medical history.  Unable to find sister(s) via google search  - will ask general surgery to evaluate     Hypokalemia  - Electrolyte replacement     Malnourishment  Failure to thrive  - No emergency contact, unclear living situation  - Case management/ consult  --PT/OT  --nutrition following  --added mirtazipine  --monitor for refeeding.       Expected Discharge Location and Transportation: placement likely  Expected Discharge   Expected Discharge Date: 7/8/2024; Expected Discharge Time:      VTE Prophylaxis:  Mechanical VTE prophylaxis orders are present.         AM-PAC 6 Clicks Score (PT): 12 (07/07/24 6028)    CODE STATUS:   Code Status and Medical Interventions:   Ordered at: 06/29/24 0881     Code  Status (Patient has no pulse and is not breathing):    CPR (Attempt to Resuscitate)     Medical Interventions (Patient has pulse or is breathing):    Full Support       Braeden Campos MD  07/07/24

## 2024-07-07 NOTE — CONSULTS
General Surgery Consultation Note    Date of Service: 7/7/2024  Arcelia BADILLO Jose Angel  [id number]  1945      Referring Provider: Braeden Campos MD    Location of Consult: Inpatient     Reason for Consultation: Right inguinal hernia       History of Present Illness:  I am seeing, Arcelia BADILLO Jose Angel, in consultation for Braeden Campos MD regarding right inguinal hernia.  79-year-old lady whom is a poor historian was found down at home and brought to the hospital.  Currently she complains of some right inguinal pain.  She denies any fever, chills, nausea, vomiting, or constipation.  A CT scan was obtained at the time of admission which demonstrated a right inguinal hernia.    Problems Addressed this Visit          Symptoms and Signs    * (Principal) Syncope - Primary     Other Visit Diagnoses       Ketosis              Diagnoses         Codes Comments    Syncope, unspecified syncope type    -  Primary ICD-10-CM: R55  ICD-9-CM: 780.2     Ketosis     ICD-10-CM: E88.89  ICD-9-CM: 276.2             History reviewed. No pertinent past medical history.    Past Surgical History:    HYSTERECTOMY       No Known Allergies    No current facility-administered medications on file prior to encounter.     No current outpatient medications on file prior to encounter.         Current Facility-Administered Medications:     acetaminophen (TYLENOL) tablet 650 mg, 650 mg, Oral, Q4H PRN, Olga Lidia Yu APRN, 650 mg at 07/06/24 1811    sennosides-docusate (PERICOLACE) 8.6-50 MG per tablet 2 tablet, 2 tablet, Oral, BID PRN **AND** polyethylene glycol (MIRALAX) packet 17 g, 17 g, Oral, Daily PRN **AND** bisacodyl (DULCOLAX) EC tablet 5 mg, 5 mg, Oral, Daily PRN **AND** bisacodyl (DULCOLAX) suppository 10 mg, 10 mg, Rectal, Daily PRN, Olga Lidia Yu APRN    Calcium Replacement - Follow Nurse / BPA Driven Protocol, , Does not apply, PRN, Olga Lidia Yu APRN    doxycycline (MONODOX) capsule 100 mg, 100 mg, Oral, Q12H, Kaitlynn Chu MD, 100 mg at  07/07/24 0825    Magnesium Standard Dose Replacement - Follow Nurse / BPA Driven Protocol, , Does not apply, PRN, Olga Lidia Yu APRN    melatonin tablet 5 mg, 5 mg, Oral, Nightly PRN, Adriana Mcgowan, APRN, 5 mg at 07/05/24 2053    mirtazapine (REMERON) tablet 15 mg, 15 mg, Oral, Nightly, Kaitlynn Chu MD, 15 mg at 07/06/24 2022    nicotine (NICODERM CQ) 21 MG/24HR patch 1 patch, 1 patch, Transdermal, Q24H, Kaitlynn Chu MD, 1 patch at 07/07/24 0828    Phosphorus Replacement - Follow Nurse / BPA Driven Protocol, , Does not apply, PRN, Olga Lidia Yu APRN    Potassium Replacement - Follow Nurse / BPA Driven Protocol, , Does not apply, PRNGigi Shelly R, APRN    sodium chloride 0.9 % flush 10 mL, 10 mL, Intravenous, Q12H, Olga Lidia Yu APRN, 10 mL at 07/07/24 0826    sodium chloride 0.9 % flush 10 mL, 10 mL, Intravenous, PRN, Olga Lidia Yu APRN    sodium chloride 0.9 % infusion 40 mL, 40 mL, Intravenous, PRN, Olga Lidia Yu APRN    thiamine (VITAMIN B-1) tablet 100 mg, 100 mg, Oral, Daily, Olga Lidia Yu APRN, 100 mg at 07/07/24 0825    Family History   Family history unknown: Yes     Social History     Socioeconomic History    Marital status: Single   Tobacco Use    Smoking status: Never    Smokeless tobacco: Never   Vaping Use    Vaping status: Never Used   Substance and Sexual Activity    Alcohol use: Never    Drug use: Never    Sexual activity: Defer       Review of Systems:  Review of Systems   Constitutional:  Negative for appetite change and fever.   HENT:  Negative for congestion, ear pain and nosebleeds.    Eyes:  Negative for photophobia and visual disturbance.   Respiratory:  Negative for apnea, cough and wheezing.    Cardiovascular:  Negative for chest pain and leg swelling.   Gastrointestinal:  Negative for abdominal distention, abdominal pain, nausea and vomiting.   Endocrine: Negative for polyphagia and polyuria.   Genitourinary:  Negative for difficulty urinating,  "hematuria and urgency.   Musculoskeletal:  Negative for arthralgias, joint swelling and neck stiffness.   Skin:  Negative for color change and pallor.   Allergic/Immunologic: Negative for immunocompromised state.   Neurological:  Negative for dizziness, syncope and light-headedness.   Hematological:  Negative for adenopathy.   Psychiatric/Behavioral:  Negative for agitation, hallucinations and sleep disturbance.      Otherwise the 12 point review of systems is negative.    /82 (BP Location: Left arm, Patient Position: Lying)   Pulse 84   Temp 98.1 °F (36.7 °C) (Oral)   Resp 18   Ht 149.9 cm (59.02\")   Wt 32.5 kg (71 lb 9.6 oz)   SpO2 100%   BMI 14.45 kg/m²   Body mass index is 14.45 kg/m².    General: Pleasantly conversant, mild distress, cachectic  HEENT: PER, no icterus, normal sclerae  Cardiac: regular rhythm,  no audible rubs  Pulmonary: bilateral breath sounds, nonlabored  Abdominal: Soft, scaphoid, reducible right inguinal hernia, mild erythema of the right inguinal area noted  Neurologic: awake, alert, no obvious focal deficits  Extremities: warm, no edema  Skin: no obvious rashes nor worrisome lesions seen     CBC  Results from last 7 days   Lab Units 07/07/24  0518   WBC 10*3/mm3 8.41   HEMOGLOBIN g/dL 8.8*   HEMATOCRIT % 26.7*   PLATELETS 10*3/mm3 340       CMP  Results from last 7 days   Lab Units 07/07/24  0518   SODIUM mmol/L 139   POTASSIUM mmol/L 3.5   CHLORIDE mmol/L 105   CO2 mmol/L 24.0   BUN mg/dL 10   CREATININE mg/dL 0.31*   CALCIUM mg/dL 7.5*   BILIRUBIN mg/dL 0.4   ALK PHOS U/L 129*   ALT (SGPT) U/L 13   AST (SGOT) U/L 13   GLUCOSE mg/dL 72       Radiology  Imaging Results (Last 72 Hours)       Procedure Component Value Units Date/Time    CT Abdomen Pelvis Without Contrast [[id number]] Collected: 07/06/24 1836     Updated: 07/06/24 1844    Narrative:      CT ABDOMEN PELVIS WO CONTRAST    Date of Exam: 7/6/2024 5:54 PM EDT    Indication: right inguinal induration.    Comparison: " None available.    Technique: Axial CT images were obtained of the abdomen and pelvis without the administration of contrast. Reconstructed coronal and sagittal images were also obtained. Automated exposure control and iterative construction methods were used.      Findings:  LUNG BASES: Small to moderate bilateral pleural effusions with basal airspace disease likely atelectasis.    LIVER:  Unremarkable parenchyma without focal lesion.  BILIARY/GALLBLADDER:  Unremarkable  SPLEEN:  Unremarkable  PANCREAS:  Unremarkable  ADRENAL:  Unremarkable  KIDNEYS:  Unremarkable parenchyma with no solid mass identified. No obstruction.  No calculus identified.  GASTROINTESTINAL/MESENTERY: There is a right femoral hernia containing the terminal ileum and cecum. No evidence of inflammation nor obstruction. No evidence of obstruction nor inflammation. Moderate to large rectal fecal load. Correlate for signs of   constipation.   AORTA/IVC:  Normal caliber.    RETROPERITONEUM/LYMPH NODES:  Unremarkable    REPRODUCTIVE:  Unremarkable  BLADDER:  Unremarkable    OSSEUS STRUCTURES:  Typical for age with no acute process identified.    There is small to moderate volume ascites most pronounced in the right upper quadrant and pelvis. There is generalized subcutaneous edema      Impression:      Impression:  1.There is a right femoral hernia containing the terminal ileum and cecum. No evidence of obstruction or inflammation.  2.Nonspecific third spacing with pleural effusions, ascites, and subcutaneous edema. Correlate with history and symptoms.            Electronically Signed: Migue Haskins MD    7/6/2024 6:41 PM EDT    Workstation ID: [id number]    US Nonvascular Extremity Limited [[id number]] Collected: 07/05/24 2145     Updated: 07/05/24 2150    Narrative:      US NONVASCULAR EXTREMITY LIMITED    Date of Exam: 7/5/2024 7:47 PM EDT    Indication: R inguinal area w erythema and fluctuance. Would like to eval for underlying fluid  collection/abscess. clinical picture on admission concerning for infection w/o clear source identified.    Comparison: No comparisons available.    Technique: Grayscale and color Doppler ultrasound evaluation of the right inguinal area was performed.  Real time scanning was performed with image documentation of the area of interest.      Findings:  There is a heterogeneous more complex area in the right inguinal region. This measures about 5.7 x 6.2 x 1.4 cm. There are hyperechoic areas that might reflect gas/air. An abscess could not be excluded. There is edema in the subcutaneous soft tissues.      Impression:      Impression:  1.Abnormal appearance to the area of interest in the right inguinal area with findings that might reflect an underlying abscess. CT with contrast could be helpful to further define anatomy as thought clinically appropriate      Electronically Signed: Darian Freed MD    7/5/2024 9:47 PM EDT    Workstation ID: [id number]              Assessment:  Right inguinal hernia  Severe malnutrition, BMI 14    Plan:  Reducible right inguinal hernia.  Normal white blood cell count with out a left shift.  Normal lactate level, no anion gap.  I reviewed her imaging.  Her right inguinal hernia is reducible though mildly tender.  She is passing gas.  There are no obstructive symptoms nor findings on her CT imaging.  Her albumin is 2.0.  In this cachectic 79-year-old lady whom is malnourished with a reducible right inguinal hernia I would suggest use of a hernia truss for now.     Carlos Eduardo Kaur MD  07/07/24  18:51 EDT

## 2024-07-08 LAB
ANION GAP SERPL CALCULATED.3IONS-SCNC: 8 MMOL/L (ref 5–15)
BUN SERPL-MCNC: 12 MG/DL (ref 8–23)
BUN/CREAT SERPL: 50 (ref 7–25)
CALCIUM SPEC-SCNC: 7.5 MG/DL (ref 8.6–10.5)
CHLORIDE SERPL-SCNC: 108 MMOL/L (ref 98–107)
CO2 SERPL-SCNC: 23 MMOL/L (ref 22–29)
CREAT SERPL-MCNC: 0.24 MG/DL (ref 0.57–1)
DEPRECATED RDW RBC AUTO: 53.7 FL (ref 37–54)
EGFRCR SERPLBLD CKD-EPI 2021: 114.7 ML/MIN/1.73
ERYTHROCYTE [DISTWIDTH] IN BLOOD BY AUTOMATED COUNT: 15.6 % (ref 12.3–15.4)
GLUCOSE SERPL-MCNC: 90 MG/DL (ref 65–99)
HCT VFR BLD AUTO: 26.9 % (ref 34–46.6)
HGB BLD-MCNC: 8.8 G/DL (ref 12–15.9)
MCH RBC QN AUTO: 30.9 PG (ref 26.6–33)
MCHC RBC AUTO-ENTMCNC: 32.7 G/DL (ref 31.5–35.7)
MCV RBC AUTO: 94.4 FL (ref 79–97)
PLATELET # BLD AUTO: 352 10*3/MM3 (ref 140–450)
PMV BLD AUTO: 10.1 FL (ref 6–12)
POTASSIUM SERPL-SCNC: 4.5 MMOL/L (ref 3.5–5.2)
RBC # BLD AUTO: 2.85 10*6/MM3 (ref 3.77–5.28)
SODIUM SERPL-SCNC: 139 MMOL/L (ref 136–145)
WBC NRBC COR # BLD AUTO: 9.88 10*3/MM3 (ref 3.4–10.8)

## 2024-07-08 PROCEDURE — 97530 THERAPEUTIC ACTIVITIES: CPT

## 2024-07-08 PROCEDURE — 80048 BASIC METABOLIC PNL TOTAL CA: CPT | Performed by: INTERNAL MEDICINE

## 2024-07-08 PROCEDURE — 99232 SBSQ HOSP IP/OBS MODERATE 35: CPT | Performed by: INTERNAL MEDICINE

## 2024-07-08 PROCEDURE — 85027 COMPLETE CBC AUTOMATED: CPT | Performed by: INTERNAL MEDICINE

## 2024-07-08 RX ADMIN — THIAMINE HCL TAB 100 MG 100 MG: 100 TAB at 08:09

## 2024-07-08 RX ADMIN — Medication 1 PATCH: at 08:10

## 2024-07-08 RX ADMIN — HYDROXYZINE HYDROCHLORIDE 10 MG: 10 TABLET ORAL at 00:04

## 2024-07-08 RX ADMIN — DOXYCYCLINE 100 MG: 100 CAPSULE ORAL at 20:00

## 2024-07-08 RX ADMIN — MIRTAZAPINE 15 MG: 15 TABLET, FILM COATED ORAL at 20:00

## 2024-07-08 RX ADMIN — DOXYCYCLINE 100 MG: 100 CAPSULE ORAL at 08:09

## 2024-07-08 NOTE — PROGRESS NOTES
[facility] Medicine Services  PROGRESS NOTE    Patient Name: Arcelia Walter  : 1945  MRN: [id number]    Date of Admission: 2024  Primary Care Physician: Provider, No Known    Subjective   Subjective     CC:  F/u AMS    HPI:    Denies current pain, says she is eating OK (however has not yet touched breakfast tray)      Objective   Objective     Vital Signs:   Temp:  [97 °F (36.1 °C)-98.7 °F (37.1 °C)] 97 °F (36.1 °C)  Heart Rate:  [83-97] 88  Resp:  [14-18] 18  BP: (113-143)/(62-82) 124/62     Physical Exam:  Frail, in bed  MM moist  RRR  Breath sounds grossly clear, poor effort  Abd soft, NT  Mild induration right lateral inguinal area, femoral hernia palpable medially and soft  Flat affect  Awake, speech clear    Results Reviewed:  LAB RESULTS:      Lab 24  0706 24  0518 24  0617 24  0454 24  1007 24  0624 24  0743   WBC 9.88 8.41 13.37* 15.22* 12.89* 15.52* 18.43*   HEMOGLOBIN 8.8* 8.8* 8.3* 9.1* 9.2* 9.5* 11.2*   HEMATOCRIT 26.9* 26.7* 24.9* 27.3* 27.8* 28.9* 35.3   PLATELETS 352 340 301 300 301 340 382   NEUTROS ABS  --  7.15* 12.17* 14.15* 11.47* 14.74* 16.66*   IMMATURE GRANS (ABS)  --   --   --   --   --   --  0.10*   LYMPHS ABS  --   --   --   --   --   --  0.89   MONOS ABS  --   --   --   --   --   --  0.71   EOS ABS  --  0.00 0.00 0.00 0.00 0.00 0.01   MCV 94.4 92.1 93.3 93.8 93.9 93.5 96.2   CRP  --  16.01*  --   --   --   --   --    PROCALCITONIN  --  0.30*  --   --   --   --   --          Lab 24  0700 24  1928 24  0518 24  0617 24  1515 24  0451 24  0609 24  1729 24   SODIUM 139  --  139 139  --  140 140  --  138   POTASSIUM 4.5 4.0 3.5 4.2 4.0 3.5 3.7  --  4.4   CHLORIDE 108*  --  105 108*  --  107 106  --  107   CO2 23.0  --  24.0 24.0  --  24.0 21.0*  --  24.0   ANION GAP 8.0  --  10.0 7.0  --  9.0 13.0  --  7.0   BUN 12  --  10 13  --  14 8  --  8   CREATININE  0.24*  --  0.31* 0.28*  --  0.28* 0.23*  --  0.25*   EGFR 114.7  --  107.9 110.6  --  110.6 115.9  --  113.6   GLUCOSE 90  --  72 86  --  98 76  --  88   CALCIUM 7.5*  --  7.5* 7.7*  --  7.8* 7.5*  --  7.6*   PHOSPHORUS  --   --   --   --   --   --  2.7 2.6 1.7*         Lab 07/07/24  0518 07/04/24  0609 07/02/24  1018   TOTAL PROTEIN 4.4* 4.6* 4.9*   ALBUMIN 2.0* 1.9* 2.4*   GLOBULIN 2.4 2.7 2.5   ALT (SGPT) 13 14 16   AST (SGOT) 13 16 22   BILIRUBIN 0.4 0.4 0.3   ALK PHOS 129* 134* 143*                           Brief Urine Lab Results  (Last result in the past 365 days)        Color   Clarity   Blood   Leuk Est   Nitrite   Protein   CREAT   Urine HCG        06/29/24 0038 Yellow   Clear   Negative   Negative   Negative   Trace                   Microbiology Results Abnormal       Procedure Component Value - Date/Time    Blood Culture - Blood, Hand, Right [[id number]]  (Normal) Collected: 07/01/24 1558    Lab Status: Final result Specimen: Blood from Hand, Right Updated: 07/06/24 1731     Blood Culture No growth at 5 days    Narrative:      Aerobic bottle only  Less than seven (7) mL's of blood was collected.  Insufficient quantity may yield false negative results.    Blood Culture - Blood, Hand, Left [[id number]]  (Normal) Collected: 07/01/24 1558    Lab Status: Final result Specimen: Blood from Hand, Left Updated: 07/06/24 1731     Blood Culture No growth at 5 days            CT Abdomen Pelvis Without Contrast    Result Date: 7/6/2024  CT ABDOMEN PELVIS WO CONTRAST Date of Exam: 7/6/2024 5:54 PM EDT Indication: right inguinal induration. Comparison: None available. Technique: Axial CT images were obtained of the abdomen and pelvis without the administration of contrast. Reconstructed coronal and sagittal images were also obtained. Automated exposure control and iterative construction methods were used. Findings: LUNG BASES: Small to moderate bilateral pleural effusions with basal airspace disease likely atelectasis.  LIVER:  Unremarkable parenchyma without focal lesion. BILIARY/GALLBLADDER:  Unremarkable SPLEEN:  Unremarkable PANCREAS:  Unremarkable ADRENAL:  Unremarkable KIDNEYS:  Unremarkable parenchyma with no solid mass identified. No obstruction.  No calculus identified. GASTROINTESTINAL/MESENTERY: There is a right femoral hernia containing the terminal ileum and cecum. No evidence of inflammation nor obstruction. No evidence of obstruction nor inflammation. Moderate to large rectal fecal load. Correlate for signs of constipation. AORTA/IVC:  Normal caliber. RETROPERITONEUM/LYMPH NODES:  Unremarkable REPRODUCTIVE:  Unremarkable BLADDER:  Unremarkable OSSEUS STRUCTURES:  Typical for age with no acute process identified. There is small to moderate volume ascites most pronounced in the right upper quadrant and pelvis. There is generalized subcutaneous edema     Impression: Impression: 1.There is a right femoral hernia containing the terminal ileum and cecum. No evidence of obstruction or inflammation. 2.Nonspecific third spacing with pleural effusions, ascites, and subcutaneous edema. Correlate with history and symptoms. Electronically Signed: Migue Haskins MD  7/6/2024 6:41 PM EDT  Workstation ID: [id number]     Results for orders placed during the hospital encounter of 06/28/24    Adult Transthoracic Echo Complete W/ Cont if Necessary Per Protocol    Interpretation Summary    Left ventricular systolic function is normal. Left ventricular ejection fraction appears to be 61 - 65%.    There is mild calcification of the aortic valve.    Mild aortic valve regurgitation is present.    Mild mitral annular calcification is present.    Mild to moderate mitral valve regurgitation is present.    Mild tricuspid valve regurgitation is present.      Current medications:  Scheduled Meds:doxycycline, 100 mg, Oral, Q12H  mirtazapine, 15 mg, Oral, Nightly  nicotine, 1 patch, Transdermal, Q24H  sodium chloride, 10 mL, Intravenous,  Q12H  thiamine, 100 mg, Oral, Daily      Continuous Infusions:     PRN Meds:.  acetaminophen    senna-docusate sodium **AND** polyethylene glycol **AND** bisacodyl **AND** bisacodyl    Calcium Replacement - Follow Nurse / BPA Driven Protocol    Magnesium Standard Dose Replacement - Follow Nurse / BPA Driven Protocol    melatonin    Phosphorus Replacement - Follow Nurse / BPA Driven Protocol    Potassium Replacement - Follow Nurse / BPA Driven Protocol    sodium chloride    sodium chloride    Assessment & Plan   Assessment & Plan     Active Hospital Problems    Diagnosis  POA    **Syncope [R55]  Yes    Severe malnutrition [E43]  Yes    AMS (altered mental status) [R41.82]  Yes    Hypokalemia [E87.6]  Yes    Elevated troponin [R79.89]  Yes    Elevated C-reactive protein (CRP) [R79.82]  Yes    Elevated sed rate [R70.0]  Yes    Leukocytosis [D72.829]  Yes      Resolved Hospital Problems   No resolved problems to display.        Brief Hospital Course to date:  Arcelia Walter is a 79 y.o. female brought in per EMS after being found down during a welfare check.  Unclear of exact events.  No emergency contact on file.       Syncope  Altered mental status  - Unclear exact events, originally reported to EMS on the floor x 2 days, later states that she is unsure what happened  - CT head w atrophy and chronic microvascular ischemic change but no acute intracranial process  - Ethanol neg  --echo w nl EF and no significant valvular abnormalities  - A1c and TSH wnl     Elevated troponin  - EKG without acute ischemia, troponin trending down    Leukocytosis  Elevated CRP  Elevated sed rate  Right inguinal erythema  -- WBC 20 at admission  - Procalcitonin, lactic normal at admit  - Possible cellulitis w R groin erythema/tenderness/induration  -- CRP 16, procal 0.3, leukocytosis resolved  -- continue doxycyline, day 4    Right Femoral hernia  - unclear chronicity  - General surgery evaluated, hernia is reducible, truss recommended at  present.     Hypokalemia  - Electrolyte replacement     Severe chronic malnutrition  Failure to thrive  - No emergency contact, unclear living situation  - Case management/ consult  --PT/OT  --nutrition following, prealbumin low at < 3.0  --added mirtazipine.       Expected Discharge Location and Transportation: placement likely  Expected Discharge   Expected Discharge Date: 7/8/2024; Expected Discharge Time:      VTE Prophylaxis:  Mechanical VTE prophylaxis orders are present.         AM-PAC 6 Clicks Score (PT): 12 (07/08/24 0800)    CODE STATUS:   Code Status and Medical Interventions:   Ordered at: 06/29/24 0451     Code Status (Patient has no pulse and is not breathing):    CPR (Attempt to Resuscitate)     Medical Interventions (Patient has pulse or is breathing):    Full Support       Braeden Campos MD  07/08/24

## 2024-07-08 NOTE — PLAN OF CARE
Goal Outcome Evaluation:  Plan of Care Reviewed With: patient        Progress: no change  Outcome Evaluation: AOx1. VSS on RA. No complaints of pain or nausea reported. Voiding appropriately. WOC completed consult and confirmed MASD on coccyx. Orders place for wound care. Skin and fall percautions in place. PT ambulated with patient to chair. up x2 assist. Tolerating diet. Awaiting state gaurdianship. Discussed plan of care with patient who verbalizes understanding.

## 2024-07-08 NOTE — CASE MANAGEMENT/SOCIAL WORK
Continued Stay Note  [facility]     Patient Name: Arcelia Walter  MRN: [id number]  Today's Date: 7/8/2024    Admit Date: 6/28/2024    Plan: TBD   Discharge Plan       Row Name 07/08/24 1249       Plan    Plan TBD    Patient/Family in Agreement with Plan unable to assess    Plan Comments Discussed in MDR. No discharge needs at this time. APS following for guardianship needs. CM will cont to follow                   Discharge Codes    No documentation.                 Expected Discharge Date and Time       Expected Discharge Date Expected Discharge Time    Jul 8, 2024               Adeline Rahman RN

## 2024-07-08 NOTE — THERAPY TREATMENT NOTE
Patient Name: Arcelia Walter  : 1945    MRN: [id number]                              Today's Date: 2024       Admit Date: 2024    Visit Dx:     ICD-10-CM ICD-9-CM   1. Syncope, unspecified syncope type  R55 780.2   2. Ketosis  E88.89 276.2     Patient Active Problem List   Diagnosis    Syncope    Hypokalemia    Elevated troponin    Elevated C-reactive protein (CRP)    Elevated sed rate    Leukocytosis    AMS (altered mental status)    Severe malnutrition     History reviewed. No pertinent past medical history.  Past Surgical History:   Procedure Laterality Date    HYSTERECTOMY        General Information       Row Name 24 1527          Physical Therapy Time and Intention    Document Type therapy note (daily note)  -KG     Mode of Treatment physical therapy  -KG       Row Name 24 1527          General Information    Existing Precautions/Restrictions fall;other (see comments)  confusion  -KG     Barriers to Rehab medically complex;cognitive status  -KG       Row Name 24 1527          Cognition    Orientation Status (Cognition) oriented to;person  -KG       Row Name 24 1527          Safety Issues, Functional Mobility    Safety Issues Affecting Function (Mobility) awareness of need for assistance;insight into deficits/self-awareness;safety precaution awareness;safety precautions follow-through/compliance;sequencing abilities  -KG     Impairments Affecting Function (Mobility) balance;cognition;coordination;endurance/activity tolerance;postural/trunk control;strength  -KG     Cognitive Impairments, Mobility Safety/Performance attention;awareness, need for assistance;insight into deficits/self-awareness;safety precaution awareness;safety precaution follow-through;sequencing abilities  -KG               User Key  (r) = Recorded By, (t) = Taken By, (c) = Cosigned By      Initials Name Provider Type    KG Misty Shipley, PT Physical Therapist                   Mobility       Row Name  07/08/24 1528          Bed Mobility    Bed Mobility supine-sit  -KG     Supine-Sit [place] (Bed Mobility) moderate assist (50% patient effort);verbal cues  -KG     Assistive Device (Bed Mobility) draw sheet;head of bed elevated  -KG     Comment, (Bed Mobility) VC's for sequencing and technique. Pt required assistance at trunk and BLEs. Increased time and effort to complete.  -KG       Row Name 07/08/24 1528          Transfers    Comment, (Transfers) VC's for sequencing and safe hand placement. Pt unsteady upon initial stand with posterior lean and feet too far anterior.  -KG       Row Name 07/08/24 1528          Sit-Stand Transfer    Sit-Stand [place] (Transfers) minimum assist (75% patient effort);verbal cues  -KG     Assistive Device (Sit-Stand Transfers) other (see comments)  UE support  -KG       Row Name 07/08/24 1528          Gait/Stairs (Locomotion)    [place] Level (Gait) minimum assist (75% patient effort);verbal cues  -KG     Assistive Device (Gait) other (see comments)  B UE support  -KG     Distance in Feet (Gait) 5  -KG     Deviations/Abnormal Patterns (Gait) base of support, narrow;linwood decreased;festinating/shuffling;stride length decreased  -KG     Bilateral Gait Deviations heel strike decreased  -KG     Comment, (Gait/Stairs) Pt demonstrated step to gait pattern with slow linwood and short, shuffled steps. Pt continued to demonstrate posterior lean with feet too far forward despite cues to correct. Pt very unsteady; required B UE support for increased stability. Continues to be limited by confusion. Additional ambulation deferred due to poor balance and coordination.  -KG               User Key  (r) = Recorded By, (t) = Taken By, (c) = Cosigned By      Initials Name Provider Type    KG Misty Shipley, PT Physical Therapist                   Obj/Interventions       Row Name 07/08/24 1530          Balance    Balance Assessment sitting static balance;standing static  balance;standing dynamic balance  -KG     Static Sitting Balance contact guard  -KG     Position, Sitting Balance unsupported;sitting edge of bed  -KG     Static Standing Balance minimal assist  -KG     Dynamic Standing Balance minimal assist  -KG     Position/Device Used, Standing Balance supported  -KG               User Key  (r) = Recorded By, (t) = Taken By, (c) = Cosigned By      Initials Name Provider Type    Misty Mueller, PT Physical Therapist                   Goals/Plan    No documentation.                  Clinical Impression       Row Name 07/08/24 1535          Pain    Pretreatment Pain Rating 0/10 - no pain  -KG     Posttreatment Pain Rating 0/10 - no pain  -KG       Row Name 07/08/24 1538          Plan of Care Review    Plan of Care Reviewed With patient  -KG     Progress declining  -KG     Outcome Evaluation Pt ambulated 5ft from bed to chair with Rod and B UE support. Pt very unsteady demonstrating posterior lean with short, shuffled steps. Pt unable to correct posture or gait mechanics despite cues. Continues to be limited by confusion and decreased command following. Continue to recommend PT skilled care and D/C to SNF.  -KG       Row Name 07/08/24 1535          Vital Signs    Pre Systolic BP Rehab 124  -KG     Pre Treatment Diastolic BP 62  -KG     O2 Delivery Pre Treatment room air  -KG     O2 Delivery Post Treatment room air  -KG     Pre Patient Position Supine  -KG     Intra Patient Position Standing  -KG     Post Patient Position Sitting  -KG       Row Name 07/08/24 1534          Positioning and Restraints    Pre-Treatment Position in bed  -KG     Post Treatment Position chair  -KG     In Chair notified nsg;reclined;call light within reach;encouraged to call for assist;exit alarm on;legs elevated  -KG               User Key  (r) = Recorded By, (t) = Taken By, (c) = Cosigned By      Initials Name Provider Type    Misty Mueller, PT Physical Therapist                    Outcome Measures       Row Name 07/08/24 1537 07/08/24 0800       How much help from another person do you currently need...    Turning from your back to your side while in flat bed without using bedrails? 3  -KG 2  -CG (r) GB (t) CG (c)    Moving from lying on back to sitting on the side of a flat bed without bedrails? 2  -KG 2  -CG (r) GB (t) CG (c)    Moving to and from a bed to a chair (including a wheelchair)? 3  -KG 2  -CG (r) GB (t) CG (c)    Standing up from a chair using your arms (e.g., wheelchair, bedside chair)? 3  -KG 2  -CG (r) GB (t) CG (c)    Climbing 3-5 steps with a railing? 2  -KG 2  -CG (r) GB (t) CG (c)    To walk in hospital room? 3  -KG 2  -CG (r) GB (t) CG (c)    AM-PAC 6 Clicks Score (PT) 16  -KG 12  -CG (r) GB (t)    Highest Level of Mobility Goal 5 --> Static standing  -KG 4 --> Transfer to chair/commode  -CG (r) GB (t)      Row Name 07/08/24 1537          Functional Assessment    Outcome Measure Options AM-PAC 6 Clicks Basic Mobility (PT)  -KG               User Key  (r) = Recorded By, (t) = Taken By, (c) = Cosigned By      Initials Name Provider Type    Josr Garcia, RN Registered Nurse    KG Misty Shipley, PT Physical Therapist    GB Blakemore, Greta, RN Extern Registered Nurse                                 Physical Therapy Education       Title: PT OT SLP Therapies (In Progress)       Topic: Physical Therapy (In Progress)       Point: Mobility training (In Progress)       Learning Progress Summary             Patient Acceptance, E, NR by KG at 7/8/2024 1352    Acceptance, E, NR by JOVITA at 7/5/2024 1330    Acceptance, E, NR by CK at 7/3/2024 1554    Acceptance, E, NR by JOVITA at 7/2/2024 1400    Acceptance, E, NR by KG at 7/1/2024 1040    Acceptance, E, NR by KG at 6/29/2024 1001                         Point: Home exercise program (In Progress)       Learning Progress Summary             Patient Acceptance, E, NR by KG at 7/8/2024 1352    Acceptance, E, NR by JOVITA at 7/5/2024  1330    Acceptance, E, NR by JOVITA at 7/2/2024 1400    Acceptance, E, NR by KG at 7/1/2024 1040                         Point: Body mechanics (In Progress)       Learning Progress Summary             Patient Acceptance, E, NR by KG at 7/8/2024 1352    Acceptance, E, NR by JOVITA at 7/5/2024 1330    Acceptance, E, NR by CK at 7/3/2024 1554    Acceptance, E, NR by JOVITA at 7/2/2024 1400    Acceptance, E, NR by KG at 7/1/2024 1040    Acceptance, E, NR by KG at 6/29/2024 1001                         Point: Precautions (In Progress)       Learning Progress Summary             Patient Acceptance, E, NR by KG at 7/8/2024 1352    Acceptance, E, NR by JOVITA at 7/5/2024 1330    Acceptance, E, NR by CK at 7/3/2024 1554    Acceptance, E, NR by JOVITA at 7/2/2024 1400    Acceptance, E, NR by KG at 7/1/2024 1040    Acceptance, E, NR by KG at 6/29/2024 1001                                         User Key       Initials Effective Dates Name Provider Type Discipline    JOVITA 02/03/23 -  Mary Kay Valencia, PT Physical Therapist PT    KG 05/22/20 -  Misty Shipley PT Physical Therapist PT    CK 02/06/24 -  Juana Velasquez, PT Physical Therapist PT                  PT Recommendation and Plan  Planned Therapy Interventions (PT): balance training, bed mobility training, gait training, strengthening, transfer training  Plan of Care Reviewed With: patient  Progress: declining  Outcome Evaluation: Pt ambulated 5ft from bed to chair with Rod and B UE support. Pt very unsteady demonstrating posterior lean with short, shuffled steps. Pt unable to correct posture or gait mechanics despite cues. Continues to be limited by confusion and decreased command following. Continue to recommend PT skilled care and D/C to SNF.     Time Calculation:   PT Evaluation Complexity  History, PT Evaluation Complexity: 1-2 personal factors and/or comorbidities  Examination of Body Systems (PT Eval Complexity): total of 3 or more elements  Clinical Presentation (PT  Evaluation Complexity): stable  Clinical Decision Making (PT Evaluation Complexity): low complexity  Overall Complexity (PT Evaluation Complexity): low complexity     PT Charges       Row Name 07/08/24 1352             Time Calculation    Start Time 1352  -KG      PT Received On 07/08/24  -KG      PT Goal Re-Cert Due Date 07/09/24  -KG         Time Calculation- PT    Total Timed Code Minutes- PT 23 minute(s)  -KG         Timed Charges    73835 - PT Therapeutic Activity Minutes 23  -KG         Total Minutes    Timed Charges Total Minutes 23  -KG       Total Minutes 23  -KG                User Key  (r) = Recorded By, (t) = Taken By, (c) = Cosigned By      Initials Name Provider Type    KG Misty Shipley, PT Physical Therapist                  Therapy Charges for Today       Code Description Service Date Service Provider Modifiers Qty    [id number] HC PT THERAPEUTIC ACT EA 15 MIN 7/8/2024 Misty Shipley, PT GP 2            PT G-Codes  Outcome Measure Options: AM-PAC 6 Clicks Basic Mobility (PT)  AM-PAC 6 Clicks Score (PT): 16  AM-PAC 6 Clicks Score (OT): 17  PT Discharge Summary  Anticipated Discharge Disposition (PT): skilled nursing facility    Lauren Shipley PT  7/8/2024

## 2024-07-08 NOTE — PLAN OF CARE
Goal Outcome Evaluation:  Plan of Care Reviewed With: patient        Progress: declining  Outcome Evaluation: Pt ambulated 5ft from bed to chair with Rod and B UE support. Pt very unsteady demonstrating posterior lean with short, shuffled steps. Pt unable to correct posture or gait mechanics despite cues. Continues to be limited by confusion and decreased command following. Continue to recommend PT skilled care and D/C to SNF.      Anticipated Discharge Disposition (PT): skilled nursing facility

## 2024-07-08 NOTE — NURSING NOTE
"Reason for Wound, Ostomy and Continence (WOC) Nursing Consultation: \"  Open area - Gluteal cleft, loose skin, red and blanchable   Question Answer Comment   Reason for Consult? Wound    Indicate Wound Location / Details Friction shear/ moisture vs PI?       \"    Patient found curled up in bed, sideways, brief in place with large urine occurrence, allevyn dressing in place.  Family/support person not present.     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 and/or urine friction has opened slightly  Location: gluteal cleft  Wound Bed: blanchable, dry, and pink  Wound Edges: Irregular and Jagged,   Periwound Skin: rough hyperpigmented skin  Drainage Characteristics/Odor: none  Drainage Amount: none  Pain: No   Care provided: cleansed with teal wipes, purple film of marathon (liquid skin barrier) placed, barrier film spray surrounding, new allevyn, new brief/incontinence care, new purewick setup applied  Notes: reports of purewick being pulled out by patient previously, she was cooperative today, hopefully purewick can stay in place with brief holding (she is very cachectic and purewick will likely not work unless brief or mesh underwear used).   Wound Image:       Recommendation(s) for management of wound:   -Refer to wound care orders for specific instructions on how to treat/manage wound.  -heels intact, allevyn in place  -allevyns applied to bilateral hips-blanchable pink found   -Practice pressure injury prevention protocol.    Most recent Emmanuel Scale score:  Sensory Perception: 3-->slightly limited  Moisture: 3-->occasionally moist  Activity: 3-->walks occasionally  Mobility: 2-->very " limited  Nutrition: 2-->probably inadequate  Friction and Shear: 1-->problem  Emmanuel Score: 14 (07/08/24 0800)     Specialty support surface: Foam Mattress with Waffle Overlay       Pressure Injury Prevention Protocol (initiate for Emmanuel Score of 18 or less):   *Turn q 2 hr, keep heels elevated and offloaded with offloading heel boots.  *Allevn dressings to heels, sacrum/coccyx  *Follow C.A.R.E protocol if medical devices (Bipap, lozano, Ng tube, etc) are being used.  *Reduce layers under patient (one sheet as drawsheet and two incontinence pads) to allow waffle or EWA to improve microclimate   *Raise knee-gatch before elevating HOB to reduce shearing      WOC Team will continue to follow.  Please re-consult if the wound(s) worsens.

## 2024-07-09 PROCEDURE — 99232 SBSQ HOSP IP/OBS MODERATE 35: CPT | Performed by: INTERNAL MEDICINE

## 2024-07-09 PROCEDURE — 97530 THERAPEUTIC ACTIVITIES: CPT

## 2024-07-09 PROCEDURE — 97535 SELF CARE MNGMENT TRAINING: CPT

## 2024-07-09 RX ADMIN — Medication 1 PATCH: at 09:12

## 2024-07-09 RX ADMIN — DOXYCYCLINE 100 MG: 100 CAPSULE ORAL at 21:15

## 2024-07-09 RX ADMIN — MIRTAZAPINE 15 MG: 15 TABLET, FILM COATED ORAL at 21:15

## 2024-07-09 RX ADMIN — DOXYCYCLINE 100 MG: 100 CAPSULE ORAL at 09:12

## 2024-07-09 RX ADMIN — THIAMINE HCL TAB 100 MG 100 MG: 100 TAB at 09:12

## 2024-07-09 NOTE — PLAN OF CARE
Goal Outcome Evaluation:  Plan of Care Reviewed With: patient        Progress: improving  Outcome Evaluation: patient was able to ambulate 75 ft with walker and min assist, patient's progress has been limited by her cognitive status and weakness we will continue to progress toward mobility goals anticipate patient to need SNF placement at D/C      Anticipated Discharge Disposition (PT): skilled nursing facility

## 2024-07-09 NOTE — PROGRESS NOTES
"          Clinical Nutrition Assessment     Patient Name: Arcelia Walter  YOB: 1945  MRN: [id number]  Date of Encounter: 07/09/24 09:55 EDT  Admission date: 6/28/2024  Reason for Visit: Follow-up protocol    Assessment   Nutrition Assessment   Admission Diagnosis:  Syncope [R55]  AMS (altered mental status) [R41.82]    Problem List:    Syncope    Hypokalemia    Elevated troponin    Elevated C-reactive protein (CRP)    Elevated sed rate    Leukocytosis    AMS (altered mental status)    Severe malnutrition      PMH:   She  has no past medical history on file.    PSH:  She  has a past surgical history that includes Hysterectomy.    Applicable Nutrition History:   +3-4 pitting edema to melonie feet     Anthropometrics     Height: Height: 149.9 cm (59.02\")  Last Filed Weight: Weight: 34.3 kg (75 lb 9.6 oz) (07/09/24 0534)  Method: Weight Method: Bed scale  BMI: BMI (Calculated): 15.3    UBW:  ? 103# per pt report  Weight change:  reported 15# wt loss (? Unsure of duration of loss)    Nutrition Focused Physical Exam    Date:  6/29       Patient meets criteria for malnutrition diagnosis, see MSA note.     Subjective   Reported/Observed/Food/Nutrition Related History:     07/09/24  Remains confused. Patient reported her appetite seems to have improved, but doesn't believe she is eating much more. Likes her ONS and would like to continue to receive it. Declined any food preferences. Has remeron ordered.    07/02/24  Patient reports good appetite. PO intake increasing. Patient remains confused.    6/29  Pt up in bed eating breakfast at time of visit, able to provide some nutrition/wt hx however difficulty elaborating when asked. Reports cooking meals for self - states having 3 eggs every other day, but sometimes daily. ? If this is the only meal consumed. Severe cachexia - ? Starvation vs cardiac. No visible difficulties chewing/swallowing. Pt endorsed ~15# wt loss \"at the beginning of summer\" however unable to discern " timeframe for notable weight change. Reports having BM every other day without aide from bowel meds. NKFA - strong dislike for tea.     Current Nutrition Prescription   PO: Diet: Regular/House; Fluid Consistency: Thin (IDDSI 0)  Oral Nutrition Supplement: VHC @ L  Intake: 0-50% PO intake documented since previous visit (minimal meals documented)    Assessment & Plan   Nutrition Diagnosis   Date:  6/29            Updated:    Problem Malnutrition  chronic, severe   Etiology ? Energy in < energy out vs cardiac cacehxia   Signs/Symptoms Severe muscle wasting and subcutaneous fat loss   Status: New    Date:  6/29    Updated:     Problem Underweight   Etiology Inadequate protein-energy intake   Signs/Symptoms BMI 17.77   Status: New    Goal:   Nutrition to support treatment and Increase intake      Nutrition Intervention      Follow treatment progress, Care plan reviewed, Menu provided, Encourage intake, Supplement provided    Will continue VHC once per day and add magic cup once per day as well  Continue to follow to encourage adequate PO intake  May need to consider addition of another appetite stim or supplemental EN if PO intakes do not improve    Monitoring/Evaluation:   Per protocol, PO intake, Supplement intake, Weight, Symptoms, POC/GOC    Britt Rosa, MS,RD,LD  Time Spent: 25min

## 2024-07-09 NOTE — PLAN OF CARE
Goal Outcome Evaluation:           Progress: no change   VSS on RA. A&Ox1. Skin and fall precautions in place. No complaints of pain or nausea. Will continue plan of care.

## 2024-07-09 NOTE — PLAN OF CARE
Goal Outcome Evaluation:  Plan of Care Reviewed With: patient        Progress: improving  Outcome Evaluation: Pt continues to present with generalized weakness, impaired balance, decreased activity tolerance and impaired ADLs warranting skilled OT services. Pt performed bed mobility with Min A and ambulated short distance using the RW with Min A. Pt had 1 LOB requiring Mod to recover. Cont POC.      Anticipated Discharge Disposition (OT): skilled nursing facility

## 2024-07-09 NOTE — PLAN OF CARE
Problem: Fall Injury Risk  Goal: Absence of Fall and Fall-Related Injury  Outcome: Ongoing, Progressing   Goal Outcome Evaluation:              Outcome Evaluation: VSS. RA. Pt A&Ox1. No complaints of pain or nausea during the shift. Pt up in chair during the shift. Skin and fall precautions in place. Awaiting social work plan. UOP adequate. No other concerns at this time. Will continue with the plan of care.

## 2024-07-09 NOTE — THERAPY TREATMENT NOTE
Patient Name: Arcelia Walter  : 1945    MRN: [id number]                              Today's Date: 2024       Admit Date: 2024    Visit Dx:     ICD-10-CM ICD-9-CM   1. Syncope, unspecified syncope type  R55 780.2   2. Ketosis  E88.89 276.2     Patient Active Problem List   Diagnosis    Syncope    Hypokalemia    Elevated troponin    Elevated C-reactive protein (CRP)    Elevated sed rate    Leukocytosis    AMS (altered mental status)    Severe malnutrition     History reviewed. No pertinent past medical history.  Past Surgical History:   Procedure Laterality Date    HYSTERECTOMY        General Information       Row Name 24 1409          OT Time and Intention    Document Type therapy note (daily note)  -AN     Mode of Treatment occupational therapy  -AN       Row Name 24 1409          General Information    Patient Profile Reviewed yes  -AN     Existing Precautions/Restrictions fall  -AN     Barriers to Rehab medically complex  -AN       Row Name 24 1409          Cognition    Orientation Status (Cognition) oriented to;person;place;other (see comments)  confused throughout session asking where her mother is  -AN       Row Name 24 1409          Safety Issues, Functional Mobility    Safety Issues Affecting Function (Mobility) safety precaution awareness;safety precautions follow-through/compliance;insight into deficits/self-awareness;awareness of need for assistance;judgment;problem-solving  -AN     Impairments Affecting Function (Mobility) balance;cognition;endurance/activity tolerance;postural/trunk control;strength  -AN     Cognitive Impairments, Mobility Safety/Performance awareness, need for assistance;safety precaution follow-through;safety precaution awareness;problem-solving/reasoning;insight into deficits/self-awareness;judgment;sequencing abilities  -AN               User Key  (r) = Recorded By, (t) = Taken By, (c) = Cosigned By      Initials Name Provider Type    AN  Theresa Moe OT Occupational Therapist                     Mobility/ADL's       Row Name 07/09/24 1410          Bed Mobility    Bed Mobility supine-sit  -AN     Supine-Sit Grover (Bed Mobility) verbal cues;minimum assist (75% patient effort);1 person assist  -AN     Bed Mobility, Safety Issues decreased use of arms for pushing/pulling;decreased use of legs for bridging/pushing;impaired trunk control for bed mobility  -AN     Assistive Device (Bed Mobility) head of bed elevated  -AN     Comment, (Bed Mobility) Increased time with assist provided at trunk and cues for sequencing of steps  -AN       Row Name 07/09/24 1410          Transfers    Transfers sit-stand transfer;stand-sit transfer  -AN     Comment, (Transfers) cues for hand placement and transfer technique  -AN       Row Name 07/09/24 1410          Sit-Stand Transfer    Sit-Stand Grover (Transfers) verbal cues;minimum assist (75% patient effort);1 person assist  -AN     Assistive Device (Sit-Stand Transfers) walker, front-wheeled  -AN       Row Name 07/09/24 1410          Stand-Sit Transfer    Stand-Sit Grover (Transfers) verbal cues;minimum assist (75% patient effort);1 person assist  -AN     Assistive Device (Stand-Sit Transfers) walker, front-wheeled  -AN       Row Name 07/09/24 1410          Functional Mobility    Functional Mobility- Ind. Level minimum assist (75% patient effort);1 person;verbal cues required  -AN     Functional Mobility- Device walker, front-wheeled  -AN     Functional Mobility-Distance (Feet) --  household distances  -AN     Functional Mobility- Comment Pt ambulated household distance in prep for sink side ADLs using the RW with Min A. Pt demo'd 1 LOB requiring Mod A to recover.  -AN       Row Name 07/09/24 1410          Activities of Daily Living    BADL Assessment/Intervention upper body dressing;grooming  -AN       Row Name 07/09/24 1410          Grooming Assessment/Training    Grover Level (Grooming)  wash face, hands;hair care, combing/brushing;minimum assist (75% patient effort)  -AN     Position (Grooming) sink side  -AN       Row Name 07/09/24 1410          Upper Body Dressing Assessment/Training    Roberts Level (Upper Body Dressing) don;minimum assist (75% patient effort)  -AN     Position (Upper Body Dressing) edge of bed sitting  -AN               User Key  (r) = Recorded By, (t) = Taken By, (c) = Cosigned By      Initials Name Provider Type    Theresa Aponte OT Occupational Therapist                   Obj/Interventions       Row Name 07/09/24 1415          Balance    Balance Assessment sitting static balance;sitting dynamic balance;sit to stand dynamic balance;standing static balance;standing dynamic balance  -AN     Static Sitting Balance supervision  -AN     Dynamic Sitting Balance supervision  -AN     Position, Sitting Balance sitting edge of bed  -AN     Sit to Stand Dynamic Balance verbal cues;minimal assist;1-person assist  -AN     Static Standing Balance verbal cues;minimal assist;1-person assist  -AN     Dynamic Standing Balance verbal cues;minimal assist;1-person assist  -AN     Position/Device Used, Standing Balance supported;walker, rolling  -AN     Balance Interventions standing;sit to stand;supported;dynamic;static;minimal challenge;occupation based/functional task  -AN               User Key  (r) = Recorded By, (t) = Taken By, (c) = Cosigned By      Initials Name Provider Type    Theresa Aponte OT Occupational Therapist                   Goals/Plan    No documentation.                  Clinical Impression       Row Name 07/09/24 1416          Pain Assessment    Pretreatment Pain Rating 0/10 - no pain  -AN     Posttreatment Pain Rating 0/10 - no pain  -AN     Pre/Posttreatment Pain Comment asymptomatic  -AN     Pain Intervention(s) Repositioned;Ambulation/increased activity  -AN       Row Name 07/09/24 1416          Plan of Care Review    Plan of Care Reviewed With patient   -AN     Progress improving  -AN     Outcome Evaluation Pt continues to present with generalized weakness, impaired balance, decreased activity tolerance and impaired ADLs warranting skilled OT services. Pt performed bed mobility with Min A and ambulated short distance using the RW with Min A. Pt had 1 LOB requiring Mod to recover. Cont POC.  -AN       Row Name 07/09/24 1416          Therapy Plan Review/Discharge Plan (OT)    Anticipated Discharge Disposition (OT) skilled nursing facility  -AN       Row Name 07/09/24 1416          Vital Signs    O2 Delivery Pre Treatment room air  -AN     O2 Delivery Intra Treatment room air  -AN     O2 Delivery Post Treatment room air  -AN     Pre Patient Position Supine  -AN     Intra Patient Position Standing  -AN     Post Patient Position Standing  -AN       Row Name 07/09/24 1416          Positioning and Restraints    Pre-Treatment Position in bed  -AN     Post Treatment Position other  -AN     Other Position with PT  -AN               User Key  (r) = Recorded By, (t) = Taken By, (c) = Cosigned By      Initials Name Provider Type    Theresa Aponte, WILMAN Occupational Therapist                   Outcome Measures       Row Name 07/09/24 1421          How much help from another is currently needed...    Putting on and taking off regular lower body clothing? 2  -AN     Bathing (including washing, rinsing, and drying) 2  -AN     Toileting (which includes using toilet bed pan or urinal) 3  -AN     Putting on and taking off regular upper body clothing 3  -AN     Taking care of personal grooming (such as brushing teeth) 3  -AN     Eating meals 4  -AN     AM-PAC 6 Clicks Score (OT) 17  -AN       Row Name 07/09/24 1406 07/09/24 0800       How much help from another person do you currently need...    Turning from your back to your side while in flat bed without using bedrails? 3  -JOVITA 3  -MH    Moving from lying on back to sitting on the side of a flat bed without bedrails? 3  -JOVITA 3   -MH    Moving to and from a bed to a chair (including a wheelchair)? 3  -JOVITA 2  -MH    Standing up from a chair using your arms (e.g., wheelchair, bedside chair)? 3  -JOVITA 2  -MH    Climbing 3-5 steps with a railing? 2  -JOVITA 2  -MH    To walk in hospital room? 3  -JOVITA 2  -MH    AM-PAC 6 Clicks Score (PT) 17  -JOVITA 14  -    Highest Level of Mobility Goal 5 --> Static standing  -JOVITA 4 --> Transfer to chair/commode  -      Row Name 07/09/24 1421          Functional Assessment    Outcome Measure Options AM-PAC 6 Clicks Daily Activity (OT)  -AN               User Key  (r) = Recorded By, (t) = Taken By, (c) = Cosigned By      Initials Name Provider Type    Mary Kay Paul, PT Physical Therapist    Lourdes Barnes, RN Registered Nurse    Theresa Aponte, OT Occupational Therapist                    Occupational Therapy Education       Title: PT OT SLP Therapies (In Progress)       Topic: Occupational Therapy (Done)       Point: ADL training (Done)       Description:   Instruct learner(s) on proper safety adaptation and remediation techniques during self care or transfers.   Instruct in proper use of assistive devices.                  Learning Progress Summary             Patient Acceptance, E, VU by AN at 7/9/2024 1422    Acceptance, E, VU by AN at 7/4/2024 1155    Acceptance, E, VU,DU,NR by MR at 7/1/2024 1015                         Point: Home exercise program (Done)       Description:   Instruct learner(s) on appropriate technique for monitoring, assisting and/or progressing therapeutic exercises/activities.                  Learning Progress Summary             Patient Acceptance, E, VU,DU,NR by MR at 7/1/2024 1015                         Point: Precautions (Done)       Description:   Instruct learner(s) on prescribed precautions during self-care and functional transfers.                  Learning Progress Summary             Patient Acceptance, E, VU by AN at 7/9/2024 1422    Acceptance, E, VU by AN at  7/4/2024 1155    Acceptance, E, VU,DU,NR by MR at 7/1/2024 1015                         Point: Body mechanics (Done)       Description:   Instruct learner(s) on proper positioning and spine alignment during self-care, functional mobility activities and/or exercises.                  Learning Progress Summary             Patient Acceptance, E, VU by AN at 7/9/2024 1422    Acceptance, E, VU by AN at 7/4/2024 1155    Acceptance, E, VU,DU,NR by MR at 7/1/2024 1015                                         User Key       Initials Effective Dates Name Provider Type Discipline    AN 09/21/21 -  Theresa Moe OT Occupational Therapist OT    MR 09/22/22 -  Emily Jimenes OT Occupational Therapist OT                  OT Recommendation and Plan     Plan of Care Review  Plan of Care Reviewed With: patient  Progress: improving  Outcome Evaluation: Pt continues to present with generalized weakness, impaired balance, decreased activity tolerance and impaired ADLs warranting skilled OT services. Pt performed bed mobility with Min A and ambulated short distance using the RW with Min A. Pt had 1 LOB requiring Mod to recover. Cont POC.     Time Calculation:         Time Calculation- OT       Row Name 07/09/24 1422             Time Calculation- OT    OT Start Time 1310  -AN      OT Received On 07/09/24  -AN         Timed Charges    90190 - OT Self Care/Mgmt Minutes 15  -AN         Total Minutes    Timed Charges Total Minutes 15  -AN       Total Minutes 15  -AN                User Key  (r) = Recorded By, (t) = Taken By, (c) = Cosigned By      Initials Name Provider Type    AN Theresa Moe, OT Occupational Therapist                  Therapy Charges for Today       Code Description Service Date Service Provider Modifiers Qty    [id number]  OT SELF CARE/MGMT/TRAIN EA 15 MIN 7/9/2024 Theresa Moe, OT GO 1                 Theresaaracely Moe OT  7/9/2024

## 2024-07-09 NOTE — PROGRESS NOTES
[facility] Medicine Services  PROGRESS NOTE    Patient Name: Arcelia Walter  : 1945  MRN: [id number]    Date of Admission: 2024  Primary Care Physician: Provider, No Known    Subjective   Subjective     CC:  F/u AMS    HPI:  No complaints, denies current pain.  Discussed our inability to contact family members, she does not know the phone numbers for her sisters.      Objective   Objective     Vital Signs:   Temp:  [97.7 °F (36.5 °C)-98.1 °F (36.7 °C)] 97.7 °F (36.5 °C)  Heart Rate:  [64-87] 69  Resp:  [16-18] 16  BP: (105-136)/(73-84) 136/77     Physical Exam:  Frail, in bed  RRR  CTAB  Abd soft, NT, femoral hernia soft, some induration lateral to the hernia    Results Reviewed:  LAB RESULTS:      Lab 24  0706 2418 2417 24  0454 24  1007 24  06   WBC 9.88 8.41 13.37* 15.22* 12.89* 15.52*   HEMOGLOBIN 8.8* 8.8* 8.3* 9.1* 9.2* 9.5*   HEMATOCRIT 26.9* 26.7* 24.9* 27.3* 27.8* 28.9*   PLATELETS 352 340 301 300 301 340   NEUTROS ABS  --  7.15* 12.17* 14.15* 11.47* 14.74*   EOS ABS  --  0.00 0.00 0.00 0.00 0.00   MCV 94.4 92.1 93.3 93.8 93.9 93.5   CRP  --  16.01*  --   --   --   --    PROCALCITONIN  --  0.30*  --   --   --   --          Lab 24  0700 24  1928 2418 2417 24  1515 24  0451 24  0609 24  1729 24   SODIUM 139  --  139 139  --  140 140  --  138   POTASSIUM 4.5 4.0 3.5 4.2 4.0 3.5 3.7  --  4.4   CHLORIDE 108*  --  105 108*  --  107 106  --  107   CO2 23.0  --  24.0 24.0  --  24.0 21.0*  --  24.0   ANION GAP 8.0  --  10.0 7.0  --  9.0 13.0  --  7.0   BUN 12  --  10 13  --  14 8  --  8   CREATININE 0.24*  --  0.31* 0.28*  --  0.28* 0.23*  --  0.25*   EGFR 114.7  --  107.9 110.6  --  110.6 115.9  --  113.6   GLUCOSE 90  --  72 86  --  98 76  --  88   CALCIUM 7.5*  --  7.5* 7.7*  --  7.8* 7.5*  --  7.6*   PHOSPHORUS  --   --   --   --   --   --  2.7 2.6 1.7*         Lab  07/07/24  0518 07/04/24  0609   TOTAL PROTEIN 4.4* 4.6*   ALBUMIN 2.0* 1.9*   GLOBULIN 2.4 2.7   ALT (SGPT) 13 14   AST (SGOT) 13 16   BILIRUBIN 0.4 0.4   ALK PHOS 129* 134*                           Brief Urine Lab Results  (Last result in the past 365 days)        Color   Clarity   Blood   Leuk Est   Nitrite   Protein   CREAT   Urine HCG        06/29/24 0038 Yellow   Clear   Negative   Negative   Negative   Trace                   Microbiology Results Abnormal       Procedure Component Value - Date/Time    Blood Culture - Blood, Hand, Right [[id number]]  (Normal) Collected: 07/01/24 1558    Lab Status: Final result Specimen: Blood from Hand, Right Updated: 07/06/24 1731     Blood Culture No growth at 5 days    Narrative:      Aerobic bottle only  Less than seven (7) mL's of blood was collected.  Insufficient quantity may yield false negative results.    Blood Culture - Blood, Hand, Left [[id number]]  (Normal) Collected: 07/01/24 1558    Lab Status: Final result Specimen: Blood from Hand, Left Updated: 07/06/24 1731     Blood Culture No growth at 5 days            No radiology results from the last 24 hrs    Results for orders placed during the hospital encounter of 06/28/24    Adult Transthoracic Echo Complete W/ Cont if Necessary Per Protocol    Interpretation Summary    Left ventricular systolic function is normal. Left ventricular ejection fraction appears to be 61 - 65%.    There is mild calcification of the aortic valve.    Mild aortic valve regurgitation is present.    Mild mitral annular calcification is present.    Mild to moderate mitral valve regurgitation is present.    Mild tricuspid valve regurgitation is present.      Current medications:  Scheduled Meds:doxycycline, 100 mg, Oral, Q12H  mirtazapine, 15 mg, Oral, Nightly  nicotine, 1 patch, Transdermal, Q24H  sodium chloride, 10 mL, Intravenous, Q12H  thiamine, 100 mg, Oral, Daily      Continuous Infusions:     PRN Meds:.  acetaminophen     senna-docusate sodium **AND** polyethylene glycol **AND** bisacodyl **AND** bisacodyl    Calcium Replacement - Follow Nurse / BPA Driven Protocol    Magnesium Standard Dose Replacement - Follow Nurse / BPA Driven Protocol    melatonin    Phosphorus Replacement - Follow Nurse / BPA Driven Protocol    Potassium Replacement - Follow Nurse / BPA Driven Protocol    sodium chloride    sodium chloride    Assessment & Plan   Assessment & Plan     Active Hospital Problems    Diagnosis  POA    **Syncope [R55]  Yes    Severe malnutrition [E43]  Yes    AMS (altered mental status) [R41.82]  Yes    Hypokalemia [E87.6]  Yes    Elevated troponin [R79.89]  Yes    Elevated C-reactive protein (CRP) [R79.82]  Yes    Elevated sed rate [R70.0]  Yes    Leukocytosis [D72.829]  Yes      Resolved Hospital Problems   No resolved problems to display.        Brief Hospital Course to date:  Arcelia Walter is a 79 y.o. female brought in per EMS after being found down during a welfare check.  Unclear of exact events.  No emergency contact on file.       Syncope  Altered mental status  - Unclear exact events, originally reported to EMS on the floor x 2 days, later states that she is unsure what happened  - CT head w atrophy and chronic microvascular ischemic change but no acute intracranial process  - Ethanol neg  --echo w nl EF and no significant valvular abnormalities  - A1c and TSH wnl     Elevated troponin  - EKG without acute ischemia, troponin trending down    Leukocytosis  Elevated CRP  Elevated sed rate  Right inguinal erythema  -- WBC 20 at admission  - Procalcitonin, lactic normal at admit  - Possible cellulitis w R groin erythema/tenderness/induration  -- recent CRP 16, procal 0.3, leukocytosis resolved  -- continue doxycyline, day 5    Right Femoral hernia  - unclear chronicity  - General surgery evaluated, hernia is reducible, truss recommended at present.     Hypokalemia  - Electrolyte replacement     Severe chronic malnutrition  Failure  to thrive  - No emergency contact, unclear living situation  - Case management/ consult  --PT/OT  --nutrition following, prealbumin low at < 3.0  --added mirtazipine.       Expected Discharge Location and Transportation: placement likely  Expected Discharge   Expected Discharge Date: 7/8/2024; Expected Discharge Time:      VTE Prophylaxis:  Mechanical VTE prophylaxis orders are present.         AM-PAC 6 Clicks Score (PT): 14 (07/09/24 0800)    CODE STATUS:   Code Status and Medical Interventions:   Ordered at: 06/29/24 0451     Code Status (Patient has no pulse and is not breathing):    CPR (Attempt to Resuscitate)     Medical Interventions (Patient has pulse or is breathing):    Full Support       Braeden Campos MD  07/09/24

## 2024-07-09 NOTE — THERAPY PROGRESS REPORT/RE-CERT
Patient Name: Arcelia Walter  : 1945    MRN: [id number]                              Today's Date: 2024       Admit Date: 2024    Visit Dx:     ICD-10-CM ICD-9-CM   1. Syncope, unspecified syncope type  R55 780.2   2. Ketosis  E88.89 276.2     Patient Active Problem List   Diagnosis    Syncope    Hypokalemia    Elevated troponin    Elevated C-reactive protein (CRP)    Elevated sed rate    Leukocytosis    AMS (altered mental status)    Severe malnutrition     History reviewed. No pertinent past medical history.  Past Surgical History:   Procedure Laterality Date    HYSTERECTOMY        General Information       Row Name 24 135          Physical Therapy Time and Intention    Document Type progress note/recertification  -JOVITA     Mode of Treatment physical therapy  -JOVITA       Row Name 24 1354          General Information    Patient Profile Reviewed yes  -JOVITA     Existing Precautions/Restrictions fall  -JOVITA     Barriers to Rehab medically complex  -JOVITA       Row Name 24 1357          Living Environment    People in Home alone  -JOVITA       Row Name 24 1359          Cognition    Orientation Status (Cognition) oriented to;person;place  -JOVITA       Row Name 24 1352          Safety Issues, Functional Mobility    Safety Issues Affecting Function (Mobility) safety precautions follow-through/compliance;safety precaution awareness  -JOVITA     Impairments Affecting Function (Mobility) balance;cognition;endurance/activity tolerance;postural/trunk control;strength  -JOVITA               User Key  (r) = Recorded By, (t) = Taken By, (c) = Cosigned By      Initials Name Provider Type    JOVITA Mary Kay Valencia PT Physical Therapist                   Mobility       Row Name 24 7562          Bed Mobility    Bed Mobility rolling left;rolling right;scooting/bridging;supine-sit  -JOVITA     Rolling Left [place] (Bed Mobility) minimum assist (75% patient effort)  -JOVITA     Rolling Right [place] (Bed  Mobility) minimum assist (75% patient effort)  -JOVITA     Scooting/Bridging [place] (Bed Mobility) minimum assist (75% patient effort)  -JOVITA     Supine-Sit [place] (Bed Mobility) minimum assist (75% patient effort)  -JOVITA     Assistive Device (Bed Mobility) draw sheet;head of bed elevated  -JOVITA     Comment, (Bed Mobility) patient takes increased time  -JOVITA       Row Name 07/09/24 1359          Bed-Chair Transfer    Bed-Chair [place] (Transfers) minimum assist (75% patient effort)  -JOVITA     Assistive Device (Bed-Chair Transfers) walker, front-wheeled  -JOVITA       Row Name 07/09/24 1359          Sit-Stand Transfer    Sit-Stand [place] (Transfers) minimum assist (75% patient effort)  -JOVITA     Assistive Device (Sit-Stand Transfers) walker, front-wheeled  -JOVITA       Row Name 07/09/24 1359          Gait/Stairs (Locomotion)    [place] Level (Gait) minimum assist (75% patient effort)  -JOVITA     Assistive Device (Gait) walker, front-wheeled  -JOVITA     Distance in Feet (Gait) 75  -JOVITA     Deviations/Abnormal Patterns (Gait) base of support, narrow;[place] decreased;festinating/shuffling;stride length decreased  -JOVITA     Bilateral Gait Deviations heel strike decreased  -JOVITA     Comment, (Gait/Stairs) patient needs encouragement to increase activity patient states she feels weak but she is steady with ambulation using walker  -JOVITA               User Key  (r) = Recorded By, (t) = Taken By, (c) = Cosigned By      Initials Name Provider Type    Mary Kay Paul, PT Physical Therapist                   Obj/Interventions       Row Name 07/09/24 1401          Range of Motion Comprehensive    General Range of Motion no range of motion deficits identified  -       Row Name 07/09/24 1401          Strength Comprehensive (MMT)    Comment, General Manual Muscle Testing (MMT) Assessment bilat LE's 3+/5  -       Row Name 07/09/24 1401          Balance    Balance Assessment sitting static balance;sitting dynamic balance;standing  static balance;standing dynamic balance  -JOVITA     Static Sitting Balance independent  -JOVITA     Dynamic Sitting Balance independent  -JOVITA     Position, Sitting Balance unsupported;sitting edge of bed  -JOVITA     Static Standing Balance minimal assist  -JOVITA     Dynamic Standing Balance minimal assist  -JOVITA     Position/Device Used, Standing Balance supported;walker, front-wheeled  -JOVITA               User Key  (r) = Recorded By, (t) = Taken By, (c) = Cosigned By      Initials Name Provider Type    Mary Kay Paul, PT Physical Therapist                   Goals/Plan       Row Name 07/09/24 1401          Bed Mobility Goal 1 (PT)    Activity/Assistive Device (Bed Mobility Goal 1, PT) sit to supine;supine to sit  -JOVITA     [place] Level/Cues Needed (Bed Mobility Goal 1, PT) standby assist  -JOVITA     Time Frame (Bed Mobility Goal 1, PT) short term goal (STG);3 days  -JOVITA     Progress/Outcomes (Bed Mobility Goal 1, PT) goal partially met;goal revised this date;goal ongoing  -JOVITA       Row Name 07/09/24 1402          Transfer Goal 1 (PT)    Activity/Assistive Device (Transfer Goal 1, PT) walker, rolling;sit-to-stand/stand-to-sit  -JOVITA     [place] Level/Cues Needed (Transfer Goal 1, PT) standby assist  -JOVITA     Time Frame (Transfer Goal 1, PT) long term goal (LTG);1 week  -JOVITA     Progress/Outcome (Transfer Goal 1, PT) goal revised this date;goal ongoing  -JOVITA       Row Name 07/09/24 1407          Gait Training Goal 1 (PT)    Activity/Assistive Device (Gait Training Goal 1, PT) gait (walking locomotion);assistive device use;walker, rolling  -JOVITA     [place] Level (Gait Training Goal 1, PT) contact guard required  -JOVITA     Distance (Gait Training Goal 1, PT) 150  -JOVITA     Time Frame (Gait Training Goal 1, PT) long term goal (LTG);10 days  -JOVITA     Progress/Outcome (Gait Training Goal 1, PT) goal partially met;goal revised this date;goal ongoing  -JOVITA       Row Name 07/09/24 1405          Therapy Assessment/Plan (PT)    Planned  Therapy Interventions (PT) balance training;bed mobility training;gait training;home exercise program;strengthening;transfer training  -JOVITA               User Key  (r) = Recorded By, (t) = Taken By, (c) = Cosigned By      Initials Name Provider Type    Mary Kay Paul, PT Physical Therapist                   Clinical Impression       Row Name 07/09/24 1402          Pain    Pretreatment Pain Rating 0/10 - no pain  -JOVITA     Posttreatment Pain Rating 0/10 - no pain  -JOVITA       Row Name 07/09/24 1402          Plan of Care Review    Plan of Care Reviewed With patient  -JOVITA     Progress improving  -JOVITA     Outcome Evaluation patient was able to ambulate 75 ft with walker and min assist, patient's progress has been limited by her cognitive status and weakness we will continue to progress toward mobility goals anticipate patient to need SNF placement at D/C  -       Row Name 07/09/24 1402          Therapy Assessment/Plan (PT)    Patient/Family Therapy Goals Statement (PT) get stronger  -JOVITA     Rehab Potential (PT) good, to achieve stated therapy goals  -JOVITA     Criteria for Skilled Interventions Met (PT) yes;skilled treatment is necessary  -JOVITA     Therapy Frequency (PT) daily  -JOVITA       Row Name 07/09/24 1402          Vital Signs    Pre Patient Position Supine  -JOVITA     Intra Patient Position Standing  -JOVITA     Post Patient Position Sitting  -JOVITA       Row Name 07/09/24 1402          Positioning and Restraints    Pre-Treatment Position in bed  -JOVITA     Post Treatment Position chair  -JOVITA     In Chair notified nsg;reclined;sitting;call light within reach;exit alarm on;encouraged to call for assist  -JOVITA               User Key  (r) = Recorded By, (t) = Taken By, (c) = Cosigned By      Initials Name Provider Type    Mary Kay Paul, PT Physical Therapist                   Outcome Measures       Row Name 07/09/24 1406 07/09/24 0800       How much help from another person do you currently need...    Turning from your back to  your side while in flat bed without using bedrails? 3  -JOVITA 3  -MH    Moving from lying on back to sitting on the side of a flat bed without bedrails? 3  -JOVITA 3  -MH    Moving to and from a bed to a chair (including a wheelchair)? 3  -JOVITA 2  -MH    Standing up from a chair using your arms (e.g., wheelchair, bedside chair)? 3  -JOVITA 2  -MH    Climbing 3-5 steps with a railing? 2  -JOVITA 2  -MH    To walk in hospital room? 3  -JOVITA 2  -MH    AM-PAC 6 Clicks Score (PT) 17  -JOVITA 14  -MH    Highest Level of Mobility Goal 5 --> Static standing  -JOVITA 4 --> Transfer to chair/commode  -MH              User Key  (r) = Recorded By, (t) = Taken By, (c) = Cosigned By      Initials Name Provider Type    aMry Kay Paul, PT Physical Therapist    Lourdes Barnes, RN Registered Nurse                                 Physical Therapy Education       Title: PT OT SLP Therapies (In Progress)       Topic: Physical Therapy (In Progress)       Point: Mobility training (In Progress)       Learning Progress Summary             Patient Acceptance, E, NR by JOVITA at 7/9/2024 1335    Acceptance, E, NR by KG at 7/8/2024 1352    Acceptance, E, NR by JOVITA at 7/5/2024 1330    Acceptance, E, NR by CK at 7/3/2024 1554    Acceptance, E, NR by JOVITA at 7/2/2024 1400    Acceptance, E, NR by KG at 7/1/2024 1040    Acceptance, E, NR by KG at 6/29/2024 1001                         Point: Home exercise program (In Progress)       Learning Progress Summary             Patient Acceptance, E, NR by JOVITA at 7/9/2024 1335    Acceptance, E, NR by KG at 7/8/2024 1352    Acceptance, E, NR by JOVITA at 7/5/2024 1330    Acceptance, E, NR by JOVITA at 7/2/2024 1400    Acceptance, E, NR by KG at 7/1/2024 1040                         Point: Body mechanics (In Progress)       Learning Progress Summary             Patient Acceptance, E, NR by JOVITA at 7/9/2024 1335    Acceptance, E, NR by KG at 7/8/2024 1352    Acceptance, E, NR by JOVITA at 7/5/2024 1330    Acceptance, E, NR by CK at 7/3/2024 8199     Acceptance, E, NR by JOVITA at 7/2/2024 1400    Acceptance, E, NR by KG at 7/1/2024 1040    Acceptance, E, NR by KG at 6/29/2024 1001                         Point: Precautions (In Progress)       Learning Progress Summary             Patient Acceptance, E, NR by JOVITA at 7/9/2024 1335    Acceptance, E, NR by KG at 7/8/2024 1352    Acceptance, E, NR by JOVITA at 7/5/2024 1330    Acceptance, E, NR by CK at 7/3/2024 1554    Acceptance, E, NR by JOVITA at 7/2/2024 1400    Acceptance, E, NR by KG at 7/1/2024 1040    Acceptance, E, NR by KG at 6/29/2024 1001                                         User Key       Initials Effective Dates Name Provider Type Discipline    JOVITA 02/03/23 -  Mary Kay Valencia, PT Physical Therapist PT    KG 05/22/20 -  Misty Shipley, PT Physical Therapist PT    CK 02/06/24 -  Juana Velasquez, PT Physical Therapist PT                  PT Recommendation and Plan  Planned Therapy Interventions (PT): balance training, bed mobility training, gait training, home exercise program, strengthening, transfer training  Plan of Care Reviewed With: patient  Progress: improving  Outcome Evaluation: patient was able to ambulate 75 ft with walker and min assist, patient's progress has been limited by her cognitive status and weakness we will continue to progress toward mobility goals anticipate patient to need SNF placement at D/C     Time Calculation:         PT Charges       Row Name 07/09/24 1407             Time Calculation    Start Time 1335  -JOVITA      PT Received On 07/09/24  -JOVITA      PT Goal Re-Cert Due Date 07/19/24  -JOVITA         Time Calculation- PT    Total Timed Code Minutes- PT 23 minute(s)  -JOVITA         Timed Charges    77558 - PT Therapeutic Activity Minutes 23  -JOVITA         Total Minutes    Timed Charges Total Minutes 23  -JOVITA       Total Minutes 23  -JOVITA                User Key  (r) = Recorded By, (t) = Taken By, (c) = Cosigned By      Initials Name Provider Type    Mary Kay Paul, PT Physical  Therapist                  Therapy Charges for Today       Code Description Service Date Service Provider Modifiers Qty    11361086723 HC PT THERAPEUTIC ACT EA 15 MIN 7/9/2024 Mary Kay Valencia, PT GP 2    98542801028 HC PT THER SUPP EA 15 MIN 7/9/2024 Mary Kay Valencia, PT GP 2            PT G-Codes  Outcome Measure Options: AM-PAC 6 Clicks Basic Mobility (PT)  AM-PAC 6 Clicks Score (PT): 17  AM-PAC 6 Clicks Score (OT): 17  PT Discharge Summary  Anticipated Discharge Disposition (PT): skilled nursing facility    Mary Kay Valencia, PT  7/9/2024

## 2024-07-10 LAB
ANION GAP SERPL CALCULATED.3IONS-SCNC: 9 MMOL/L (ref 5–15)
BUN SERPL-MCNC: 11 MG/DL (ref 8–23)
BUN/CREAT SERPL: 44 (ref 7–25)
CALCIUM SPEC-SCNC: 7.8 MG/DL (ref 8.6–10.5)
CHLORIDE SERPL-SCNC: 102 MMOL/L (ref 98–107)
CO2 SERPL-SCNC: 24 MMOL/L (ref 22–29)
CREAT SERPL-MCNC: 0.25 MG/DL (ref 0.57–1)
CRP SERPL-MCNC: 9.72 MG/DL (ref 0–0.5)
DEPRECATED RDW RBC AUTO: 52.5 FL (ref 37–54)
EGFRCR SERPLBLD CKD-EPI 2021: 113.6 ML/MIN/1.73
ERYTHROCYTE [DISTWIDTH] IN BLOOD BY AUTOMATED COUNT: 15.6 % (ref 12.3–15.4)
GLUCOSE SERPL-MCNC: 94 MG/DL (ref 65–99)
HCT VFR BLD AUTO: 24.6 % (ref 34–46.6)
HGB BLD-MCNC: 8.2 G/DL (ref 12–15.9)
MCH RBC QN AUTO: 30.7 PG (ref 26.6–33)
MCHC RBC AUTO-ENTMCNC: 33.3 G/DL (ref 31.5–35.7)
MCV RBC AUTO: 92.1 FL (ref 79–97)
PLATELET # BLD AUTO: 433 10*3/MM3 (ref 140–450)
PMV BLD AUTO: 9.9 FL (ref 6–12)
POTASSIUM SERPL-SCNC: 3.3 MMOL/L (ref 3.5–5.2)
POTASSIUM SERPL-SCNC: 4.7 MMOL/L (ref 3.5–5.2)
PROCALCITONIN SERPL-MCNC: 0.2 NG/ML (ref 0–0.25)
RBC # BLD AUTO: 2.67 10*6/MM3 (ref 3.77–5.28)
SODIUM SERPL-SCNC: 135 MMOL/L (ref 136–145)
WBC NRBC COR # BLD AUTO: 8.32 10*3/MM3 (ref 3.4–10.8)

## 2024-07-10 PROCEDURE — 85027 COMPLETE CBC AUTOMATED: CPT | Performed by: INTERNAL MEDICINE

## 2024-07-10 PROCEDURE — 84145 PROCALCITONIN (PCT): CPT | Performed by: INTERNAL MEDICINE

## 2024-07-10 PROCEDURE — 84132 ASSAY OF SERUM POTASSIUM: CPT | Performed by: INTERNAL MEDICINE

## 2024-07-10 PROCEDURE — 86140 C-REACTIVE PROTEIN: CPT | Performed by: INTERNAL MEDICINE

## 2024-07-10 PROCEDURE — 99232 SBSQ HOSP IP/OBS MODERATE 35: CPT | Performed by: INTERNAL MEDICINE

## 2024-07-10 PROCEDURE — 80048 BASIC METABOLIC PNL TOTAL CA: CPT | Performed by: INTERNAL MEDICINE

## 2024-07-10 RX ORDER — POTASSIUM CHLORIDE 20 MEQ/1
40 TABLET, EXTENDED RELEASE ORAL EVERY 4 HOURS
Status: COMPLETED | OUTPATIENT
Start: 2024-07-10 | End: 2024-07-10

## 2024-07-10 RX ADMIN — POTASSIUM CHLORIDE 40 MEQ: 1500 TABLET, EXTENDED RELEASE ORAL at 13:43

## 2024-07-10 RX ADMIN — Medication 1 PATCH: at 08:29

## 2024-07-10 RX ADMIN — MIRTAZAPINE 15 MG: 15 TABLET, FILM COATED ORAL at 20:46

## 2024-07-10 RX ADMIN — POTASSIUM CHLORIDE 40 MEQ: 1500 TABLET, EXTENDED RELEASE ORAL at 08:30

## 2024-07-10 RX ADMIN — DOXYCYCLINE 100 MG: 100 CAPSULE ORAL at 20:46

## 2024-07-10 RX ADMIN — THIAMINE HCL TAB 100 MG 100 MG: 100 TAB at 08:30

## 2024-07-10 RX ADMIN — DOXYCYCLINE 100 MG: 100 CAPSULE ORAL at 08:29

## 2024-07-10 NOTE — CASE MANAGEMENT/SOCIAL WORK
Continued Stay Note  [facility]     Patient Name: Arcelia Walter  MRN: [id number]  Today's Date: 7/10/2024    Admit Date: 6/28/2024    Plan: TBD   Discharge Plan       Row Name 07/10/24 5360       Plan    Plan Comments MSW followed up with pt's worker, WALE. GREGORIO' reports he did not get the paperwork sent previously. MSW resent paperwork. GREGORIO' working on state guardianship for pt.                   Discharge Codes    No documentation.                 Expected Discharge Date and Time       Expected Discharge Date Expected Discharge Time    Jul 8, 2024               SHANTI Ramsey

## 2024-07-10 NOTE — PLAN OF CARE
Goal Outcome Evaluation:           Progress: improving  Outcome Evaluation: VSS on RA. A&Ox1. No complaints of pain or nausea. Skin and fall precautions in place. Will continue plan of care.

## 2024-07-10 NOTE — PROGRESS NOTES
[facility]  PROGRESS NOTE    Patient Name: Arcelia Walter  : 1945  MRN: [id number]    Date of Admission: 2024  Primary Care Physician: Provider, No Known    Subjective   Subjective     CC: Follow-up AMS    HPI: No acute events overnight, patient rested well does endorse some mild lower abdominal discomfort      Objective   Objective     Vital Signs:   Temp:  [97.7 °F (36.5 °C)-98.9 °F (37.2 °C)] 98 °F (36.7 °C)  Heart Rate:  [64-89] 76  Resp:  [16-18] 18  BP: (114-136)/(52-77) 128/60     Physical Exam:  Constitutional: Chronically ill-appearing frail elderly female no acute distress  HENT: NCAT, mucous membranes dry  Respiratory: Nonlabored  Cardiovascular: RRR,  Gastrointestinal: Soft, nontender, right femoral hernia, slightly tender  Musculoskeletal: No bilateral ankle edema  Psychiatric: Appropriate affect, cooperative  Neurologic: Nonfocal      Results Reviewed:  LAB RESULTS:      Lab 07/10/24  0556 24  0706 24  0518 24  0617 24  0454 24  1007   WBC 8.32 9.88 8.41 13.37* 15.22* 12.89*   HEMOGLOBIN 8.2* 8.8* 8.8* 8.3* 9.1* 9.2*   HEMATOCRIT 24.6* 26.9* 26.7* 24.9* 27.3* 27.8*   PLATELETS 433 352 340 301 300 301   NEUTROS ABS  --   --  7.15* 12.17* 14.15* 11.47*   EOS ABS  --   --  0.00 0.00 0.00 0.00   MCV 92.1 94.4 92.1 93.3 93.8 93.9   CRP 9.72*  --  16.01*  --   --   --    PROCALCITONIN 0.20  --  0.30*  --   --   --          Lab 07/10/24  0556 24  0700 24  1928 24  0518 24  0617 24  1515 24  0451 24  0609 24  0609 24  1729   SODIUM 135* 139  --  139 139  --  140   < > 140  --    POTASSIUM 3.3* 4.5 4.0 3.5 4.2   < > 3.5   < > 3.7  --    CHLORIDE 102 108*  --  105 108*  --  107   < > 106  --    CO2 24.0 23.0  --  24.0 24.0  --  24.0   < > 21.0*  --    ANION GAP 9.0 8.0  --  10.0 7.0  --  9.0   < > 13.0  --    BUN 11 12  --  10 13  --  14   < > 8  --    CREATININE 0.25* 0.24*   --  0.31* 0.28*  --  0.28*   < > 0.23*  --    EGFR 113.6 114.7  --  107.9 110.6  --  110.6   < > 115.9  --    GLUCOSE 94 90  --  72 86  --  98   < > 76  --    CALCIUM 7.8* 7.5*  --  7.5* 7.7*  --  7.8*   < > 7.5*  --    PHOSPHORUS  --   --   --   --   --   --   --   --  2.7 2.6    < > = values in this interval not displayed.         Lab 07/07/24  0518 07/04/24  0609   TOTAL PROTEIN 4.4* 4.6*   ALBUMIN 2.0* 1.9*   GLOBULIN 2.4 2.7   ALT (SGPT) 13 14   AST (SGOT) 13 16   BILIRUBIN 0.4 0.4   ALK PHOS 129* 134*                     Brief Urine Lab Results  (Last result in the past 365 days)        Color   Clarity   Blood   Leuk Est   Nitrite   Protein   CREAT   Urine HCG        06/29/24 0038 Yellow   Clear   Negative   Negative   Negative   Trace                   Microbiology Results Abnormal       Procedure Component Value - Date/Time    Blood Culture - Blood, Hand, Right [[id number]]  (Normal) Collected: 07/01/24 1558    Lab Status: Final result Specimen: Blood from Hand, Right Updated: 07/06/24 1731     Blood Culture No growth at 5 days    Narrative:      Aerobic bottle only  Less than seven (7) mL's of blood was collected.  Insufficient quantity may yield false negative results.    Blood Culture - Blood, Hand, Left [[id number]]  (Normal) Collected: 07/01/24 1558    Lab Status: Final result Specimen: Blood from Hand, Left Updated: 07/06/24 1731     Blood Culture No growth at 5 days            No radiology results from the last 24 hrs    Results for orders placed during the hospital encounter of 06/28/24    Adult Transthoracic Echo Complete W/ Cont if Necessary Per Protocol    Interpretation Summary    Left ventricular systolic function is normal. Left ventricular ejection fraction appears to be 61 - 65%.    There is mild calcification of the aortic valve.    Mild aortic valve regurgitation is present.    Mild mitral annular calcification is present.    Mild to moderate mitral valve regurgitation is present.    Mild  tricuspid valve regurgitation is present.      Current medications:  Scheduled Meds:doxycycline, 100 mg, Oral, Q12H  mirtazapine, 15 mg, Oral, Nightly  nicotine, 1 patch, Transdermal, Q24H  sodium chloride, 10 mL, Intravenous, Q12H  thiamine, 100 mg, Oral, Daily      Continuous Infusions:   PRN Meds:.  acetaminophen    senna-docusate sodium **AND** polyethylene glycol **AND** bisacodyl **AND** bisacodyl    Calcium Replacement - Follow Nurse / BPA Driven Protocol    Magnesium Standard Dose Replacement - Follow Nurse / BPA Driven Protocol    melatonin    Phosphorus Replacement - Follow Nurse / BPA Driven Protocol    Potassium Replacement - Follow Nurse / BPA Driven Protocol    sodium chloride    sodium chloride    Assessment & Plan   Assessment & Plan     Active Hospital Problems    Diagnosis  POA    **Syncope [R55]  Yes    Severe malnutrition [E43]  Yes    AMS (altered mental status) [R41.82]  Yes    Hypokalemia [E87.6]  Yes    Elevated troponin [R79.89]  Yes    Elevated C-reactive protein (CRP) [R79.82]  Yes    Elevated sed rate [R70.0]  Yes    Leukocytosis [D72.829]  Yes      Resolved Hospital Problems   No resolved problems to display.        Brief Hospital Course to date:  Arcelia Walter is a 79 y.o. female brought in per EMS after being found down during a welfare check.  Unclear of exact events.  No emergency contact on file.       This patient's problems and plans were partially entered by my partner and updated as appropriate by me 07/10/24.     Syncope  Altered mental status  - Unclear exact events, originally reported to EMS on the floor x 2 days, later states that she is unsure what happened  - CT head w atrophy and chronic microvascular ischemic change but no acute intracranial process  - Ethanol neg  --echo w nl EF and no significant valvular abnormalities  - A1c and TSH wnl     Elevated troponin  - EKG without acute ischemia, troponin trending down     Leukocytosis  Elevated CRP  Elevated sed rate  Right  inguinal erythema  -- WBC 20 at admission  - Procalcitonin, lactic normal at admit  - Possible cellulitis w R groin erythema/tenderness/induration  -- recent CRP 16, procal 0.3, leukocytosis has since resolved  -- continue doxycyline, plan for 7-day course     Right Femoral hernia  - unclear chronicity  - General surgery evaluated, hernia is reducible, truss recommended at present.     Hypokalemia  - Continue to monitor replete per protocol     Severe chronic malnutrition  Failure to thrive  - No emergency contact, unclear living situation  - Case management/ following  --PT/OT following, recommend SNF  --nutrition following, prealbumin low at < 3.0  -- Continue mirtazapine    All problems listed above are new to me today    Expected Discharge Location and Transportation: SNF  Expected Discharge   Expected Discharge Date: 7/8/2024; Expected Discharge Time:      VTE Prophylaxis:  Mechanical VTE prophylaxis orders are present.         AM-PAC 6 Clicks Score (PT): 17 (07/09/24 2000)    CODE STATUS:   Code Status and Medical Interventions:   Ordered at: 06/29/24 0451     Code Status (Patient has no pulse and is not breathing):    CPR (Attempt to Resuscitate)     Medical Interventions (Patient has pulse or is breathing):    Full Support       Callie Higuera MD  07/10/24

## 2024-07-10 NOTE — PLAN OF CARE
Goal Outcome Evaluation:  Plan of Care Reviewed With: patient        Progress: no change  Outcome Evaluation: VSS. RA. Alert and oriented to self and time. No complaints of pain or nausea. PO potassium replacement given for low levels. Skin and fall precautions in place. Pt up to chair this afternoon. pt resting comfortably. no further complaints at this time.

## 2024-07-11 PROCEDURE — 87186 SC STD MICRODIL/AGAR DIL: CPT | Performed by: FAMILY MEDICINE

## 2024-07-11 PROCEDURE — 97535 SELF CARE MNGMENT TRAINING: CPT

## 2024-07-11 PROCEDURE — 99233 SBSQ HOSP IP/OBS HIGH 50: CPT | Performed by: FAMILY MEDICINE

## 2024-07-11 PROCEDURE — 87070 CULTURE OTHR SPECIMN AEROBIC: CPT | Performed by: FAMILY MEDICINE

## 2024-07-11 PROCEDURE — 87205 SMEAR GRAM STAIN: CPT | Performed by: FAMILY MEDICINE

## 2024-07-11 PROCEDURE — 87077 CULTURE AEROBIC IDENTIFY: CPT | Performed by: FAMILY MEDICINE

## 2024-07-11 PROCEDURE — 97530 THERAPEUTIC ACTIVITIES: CPT

## 2024-07-11 RX ADMIN — THIAMINE HCL TAB 100 MG 100 MG: 100 TAB at 08:55

## 2024-07-11 RX ADMIN — Medication 1 PATCH: at 08:55

## 2024-07-11 RX ADMIN — Medication 5 MG: at 00:54

## 2024-07-11 RX ADMIN — DOXYCYCLINE 100 MG: 100 CAPSULE ORAL at 08:55

## 2024-07-11 NOTE — PLAN OF CARE
Goal Outcome Evaluation:  Plan of Care Reviewed With: patient        Progress: no change  Outcome Evaluation: Pt continues to be below her functional baseline w/ mobility, transfers and ADLs. Good effort noted this date w/ mobility, UB dressing and grooming. Goals reviewed and revised this date as appropriate. Continue to progress as able per current POC.      Anticipated Discharge Disposition (OT): skilled nursing facility

## 2024-07-11 NOTE — PROGRESS NOTES
[facility] Medicine Services  PROGRESS NOTE    Patient Name: Arcelia Walter  : 1945  MRN: [id number]    Date of Admission: 2024  Primary Care Physician: Provider, No Known    Subjective   Subjective     CC:   Follow-up AMS    HPI:   Patient seen and examined. No new issues overnight. Hasn't eaten much of her breakfast this AM but says her morning was busy. No new complaints.     Objective   Objective     Vital Signs:   Temp:  [97.4 °F (36.3 °C)-98.6 °F (37 °C)] 97.8 °F (36.6 °C)  Heart Rate:  [71-94] 74  Resp:  [15-18] 16  BP: (118-137)/(61-81) 124/62     Physical Exam:  Constitutional: Chronically ill-appearing frail elderly female no acute distress  HENT: NCAT, mucous membranes moist  Respiratory: Nonlabored  Cardiovascular: RRR,  Gastrointestinal: Soft, nontender, right femoral hernia, slightly tender  Musculoskeletal: No bilateral ankle edema  Psychiatric: Appropriate affect, cooperative  Neurologic: Nonfocal, oriented to person, place sometimes    Results Reviewed:  LAB RESULTS:      Lab 07/10/24  0556 24  0706 24  0518 24  0617 24  0454   WBC 8.32 9.88 8.41 13.37* 15.22*   HEMOGLOBIN 8.2* 8.8* 8.8* 8.3* 9.1*   HEMATOCRIT 24.6* 26.9* 26.7* 24.9* 27.3*   PLATELETS 433 352 340 301 300   NEUTROS ABS  --   --  7.15* 12.17* 14.15*   EOS ABS  --   --  0.00 0.00 0.00   MCV 92.1 94.4 92.1 93.3 93.8   CRP 9.72*  --  16.01*  --   --    PROCALCITONIN 0.20  --  0.30*  --   --          Lab 07/10/24  1658 07/10/24  0556 24  0700 24  1928 24  0518 24  0617 24  1515 24  0451   SODIUM  --  135* 139  --  139 139  --  140   POTASSIUM 4.7 3.3* 4.5 4.0 3.5 4.2   < > 3.5   CHLORIDE  --  102 108*  --  105 108*  --  107   CO2  --  24.0 23.0  --  24.0 24.0  --  24.0   ANION GAP  --  9.0 8.0  --  10.0 7.0  --  9.0   BUN  --  11 12  --  10 13  --  14   CREATININE  --  0.25* 0.24*  --  0.31* 0.28*  --  0.28*   EGFR  --  113.6 114.7  --  107.9  110.6  --  110.6   GLUCOSE  --  94 90  --  72 86  --  98   CALCIUM  --  7.8* 7.5*  --  7.5* 7.7*  --  7.8*    < > = values in this interval not displayed.         Lab 07/07/24  0518   TOTAL PROTEIN 4.4*   ALBUMIN 2.0*   GLOBULIN 2.4   ALT (SGPT) 13   AST (SGOT) 13   BILIRUBIN 0.4   ALK PHOS 129*                     Brief Urine Lab Results  (Last result in the past 365 days)        Color   Clarity   Blood   Leuk Est   Nitrite   Protein   CREAT   Urine HCG        06/29/24 0038 Yellow   Clear   Negative   Negative   Negative   Trace                   Microbiology Results Abnormal       Procedure Component Value - Date/Time    Blood Culture - Blood, Hand, Right [[id number]]  (Normal) Collected: 07/01/24 1558    Lab Status: Final result Specimen: Blood from Hand, Right Updated: 07/06/24 1731     Blood Culture No growth at 5 days    Narrative:      Aerobic bottle only  Less than seven (7) mL's of blood was collected.  Insufficient quantity may yield false negative results.    Blood Culture - Blood, Hand, Left [[id number]]  (Normal) Collected: 07/01/24 1558    Lab Status: Final result Specimen: Blood from Hand, Left Updated: 07/06/24 1731     Blood Culture No growth at 5 days            No radiology results from the last 24 hrs    Results for orders placed during the hospital encounter of 06/28/24    Adult Transthoracic Echo Complete W/ Cont if Necessary Per Protocol    Interpretation Summary    Left ventricular systolic function is normal. Left ventricular ejection fraction appears to be 61 - 65%.    There is mild calcification of the aortic valve.    Mild aortic valve regurgitation is present.    Mild mitral annular calcification is present.    Mild to moderate mitral valve regurgitation is present.    Mild tricuspid valve regurgitation is present.      Current medications:  Scheduled Meds:doxycycline, 100 mg, Oral, Q12H  mirtazapine, 15 mg, Oral, Nightly  nicotine, 1 patch, Transdermal, Q24H  sodium chloride, 10 mL,  Intravenous, Q12H  thiamine, 100 mg, Oral, Daily      Continuous Infusions:   PRN Meds:.  acetaminophen    senna-docusate sodium **AND** polyethylene glycol **AND** bisacodyl **AND** bisacodyl    Calcium Replacement - Follow Nurse / BPA Driven Protocol    Magnesium Standard Dose Replacement - Follow Nurse / BPA Driven Protocol    melatonin    Phosphorus Replacement - Follow Nurse / BPA Driven Protocol    Potassium Replacement - Follow Nurse / BPA Driven Protocol    sodium chloride    sodium chloride    Assessment & Plan   Assessment & Plan     Active Hospital Problems    Diagnosis  POA    **Syncope [R55]  Yes    Severe malnutrition [E43]  Yes    AMS (altered mental status) [R41.82]  Yes    Hypokalemia [E87.6]  Yes    Elevated troponin [R79.89]  Yes    Elevated C-reactive protein (CRP) [R79.82]  Yes    Elevated sed rate [R70.0]  Yes    Leukocytosis [D72.829]  Yes      Resolved Hospital Problems   No resolved problems to display.        Brief Hospital Course to date:  Arcelia Walter is a 79 y.o. female brought in per EMS after being found down during a welfare check.  Unclear of exact events.  No emergency contact on file.       This patient's problems and plans were partially entered by my partner and updated as appropriate by me 07/11/24.   All problems are new to me today    Syncope  Altered mental status  - Unclear exact events, originally reported to EMS on the floor x 2 days, later states that she is unsure what happened  - CT head w atrophy and chronic microvascular ischemic change but no acute intracranial process  - Ethanol neg  --echo w nl EF and no significant valvular abnormalities  - A1c and TSH wnl     Elevated troponin  - EKG without acute ischemia, troponin trended down     Leukocytosis -> resolved   Elevated CRP  Elevated sed rate  Right inguinal erythema  - Possible cellulitis w R groin erythema/tenderness/induration  -- continue doxycyline, plan for 7-day course     Right Femoral hernia  - unclear  chronicity  - General surgery evaluated, hernia is reducible, truss recommended at present.     Hypokalemia  - Continue to monitor replete per protocol     Severe chronic malnutrition  Failure to thrive  - No emergency contact, unclear living situation  - Case management/ following  --PT/OT following, recommend SNF  --nutrition following, prealbumin low at < 3.0  -- Continue mirtazapine    Expected Discharge Location and Transportation: SNF  Expected Discharge   Expected Discharge Date: 7/19/2024; Expected Discharge Time:      VTE Prophylaxis:  Mechanical VTE prophylaxis orders are present.         AM-PAC 6 Clicks Score (PT): 17 (07/11/24 0800)    CODE STATUS:   Code Status and Medical Interventions:   Ordered at: 06/29/24 0451     Code Status (Patient has no pulse and is not breathing):    CPR (Attempt to Resuscitate)     Medical Interventions (Patient has pulse or is breathing):    Full Support       Aida Houston DO  07/11/24

## 2024-07-11 NOTE — THERAPY PROGRESS REPORT/RE-CERT
Patient Name: Arcelia Walter  : 1945    MRN: [id number]                              Today's Date: 2024       Admit Date: 2024    Visit Dx:     ICD-10-CM ICD-9-CM   1. Syncope, unspecified syncope type  R55 780.2   2. Ketosis  E88.89 276.2     Patient Active Problem List   Diagnosis    Syncope    Hypokalemia    Elevated troponin    Elevated C-reactive protein (CRP)    Elevated sed rate    Leukocytosis    AMS (altered mental status)    Severe malnutrition     History reviewed. No pertinent past medical history.  Past Surgical History:   Procedure Laterality Date    HYSTERECTOMY        General Information       Row Name 24 1505          OT Time and Intention    Document Type progress note/recertification  -MR     Mode of Treatment occupational therapy  -MR       Row Name 24 1505          General Information    Patient Profile Reviewed yes  -MR     Existing Precautions/Restrictions fall  -MR     Barriers to Rehab medically complex  -MR       Row Name 24 1505          Cognition    Orientation Status (Cognition) oriented to;person;place;other (see comments)  Pt reporting she needed to call the hospital and check on her mother.  -MR       Row Name 24 1505          Safety Issues, Functional Mobility    Safety Issues Affecting Function (Mobility) safety precaution awareness;safety precautions follow-through/compliance;insight into deficits/self-awareness;awareness of need for assistance;judgment;problem-solving  -MR     Impairments Affecting Function (Mobility) balance;cognition;endurance/activity tolerance;postural/trunk control;strength  -MR     Cognitive Impairments, Mobility Safety/Performance awareness, need for assistance;safety precaution awareness;safety precaution follow-through;sequencing abilities;judgment  -MR               User Key  (r) = Recorded By, (t) = Taken By, (c) = Cosigned By      Initials Name Provider Type    Emily Montez, OT Occupational Therapist                      Mobility/ADL's       Row Name 07/11/24 1507          Bed Mobility    Bed Mobility supine-sit;scooting/bridging  -MR     Scooting/Bridging [place] (Bed Mobility) contact guard;verbal cues  -MR     Supine-Sit [place] (Bed Mobility) verbal cues;minimum assist (75% patient effort);1 person assist  -MR     Bed Mobility, Safety Issues decreased use of arms for pushing/pulling;decreased use of legs for bridging/pushing;impaired trunk control for bed mobility  -MR     Assistive Device (Bed Mobility) head of bed elevated;bed rails  -MR     Comment, (Bed Mobility) Increased time and effort noted w/ bed mobility. Pt requiring v/c for sequencing of task and utilization of bed rails.  -MR       Row Name 07/11/24 1507          Transfers    Transfers sit-stand transfer;stand-sit transfer;toilet transfer  -MR       Row Name 07/11/24 1507          Sit-Stand Transfer    Sit-Stand [place] (Transfers) minimum assist (75% patient effort);verbal cues  -MR     Assistive Device (Sit-Stand Transfers) walker, front-wheeled  -MR       Row Name 07/11/24 1507          Stand-Sit Transfer    Stand-Sit [place] (Transfers) verbal cues;minimum assist (75% patient effort);1 person assist  -MR     Assistive Device (Stand-Sit Transfers) walker, front-wheeled  -MR       Row Name 07/11/24 1507          Toilet Transfer    Type (Toilet Transfer) sit-stand;stand-sit  -MR     [place] Level (Toilet Transfer) verbal cues;minimum assist (75% patient effort)  -MR     Assistive Device (Toilet Transfer) walker, front-wheeled;commode, bedside without drop arms  -MR       Row Name 07/11/24 1507          Functional Mobility    Functional Mobility- Ind. Level minimum assist (75% patient effort);1 person;verbal cues required  -MR     Functional Mobility- Device walker, front-wheeled  -MR     Functional Mobility-Distance (Feet) --  household distances w/in pt's room  -MR       Row Name 07/11/24 1507          Activities of Daily  Living    BADL Assessment/Intervention upper body dressing;lower body dressing;grooming;toileting  -MR       Row Name 07/11/24 1507          Lower Body Dressing Assessment/Training    [place] Level (Lower Body Dressing) don;socks;maximum assist (25% patient effort)  -MR     Position (Lower Body Dressing) supine  -MR       Row Name 07/11/24 1507          Toileting Assessment/Training    [place] Level (Toileting) adjust/manage clothing;contact guard assist;perform perineal hygiene;maximum assist (25% patient effort)  -     Assistive Devices (Toileting) commode, bedside without drop arms  -MR     Position (Toileting) supported standing;unsupported sitting  -MR       Row Name 07/11/24 1507          Grooming Assessment/Training    [place] Level (Grooming) hair care, combing/brushing;wash face, hands;set up  -MR     Position (Grooming) edge of bed sitting;sitting up in bed  -MR       Row Name 07/11/24 1507          Upper Body Dressing Assessment/Training    [place] Level (Upper Body Dressing) don;doff;moderate assist (50% patient effort)  -MR     Position (Upper Body Dressing) edge of bed sitting  -               User Key  (r) = Recorded By, (t) = Taken By, (c) = Cosigned By      Initials Name Provider Type    MR Emily Jimenes, OT Occupational Therapist                   Obj/Interventions       Row Name 07/11/24 1511          Balance    Balance Assessment sitting static balance;sitting dynamic balance;standing static balance;standing dynamic balance  -     Static Sitting Balance supervision  -     Dynamic Sitting Balance supervision  -MR     Position, Sitting Balance unsupported;sitting edge of bed;sitting in chair;other (see comments)  BSC  -MR     Static Standing Balance minimal assist;verbal cues  -MR     Dynamic Standing Balance minimal assist;verbal cues  -MR     Position/Device Used, Standing Balance supported;walker, front-wheeled  -MR     Balance Interventions sitting;standing;sit to  stand;supported;static;dynamic;occupation based/functional task  -MR     Comment, Balance Pt requiring extned time to gain balance when transitioning from sitting to standing position.  -MR               User Key  (r) = Recorded By, (t) = Taken By, (c) = Cosigned By      Initials Name Provider Type    Emily Montez, OT Occupational Therapist                   Goals/Plan       Row Name 07/11/24 1518          Bed Mobility Goal 1 (OT)    Activity/Assistive Device (Bed Mobility Goal 1, OT) sit to supine;supine to sit  -MR     [place] Level/Cues Needed (Bed Mobility Goal 1, OT) tactile cues required;verbal cues required;contact guard required  -MR     Time Frame (Bed Mobility Goal 1, OT) short term goal (STG);3 days  -MR     Progress/Outcomes (Bed Mobility Goal 1, OT) goal ongoing;goal revised this date  -MR       Row Name 07/11/24 1518          Transfer Goal 1 (OT)    Activity/Assistive Device (Transfer Goal 1, OT) bed-to-chair/chair-to-bed;sit-to-stand/stand-to-sit  -MR     [place] Level/Cues Needed (Transfer Goal 1, OT) contact guard required;tactile cues required;verbal cues required  -MR     Time Frame (Transfer Goal 1, OT) long term goal (LTG);10 days  -MR     Progress/Outcome (Transfer Goal 1, OT) goal ongoing  -MR       Row Name 07/11/24 1518          Dressing Goal 1 (OT)    Activity/Device (Dressing Goal 1, OT) lower body dressing  -MR     [place]/Cues Needed (Dressing Goal 1, OT) moderate assist (50-74% patient effort)  -MR     Time Frame (Dressing Goal 1, OT) long term goal (LTG);10 days  -MR     Progress/Outcome (Dressing Goal 1, OT) goal ongoing  -MR       Row Name 07/11/24 1518          Toileting Goal 1 (OT)    Activity/Device (Toileting Goal 1, OT) adjust/manage clothing;perform perineal hygiene;commode  -MR     [place] Level/Cues Needed (Toileting Goal 1, OT) tactile cues required;verbal cues required;moderate assist (50-74% patient effort)  -MR     Time Frame (Toileting Goal 1,  OT) long term goal (LTG);10 days  -MR     Progress/Outcome (Toileting Goal 1, OT) goal ongoing;goal revised this date  -MR       Row Name 07/11/24 1518          Therapy Assessment/Plan (OT)    Planned Therapy Interventions (OT) activity tolerance training;adaptive equipment training;BADL retraining;IADL retraining;functional balance retraining;occupation/activity based interventions;strengthening exercise;ROM/therapeutic exercise;patient/caregiver education/training;passive ROM/stretching;transfer/mobility retraining  -MR               User Key  (r) = Recorded By, (t) = Taken By, (c) = Cosigned By      Initials Name Provider Type    MR Emily Jimenes, OT Occupational Therapist                   Clinical Impression       Row Name 07/11/24 1513          Pain Assessment    Pretreatment Pain Rating 0/10 - no pain  -MR     Posttreatment Pain Rating 0/10 - no pain  -MR     Pain Intervention(s) Ambulation/increased activity;Repositioned  -MR       Row Name 07/11/24 1513          Plan of Care Review    Plan of Care Reviewed With patient  -MR     Progress no change  -MR     Outcome Evaluation Pt continues to be below her functional baseline w/ mobility, transfers and ADLs. Good effort noted this date w/ mobility, UB dressing and grooming. Goals reviewed and revised this date as appropriate. Continue to progress as able per current POC.  -MR       Row Name 07/11/24 1513          Therapy Plan Review/Discharge Plan (OT)    Anticipated Discharge Disposition (OT) skilled nursing facility  -MR       Row Name 07/11/24 1513          Vital Signs    O2 Delivery Pre Treatment room air  -MR     O2 Delivery Intra Treatment room air  -MR     O2 Delivery Post Treatment room air  -MR     Pre Patient Position Supine  -MR     Intra Patient Position Standing  -MR     Post Patient Position Sitting  -MR       Row Name 07/11/24 1513          Positioning and Restraints    Pre-Treatment Position in bed  -MR     Post Treatment Position chair  -MR      In Chair notified nsg;reclined;sitting;call light within reach;encouraged to call for assist;exit alarm on;with family/caregiver;waffle cushion;heels elevated;legs elevated  -MR               User Key  (r) = Recorded By, (t) = Taken By, (c) = Cosigned By      Initials Name Provider Type    Emily Montez OT Occupational Therapist                   Outcome Measures       Row Name 07/11/24 1520          How much help from another is currently needed...    Putting on and taking off regular lower body clothing? 2  -MR     Bathing (including washing, rinsing, and drying) 2  -MR     Toileting (which includes using toilet bed pan or urinal) 3  -MR     Putting on and taking off regular upper body clothing 3  -MR     Taking care of personal grooming (such as brushing teeth) 3  -MR     Eating meals 4  -MR     AM-PAC 6 Clicks Score (OT) 17  -MR       Row Name 07/11/24 0800          How much help from another person do you currently need...    Turning from your back to your side while in flat bed without using bedrails? 3  -AR     Moving from lying on back to sitting on the side of a flat bed without bedrails? 3  -AR     Moving to and from a bed to a chair (including a wheelchair)? 3  -AR     Standing up from a chair using your arms (e.g., wheelchair, bedside chair)? 3  -AR     Climbing 3-5 steps with a railing? 2  -AR     To walk in hospital room? 3  -AR     AM-PAC 6 Clicks Score (PT) 17  -AR     Highest Level of Mobility Goal 5 --> Static standing  -AR       Row Name 07/11/24 1520          Functional Assessment    Outcome Measure Options AM-PAC 6 Clicks Daily Activity (OT)  -MR               User Key  (r) = Recorded By, (t) = Taken By, (c) = Cosigned By      Initials Name Provider Type    Gregory Emerson, RN Registered Nurse    Emily Montez OT Occupational Therapist                    Occupational Therapy Education       Title: PT OT SLP Therapies (In Progress)       Topic: Occupational Therapy (In Progress)        Point: ADL training (In Progress)       Description:   Instruct learner(s) on proper safety adaptation and remediation techniques during self care or transfers.   Instruct in proper use of assistive devices.                  Learning Progress Summary             Patient Acceptance, E, NR by MR at 7/11/2024 1522    Acceptance, E, NR by MR at 7/11/2024 1521    Acceptance, E, VU by AN at 7/9/2024 1422    Acceptance, E, VU by AN at 7/4/2024 1155    Acceptance, E, VU,DU,NR by MR at 7/1/2024 1015                         Point: Home exercise program (Done)       Description:   Instruct learner(s) on appropriate technique for monitoring, assisting and/or progressing therapeutic exercises/activities.                  Learning Progress Summary             Patient Acceptance, E, VU,DU,NR by MR at 7/1/2024 1015                         Point: Precautions (In Progress)       Description:   Instruct learner(s) on prescribed precautions during self-care and functional transfers.                  Learning Progress Summary             Patient Acceptance, E, NR by MR at 7/11/2024 1522    Acceptance, E, NR by MR at 7/11/2024 1521    Acceptance, E, VU by AN at 7/9/2024 1422    Acceptance, E, VU by AN at 7/4/2024 1155    Acceptance, E, VU,DU,NR by MR at 7/1/2024 1015                         Point: Body mechanics (In Progress)       Description:   Instruct learner(s) on proper positioning and spine alignment during self-care, functional mobility activities and/or exercises.                  Learning Progress Summary             Patient Acceptance, E, NR by MR at 7/11/2024 1522    Acceptance, E, NR by MR at 7/11/2024 1521    Acceptance, E, VU by AN at 7/9/2024 1422    Acceptance, E, VU by AN at 7/4/2024 1155    Acceptance, E, VU,DU,NR by MR at 7/1/2024 1015                                         User Key       Initials Effective Dates Name Provider Type Discipline    AN 09/21/21 -  Theresa Moe OT Occupational Therapist OT     MR 09/22/22 -  Emily Jimenes OT Occupational Therapist OT                  OT Recommendation and Plan  Planned Therapy Interventions (OT): activity tolerance training, adaptive equipment training, BADL retraining, IADL retraining, functional balance retraining, occupation/activity based interventions, strengthening exercise, ROM/therapeutic exercise, patient/caregiver education/training, passive ROM/stretching, transfer/mobility retraining  Therapy Frequency (OT): daily  Plan of Care Review  Plan of Care Reviewed With: patient  Progress: no change  Outcome Evaluation: Pt continues to be below her functional baseline w/ mobility, transfers and ADLs. Good effort noted this date w/ mobility, UB dressing and grooming. Goals reviewed and revised this date as appropriate. Continue to progress as able per current POC.     Time Calculation:   Evaluation Complexity (OT)  Review Occupational Profile/Medical/Therapy History Complexity: expanded/moderate complexity  Assessment, Occupational Performance/Identification of Deficit Complexity: 3-5 performance deficits  Clinical Decision Making Complexity (OT): detailed assessment/moderate complexity  Overall Complexity of Evaluation (OT): moderate complexity     Time Calculation- OT       Row Name 07/11/24 1522             Time Calculation- OT    OT Start Time 1415  -MR      OT Received On 07/11/24  -MR      OT Goal Re-Cert Due Date 07/21/24  -MR         Timed Charges    98265 - OT Therapeutic Activity Minutes 23  -MR      27666 - OT Self Care/Mgmt Minutes 10  -MR         Total Minutes    Timed Charges Total Minutes 33  -MR       Total Minutes 33  -MR                User Key  (r) = Recorded By, (t) = Taken By, (c) = Cosigned By      Initials Name Provider Type     Jalil WILMAN Diaz Occupational Therapist                  Therapy Charges for Today       Code Description Service Date Service Provider Modifiers Qty    [id number]  OT THERAPEUTIC ACT EA 15 MIN 7/11/2024 Jalil  Emily OT GO 1    60045231176 HC OT SELF CARE/MGMT/TRAIN EA 15 MIN 7/11/2024 Emily Jimenes OT GO 1                 Emily Jimenes OT  7/11/2024

## 2024-07-11 NOTE — PLAN OF CARE
Goal Outcome Evaluation:  Plan of Care Reviewed With: patient        Progress: no change  Outcome Evaluation: VSS. RA. Disoriented to situation and place. No complaints of pain or nausea. Pt worked with therapy and up to chair most of shift. Skin and fall precautions in place.Pt resting comfortably. no further complaints at this time.    At around 1730- R hip (location of hernia), rupture. Large. Open area now present. Foul odor. Purulent drainage. MD notified. Culture obtained. Area now remains red.

## 2024-07-11 NOTE — PLAN OF CARE
Goal Outcome Evaluation:           Progress: no change  Outcome Evaluation: VSS. RA. A&O to self only. No complaints of pain or nausea. Skin and fall precautions in place. Pt resting comfortably, will continue plan of care.

## 2024-07-12 ENCOUNTER — APPOINTMENT (OUTPATIENT)
Dept: CT IMAGING | Facility: HOSPITAL | Age: 79
DRG: 884 | End: 2024-07-12
Payer: MEDICARE

## 2024-07-12 LAB
ALBUMIN SERPL-MCNC: 2 G/DL (ref 3.5–5.2)
ALBUMIN/GLOB SERPL: 0.8 G/DL
ALP SERPL-CCNC: 107 U/L (ref 39–117)
ALT SERPL W P-5'-P-CCNC: 7 U/L (ref 1–33)
ANION GAP SERPL CALCULATED.3IONS-SCNC: 6 MMOL/L (ref 5–15)
AST SERPL-CCNC: 8 U/L (ref 1–32)
BASOPHILS # BLD MANUAL: 0 10*3/MM3 (ref 0–0.2)
BASOPHILS NFR BLD MANUAL: 0 % (ref 0–1.5)
BILIRUB SERPL-MCNC: 0.2 MG/DL (ref 0–1.2)
BUN SERPL-MCNC: 14 MG/DL (ref 8–23)
BUN/CREAT SERPL: 51.9 (ref 7–25)
CALCIUM SPEC-SCNC: 7.8 MG/DL (ref 8.6–10.5)
CHLORIDE SERPL-SCNC: 105 MMOL/L (ref 98–107)
CO2 SERPL-SCNC: 27 MMOL/L (ref 22–29)
CREAT SERPL-MCNC: 0.27 MG/DL (ref 0.57–1)
D-LACTATE SERPL-SCNC: 0.8 MMOL/L (ref 0.5–2)
DEPRECATED RDW RBC AUTO: 52.8 FL (ref 37–54)
EGFRCR SERPLBLD CKD-EPI 2021: 111.5 ML/MIN/1.73
EOSINOPHIL # BLD MANUAL: 0.06 10*3/MM3 (ref 0–0.4)
EOSINOPHIL NFR BLD MANUAL: 1 % (ref 0.3–6.2)
ERYTHROCYTE [DISTWIDTH] IN BLOOD BY AUTOMATED COUNT: 15.4 % (ref 12.3–15.4)
FERRITIN SERPL-MCNC: 387.3 NG/ML (ref 13–150)
FOLATE SERPL-MCNC: 3.83 NG/ML (ref 4.78–24.2)
GLOBULIN UR ELPH-MCNC: 2.4 GM/DL
GLUCOSE SERPL-MCNC: 107 MG/DL (ref 65–99)
HCT VFR BLD AUTO: 23 % (ref 34–46.6)
HGB BLD-MCNC: 7.7 G/DL (ref 12–15.9)
INR PPP: 1.15 (ref 0.89–1.12)
IRON 24H UR-MRATE: 22 MCG/DL (ref 37–145)
IRON SATN MFR SERPL: 21 % (ref 20–50)
LYMPHOCYTES # BLD MANUAL: 0.9 10*3/MM3 (ref 0.7–3.1)
LYMPHOCYTES NFR BLD MANUAL: 2 % (ref 5–12)
MCH RBC QN AUTO: 31.2 PG (ref 26.6–33)
MCHC RBC AUTO-ENTMCNC: 33.5 G/DL (ref 31.5–35.7)
MCV RBC AUTO: 93.1 FL (ref 79–97)
MONOCYTES # BLD: 0.12 10*3/MM3 (ref 0.1–0.9)
NEUTROPHILS # BLD AUTO: 4.9 10*3/MM3 (ref 1.7–7)
NEUTROPHILS NFR BLD MANUAL: 70 % (ref 42.7–76)
NEUTS BAND NFR BLD MANUAL: 12 % (ref 0–5)
NRBC SPEC MANUAL: 0 /100 WBC (ref 0–0.2)
PLAT MORPH BLD: NORMAL
PLATELET # BLD AUTO: 505 10*3/MM3 (ref 140–450)
PMV BLD AUTO: 9.8 FL (ref 6–12)
POTASSIUM SERPL-SCNC: 3.8 MMOL/L (ref 3.5–5.2)
PROT SERPL-MCNC: 4.4 G/DL (ref 6–8.5)
PROTHROMBIN TIME: 14.9 SECONDS (ref 12.2–14.5)
RBC # BLD AUTO: 2.47 10*6/MM3 (ref 3.77–5.28)
RBC MORPH BLD: NORMAL
SODIUM SERPL-SCNC: 138 MMOL/L (ref 136–145)
TIBC SERPL-MCNC: 107 MCG/DL (ref 298–536)
TRANSFERRIN SERPL-MCNC: 72 MG/DL (ref 200–360)
VARIANT LYMPHS NFR BLD MANUAL: 15 % (ref 19.6–45.3)
VIT B12 BLD-MCNC: 1117 PG/ML (ref 211–946)
WBC MORPH BLD: NORMAL
WBC NRBC COR # BLD AUTO: 5.97 10*3/MM3 (ref 3.4–10.8)

## 2024-07-12 PROCEDURE — 25010000002 FENTANYL CITRATE (PF) 50 MCG/ML SOLUTION: Performed by: RADIOLOGY

## 2024-07-12 PROCEDURE — 85610 PROTHROMBIN TIME: CPT | Performed by: RADIOLOGY

## 2024-07-12 PROCEDURE — 82746 ASSAY OF FOLIC ACID SERUM: CPT | Performed by: FAMILY MEDICINE

## 2024-07-12 PROCEDURE — 83605 ASSAY OF LACTIC ACID: CPT | Performed by: FAMILY MEDICINE

## 2024-07-12 PROCEDURE — 25510000001 IOPAMIDOL 61 % SOLUTION: Performed by: FAMILY MEDICINE

## 2024-07-12 PROCEDURE — 0W9G3ZZ DRAINAGE OF PERITONEAL CAVITY, PERCUTANEOUS APPROACH: ICD-10-PCS | Performed by: STUDENT IN AN ORGANIZED HEALTH CARE EDUCATION/TRAINING PROGRAM

## 2024-07-12 PROCEDURE — 49406 IMAGE CATH FLUID PERI/RETRO: CPT

## 2024-07-12 PROCEDURE — 82607 VITAMIN B-12: CPT | Performed by: FAMILY MEDICINE

## 2024-07-12 PROCEDURE — 0 DIATRIZOATE MEGLUMINE & SODIUM PER 1 ML: Performed by: FAMILY MEDICINE

## 2024-07-12 PROCEDURE — 74177 CT ABD & PELVIS W/CONTRAST: CPT

## 2024-07-12 PROCEDURE — 82728 ASSAY OF FERRITIN: CPT | Performed by: FAMILY MEDICINE

## 2024-07-12 PROCEDURE — 84466 ASSAY OF TRANSFERRIN: CPT | Performed by: FAMILY MEDICINE

## 2024-07-12 PROCEDURE — 87040 BLOOD CULTURE FOR BACTERIA: CPT | Performed by: FAMILY MEDICINE

## 2024-07-12 PROCEDURE — 85025 COMPLETE CBC W/AUTO DIFF WBC: CPT | Performed by: FAMILY MEDICINE

## 2024-07-12 PROCEDURE — 25010000002 PIPERACILLIN SOD-TAZOBACTAM PER 1 G: Performed by: FAMILY MEDICINE

## 2024-07-12 PROCEDURE — 80053 COMPREHEN METABOLIC PANEL: CPT | Performed by: FAMILY MEDICINE

## 2024-07-12 PROCEDURE — 85007 BL SMEAR W/DIFF WBC COUNT: CPT | Performed by: FAMILY MEDICINE

## 2024-07-12 PROCEDURE — 99233 SBSQ HOSP IP/OBS HIGH 50: CPT | Performed by: FAMILY MEDICINE

## 2024-07-12 PROCEDURE — 83540 ASSAY OF IRON: CPT | Performed by: FAMILY MEDICINE

## 2024-07-12 RX ORDER — FENTANYL CITRATE 50 UG/ML
INJECTION, SOLUTION INTRAMUSCULAR; INTRAVENOUS AS NEEDED
Status: COMPLETED | OUTPATIENT
Start: 2024-07-12 | End: 2024-07-12

## 2024-07-12 RX ORDER — FENTANYL CITRATE 50 UG/ML
INJECTION, SOLUTION INTRAMUSCULAR; INTRAVENOUS
Status: DISPENSED
Start: 2024-07-12 | End: 2024-07-13

## 2024-07-12 RX ADMIN — FENTANYL CITRATE 50 MCG: 50 INJECTION, SOLUTION INTRAMUSCULAR; INTRAVENOUS at 14:52

## 2024-07-12 RX ADMIN — IOPAMIDOL 50 ML: 612 INJECTION, SOLUTION INTRAVENOUS at 11:14

## 2024-07-12 RX ADMIN — ACETAMINOPHEN 650 MG: 325 TABLET ORAL at 22:01

## 2024-07-12 RX ADMIN — Medication 5 MG: at 22:02

## 2024-07-12 RX ADMIN — PIPERACILLIN AND TAZOBACTAM 3.38 G: 3; .375 INJECTION, POWDER, LYOPHILIZED, FOR SOLUTION INTRAVENOUS at 17:18

## 2024-07-12 RX ADMIN — THIAMINE HCL TAB 100 MG 100 MG: 100 TAB at 09:17

## 2024-07-12 RX ADMIN — MIRTAZAPINE 15 MG: 15 TABLET, FILM COATED ORAL at 22:01

## 2024-07-12 RX ADMIN — DIATRIZOATE MEGLUMINE AND DIATRIZOATE SODIUM 15 ML: 660; 100 LIQUID ORAL; RECTAL at 09:18

## 2024-07-12 RX ADMIN — PIPERACILLIN AND TAZOBACTAM 3.38 G: 3; .375 INJECTION, POWDER, LYOPHILIZED, FOR SOLUTION INTRAVENOUS at 12:35

## 2024-07-12 RX ADMIN — Medication 1 PATCH: at 09:17

## 2024-07-12 NOTE — PLAN OF CARE
Goal Outcome Evaluation:  Plan of Care Reviewed With: patient   - VSS, RA, disoriented to time and place  - Open area on R hip is still leaking moderate amounts of brown fluid. Dressing changed.   - No complaints of pain or nausea  - Pt resting comfortably     Progress: no change

## 2024-07-12 NOTE — PROGRESS NOTES
"          Clinical Nutrition Assessment     Patient Name: Arcelia Walter  YOB: 1945  MRN: [id number]  Date of Encounter: 07/12/24 14:11 EDT  Admission date: 6/28/2024  Reason for Visit: Follow-up protocol    Assessment   Nutrition Assessment   Admission Diagnosis:  Syncope [R55]  AMS (altered mental status) [R41.82]    Problem List:    Syncope    Hypokalemia    Elevated troponin    Elevated C-reactive protein (CRP)    Elevated sed rate    Leukocytosis    AMS (altered mental status)    Severe malnutrition      PMH:   She  has no past medical history on file.    PSH:  She  has a past surgical history that includes Hysterectomy.    Applicable Nutrition History:   +3-4 pitting edema to melonie feet     Anthropometrics     Height: Height: 149.9 cm (59.02\")  Last Filed Weight: Weight: 30.8 kg (67 lb 12.8 oz) (07/12/24 1226)  Method: Weight Method: Standing scale  BMI: BMI (Calculated): 13.7    UBW:  ? 103# per pt report  Weight change:  reported 15# wt loss (? Unsure of duration of loss)    7/12 standing weight shows continued weight loss: 68# (-20# from admit)    Nutrition Focused Physical Exam    Date:  6/29       Patient meets criteria for malnutrition diagnosis, see MSA note.     Subjective   Reported/Observed/Food/Nutrition Related History:     07/12/24  Patient busy at time of visit. Spoke with RN who reported continued poor PO intake. Continues on remeron. NPO for drain placement.    07/09/24  Remains confused. Patient reported her appetite seems to have improved, but doesn't believe she is eating much more. Likes her ONS and would like to continue to receive it. Declined any food preferences. Has remeron ordered.    07/02/24  Patient reports good appetite. PO intake increasing. Patient remains confused.    6/29  Pt up in bed eating breakfast at time of visit, able to provide some nutrition/wt hx however difficulty elaborating when asked. Reports cooking meals for self - states having 3 eggs every other day, " "but sometimes daily. ? If this is the only meal consumed. Severe cachexia - ? Starvation vs cardiac. No visible difficulties chewing/swallowing. Pt endorsed ~15# wt loss \"at the beginning of summer\" however unable to discern timeframe for notable weight change. Reports having BM every other day without aide from bowel meds. NKFA - strong dislike for tea.     Current Nutrition Prescription   PO: NPO Diet NPO Type: Strict NPO  Oral Nutrition Supplement: VHC @ L  Intake: 0-50% PO intake documented since previous visit (minimal meals documented)    Assessment & Plan   Nutrition Diagnosis   Date:  6/29            Updated:    Problem Malnutrition  chronic, severe   Etiology ? Energy in < energy out vs cardiac cacehxia   Signs/Symptoms Severe muscle wasting and subcutaneous fat loss   Status: New    Date:  6/29    Updated:     Problem Underweight   Etiology Inadequate protein-energy intake   Signs/Symptoms BMI 17.77   Status: New    Goal:   Nutrition to support treatment, Increase intake, and Initiate EN      Nutrition Intervention      Follow treatment progress, Care plan reviewed, Menu provided, Encourage intake, Supplement provided    If medically appropriate, recommend starting supplemental EN at this time  Will continue PO/ONS regimen as well  Continue to encourage increase in PO intake    Monitoring/Evaluation:   Per protocol, PO intake, Supplement intake, Weight, Symptoms, POC/GOC    Britt Rosa, MS,RD,LD  Time Spent: 25min  "

## 2024-07-12 NOTE — NURSING NOTE
After assessing imaging and removing bandage from open abscess area, per Dr. Dubose, this abscess is already draining (Bowel content), and there's nothing Interventional Radiology can do. Dr. Dubose notified ordering provider, Dr. Houston about findings. Dr. Dubose suggested an Ostomy bag be placed over abscess to collect drainage. Notified El BARAHONA.

## 2024-07-12 NOTE — CASE MANAGEMENT/SOCIAL WORK
Continued Stay Note  [facility]     Patient Name: Arcelia Walter  MRN: [id number]  Today's Date: 7/12/2024    Admit Date: 6/28/2024    Plan: TBD   Discharge Plan       Row Name 07/12/24 1328       Plan    Plan TBD    Patient/Family in Agreement with Plan unable to assess    Plan Comments Pt off unit in CT scan. Being followed by MSW and APS for guardianship. CM will cont to follow.    Final Discharge Disposition Code 30 - still a patient                   Discharge Codes    No documentation.                 Expected Discharge Date and Time       Expected Discharge Date Expected Discharge Time    Jul 19, 2024               Adeline Rahman RN

## 2024-07-12 NOTE — PLAN OF CARE
Goal Outcome Evaluation:Compliant with staff requests.  Occasional incoherent verbalizations, easily redirected. Small amount of brackish thin drainage into ostomy bag positioned over attempted drainage site. Purewick in place.  Refused dinner tonight.

## 2024-07-12 NOTE — PROGRESS NOTES
[facility]  PROGRESS NOTE    Patient Name: Arcelia Walter  : 1945  MRN: [id number]    Date of Admission: 2024  Primary Care Physician: Provider, No Known    Subjective   Subjective     CC:   Follow-up AMS    HPI:   Patient seen and examined. R groin area leaking large amounts of brown foul smelling liquid. Erythema noted around area. Patient denies pain. Afebrile. BP stable.     Objective   Objective     Vital Signs:   Temp:  [97.6 °F (36.4 °C)-97.8 °F (36.6 °C)] 97.8 °F (36.6 °C)  Heart Rate:  [66-87] 69  Resp:  [12-16] 12  BP: (115-124)/(51-76) 117/61     Physical Exam:  Constitutional: Chronically ill-appearing frail elderly female no acute distress  HENT: NCAT, mucous membranes moist  Respiratory: Nonlabored  Cardiovascular: RRR,  Gastrointestinal: Soft, nontender, right femoral hernia  Musculoskeletal: No bilateral ankle edema  Psychiatric: Appropriate affect, cooperative  Neurologic: Nonfocal, oriented to person, place sometimes.   Skin: RLQ erythema with open area draining brown/purulent foul smelling material    Results Reviewed:  LAB RESULTS:      Lab 24  0856 07/10/24  0556 24  0706 24  0524  0617   WBC 5.97 8.32 9.88 8.41 13.37*   HEMOGLOBIN 7.7* 8.2* 8.8* 8.8* 8.3*   HEMATOCRIT 23.0* 24.6* 26.9* 26.7* 24.9*   PLATELETS 505* 433 352 340 301   NEUTROS ABS 4.90  --   --  7.15* 12.17*   EOS ABS 0.06  --   --  0.00 0.00   MCV 93.1 92.1 94.4 92.1 93.3   CRP  --  9.72*  --  16.01*  --    PROCALCITONIN  --  0.20  --  0.30*  --          Lab 24  0856 07/10/24  1658 07/10/24  0556 24  0700 24  1928 24  0518 24  0617   SODIUM 138  --  135* 139  --  139 139   POTASSIUM 3.8 4.7 3.3* 4.5 4.0 3.5 4.2   CHLORIDE 105  --  102 108*  --  105 108*   CO2 27.0  --  24.0 23.0  --  24.0 24.0   ANION GAP 6.0  --  9.0 8.0  --  10.0 7.0   BUN 14  --  11 12  --  10 13   CREATININE 0.27*  --  0.25* 0.24*  --  0.31* 0.28*    EGFR 111.5  --  113.6 114.7  --  107.9 110.6   GLUCOSE 107*  --  94 90  --  72 86   CALCIUM 7.8*  --  7.8* 7.5*  --  7.5* 7.7*         Lab 07/12/24  0856 07/07/24  0518   TOTAL PROTEIN 4.4* 4.4*   ALBUMIN 2.0* 2.0*   GLOBULIN 2.4 2.4   ALT (SGPT) 7 13   AST (SGOT) 8 13   BILIRUBIN 0.2 0.4   ALK PHOS 107 129*                     Brief Urine Lab Results  (Last result in the past 365 days)        Color   Clarity   Blood   Leuk Est   Nitrite   Protein   CREAT   Urine HCG        06/29/24 0038 Yellow   Clear   Negative   Negative   Negative   Trace                   Microbiology Results Abnormal       Procedure Component Value - Date/Time    Wound Culture - Drainage, Abdominal Wall [[id number]] Collected: 07/11/24 1752    Lab Status: Preliminary result Specimen: Drainage from Abdominal Wall Updated: 07/11/24 2007     Gram Stain Few (2+) WBCs seen      Many (4+) Gram negative bacilli      Few (2+) Gram positive cocci in pairs and clusters    Blood Culture - Blood, Hand, Right [[id number]]  (Normal) Collected: 07/01/24 1558    Lab Status: Final result Specimen: Blood from Hand, Right Updated: 07/06/24 1731     Blood Culture No growth at 5 days    Narrative:      Aerobic bottle only  Less than seven (7) mL's of blood was collected.  Insufficient quantity may yield false negative results.    Blood Culture - Blood, Hand, Left [[id number]]  (Normal) Collected: 07/01/24 1558    Lab Status: Final result Specimen: Blood from Hand, Left Updated: 07/06/24 1731     Blood Culture No growth at 5 days            No radiology results from the last 24 hrs    Results for orders placed during the hospital encounter of 06/28/24    Adult Transthoracic Echo Complete W/ Cont if Necessary Per Protocol    Interpretation Summary    Left ventricular systolic function is normal. Left ventricular ejection fraction appears to be 61 - 65%.    There is mild calcification of the aortic valve.    Mild aortic valve regurgitation is present.    Mild mitral  annular calcification is present.    Mild to moderate mitral valve regurgitation is present.    Mild tricuspid valve regurgitation is present.      Current medications:  Scheduled Meds:mirtazapine, 15 mg, Oral, Nightly  nicotine, 1 patch, Transdermal, Q24H  sodium chloride, 10 mL, Intravenous, Q12H  thiamine, 100 mg, Oral, Daily      Continuous Infusions:   PRN Meds:.  acetaminophen    senna-docusate sodium **AND** polyethylene glycol **AND** bisacodyl **AND** bisacodyl    Calcium Replacement - Follow Nurse / BPA Driven Protocol    Magnesium Standard Dose Replacement - Follow Nurse / BPA Driven Protocol    melatonin    Phosphorus Replacement - Follow Nurse / BPA Driven Protocol    Potassium Replacement - Follow Nurse / BPA Driven Protocol    sodium chloride    sodium chloride    Assessment & Plan   Assessment & Plan     Active Hospital Problems    Diagnosis  POA    **Syncope [R55]  Yes    Severe malnutrition [E43]  Yes    AMS (altered mental status) [R41.82]  Yes    Hypokalemia [E87.6]  Yes    Elevated troponin [R79.89]  Yes    Elevated C-reactive protein (CRP) [R79.82]  Yes    Elevated sed rate [R70.0]  Yes    Leukocytosis [D72.829]  Yes      Resolved Hospital Problems   No resolved problems to display.        Brief Hospital Course to date:  Arcelia Walter is a 79 y.o. female brought in per EMS after being found down during a welfare check.  Unclear of exact events.  No emergency contact on file.       This patient's problems and plans were partially entered by my partner and updated as appropriate by me 07/12/24.     Syncope  Altered mental status  - Unclear exact events, originally reported to EMS on the floor x 2 days, later states that she is unsure what happened  - CT head w atrophy and chronic microvascular ischemic change but no acute intracranial process  - Ethanol neg  --echo w nl EF and no significant valvular abnormalities  - A1c and TSH wnl     Elevated troponin  - EKG without acute ischemia, troponin  trended down     Leukocytosis -> resolved   Bandemia  Right inguinal hernia with erythema  RLQ abscess vs fistula   -RLQ now with area leaking brown/purulent fluid with foul smell   -Culture obtained 7/11, pending  -STAT CT with contrast pending  -Seen by General Surgery 7/7, pending CT results, may need them to re-visit   -Add Zosyn for now     **Addendum: Discussed case with Dr Kaur via phone as he had seen patient in consult on 7/7. Discussed CT findings. Recommendations to have IR place a drain. Ordered. Patient has no family and unable to consent to procedure, needs 2 physicians to sign. I have signed and IR physician will sign. With her very poor nutritional status (BMI 13), albumin 2.0, she would be a very poor surgical candidate.     **2nd Addendum: Patient sent down to IR for drain placement. Discussed with Dr Dubose, IR, feels patient does not need a drain as she already has a large hole that is draining. She also now has contrast coming outside the bowel/has fistula with bowel. Discussed with Dr Kaur who is now out of town and signing out to Dr Rios. Discussed with Dr Rios, he will review imaging but patient is a very poor candidate for surgery and would actually recommend a drain. Discussed again with Dr Dubose asks Dr Rios to call him to further discuss regarding best plan for patient. I have sent Dr Rios the phone number for Dr Dubose.     Right Femoral hernia  - unclear chronicity  - General surgery evaluated 7/7, hernia is reducible, truss recommended.  - see discussion above      Hypokalemia  - resolved      Severe chronic malnutrition  Failure to thrive  - No emergency contact, unclear living situation  - Case management/ following  --PT/OT following, recommend SNF  --nutrition following, prealbumin low at < 3.0  -- Continue mirtazapine    Normocytic Anemia   -H&H has trended down since admission   -Check Iron Studies   -Obtain FOBT  -Not on AC   -CT A/P pending      Expected Discharge Location and Transportation: [facility]  Expected Discharge   Expected Discharge Date: 7/19/2024; Expected Discharge Time:      VTE Prophylaxis:  Mechanical VTE prophylaxis orders are present.         AM-PAC 6 Clicks Score (PT): 17 (07/11/24 2000)    CODE STATUS:   Code Status and Medical Interventions:   Ordered at: 06/29/24 0451     Code Status (Patient has no pulse and is not breathing):    CPR (Attempt to Resuscitate)     Medical Interventions (Patient has pulse or is breathing):    Full Support       Aida Houston DO  07/12/24

## 2024-07-13 PROBLEM — R41.89 COGNITIVE IMPAIRMENT: Status: ACTIVE | Noted: 2024-07-13

## 2024-07-13 PROBLEM — K63.2 ENTEROCUTANEOUS FISTULA: Status: ACTIVE | Noted: 2024-01-01

## 2024-07-13 PROBLEM — R62.7 FAILURE TO THRIVE IN ADULT: Status: ACTIVE | Noted: 2024-01-01

## 2024-07-13 PROBLEM — R18.8 INTRA-ABDOMINAL FLUID COLLECTION: Status: ACTIVE | Noted: 2024-01-01

## 2024-07-13 LAB
ANION GAP SERPL CALCULATED.3IONS-SCNC: 10 MMOL/L (ref 5–15)
BACTERIA SPEC AEROBE CULT: ABNORMAL
BACTERIA SPEC AEROBE CULT: ABNORMAL
BUN SERPL-MCNC: 10 MG/DL (ref 8–23)
BUN/CREAT SERPL: 33.3 (ref 7–25)
CALCIUM SPEC-SCNC: 7.7 MG/DL (ref 8.6–10.5)
CHLORIDE SERPL-SCNC: 106 MMOL/L (ref 98–107)
CO2 SERPL-SCNC: 24 MMOL/L (ref 22–29)
CREAT SERPL-MCNC: 0.3 MG/DL (ref 0.57–1)
DEPRECATED RDW RBC AUTO: 53.4 FL (ref 37–54)
EGFRCR SERPLBLD CKD-EPI 2021: 108.7 ML/MIN/1.73
ERYTHROCYTE [DISTWIDTH] IN BLOOD BY AUTOMATED COUNT: 15.5 % (ref 12.3–15.4)
GLUCOSE SERPL-MCNC: 75 MG/DL (ref 65–99)
GRAM STN SPEC: ABNORMAL
HCT VFR BLD AUTO: 23.3 % (ref 34–46.6)
HGB BLD-MCNC: 7.6 G/DL (ref 12–15.9)
MCH RBC QN AUTO: 30.8 PG (ref 26.6–33)
MCHC RBC AUTO-ENTMCNC: 32.6 G/DL (ref 31.5–35.7)
MCV RBC AUTO: 94.3 FL (ref 79–97)
PLATELET # BLD AUTO: 543 10*3/MM3 (ref 140–450)
PMV BLD AUTO: 10 FL (ref 6–12)
POTASSIUM SERPL-SCNC: 3.8 MMOL/L (ref 3.5–5.2)
RBC # BLD AUTO: 2.47 10*6/MM3 (ref 3.77–5.28)
RETICS # AUTO: 0.06 10*6/MM3 (ref 0.02–0.13)
RETICS/RBC NFR AUTO: 2.24 % (ref 0.7–1.9)
SODIUM SERPL-SCNC: 140 MMOL/L (ref 136–145)
WBC NRBC COR # BLD AUTO: 4.23 10*3/MM3 (ref 3.4–10.8)

## 2024-07-13 PROCEDURE — 99233 SBSQ HOSP IP/OBS HIGH 50: CPT | Performed by: INTERNAL MEDICINE

## 2024-07-13 PROCEDURE — 80048 BASIC METABOLIC PNL TOTAL CA: CPT | Performed by: FAMILY MEDICINE

## 2024-07-13 PROCEDURE — 25010000002 PIPERACILLIN SOD-TAZOBACTAM PER 1 G: Performed by: FAMILY MEDICINE

## 2024-07-13 PROCEDURE — 85045 AUTOMATED RETICULOCYTE COUNT: CPT | Performed by: INTERNAL MEDICINE

## 2024-07-13 PROCEDURE — 97530 THERAPEUTIC ACTIVITIES: CPT

## 2024-07-13 PROCEDURE — 85027 COMPLETE CBC AUTOMATED: CPT | Performed by: FAMILY MEDICINE

## 2024-07-13 RX ADMIN — ACETAMINOPHEN 650 MG: 325 TABLET ORAL at 20:56

## 2024-07-13 RX ADMIN — PIPERACILLIN AND TAZOBACTAM 3.38 G: 3; .375 INJECTION, POWDER, LYOPHILIZED, FOR SOLUTION INTRAVENOUS at 16:48

## 2024-07-13 RX ADMIN — Medication 1 PATCH: at 09:24

## 2024-07-13 RX ADMIN — MIRTAZAPINE 15 MG: 15 TABLET, FILM COATED ORAL at 20:56

## 2024-07-13 RX ADMIN — Medication 5 MG: at 20:56

## 2024-07-13 RX ADMIN — PIPERACILLIN AND TAZOBACTAM 3.38 G: 3; .375 INJECTION, POWDER, LYOPHILIZED, FOR SOLUTION INTRAVENOUS at 08:54

## 2024-07-13 RX ADMIN — THIAMINE HCL TAB 100 MG 100 MG: 100 TAB at 08:55

## 2024-07-13 RX ADMIN — Medication 10 ML: at 21:02

## 2024-07-13 RX ADMIN — PIPERACILLIN AND TAZOBACTAM 3.38 G: 3; .375 INJECTION, POWDER, LYOPHILIZED, FOR SOLUTION INTRAVENOUS at 01:33

## 2024-07-13 NOTE — SIGNIFICANT NOTE
With patient's failure to thrive and BMI of 13 combined with her current issue of spontaneous enterocutaneous fistula with concern for appendical perforation/other GI perforation, CPR/mechanical ventilation would be futile in this patient. Recommend change of CODE STATUS to DNR/DNI.

## 2024-07-13 NOTE — ACP (ADVANCE CARE PLANNING)
I have d/w Dr Rios and prior hospitalist, Dr Houston, in detail patient's complex case. Have also d/w ethics/palliative for general instruction in situation.  Patient without a guardian, APS working on emergency guardian but none as of yet. No contacts - family or otherwise to discuss GOC with at this time.     Given patient's BMI 13, chronic severe malnutrition 2/2 failure to thrive, and suspect dementia (no one to corroborate baseline but presenting as such), feel patient would not benefit from CPR/intubation and this would harm instead of help her with these multiple comorbid failing conditions. Along with 2nd opinion of Dr Houston, will change code status to DNR/DNI.     In regards to her acute GI condition, I have d/w Dr Rios and agree with his assessment that major surgery in this malnourished patient would have poor outcome with poor wound healing/infectious complications/etc and not be of benefit. The fact that patient fistulized cutaneously so rapidly is clear sign of her poor condition. Furthermore patient is well, asymptomatic at this time. Will therefore not pursue surgical intervention now or in case of worsening.      No escalation of care - will work with ostomy care, encourage patient nutrition (but would not do feeding tube), antibiotics for now while care is further clarified     Changed orders in computer related to code status to reflect this after consultation with both other physicians + ethics/palliative in setting of no guardian and patient with no capacity.        
Awake/Alert/Cooperative

## 2024-07-13 NOTE — PLAN OF CARE
Goal Outcome Evaluation:  Plan of Care Reviewed With: patient        Progress: no change  Outcome Evaluation: VSS, on RA. No c/o pain. UOP-WNL. Pt slept well. IV ABX given as ordered. Oriented to self and time, easily redirected. Will continue to monitor.

## 2024-07-13 NOTE — PLAN OF CARE
Goal Outcome Evaluation:Tolerated up in chair for @ 3 hours. Able to walk, with assist of one, back to the bed.  Wound drainage bag changed. Brown, opaque and foul smelling exudate remains. Able to go to the bathroom with assist. Tolerating PO medications and IV antibiotics.  Able to feed herself regular diet.

## 2024-07-13 NOTE — CASE MANAGEMENT/SOCIAL WORK
Continued Stay Note  [facility]     Patient Name: Arcelia Walter  MRN: [id number]  Today's Date: 7/13/2024    Admit Date: 6/28/2024    Plan: MSW following   Discharge Plan       Row Name 07/13/24 0921       Plan    Plan MSW following    Plan Comments MSW contacted by MD. Per previous MSW note, APS referral was made on July 1, 2024. MSW attempted to contact APS worker ELISABETH Patricio @ [phone] to obtain guardianship update. MSW attempted to reach Veronica Doan APS supervisor and unable to leave . MSW updated MD and will continue to follow.    Final Discharge Disposition Code 30 - still a patient                   Discharge Codes    No documentation.                 Expected Discharge Date and Time       Expected Discharge Date Expected Discharge Time    Jul 19, 2024               SHANTI Tomlin

## 2024-07-13 NOTE — PLAN OF CARE
Goal Outcome Evaluation:  Plan of Care Reviewed With: patient        Progress: no change  Outcome Evaluation: Pt limited this date by increased fatigue but overall demonstrated good effort. Pt will continue to benefit from skilled therapy services to address functional limitations and promote return to PLOF. PT rec SNF at d/c.      Anticipated Discharge Disposition (PT): skilled nursing facility

## 2024-07-13 NOTE — THERAPY TREATMENT NOTE
Patient Name: Arcelia Walter  : 1945    MRN: [id number]                              Today's Date: 2024       Admit Date: 2024    Visit Dx:     ICD-10-CM ICD-9-CM   1. Syncope, unspecified syncope type  R55 780.2   2. Ketosis  E88.89 276.2     Patient Active Problem List   Diagnosis    Severe malnutrition    Failure to thrive in adult    Enterocutaneous fistula    Intra-abdominal fluid collection    Cognitive impairment     History reviewed. No pertinent past medical history.  Past Surgical History:   Procedure Laterality Date    HYSTERECTOMY        General Information       Row Name 24 1123          Physical Therapy Time and Intention    Document Type therapy note (daily note)  -ES     Mode of Treatment physical therapy  -ES       Row Name 24 1123          General Information    Patient Profile Reviewed yes  -ES     Existing Precautions/Restrictions fall  -ES     Barriers to Rehab medically complex;cognitive status  -ES       Row Name 24 1123          Cognition    Orientation Status (Cognition) oriented to;person;situation;disoriented to;place;time  -ES       Row Name 24 1123          Safety Issues, Functional Mobility    Safety Issues Affecting Function (Mobility) awareness of need for assistance;insight into deficits/self-awareness;safety precaution awareness;safety precautions follow-through/compliance;sequencing abilities  -ES     Impairments Affecting Function (Mobility) balance;cognition;endurance/activity tolerance;postural/trunk control;strength  -ES               User Key  (r) = Recorded By, (t) = Taken By, (c) = Cosigned By      Initials Name Provider Type    ES Ijeoma Mejia PT Physical Therapist                   Mobility       Row Name 24 1123          Bed Mobility    Bed Mobility supine-sit  -ES     Supine-Sit [place] (Bed Mobility) verbal cues;minimum assist (75% patient effort);1 person assist  -ES     Assistive Device (Bed Mobility) head of bed  elevated;bed rails  -ES     Comment, (Bed Mobility) Increased time/effort. v/c for sequencing  -ES       Row Name 07/13/24 1123          Bed-Chair Transfer    Bed-Chair [place] (Transfers) minimum assist (75% patient effort)  -ES     Assistive Device (Bed-Chair Transfers) other (see comments)  UE support  -ES     Comment, (Bed-Chair Transfer) Pt took ~5 side steps from EOB to chair with BUE support. Demo'd shuffling gait pattern with narrow CARLY. V/c to promote upright posture.  -ES       Row Name 07/13/24 1123          Sit-Stand Transfer    Sit-Stand [place] (Transfers) minimum assist (75% patient effort);verbal cues  -ES     Assistive Device (Sit-Stand Transfers) other (see comments)  UE support  -ES     Comment, (Sit-Stand Transfer) v/c for sequencing  -ES       Row Name 07/13/24 1123          Gait/Stairs (Locomotion)    [place] Level (Gait) unable to assess  -ES     Comment, (Gait/Stairs) Further mob limited by fatigue  -ES               User Key  (r) = Recorded By, (t) = Taken By, (c) = Cosigned By      Initials Name Provider Type    ES Ijeoma Mejia PT Physical Therapist                   Obj/Interventions       Row Name 07/13/24 1126          Balance    Balance Assessment sitting static balance;sitting dynamic balance;sit to stand dynamic balance;standing static balance;standing dynamic balance  -ES     Static Sitting Balance supervision  -ES     Dynamic Sitting Balance supervision  -ES     Position, Sitting Balance unsupported;sitting edge of bed;sitting in chair  -ES     Sit to Stand Dynamic Balance minimal assist;verbal cues  -ES     Static Standing Balance minimal assist  -ES     Dynamic Standing Balance minimal assist;verbal cues  -ES     Position/Device Used, Standing Balance supported  -ES     Balance Interventions sitting;sit to stand;supported;standing;static;dynamic;occupation based/functional task  -ES               User Key  (r) = Recorded By, (t) = Taken By, (c) = Cosigned By       Initials Name Provider Type    ES Ijeoma Mejia PT Physical Therapist                   Goals/Plan    No documentation.                  Clinical Impression       Row Name 07/13/24 1130          Pain    Pretreatment Pain Rating 0/10 - no pain  -ES     Posttreatment Pain Rating 0/10 - no pain  -ES     Pre/Posttreatment Pain Comment tolerated  -ES     Pain Intervention(s) Repositioned;Ambulation/increased activity  -ES       Row Name 07/13/24 1130          Plan of Care Review    Plan of Care Reviewed With patient  -ES     Progress no change  -ES     Outcome Evaluation Pt limited this date by increased fatigue but overall demonstrated good effort. Pt will continue to benefit from skilled therapy services to address functional limitations and promote return to PLOF. PT rec SNF at d/c.  -ES       Row Name 07/13/24 1130          Therapy Assessment/Plan (PT)    Rehab Potential (PT) good, to achieve stated therapy goals  -ES     Criteria for Skilled Interventions Met (PT) yes;skilled treatment is necessary  -ES     Therapy Frequency (PT) daily  -ES     Predicted Duration of Therapy Intervention (PT) 10 days  -ES       Row Name 07/13/24 1130          Vital Signs    Pre Systolic BP Rehab --  non-tele. cleared byRN  -ES     O2 Delivery Pre Treatment room air  -ES     O2 Delivery Intra Treatment room air  -ES     O2 Delivery Post Treatment room air  -ES     Pre Patient Position Supine  -ES     Intra Patient Position Standing  -ES     Post Patient Position Sitting  -ES       Row Name 07/13/24 1130          Positioning and Restraints    Pre-Treatment Position in bed  -ES     Post Treatment Position chair  -ES     In Chair notified nsg;reclined;sitting;call light within reach;encouraged to call for assist;exit alarm on;waffle cushion;legs elevated  -ES               User Key  (r) = Recorded By, (t) = Taken By, (c) = Cosigned By      Initials Name Provider Type    ES Ijeoma Mejia, PT Physical Therapist                    Outcome Measures       Row Name 07/13/24 1134 07/13/24 0800       How much help from another person do you currently need...    Turning from your back to your side while in flat bed without using bedrails? 4  -ES 4  -TK    Moving from lying on back to sitting on the side of a flat bed without bedrails? 3  -ES 3  -TK    Moving to and from a bed to a chair (including a wheelchair)? 3  -ES 3  -TK    Standing up from a chair using your arms (e.g., wheelchair, bedside chair)? 3  -ES 3  -TK    Climbing 3-5 steps with a railing? 2  -ES 2  -TK    To walk in hospital room? 3  -ES 3  -TK    AM-PAC 6 Clicks Score (PT) 18  -ES 18  -TK    Highest Level of Mobility Goal 6 --> Walk 10 steps or more  -ES 6 --> Walk 10 steps or more  -TK      Row Name 07/13/24 1134          Functional Assessment    Outcome Measure Options AM-PAC 6 Clicks Basic Mobility (PT)  -ES               User Key  (r) = Recorded By, (t) = Taken By, (c) = Cosigned By      Initials Name Provider Type    Taurus Bower RN Registered Nurse    Ijeoma Blanco, KARRIE Physical Therapist                                 Physical Therapy Education       Title: PT OT SLP Therapies (In Progress)       Topic: Physical Therapy (In Progress)       Point: Mobility training (In Progress)       Learning Progress Summary             Patient Acceptance, E, NR by JOVITA at 7/9/2024 1335    Acceptance, E, NR by KG at 7/8/2024 1352    Acceptance, E, NR by JOVITA at 7/5/2024 1330    Acceptance, E, NR by CK at 7/3/2024 1554    Acceptance, E, NR by JOVITA at 7/2/2024 1400    Acceptance, E, NR by KG at 7/1/2024 1040    Acceptance, E, NR by KG at 6/29/2024 1001                         Point: Home exercise program (In Progress)       Learning Progress Summary             Patient Acceptance, E, NR by JOVITA at 7/9/2024 1335    Acceptance, E, NR by KG at 7/8/2024 1352    Acceptance, E, NR by JOVITA at 7/5/2024 1330    Acceptance, E, NR by JOVITA at 7/2/2024 1400    Acceptance, E, NR by KG at 7/1/2024  1040                         Point: Body mechanics (In Progress)       Learning Progress Summary             Patient Acceptance, E, NR by JOVITA at 7/9/2024 1335    Acceptance, E, NR by KG at 7/8/2024 1352    Acceptance, E, NR by JOVITA at 7/5/2024 1330    Acceptance, E, NR by CK at 7/3/2024 1554    Acceptance, E, NR by JOVITA at 7/2/2024 1400    Acceptance, E, NR by KG at 7/1/2024 1040    Acceptance, E, NR by KG at 6/29/2024 1001                         Point: Precautions (In Progress)       Learning Progress Summary             Patient Acceptance, E, NR by JOVITA at 7/9/2024 1335    Acceptance, E, NR by KG at 7/8/2024 1352    Acceptance, E, NR by JOVITA at 7/5/2024 1330    Acceptance, E, NR by CK at 7/3/2024 1554    Acceptance, E, NR by JOVITA at 7/2/2024 1400    Acceptance, E, NR by KG at 7/1/2024 1040    Acceptance, E, NR by KG at 6/29/2024 1001                                         User Key       Initials Effective Dates Name Provider Type Discipline    JOVITA 02/03/23 -  Mary Kay Valencia, PT Physical Therapist PT    KG 05/22/20 -  Misty Shipley PT Physical Therapist PT    CK 02/06/24 -  Juana Velasquez, PT Physical Therapist PT                  PT Recommendation and Plan     Plan of Care Reviewed With: patient  Progress: no change  Outcome Evaluation: Pt limited this date by increased fatigue but overall demonstrated good effort. Pt will continue to benefit from skilled therapy services to address functional limitations and promote return to PLOF. PT rec SNF at d/c.     Time Calculation:         PT Charges       Row Name 07/13/24 1134             Time Calculation    Start Time 1040  -ES      PT Received On 07/13/24  -ES      PT Goal Re-Cert Due Date 07/19/24  -ES         Time Calculation- PT    Total Timed Code Minutes- PT 16 minute(s)  -ES         Timed Charges    85030 - PT Therapeutic Activity Minutes 16  -ES         Total Minutes    Timed Charges Total Minutes 16  -ES       Total Minutes 16  -ES                User  Key  (r) = Recorded By, (t) = Taken By, (c) = Cosigned By      Initials Name Provider Type    ES Ijeoma Mejia, KARRIE Physical Therapist                  Therapy Charges for Today       Code Description Service Date Service Provider Modifiers Qty    [id number]  PT THERAPEUTIC ACT EA 15 MIN 7/13/2024 Ijeoma Mejia PT GP 1            PT G-Codes  Outcome Measure Options: AM-PAC 6 Clicks Basic Mobility (PT)  AM-PAC 6 Clicks Score (PT): 18  AM-PAC 6 Clicks Score (OT): 17  PT Discharge Summary  Anticipated Discharge Disposition (PT): skilled nursing facility    Ijeoma Mejia PT  7/13/2024

## 2024-07-13 NOTE — CONSULTS
"Palliative Care Initial Consult   Attending Physician: Leigh Ann Vasquez MD  Referring Provider: Dr. Leigh Ann Vasquez    Reason for Referral:  assistance with clarification of goals of care    Code Status:   Code Status and Medical Interventions:   Ordered at: 06/29/24 0451     Code Status (Patient has no pulse and is not breathing):    CPR (Attempt to Resuscitate)     Medical Interventions (Patient has pulse or is breathing):    Full Support      Advanced Directives: Advance Directive Status: Patient does not have advance directive   Family/Support: No emergency contact listed  Goals of Care: TBD.    HPI: Arcelia Walter is a 79 y.o. female with unknown PMH who presented to [facility] ED on 6/28 after neighbors called for welfare check. Work up revealed hypokalemia, elevated troponin, leukocytosis, elevated sed rate, elevated CRP, oriented to self only, malnourishment. CT head w atrophy and chronic microvascular ischemic change but no acute intracranial process. Right inguinal erythema with RLQ later draining foul smelling brown stool. CT with contrast discovered enterocutaneous fistula, concern for periappendiceal abscess tracking into soft tissue. GI surgery following and concerned patient is a poor surgical candidate due to poor nutritional status albumin 2.0, BMI 13. Palliative Care consulted for C in the context of complex medical decision making.    Patient sitting up in chair at time of visit. She is oriented to her self only and is unable to verbalize why she is at the hospital or situation. She reports pain to buttocks that has been ongoing for \"years\", which she describes as relieved with movement. She endorses intermittent nausea that started \"months\" ago and has been taking over the counter medications but she does not know which ones. She denies shortness of breath. When asked if anyone checks in on her she states neighbors but \"no one lately\". Patient reports that she does not use any assistive devices at home, still " "drives but does not know where her car is located at present. PT/OT noting patient required front wheeled walker to ambulate. She reports having family members (mother and four siblings) but does not have any contact information for them. She reports recently going to the doctor but not sure which one. Patient is not a reliable historian as when chart reviewed  patient's statements are not consistent from provider to provider.     ROS: +pain, \"bottom of butt\", some relief with movement, taking OTC medications at home, patient does not know which medications, ongoing for \"years\". +debility. Denies nausea at present, anxiety, shortness of breath.       History reviewed. No pertinent past medical history.  Past Surgical History:   Procedure Laterality Date    HYSTERECTOMY       Social History     Socioeconomic History    Marital status: Single   Tobacco Use    Smoking status: Never    Smokeless tobacco: Never   Vaping Use    Vaping status: Never Used   Substance and Sexual Activity    Alcohol use: Never    Drug use: Never    Sexual activity: Defer     Family History   Family history unknown: Yes       No Known Allergies    Current medication reviewed for route, type, dose and frequency and are current per MAR at time of dictation.    Palliative Performance Scale Score:  40%    /53 (BP Location: Right arm, Patient Position: Lying)   Pulse 66   Temp 97.8 °F (36.6 °C) (Temporal)   Resp 16   Ht 149.9 cm (59.02\")   Wt 30.8 kg (67 lb 12.8 oz)   SpO2 98%   BMI 13.69 kg/m²     Intake/Output Summary (Last 24 hours) at 7/13/2024 0946  Last data filed at 7/13/2024 0533  Gross per 24 hour   Intake 200 ml   Output 400 ml   Net -200 ml       Physical Exam:    General Appearance:    Patient sitting up in chair, awake, alert, A/C ill appearing, frail, cooperative, NAD, severely malnourished   HEENT:    NC/AT, EOMI, anicteric, MMM, face relaxed   Neck:   supple, trachea midline, no JVD   Lungs:     CTA bilat, diminished in " bases; respirations regular, even and unlabored; RR 16-18 on exam, on RA    Heart:    RRR, normal S1 and S2, no M/R/G, HR 66    Abdomen:     Normal bowel sounds, soft, nontender, nondistended   G/U:   Deferred   MSK/Extremities:   Severe wasting, no edema   Pulses:   Pulses palpable and equal bilaterally   Skin:   Warm, dry   Neurologic:   A/Ox1-2, cooperative, TORO   Psych:   Calm, appropriate         Labs:   Results from last 7 days   Lab Units 07/13/24  0453   WBC 10*3/mm3 4.23   HEMOGLOBIN g/dL 7.6*   HEMATOCRIT % 23.3*   PLATELETS 10*3/mm3 543*     Results from last 7 days   Lab Units 07/13/24  0453   SODIUM mmol/L 140   POTASSIUM mmol/L 3.8   CHLORIDE mmol/L 106   CO2 mmol/L 24.0   BUN mg/dL 10   CREATININE mg/dL 0.30*   GLUCOSE mg/dL 75   CALCIUM mg/dL 7.7*     Results from last 7 days   Lab Units 07/13/24  0453 07/12/24  0856   SODIUM mmol/L 140 138   POTASSIUM mmol/L 3.8 3.8   CHLORIDE mmol/L 106 105   CO2 mmol/L 24.0 27.0   BUN mg/dL 10 14   CREATININE mg/dL 0.30* 0.27*   CALCIUM mg/dL 7.7* 7.8*   BILIRUBIN mg/dL  --  0.2   ALK PHOS U/L  --  107   ALT (SGPT) U/L  --  7   AST (SGOT) U/L  --  8   GLUCOSE mg/dL 75 107*     Imaging Results (Last 72 Hours)       Procedure Component Value Units Date/Time    CT Guided Percutaneous Drain Pelvis [[id number]] Collected: 07/12/24 1519     Updated: 07/12/24 1532    Narrative:        Limited CT abdomen    Date of Exam: 7/12/2024 2:35 PM EDT    Indication: RLQ abscess extending into thigh.    Comparison: CT study, July 12, 2024    Technique: After obtaining appropriate consent the patient was placed onto the CT table in the supine position with a time out performed. Initial scanning of the pelvis was performed. Note was made of foul-smelling brownish liquid gushing from a sizable   wound in the region of the patient's known subcutaneous abscess.     Findings:  Note was made of contrast media into the dependent portion of the subcutaneous collection, clearly indicating a  fistulous communication with bowel. Given this finding, and the copious amounts of foul-smelling brownish liquid exiting a sizable skin wound,   the decision was made to not place a percutaneous drain, as it would likely not add any therapeutic value. No safe percutaneous window to a phlegmonous/inflamed region within her right pelvis was identified either.  These findings and the decision to not proceed with drainage was discussed by telephone, in detail with Dr. Houston of the hospitalist service.      Impression:      Impression:  Limited abdominal pelvic CT with concerning findings as described above in great detail.      Electronically Signed: Candace Dubose MD    7/12/2024 3:29 PM EDT    Workstation ID: [id number]    CT Abdomen Pelvis With Contrast [[id number]] Collected: 07/12/24 1119     Updated: 07/12/24 1143    Narrative:      CT ABDOMEN PELVIS W CONTRAST    Date of Exam: 7/12/2024 11:06 AM EDT    Indication: RLQ draining abscess vs fistula, noted to have hernia on prior CT.    Comparison: CT of the abdomen and pelvis dated 7/6/2024    Technique: Axial CT images were obtained of the abdomen and pelvis following the uneventful intravenous administration of 85 mL Isovue-300. Reconstructed coronal and sagittal images were also obtained. Automated exposure control and iterative   construction methods were used.    Findings:    Liver: The liver is unremarkable in morphology. No focal liver lesion is seen. No biliary dilation is seen.    Gallbladder: Unremarkable.    Pancreas: Unremarkable.    Spleen: Unremarkable.    Adrenal glands: Unremarkable.    Genitourinary tract: Kidneys are unremarkable. No hydronephrosis is seen. The visualized portions of the ureters and urinary bladder appear unremarkable.    Gastrointestinal tract: There is ill-defined soft tissue prominence centered about the ileocecal region measuring approximately 7 x 5 cm (series 2, images 70 through 95). Several mildly prominent small bowel loops  measuring up to 3.5 cm. Contrast is seen   within the small and large bowel.    Appendix: The appendix is not definitely identified.    Other findings: Mild to moderate free fluid is present. No there appear to be several small extraluminal air density foci about the cecum. No pathologically enlarged lymph nodes are seen. Vascular calcifications are present. The IVC is unremarkable.    Bones and soft tissues: Lobulated fluid/air density collection noted within the superficial soft tissues of the right lower abdomen extending into the proximal thigh. This measures approximately 9 x 4.5 cm. This appears to communicate with a thin   pericecal or periappendiceal fluid collection via a sinus tract seen on series 2, image 89. No acute osseous lesion is seen. No acute osseous lesion is seen. Bones are demineralized. There are degenerative changes within the spine.    Lung bases: Trace bilateral pleural effusions with bibasilar atelectasis.      Impression:      Impression:  1.9 x 4.5 cm abscess within the superficial soft tissues of the right lower abdomen extending into the proximal thigh. This appears to communicate with a thin pericecal or periappendiceal fluid collection via a sinus tract seen on series 2, image 89.  2.There is ill-defined soft tissue prominence centered about the ileocecal region measuring approximately 7 x 5 cm (series 2, images 70 through 95). The appendix is not definitely identified. Appearance is concerning for pericecal abscess/phlegmon which   may be related to perforated appendicitis or focal colitis. An underlying neoplastic mass lesion at the ileocecal region cannot be excluded.  3.Mild to moderate intra-abdominal free fluid.  4.Trace bilateral pleural effusions with bibasilar atelectasis.    The above findings were discussed with Renetta Dawson via telephone on 7/12/2024 11:40 AM EDT.    Electronically Signed: Ranjith Redding MD    7/12/2024 11:40 AM EDT    Workstation ID: [id number]                   Diagnostics: Reviewed    A:   Severe malnutrition    Failure to thrive in adult    Enterocutaneous fistula    Intra-abdominal fluid collection    Cognitive impairment     79 y.o. female with severe malnutrition, enterocutaneous fistula, concern for GI perforation.   S/S:   Pain -coccyx  -continue Tylenol 650mg PO q 4 hours prn mild pain    2. Nausea -consider Zofran prn if patient complains of nausea, none at present and none noted by RN    3. GOC -recommend DNR/DNI due to poor nutritional status (Albumin 2.0) and debility, as patient would not benefit from aggressive resuscitation attempts with CPR nor would she benefit from MV  -reviewed with Dr. Vasquez  -reviewed with MSW Taylor Corley to review current attempts to find family/medical decision maker/guardian    P: Introduced Palliative Care and services to patient. Patient does not have capacity to make medical decisions for herself at present due to lack of full orientation and inability to weight risks and benefits of medical treatment. No current guardian/HCS, see MSW note from 7/13. Recommend DNR/DNI due to poor nutritional status and debility in the setting of advanced age. Will continue to follow for support and ongoing GOC.     Thank you for this consult and allowing us to participate in patient's plan of care. Palliative Care Team will continue to follow patient. Please do not hesitate to contact us regarding further symptom management or goals of care needs.  Time: 60 minutes spent reviewing medical and medication records, assessing and examining patient, discussing with family, answering questions, providing some guidance about a plan and documentation of care, and coordinating care with other healthcare members, with > 50% time spent face to face.         Gisella Olivia, APRN  7/13/2024

## 2024-07-13 NOTE — SIGNIFICANT NOTE
I have d/w Dr Rios and prior hospitalist, Dr Houston, in detail patient's complex case. Have also d/w ethics/palliative for general instruction in situation.  Patient without a guardian, APS working on emergency guardian but none as of yet. No contacts - family or otherwise to discuss GOC with at this time.    Given patient's BMI 13, chronic severe malnutrition 2/2 failure to thrive, and suspect dementia (no one to corroborate baseline but presenting as such), feel patient would not benefit from CPR/intubation and this would harm instead of help her with these multiple comorbid failing conditions. Along with 2nd opinion of Dr Houston, will change code status to DNR/DNI.    In regards to her acute GI condition, I have d/w Dr Rios and agree with his assessment that major surgery in this malnourished patient would have poor outcome with poor wound healing/infectious complications/etc and not be of benefit. The fact that patient fistulized cutaneously so rapidly is clear sign of her poor condition. Furthermore patient is well, asymptomatic at this time. Will therefore not pursue surgical intervention now or in case of worsening.     No escalation of care - will work with ostomy care, encourage patient nutrition (but would not do feeding tube), antibiotics for now while care is further clarified    Changed orders in computer related to code status to reflect this after consultation with both other physicians + ethics/palliative in setting of no guardian and patient with no capacity.

## 2024-07-13 NOTE — CONSULTS
General Surgery Consultation Note    Date of Service: 7/13/2024  Arcelia Walter  [id number]  1945      Referring Provider: Leigh Ann Vasquez MD    Location of Consult: [id number]     Reason for Consultation: Right groin abscess/fistula       History of Present Illness:  I am seeing, Arcelia Walter, in consultation for Leigh Ann Vasquez MD regarding right groin abscess/fistula.    Mrs. Arcelia Walter is a 79 year old woman with severe protein-calorie malnutrition (prealbumin <2.0, albumin 2.0, BMI 13.7), altered mental status, unknown other PMH with a right groin abscess/fistula at the location of a previously reduced inguinal hernia. She was originally admitted on 6/29 after being found down when her neighbors called for a welfare check on her. She developed inguinal pain on 7/5 which she states has been going on for about a week, but it was unclear regarding the chronicity as the patient was a poor historian. CT A/P showed an inguinal hernia with contained bowel, and my partner was called on 7/7. At that time, patient did not have obstructive symptoms and her tenderness was only mild. The hernia was successfully reduced at bedside. A hernia truss was recommended. Follow-up exams the following day showed that the hernia remained reduced. However, patient developed right inguinal cellulitis over the past several days and started having yellow-brown drainage from her right groin yesterday. CT A/P showed an abscess of the right groin, thus IR was consulted for insertion of a drain, however, upon arriving to the IR suite, the large opening in her right groin was freely draining and there was appearance of contrast in the cavity on pre-procedure imaging, thus the drain was aborted and an ostomy bag was placed over the draining area. She was placed on Zosyn.     Patient is unaware of where she is, and has not had any family that could be contacted. She is severely malnourished. She has been afebrile, but initially had a white count up to ~20  from 6/29 to 7/6. Her WBC has been normal from 7/7 to date. She has continued to have bowel movements. The drainage from her right inguinal wound has been controlled for the past day with an ostomy bag. She is eating PO intake, and nutrition has been following closely. She has lost 20 lb while inpatient.     General surgery was re-consulted due to the findings in the right inguinal region.       Problems Addressed this Visit    None  Visit Diagnoses       Syncope, unspecified syncope type    -  Primary    Ketosis              Diagnoses         Codes Comments    Syncope, unspecified syncope type    -  Primary ICD-10-CM: R55  ICD-9-CM: 780.2     Ketosis     ICD-10-CM: E88.89  ICD-9-CM: 276.2             History reviewed. No pertinent past medical history.    Past Surgical History:    HYSTERECTOMY       No Known Allergies    No current facility-administered medications on file prior to encounter.     No current outpatient medications on file prior to encounter.         Current Facility-Administered Medications:     acetaminophen (TYLENOL) tablet 650 mg, 650 mg, Oral, Q4H PRN, Olga Lidia Yu APRN, 650 mg at 07/12/24 2201    sennosides-docusate (PERICOLACE) 8.6-50 MG per tablet 2 tablet, 2 tablet, Oral, BID PRN **AND** polyethylene glycol (MIRALAX) packet 17 g, 17 g, Oral, Daily PRN **AND** bisacodyl (DULCOLAX) EC tablet 5 mg, 5 mg, Oral, Daily PRN **AND** bisacodyl (DULCOLAX) suppository 10 mg, 10 mg, Rectal, Daily PRN, Olga Lidia Yu APRN    Calcium Replacement - Follow Nurse / BPA Driven Protocol, , Does not apply, PRN, Olga Lidia Yu APRN    Magnesium Standard Dose Replacement - Follow Nurse / BPA Driven Protocol, , Does not apply, PRN, Olga Lidia Yu APRN    melatonin tablet 5 mg, 5 mg, Oral, Nightly PRN, Adriana Mcgowan APRN, 5 mg at 07/12/24 2202    mirtazapine (REMERON) tablet 15 mg, 15 mg, Oral, Nightly, Kaitlynn Chu MD, 15 mg at 07/12/24 2201    nicotine (NICODERM CQ) 21 MG/24HR patch 1  "patch, 1 patch, Transdermal, Q24H, Kaitlynn Chu MD, 1 patch at 07/13/24 0924    Phosphorus Replacement - Follow Nurse / BPA Driven Protocol, , Does not apply, PRN, Olga Lidia Yu APRN    piperacillin-tazobactam (ZOSYN) 3.375 g IVPB in 100 mL NS MBP (CD), 3.375 g, Intravenous, Q8H, Aida Houston DO, 3.375 g at 07/13/24 0854    Potassium Replacement - Follow Nurse / BPA Driven Protocol, , Does not apply, PRN, Olga Lidia Yu APRN    sodium chloride 0.9 % flush 10 mL, 10 mL, Intravenous, Q12H, Olga Lidia Yu APRN, 10 mL at 07/07/24 0826    sodium chloride 0.9 % flush 10 mL, 10 mL, Intravenous, PRN, Olga Lidia Yu APRN    sodium chloride 0.9 % infusion 40 mL, 40 mL, Intravenous, PRN, Olga Lidia Yu APRN    thiamine (VITAMIN B-1) tablet 100 mg, 100 mg, Oral, Daily, Olga Lidia Yu APRN, 100 mg at 07/13/24 0855    Family History   Family history unknown: Yes     Social History     Socioeconomic History    Marital status: Single   Tobacco Use    Smoking status: Never    Smokeless tobacco: Never   Vaping Use    Vaping status: Never Used   Substance and Sexual Activity    Alcohol use: Never    Drug use: Never    Sexual activity: Defer       Review of Systems:  Per HPI, otherwise the 12 point review of systems is negative.    /53 (BP Location: Right arm, Patient Position: Lying)   Pulse 66   Temp 97.8 °F (36.6 °C) (Temporal)   Resp 16   Ht 149.9 cm (59.02\")   Wt 30.8 kg (67 lb 12.8 oz)   SpO2 98%   BMI 13.69 kg/m²   Body mass index is 13.69 kg/m².    General: confused, ill-appearing, cachectic, no acute distress  HEENT: normocephalic, atraumatic, sclerae anicteric, external ears normal   Cardiovascular: regular rate and regular rhythm  Pulmonary: breathing comfortably on room air, no respiratory distress  Gastrointestinal: soft, tender to palpation in the RLQ, non-distended, right inguinal region with a lateral open wound of about 2 cm in diameter with active yellow drainage consistent with " succus. Bag removed and replaced. Cavity tracks 14 cm superiorly, 8 cm laterally, and 4 cm inferiorly on exam today, there is some lateral cellulitis and induration associated with the wound  Extremity: muscle wasting, no edema  Neuro: confused, disoriented, CN grossly intact, no focal deficits  Psych: muted    CBC  Results from last 7 days   Lab Units 07/13/24  0453   WBC 10*3/mm3 4.23   HEMOGLOBIN g/dL 7.6*   HEMATOCRIT % 23.3*   PLATELETS 10*3/mm3 543*       CMP  Results from last 7 days   Lab Units 07/13/24  0453 07/12/24  0856   SODIUM mmol/L 140 138   POTASSIUM mmol/L 3.8 3.8   CHLORIDE mmol/L 106 105   CO2 mmol/L 24.0 27.0   BUN mg/dL 10 14   CREATININE mg/dL 0.30* 0.27*   CALCIUM mg/dL 7.7* 7.8*   BILIRUBIN mg/dL  --  0.2   ALK PHOS U/L  --  107   ALT (SGPT) U/L  --  7   AST (SGOT) U/L  --  8   GLUCOSE mg/dL 75 107*       Radiology  Imaging Results (Last 72 Hours)       Procedure Component Value Units Date/Time    CT Guided Percutaneous Drain Pelvis [[id number]] Collected: 07/12/24 1519     Updated: 07/12/24 1532    Narrative:        Limited CT abdomen    Date of Exam: 7/12/2024 2:35 PM EDT    Indication: RLQ abscess extending into thigh.    Comparison: CT study, July 12, 2024    Technique: After obtaining appropriate consent the patient was placed onto the CT table in the supine position with a time out performed. Initial scanning of the pelvis was performed. Note was made of foul-smelling brownish liquid gushing from a sizable   wound in the region of the patient's known subcutaneous abscess.     Findings:  Note was made of contrast media into the dependent portion of the subcutaneous collection, clearly indicating a fistulous communication with bowel. Given this finding, and the copious amounts of foul-smelling brownish liquid exiting a sizable skin wound,   the decision was made to not place a percutaneous drain, as it would likely not add any therapeutic value. No safe percutaneous window to a  phlegmonous/inflamed region within her right pelvis was identified either.  These findings and the decision to not proceed with drainage was discussed by telephone, in detail with Dr. Houston of the hospitalist service.      Impression:      Impression:  Limited abdominal pelvic CT with concerning findings as described above in great detail.      Electronically Signed: Candace Dubose MD    7/12/2024 3:29 PM EDT    Workstation ID: [id number]    CT Abdomen Pelvis With Contrast [[id number]] Collected: 07/12/24 1119     Updated: 07/12/24 1143    Narrative:      CT ABDOMEN PELVIS W CONTRAST    Date of Exam: 7/12/2024 11:06 AM EDT    Indication: RLQ draining abscess vs fistula, noted to have hernia on prior CT.    Comparison: CT of the abdomen and pelvis dated 7/6/2024    Technique: Axial CT images were obtained of the abdomen and pelvis following the uneventful intravenous administration of 85 mL Isovue-300. Reconstructed coronal and sagittal images were also obtained. Automated exposure control and iterative   construction methods were used.    Findings:    Liver: The liver is unremarkable in morphology. No focal liver lesion is seen. No biliary dilation is seen.    Gallbladder: Unremarkable.    Pancreas: Unremarkable.    Spleen: Unremarkable.    Adrenal glands: Unremarkable.    Genitourinary tract: Kidneys are unremarkable. No hydronephrosis is seen. The visualized portions of the ureters and urinary bladder appear unremarkable.    Gastrointestinal tract: There is ill-defined soft tissue prominence centered about the ileocecal region measuring approximately 7 x 5 cm (series 2, images 70 through 95). Several mildly prominent small bowel loops measuring up to 3.5 cm. Contrast is seen   within the small and large bowel.    Appendix: The appendix is not definitely identified.    Other findings: Mild to moderate free fluid is present. No there appear to be several small extraluminal air density foci about the cecum. No  pathologically enlarged lymph nodes are seen. Vascular calcifications are present. The IVC is unremarkable.    Bones and soft tissues: Lobulated fluid/air density collection noted within the superficial soft tissues of the right lower abdomen extending into the proximal thigh. This measures approximately 9 x 4.5 cm. This appears to communicate with a thin   pericecal or periappendiceal fluid collection via a sinus tract seen on series 2, image 89. No acute osseous lesion is seen. No acute osseous lesion is seen. Bones are demineralized. There are degenerative changes within the spine.    Lung bases: Trace bilateral pleural effusions with bibasilar atelectasis.      Impression:      Impression:  1.9 x 4.5 cm abscess within the superficial soft tissues of the right lower abdomen extending into the proximal thigh. This appears to communicate with a thin pericecal or periappendiceal fluid collection via a sinus tract seen on series 2, image 89.  2.There is ill-defined soft tissue prominence centered about the ileocecal region measuring approximately 7 x 5 cm (series 2, images 70 through 95). The appendix is not definitely identified. Appearance is concerning for pericecal abscess/phlegmon which   may be related to perforated appendicitis or focal colitis. An underlying neoplastic mass lesion at the ileocecal region cannot be excluded.  3.Mild to moderate intra-abdominal free fluid.  4.Trace bilateral pleural effusions with bibasilar atelectasis.    The above findings were discussed with Renetta Dawson via telephone on 7/12/2024 11:40 AM EDT.    Electronically Signed: Ranjith Redding MD    7/12/2024 11:40 AM EDT    Workstation ID: [id number]            ASSESSMENT/PLAN:  Arcelia Walter is a 79 y.o. female with severe protein-calorie malnutrition, altered mental status, unknown other PMH presenting after being found down and recently having a right inguinal hernia reduced now with a right groin abscess and evidence of fistulous  connection to the cecum/small bowel. This either represents development of an abscess due to incarcerated/strangulated bowel at initial reduction, re-herniation with strangulation, or translocation of bacteria in this area causing development of a subcutaneous infection which has fistulized due to the patient's poor nutrition. Patient has not been a operative candidate since her initial presentation due to her malnutrition.    She is exceedingly malnourished with prealbumin <2.0, Albumin 2.0, and BMI 13. She has had a 20 lb weight loss while in the hospital on top of an unknown amount of weight loss at home. Unfortunately, there is no family members present and no documented POA. At this point, I would recommend non-operative management especially since she is stable. If patient declines prior to improvement in her nutrition, surgery would not be of benefit to her as she would not be able to recover or heal her wounds.    Right groin abscess/fistula  - Recommend conservative management of right groin wound/fistula with ostomy bag  - Continue IV Zosyn, or other antibiotics per ID due to lateral cellulitis and induration  - Patient is an exceedingly poor surgical candidate. In her current condition she would not heal after a major operation. This would entail an exploratory laparotomy, fistula takedown, right colectomy, ileostomy, inguinofemoral hernia repair, right groin I&D, and wide drainage. Along with the intra-abdominal components, this would require extensive ongoing wound care in the right groin.   - Recommend non-operative management to improve her nutrition and care for her fistula. If patient declines, I would recommend goals of care discussion as surgery would not benefit her  - Palliative care consult today  - Unable to contact patient's family, will discuss with them if able to be reached  - Will continue to follow    Jose Rios MD  07/13/24  12:27 EDT

## 2024-07-13 NOTE — PROGRESS NOTES
"    [facility] Medicine Services  PROGRESS NOTE    Patient Name: Arcelia Walter  : 1945  MRN: [id number]    Date of Admission: 2024  Primary Care Physician: Provider, No Known    Subjective   Subjective     CC:  Abdominal perforation f/u    HPI:  Patient is only oriented to self, close on month and year (August... , ...)  Does not know where she is now \"maybe an overnight place?\" Or where she lived prior  Unsure ifshe has maybe a sister in [place], maybe a mother in nursing home care  Unaware of being in the hospital of the situation    Denies any pain  Nurse bedside who also cared for patient yesterday reports she was oriented to self, +/- date as well yesterday  Some leaking problems w drainage bag  overnight      Objective   Objective     Vital Signs:   Temp:  [97.3 °F (36.3 °C)-98.2 °F (36.8 °C)] 97.8 °F (36.6 °C)  Heart Rate:  [61-79] 66  Resp:  [16-18] 16  BP: (112-131)/(53-68) 131/53     Physical Exam:  Constitutional: Cachectic very thin older female sitting up in bed, counting change  Respiratory: Clear to auscultation bilaterally, respiratory effort normal on room air  Cardiovascular: RRR, no murmurs, rubs, or gallops  Gastrointestinal: Soft, swollen bump in right side of abdomen at iliac crest area w mild tenderness to focal palpation of this specific area, clear ostomy bag attached to skin opening with light brown water output  Musculoskeletal: Muscle tone extremely decreased throughout, no joint effusions appreciated  Psychiatric: Appropriate affect, cooperative in simple conversation  Neurologic: Alert and awake with clear speech. Only oriented to self, reports it is , maybe August. Does not know location. We discuss her situation and she then says \"may be better for you to talk to someone else who knows more\"  Skin: No rashes    Results Reviewed:  LAB RESULTS:      Lab 24  0453 24  1240 24  1105 24  0856 07/10/24  0556 " 07/08/24  0706 07/07/24  0518   WBC 4.23  --   --  5.97 8.32 9.88 8.41   HEMOGLOBIN 7.6*  --   --  7.7* 8.2* 8.8* 8.8*   HEMATOCRIT 23.3*  --   --  23.0* 24.6* 26.9* 26.7*   PLATELETS 543*  --   --  505* 433 352 340   NEUTROS ABS  --   --   --  4.90  --   --  7.15*   EOS ABS  --   --   --  0.06  --   --  0.00   MCV 94.3  --   --  93.1 92.1 94.4 92.1   CRP  --   --   --   --  9.72*  --  16.01*   PROCALCITONIN  --   --   --   --  0.20  --  0.30*   LACTATE  --   --  0.8  --   --   --   --    PROTIME  --  14.9*  --   --   --   --   --          Lab 07/13/24  0453 07/12/24  0856 07/10/24  1658 07/10/24  0556 07/08/24  0700 07/07/24  1928 07/07/24  0518   SODIUM 140 138  --  135* 139  --  139   POTASSIUM 3.8 3.8 4.7 3.3* 4.5   < > 3.5   CHLORIDE 106 105  --  102 108*  --  105   CO2 24.0 27.0  --  24.0 23.0  --  24.0   ANION GAP 10.0 6.0  --  9.0 8.0  --  10.0   BUN 10 14  --  11 12  --  10   CREATININE 0.30* 0.27*  --  0.25* 0.24*  --  0.31*   EGFR 108.7 111.5  --  113.6 114.7  --  107.9   GLUCOSE 75 107*  --  94 90  --  72   CALCIUM 7.7* 7.8*  --  7.8* 7.5*  --  7.5*    < > = values in this interval not displayed.         Lab 07/12/24  0856 07/07/24  0518   TOTAL PROTEIN 4.4* 4.4*   ALBUMIN 2.0* 2.0*   GLOBULIN 2.4 2.4   ALT (SGPT) 7 13   AST (SGOT) 8 13   BILIRUBIN 0.2 0.4   ALK PHOS 107 129*         Lab 07/12/24  1240   PROTIME 14.9*   INR 1.15*             Lab 07/12/24  1036 07/12/24  0856   IRON  --  22*   IRON SATURATION (TSAT)  --  21   TIBC  --  107*   TRANSFERRIN  --  72*   FERRITIN  --  387.30*   FOLATE 3.83*  --    VITAMIN B 12 1,117*  --          Brief Urine Lab Results  (Last result in the past 365 days)        Color   Clarity   Blood   Leuk Est   Nitrite   Protein   CREAT   Urine HCG        06/29/24 0038 Yellow   Clear   Negative   Negative   Negative   Trace                   Microbiology Results Abnormal       Procedure Component Value - Date/Time    Blood Culture - Blood, Hand, Right [[id number]]  (Normal)  Collected: 07/01/24 1558    Lab Status: Final result Specimen: Blood from Hand, Right Updated: 07/06/24 1731     Blood Culture No growth at 5 days    Narrative:      Aerobic bottle only  Less than seven (7) mL's of blood was collected.  Insufficient quantity may yield false negative results.    Blood Culture - Blood, Hand, Left [[id number]]  (Normal) Collected: 07/01/24 1558    Lab Status: Final result Specimen: Blood from Hand, Left Updated: 07/06/24 1731     Blood Culture No growth at 5 days            CT Guided Percutaneous Drain Pelvis    Result Date: 7/12/2024  Limited CT abdomen Date of Exam: 7/12/2024 2:35 PM EDT Indication: RLQ abscess extending into thigh. Comparison: CT study, July 12, 2024 Technique: After obtaining appropriate consent the patient was placed onto the CT table in the supine position with a time out performed. Initial scanning of the pelvis was performed. Note was made of foul-smelling brownish liquid gushing from a sizable wound in the region of the patient's known subcutaneous abscess. Findings: Note was made of contrast media into the dependent portion of the subcutaneous collection, clearly indicating a fistulous communication with bowel. Given this finding, and the copious amounts of foul-smelling brownish liquid exiting a sizable skin wound,  the decision was made to not place a percutaneous drain, as it would likely not add any therapeutic value. No safe percutaneous window to a phlegmonous/inflamed region within her right pelvis was identified either. These findings and the decision to not proceed with drainage was discussed by telephone, in detail with Dr. Houston of the hospitalist service.     Impression: Impression: Limited abdominal pelvic CT with concerning findings as described above in great detail. Electronically Signed: Candace Dubose MD  7/12/2024 3:29 PM EDT  Workstation ID: [id number]    CT Abdomen Pelvis With Contrast    Result Date: 7/12/2024  CT ABDOMEN PELVIS W  CONTRAST Date of Exam: 7/12/2024 11:06 AM EDT Indication: RLQ draining abscess vs fistula, noted to have hernia on prior CT. Comparison: CT of the abdomen and pelvis dated 7/6/2024 Technique: Axial CT images were obtained of the abdomen and pelvis following the uneventful intravenous administration of 85 mL Isovue-300. Reconstructed coronal and sagittal images were also obtained. Automated exposure control and iterative construction methods were used. Findings: Liver: The liver is unremarkable in morphology. No focal liver lesion is seen. No biliary dilation is seen. Gallbladder: Unremarkable. Pancreas: Unremarkable. Spleen: Unremarkable. Adrenal glands: Unremarkable. Genitourinary tract: Kidneys are unremarkable. No hydronephrosis is seen. The visualized portions of the ureters and urinary bladder appear unremarkable. Gastrointestinal tract: There is ill-defined soft tissue prominence centered about the ileocecal region measuring approximately 7 x 5 cm (series 2, images 70 through 95). Several mildly prominent small bowel loops measuring up to 3.5 cm. Contrast is seen  within the small and large bowel. Appendix: The appendix is not definitely identified. Other findings: Mild to moderate free fluid is present. No there appear to be several small extraluminal air density foci about the cecum. No pathologically enlarged lymph nodes are seen. Vascular calcifications are present. The IVC is unremarkable. Bones and soft tissues: Lobulated fluid/air density collection noted within the superficial soft tissues of the right lower abdomen extending into the proximal thigh. This measures approximately 9 x 4.5 cm. This appears to communicate with a thin pericecal or periappendiceal fluid collection via a sinus tract seen on series 2, image 89. No acute osseous lesion is seen. No acute osseous lesion is seen. Bones are demineralized. There are degenerative changes within the spine. Lung bases: Trace bilateral pleural effusions  with bibasilar atelectasis.     Impression: Impression: 1.9 x 4.5 cm abscess within the superficial soft tissues of the right lower abdomen extending into the proximal thigh. This appears to communicate with a thin pericecal or periappendiceal fluid collection via a sinus tract seen on series 2, image 89. 2.There is ill-defined soft tissue prominence centered about the ileocecal region measuring approximately 7 x 5 cm (series 2, images 70 through 95). The appendix is not definitely identified. Appearance is concerning for pericecal abscess/phlegmon which may be related to perforated appendicitis or focal colitis. An underlying neoplastic mass lesion at the ileocecal region cannot be excluded. 3.Mild to moderate intra-abdominal free fluid. 4.Trace bilateral pleural effusions with bibasilar atelectasis. The above findings were discussed with Renetta Dawson via telephone on 7/12/2024 11:40 AM EDT. Electronically Signed: Ranjith Redding MD  7/12/2024 11:40 AM EDT  Workstation ID: [id number]     Results for orders placed during the hospital encounter of 06/28/24    Adult Transthoracic Echo Complete W/ Cont if Necessary Per Protocol    Interpretation Summary    Left ventricular systolic function is normal. Left ventricular ejection fraction appears to be 61 - 65%.    There is mild calcification of the aortic valve.    Mild aortic valve regurgitation is present.    Mild mitral annular calcification is present.    Mild to moderate mitral valve regurgitation is present.    Mild tricuspid valve regurgitation is present.      Current medications:  Scheduled Meds:mirtazapine, 15 mg, Oral, Nightly  nicotine, 1 patch, Transdermal, Q24H  piperacillin-tazobactam, 3.375 g, Intravenous, Q8H  sodium chloride, 10 mL, Intravenous, Q12H  thiamine, 100 mg, Oral, Daily      Continuous Infusions:   PRN Meds:.  acetaminophen    senna-docusate sodium **AND** polyethylene glycol **AND** bisacodyl **AND** bisacodyl    Calcium Replacement - Follow  Nurse / BPA Driven Protocol    Magnesium Standard Dose Replacement - Follow Nurse / BPA Driven Protocol    melatonin    Phosphorus Replacement - Follow Nurse / BPA Driven Protocol    Potassium Replacement - Follow Nurse / BPA Driven Protocol    sodium chloride    sodium chloride    Assessment & Plan   Assessment & Plan     Active Hospital Problems    Diagnosis  POA    Failure to thrive in adult [R62.7]  Yes    Enterocutaneous fistula [K63.2]  Yes    Intra-abdominal fluid collection [R18.8]  Yes    Severe malnutrition [E43]  Yes      Resolved Hospital Problems   No resolved problems to display.        Brief Hospital Course to date:  Arcelia Walter is a 79 y.o. female w completely unknown pmhx who presented after being found down by EMS on welfare check. No emergency contacts/collateral available in system.   Admitted for altered mental status, working on placement/emergency guardianship.    Area in right lower abdomen of concern noted 7/5, CT without contrast initially attributed to right femoral hernia but then began to leak brown foul smelling fluid 7/12. 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/other GI perforation  -draining into bedside placed ostomy bag low volume currently, patient not yet systemically ill/septic  -evaluated by IR 7/13, given self draining, an IR drain plcmt thought not to add  -source control is likely impossible without surgery  -surgery risk very high given BMI 13 and overall significant deconditioning  -on zosyn  -will need to clarify with all stakeholders best next steps: if not deemed surgical candidate, will need to work through process for trx to comfort care without guardian. My hospitalist partner dc/w surgery 7/12 and they will evaluate today  **D/w Dr Rios - surgery for definitive treatment would not benefit patient, poor wound  healing/re-fistulization etc. May consider staging up current drainage site but no other intervention planned  **Will work to clarify code status/etc as below. [facility] involvement on Monday, zosyn until then. Overall, feel this will still in result be fatal but with self drainage + abx + no current sx may have more time    Acute anemia  -downtrending since admission without clear melena/hematochezia reported  -some fluid collection in abdomen could be blood, see above     Severe chronic malnutrition, BMI 13 - patient currently NPO for surgery eval, reestablish nutrition asap  Appears chronic dementia/FTT - nothing prior in system, but appears chronic and not acute encephalopathy given stability and overall state    Lack of capacity - no capacity on my assessment and those of my partners prior    Lack of guardian - per charting, APS in process of emergency guardianship. I have d/w SW today to work to confirm that we do not currently have any emergency contact/family information/guardian. Also asked if APS was involved prior to help provide any collateral of her baseline    I worry that above abdominal issue would require intensive management/intervention that patient may not survive given her baseline status.     I do not know patient baseline given lack of information/contact but given her current physical state suspect longstanding dementia + failure to thrive + severe malnutrition and that makes me even more concerned that these interventions could cause her pain short-term, high risk of complications infection and overall nutritional concerns longstanding and though may prolong her life would not improve her overall poor trajectory and limited remaining time. Will await surgical opinion, work to clarify with my SW/CM team her current state and again double-check there is no one involved we can contact/make decisions with/confirm baseline with. Will discuss w surgery whether a surgical intervention would provide more  benefit than harm given complexity of choice. Currently, is self-draining without pain or s/s of sepsis.    Overall, think it is clearer that CPR and ventilation would not benefit her in her current state with FTT and (what appears to be) dementia. Will work to confer with 2nd physician as to this point for code status change in patient's best interest    Will add palliative consultation    VTE Prophylaxis:  Mechanical VTE prophylaxis orders are present.         AM-PAC 6 Clicks Score (PT): 18 (07/13/24 0800)    CODE STATUS:   Code Status and Medical Interventions:   Ordered at: 06/29/24 0451     Code Status (Patient has no pulse and is not breathing):    CPR (Attempt to Resuscitate)     Medical Interventions (Patient has pulse or is breathing):    Full Support       Leigh Ann Vasquez MD  07/13/24    Total time spent: Time Spent: Time Spent: 55 minutes  Time spent includes time reviewing chart, face-to-face time, counseling patient/family/caregiver, ordering medications/tests/procedures, communicating with other health care professionals, documenting clinical information in the electronic health record, and coordination of care.

## 2024-07-14 LAB
DEPRECATED RDW RBC AUTO: 53.8 FL (ref 37–54)
ERYTHROCYTE [DISTWIDTH] IN BLOOD BY AUTOMATED COUNT: 15.5 % (ref 12.3–15.4)
HCT VFR BLD AUTO: 25.2 % (ref 34–46.6)
HGB BLD-MCNC: 7.9 G/DL (ref 12–15.9)
MCH RBC QN AUTO: 29.8 PG (ref 26.6–33)
MCHC RBC AUTO-ENTMCNC: 31.3 G/DL (ref 31.5–35.7)
MCV RBC AUTO: 95.1 FL (ref 79–97)
PLATELET # BLD AUTO: 660 10*3/MM3 (ref 140–450)
PMV BLD AUTO: 9.3 FL (ref 6–12)
RBC # BLD AUTO: 2.65 10*6/MM3 (ref 3.77–5.28)
WBC NRBC COR # BLD AUTO: 5.78 10*3/MM3 (ref 3.4–10.8)

## 2024-07-14 PROCEDURE — 99232 SBSQ HOSP IP/OBS MODERATE 35: CPT | Performed by: INTERNAL MEDICINE

## 2024-07-14 PROCEDURE — 25010000002 PIPERACILLIN SOD-TAZOBACTAM PER 1 G: Performed by: FAMILY MEDICINE

## 2024-07-14 PROCEDURE — 85027 COMPLETE CBC AUTOMATED: CPT | Performed by: INTERNAL MEDICINE

## 2024-07-14 RX ADMIN — Medication 1 PATCH: at 08:35

## 2024-07-14 RX ADMIN — PIPERACILLIN AND TAZOBACTAM 3.38 G: 3; .375 INJECTION, POWDER, LYOPHILIZED, FOR SOLUTION INTRAVENOUS at 17:20

## 2024-07-14 RX ADMIN — PIPERACILLIN AND TAZOBACTAM 3.38 G: 3; .375 INJECTION, POWDER, LYOPHILIZED, FOR SOLUTION INTRAVENOUS at 01:24

## 2024-07-14 RX ADMIN — PIPERACILLIN AND TAZOBACTAM 3.38 G: 3; .375 INJECTION, POWDER, LYOPHILIZED, FOR SOLUTION INTRAVENOUS at 10:12

## 2024-07-14 RX ADMIN — Medication 10 ML: at 20:19

## 2024-07-14 RX ADMIN — Medication 5 MG: at 20:18

## 2024-07-14 RX ADMIN — THIAMINE HCL TAB 100 MG 100 MG: 100 TAB at 08:35

## 2024-07-14 RX ADMIN — MIRTAZAPINE 15 MG: 15 TABLET, FILM COATED ORAL at 20:18

## 2024-07-14 RX ADMIN — Medication 10 ML: at 08:44

## 2024-07-14 NOTE — PLAN OF CARE
Goal Outcome Evaluation:  Plan of Care Reviewed With: patient        Progress: no change  Outcome Evaluation: VSS, on RA. No c/o pain. UOP-WNL. Pt slept well. Up with assist X1. Pt spent 3-4 hours in the chair this shift. Will continue to monitor.

## 2024-07-14 NOTE — PROGRESS NOTES
"    [facility] Medicine Services  PROGRESS NOTE    Patient Name: Arcelia Walter  : 1945  MRN: [id number]    Date of Admission: 2024  Primary Care Physician: Provider, No Known    Subjective   Subjective     CC:  Abdominal perforation f/u    HPI:  Reports last night was \"much the same\"  Doesn't remember meeting me yesterday and apologizes for this  When asked directly about abdominal pain she reports it hurts \"sometimes when my hand drops and hits it\" but seems unbothered  She reports again a sister who lives in [place]. She cannot name any addresses or any phone numbers for me - we go through her purse and wallet together and can only discover check book/bank statements but nothing that hints at another person who knows her. In fact, she has an emergency contact card in her wallet that has never been filled out.      Objective   Objective     Vital Signs:   Temp:  [97.5 °F (36.4 °C)-97.8 °F (36.6 °C)] 97.7 °F (36.5 °C)  Heart Rate:  [63-74] 63  Resp:  [16-18] 18  BP: (102-138)/(62-98) 126/64     Physical Exam:  Constitutional: Cachectic very thin older female sitting up in bed. Very comfortable appearing  Respiratory: Clear to auscultation bilaterally, respiratory effort normal on room air  Cardiovascular: RRR, no murmurs, rubs, or gallops  Gastrointestinal: Soft, swollen bump in right side of abdomen at iliac crest area w mild tenderness to focal palpation of this specific area, clear ostomy bag attached to skin opening with light brown/yellow water output  Musculoskeletal: Muscle tone extremely decreased throughout, no joint effusions appreciated  Psychiatric: Appropriate affect, cooperative in simple conversation  Neurologic: Alert and awake with clear speech. Only oriented to self  Skin: No rashes    Results Reviewed:  LAB RESULTS:      Lab 24  0702 24  0453 24  1240 24  1105 24  0856 07/10/24  0556 24  0706   WBC 5.78 4.23  --   --  5.97 8.32 " 9.88   HEMOGLOBIN 7.9* 7.6*  --   --  7.7* 8.2* 8.8*   HEMATOCRIT 25.2* 23.3*  --   --  23.0* 24.6* 26.9*   PLATELETS 660* 543*  --   --  505* 433 352   NEUTROS ABS  --   --   --   --  4.90  --   --    EOS ABS  --   --   --   --  0.06  --   --    MCV 95.1 94.3  --   --  93.1 92.1 94.4   CRP  --   --   --   --   --  9.72*  --    PROCALCITONIN  --   --   --   --   --  0.20  --    LACTATE  --   --   --  0.8  --   --   --    PROTIME  --   --  14.9*  --   --   --   --          Lab 07/13/24  0453 07/12/24  0856 07/10/24  1658 07/10/24  0556 07/08/24  0700   SODIUM 140 138  --  135* 139   POTASSIUM 3.8 3.8 4.7 3.3* 4.5   CHLORIDE 106 105  --  102 108*   CO2 24.0 27.0  --  24.0 23.0   ANION GAP 10.0 6.0  --  9.0 8.0   BUN 10 14  --  11 12   CREATININE 0.30* 0.27*  --  0.25* 0.24*   EGFR 108.7 111.5  --  113.6 114.7   GLUCOSE 75 107*  --  94 90   CALCIUM 7.7* 7.8*  --  7.8* 7.5*         Lab 07/12/24  0856   TOTAL PROTEIN 4.4*   ALBUMIN 2.0*   GLOBULIN 2.4   ALT (SGPT) 7   AST (SGOT) 8   BILIRUBIN 0.2   ALK PHOS 107         Lab 07/12/24  1240   PROTIME 14.9*   INR 1.15*             Lab 07/12/24  1036 07/12/24  0856   IRON  --  22*   IRON SATURATION (TSAT)  --  21   TIBC  --  107*   TRANSFERRIN  --  72*   FERRITIN  --  387.30*   FOLATE 3.83*  --    VITAMIN B 12 1,117*  --          Brief Urine Lab Results  (Last result in the past 365 days)        Color   Clarity   Blood   Leuk Est   Nitrite   Protein   CREAT   Urine HCG        06/29/24 0038 Yellow   Clear   Negative   Negative   Negative   Trace                   Microbiology Results Abnormal       Procedure Component Value - Date/Time    Blood Culture - Blood, Hand, Left [[id number]]  (Normal) Collected: 07/12/24 1033    Lab Status: Preliminary result Specimen: Blood from Hand, Left Updated: 07/14/24 1245     Blood Culture No growth at 2 days    Narrative:      Less than seven (7) mL's of blood was collected.  Insufficient quantity may yield false negative results.    Blood  Culture - Blood, Hand, Right [[id number]]  (Normal) Collected: 07/12/24 1033    Lab Status: Preliminary result Specimen: Blood from Hand, Right Updated: 07/14/24 1245     Blood Culture No growth at 2 days    Narrative:      Less than seven (7) mL's of blood was collected.  Insufficient quantity may yield false negative results.    Blood Culture - Blood, Hand, Right [[id number]]  (Normal) Collected: 07/01/24 1558    Lab Status: Final result Specimen: Blood from Hand, Right Updated: 07/06/24 1731     Blood Culture No growth at 5 days    Narrative:      Aerobic bottle only  Less than seven (7) mL's of blood was collected.  Insufficient quantity may yield false negative results.    Blood Culture - Blood, Hand, Left [[id number]]  (Normal) Collected: 07/01/24 1558    Lab Status: Final result Specimen: Blood from Hand, Left Updated: 07/06/24 1731     Blood Culture No growth at 5 days            CT Guided Percutaneous Drain Pelvis    Result Date: 7/12/2024  Limited CT abdomen Date of Exam: 7/12/2024 2:35 PM EDT Indication: RLQ abscess extending into thigh. Comparison: CT study, July 12, 2024 Technique: After obtaining appropriate consent the patient was placed onto the CT table in the supine position with a time out performed. Initial scanning of the pelvis was performed. Note was made of foul-smelling brownish liquid gushing from a sizable wound in the region of the patient's known subcutaneous abscess. Findings: Note was made of contrast media into the dependent portion of the subcutaneous collection, clearly indicating a fistulous communication with bowel. Given this finding, and the copious amounts of foul-smelling brownish liquid exiting a sizable skin wound,  the decision was made to not place a percutaneous drain, as it would likely not add any therapeutic value. No safe percutaneous window to a phlegmonous/inflamed region within her right pelvis was identified either. These findings and the decision to not proceed with  drainage was discussed by telephone, in detail with Dr. Houston of the hospitalist service.     Impression: Impression: Limited abdominal pelvic CT with concerning findings as described above in great detail. Electronically Signed: Candace Dubose MD  7/12/2024 3:29 PM EDT  Workstation ID: [id number]     Results for orders placed during the hospital encounter of 06/28/24    Adult Transthoracic Echo Complete W/ Cont if Necessary Per Protocol    Interpretation Summary    Left ventricular systolic function is normal. Left ventricular ejection fraction appears to be 61 - 65%.    There is mild calcification of the aortic valve.    Mild aortic valve regurgitation is present.    Mild mitral annular calcification is present.    Mild to moderate mitral valve regurgitation is present.    Mild tricuspid valve regurgitation is present.      Current medications:  Scheduled Meds:mirtazapine, 15 mg, Oral, Nightly  nicotine, 1 patch, Transdermal, Q24H  piperacillin-tazobactam, 3.375 g, Intravenous, Q8H  sodium chloride, 10 mL, Intravenous, Q12H  thiamine, 100 mg, Oral, Daily      Continuous Infusions:   PRN Meds:.  acetaminophen    senna-docusate sodium **AND** polyethylene glycol **AND** bisacodyl **AND** bisacodyl    Calcium Replacement - Follow Nurse / BPA Driven Protocol    Magnesium Standard Dose Replacement - Follow Nurse / BPA Driven Protocol    melatonin    Phosphorus Replacement - Follow Nurse / BPA Driven Protocol    Potassium Replacement - Follow Nurse / BPA Driven Protocol    sodium chloride    sodium chloride    Assessment & Plan   Assessment & Plan     Active Hospital Problems    Diagnosis  POA    Failure to thrive in adult [R62.7]  Yes    Enterocutaneous fistula [K63.2]  Yes    Intra-abdominal fluid collection [R18.8]  Yes    Cognitive impairment [R41.89]  Yes    Severe malnutrition [E43]  Yes      Resolved Hospital Problems   No resolved problems to display.        Brief Hospital Course to date:  Arcelia Walter is a 79  y.o. female w completely unknown pmhx who presented after being found down by EMS on welfare check. No emergency contacts/collateral available in system.   Admitted for altered mental status, working on placement/emergency guardianship.  Area in right lower abdomen of concern noted 7/5, CT without contrast initially attributed to right femoral hernia but then began to leak brown foul smelling fluid 7/12. 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  Given patient's severe malnutrition, chronic failure to thrive, concern also for chronic dementia given the above (though collateral unable to be obtained), discussed with multiple physicians, ethics/palliative and finalized no escalation of care plan (no surgery, no artificial nutrition) on 7/13. Patient still asx from this but will need plan forward.    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/other GI perforation  -draining into bedside placed ostomy bag low volume currently, patient not yet systemically ill/septic  -evaluated by IR 7/13, given self draining, an IR drain plcmt thought not to add  -source control is likely impossible without surgery, but surgery risk very high given BMI 13 and overall significant deconditioning and would lead to poor healing/poor outcome/infection  -on zosyn for now  -appreciate ethics/palliative assistance moving forward. Will d/w them more tmrw on long-term plan here. Given rising thrombocytosis do suspect she will become septic in days-weeks which will be fatal    Acute anemia  -downtrending since admission without clear melena/hematochezia reported  -some fluid collection in abdomen could be blood, see above     Severe chronic malnutrition, BMI 13 - encouraging intake but minimal even before acute condition    Appears chronic dementia/FTT - nothing prior in system, but appears chronic and not acute encephalopathy given  stability and overall state    Lack of capacity - no capacity on my assessment and those of my partners prior    Lack of guardian - per charting, APS in process of emergency guardianship. I have d/w SW today to work to confirm that we do not currently have any emergency contact/family information/guardian. Also asked if APS was involved prior to help provide any collateral of her baseline. No contact back over weekend. I have personally discussed with patient for some time to try to find someone who knew her and she knows no one's contact information to establish contact    VTE Prophylaxis:  Mechanical VTE prophylaxis orders are present.         AM-PAC 6 Clicks Score (PT): 18 (07/13/24 2000)    CODE STATUS:   Code Status and Medical Interventions:   Ordered at: 07/13/24 1359     Medical Intervention Limits:    No intubation (DNI)    Other     Code Status (Patient has no pulse and is not breathing):    No CPR (Do Not Attempt to Resuscitate)     Medical Interventions (Patient has pulse or is breathing):    Limited Support     Additional Medical Interventions Limits:    no escalation of care     Leigh Ann Vasquez MD  07/14/24

## 2024-07-14 NOTE — PROGRESS NOTES
"Patient Name:  Arcelia Walter  YOB: 1945  [id number]    Surgery Progress Note    Date of visit: 7/14/2024    Subjective   No acute changes. Afebrile. Right inguinal/hip wound still productive with ostomy bag in place. Eating upon my evaluation.     Objective       /64 (BP Location: Left arm, Patient Position: Lying)   Pulse 63   Temp 97.7 °F (36.5 °C) (Temporal)   Resp 18   Ht 149.9 cm (59.02\")   Wt 30.8 kg (67 lb 12.8 oz)   SpO2 94%   BMI 13.69 kg/m²     Intake/Output Summary (Last 24 hours) at 7/14/2024 1407  Last data filed at 7/14/2024 1231  Gross per 24 hour   Intake 100 ml   Output 350 ml   Net -250 ml       General: confused, ill-appearing, cachectic, no acute distress  HEENT: normocephalic, atraumatic, sclerae anicteric, external ears normal   Cardiovascular: regular rate and regular rhythm  Pulmonary: breathing comfortably on room air, no respiratory distress  Gastrointestinal: soft, tender to palpation in the RLQ, non-distended, right inguinal region with a lateral open wound of about 2 cm in diameter with active yellow drainage consistent with succus with bag in place    Recent labs and imaging that are back at this time have been reviewed.     Labs:    Results from last 7 days   Lab Units 07/14/24  0702   WBC 10*3/mm3 5.78   HEMOGLOBIN g/dL 7.9*   HEMATOCRIT % 25.2*   PLATELETS 10*3/mm3 660*     Results from last 7 days   Lab Units 07/13/24  0453 07/12/24  0856   SODIUM mmol/L 140 138   POTASSIUM mmol/L 3.8 3.8   CHLORIDE mmol/L 106 105   CO2 mmol/L 24.0 27.0   BUN mg/dL 10 14   CREATININE mg/dL 0.30* 0.27*   CALCIUM mg/dL 7.7* 7.8*   BILIRUBIN mg/dL  --  0.2   ALK PHOS U/L  --  107   ALT (SGPT) U/L  --  7   AST (SGOT) U/L  --  8   GLUCOSE mg/dL 75 107*     Results from last 7 days   Lab Units 07/13/24  0453   SODIUM mmol/L 140   POTASSIUM mmol/L 3.8   CHLORIDE mmol/L 106   CO2 mmol/L 24.0   BUN mg/dL 10   CREATININE mg/dL 0.30*   GLUCOSE mg/dL 75   CALCIUM mg/dL 7.7*     Lab " Results   Lab Value Date/Time    LIPASE 15 06/29/2024 0033            Assessment & Plan   Arcelia Walter is a 79 y.o. female with severe protein-calorie malnutrition, altered mental status, unknown other PMH presenting after being found down and recently having a right inguinal hernia reduced now with a right groin abscess and evidence of fistulous connection to the cecum/small bowel     Right groin abscess/fistula  - Recommend conservative management of right groin wound/fistula with ostomy bag  - Continue IV Zosyn, or other antibiotics per ID due to lateral cellulitis and induration  - Patient is not an operative candidate due to nutrition  - Palliative following  - Will follow peripherally as patient's family has been unable to be reached and for questions regarding fistula care    Problem List Items Addressed This Visit    None  Visit Diagnoses       Syncope, unspecified syncope type    -  Primary    Ketosis                Active Hospital Problems    Diagnosis  POA    Failure to thrive in adult [R62.7]  Yes    Enterocutaneous fistula [K63.2]  Yes    Intra-abdominal fluid collection [R18.8]  Yes    Cognitive impairment [R41.89]  Yes    Severe malnutrition [E43]  Yes      Resolved Hospital Problems   No resolved problems to display.        Jose Rios MD  7/14/2024  14:07 EDT

## 2024-07-14 NOTE — PLAN OF CARE
Goal Outcome Evaluation:         VSS, RA, denies any pain , no prn is given, Q 2 hr turned, scuds on, on Abx Zosyn r/t intraabdominal infection, one person assist, used bathroom, void spontaneously w/o difficulty, Brown, opaque and foul smelling drainage(50 ml) from ostomy bag, dsg over sacrum is changed, CDI, no drainage, will cont to mx.

## 2024-07-15 PROCEDURE — 99232 SBSQ HOSP IP/OBS MODERATE 35: CPT | Performed by: INTERNAL MEDICINE

## 2024-07-15 PROCEDURE — 25010000002 PIPERACILLIN SOD-TAZOBACTAM PER 1 G: Performed by: FAMILY MEDICINE

## 2024-07-15 RX ADMIN — PIPERACILLIN AND TAZOBACTAM 3.38 G: 3; .375 INJECTION, POWDER, LYOPHILIZED, FOR SOLUTION INTRAVENOUS at 09:12

## 2024-07-15 RX ADMIN — Medication 5 MG: at 20:59

## 2024-07-15 RX ADMIN — ACETAMINOPHEN 650 MG: 325 TABLET ORAL at 08:21

## 2024-07-15 RX ADMIN — Medication 10 ML: at 08:21

## 2024-07-15 RX ADMIN — PIPERACILLIN AND TAZOBACTAM 3.38 G: 3; .375 INJECTION, POWDER, LYOPHILIZED, FOR SOLUTION INTRAVENOUS at 18:12

## 2024-07-15 RX ADMIN — Medication 10 ML: at 20:59

## 2024-07-15 RX ADMIN — PIPERACILLIN AND TAZOBACTAM 3.38 G: 3; .375 INJECTION, POWDER, LYOPHILIZED, FOR SOLUTION INTRAVENOUS at 01:26

## 2024-07-15 RX ADMIN — Medication 1 PATCH: at 08:23

## 2024-07-15 RX ADMIN — THIAMINE HCL TAB 100 MG 100 MG: 100 TAB at 08:21

## 2024-07-15 RX ADMIN — MIRTAZAPINE 15 MG: 15 TABLET, FILM COATED ORAL at 20:59

## 2024-07-15 NOTE — NURSING NOTE
[facility] follow-up to coccyx, right groin fistula.    Noted right groin fistula with external fecal incontinence bag covering opening, appliance is intact no evidence of leaking and actually looks to be a good fit for the area this fistula is in.  Some brown-tan liquid drainage present in bag not enough to empty.  Recommend continuing external fecal incontinence bag as ostomy appliance for this fistula.  Change weekly and as needed with leakage.  Will place orders as reminder.    Chronic friction and moisture area present on bottom has opened up a little bit more despite Allevyn dressing being used as well as waffle mattress and cushion.  Patient is also quite mobile for her malnourished state, was up in chair upon my visit and easily rolled on her side and according to nursing staff is up with the standby assistance.  Once again believe this to be more of a friction related injury patient's skin was already compromised last time I visited.  Allevyn dressing was perfectly placed covering entire tailbone area.    Recommend continuing these dressings, continue to place Allevyn dressing over entire tailbone.

## 2024-07-15 NOTE — CASE MANAGEMENT/SOCIAL WORK
Continued Stay Note  [facility]     Patient Name: Arcelia Walter  MRN: [id number]  Today's Date: 7/15/2024    Admit Date: 6/28/2024    Plan: Onging: State Guardianship   Discharge Plan       Row Name 07/15/24 1237       Plan    Plan Onging: State Guardianship    Patient Needs State Guardianship? Yes  State Guardianship    Plan Comments SW'er met with patient at bedside. SW'er contacted APS worker supervisor JSUTINO Terry/ CHELSI Fuller.and  worker Zari, no answer; awaiting response. Patient awaiting State Guardianship.  Plan is ongoing                   Discharge Codes    No documentation.                 Expected Discharge Date and Time       Expected Discharge Date Expected Discharge Time    Jul 13, 2024               SHANTI Shaw (Kay)

## 2024-07-15 NOTE — PROGRESS NOTES
[facility] Medicine Services  PROGRESS NOTE    Patient Name: Arcelia Walter  : 1945  MRN: [id number]    Date of Admission: 2024  Primary Care Physician: Provider, No Known    Subjective   Subjective     CC:  Abdominal fistula f/u    HPI:  Patient without abdominal pain even when directly asked  Nursing reports episode of trying to get out of bed in evening but re-directable    Objective   Objective     Vital Signs:   Temp:  [97.6 °F (36.4 °C)-98.8 °F (37.1 °C)] 98 °F (36.7 °C)  Heart Rate:  [66-76] 76  Resp:  [16-18] 18  BP: (109-151)/(62-68) 109/62     Physical Exam:  Constitutional: No acute distress, awake, alert cachectic female sitting up in bed  HENT: NCAT, mucous membranes moist  Respiratory: Clear to auscultation bilaterally, respiratory effort normal   Cardiovascular: RRR, no murmurs, rubs, or gallops  Gastrointestinal: Soft, ostomy RLQ draining watery brown drainage, mild tenderness in RLQ but improved from prior  Musculoskeletal: Muscle tone significantly decreased throughout no joint effusions appreciated  Psychiatric: Appropriate affect, cooperative  Neurologic: Alert, oriented only to self not to location/date/or situation. Does not remember me day to day. , facial movements symmetric and spontaneous movement of all 4 extremities grossly equal bilaterally, speech clear  Skin: No rashes    Results Reviewed:  LAB RESULTS:      Lab 24  0702 24  0453 24  1240 24  1105 24  0856 07/10/24  0556   WBC 5.78 4.23  --   --  5.97 8.32   HEMOGLOBIN 7.9* 7.6*  --   --  7.7* 8.2*   HEMATOCRIT 25.2* 23.3*  --   --  23.0* 24.6*   PLATELETS 660* 543*  --   --  505* 433   NEUTROS ABS  --   --   --   --  4.90  --    EOS ABS  --   --   --   --  0.06  --    MCV 95.1 94.3  --   --  93.1 92.1   CRP  --   --   --   --   --  9.72*   PROCALCITONIN  --   --   --   --   --  0.20   LACTATE  --   --   --  0.8  --   --    PROTIME  --   --  14.9*  --   --   --           Lab 07/13/24  0453 07/12/24  0856 07/10/24  1658 07/10/24  0556   SODIUM 140 138  --  135*   POTASSIUM 3.8 3.8 4.7 3.3*   CHLORIDE 106 105  --  102   CO2 24.0 27.0  --  24.0   ANION GAP 10.0 6.0  --  9.0   BUN 10 14  --  11   CREATININE 0.30* 0.27*  --  0.25*   EGFR 108.7 111.5  --  113.6   GLUCOSE 75 107*  --  94   CALCIUM 7.7* 7.8*  --  7.8*         Lab 07/12/24  0856   TOTAL PROTEIN 4.4*   ALBUMIN 2.0*   GLOBULIN 2.4   ALT (SGPT) 7   AST (SGOT) 8   BILIRUBIN 0.2   ALK PHOS 107         Lab 07/12/24  1240   PROTIME 14.9*   INR 1.15*             Lab 07/12/24  1036 07/12/24  0856   IRON  --  22*   IRON SATURATION (TSAT)  --  21   TIBC  --  107*   TRANSFERRIN  --  72*   FERRITIN  --  387.30*   FOLATE 3.83*  --    VITAMIN B 12 1,117*  --          Brief Urine Lab Results  (Last result in the past 365 days)        Color   Clarity   Blood   Leuk Est   Nitrite   Protein   CREAT   Urine HCG        06/29/24 0038 Yellow   Clear   Negative   Negative   Negative   Trace                   Microbiology Results Abnormal       Procedure Component Value - Date/Time    Blood Culture - Blood, Hand, Left [[id number]]  (Normal) Collected: 07/12/24 1033    Lab Status: Preliminary result Specimen: Blood from Hand, Left Updated: 07/15/24 1245     Blood Culture No growth at 3 days    Narrative:      Less than seven (7) mL's of blood was collected.  Insufficient quantity may yield false negative results.    Blood Culture - Blood, Hand, Right [[id number]]  (Normal) Collected: 07/12/24 1033    Lab Status: Preliminary result Specimen: Blood from Hand, Right Updated: 07/15/24 1245     Blood Culture No growth at 3 days    Narrative:      Less than seven (7) mL's of blood was collected.  Insufficient quantity may yield false negative results.    Blood Culture - Blood, Hand, Right [[id number]]  (Normal) Collected: 07/01/24 1558    Lab Status: Final result Specimen: Blood from Hand, Right Updated: 07/06/24 173     Blood Culture No growth at 5 days     Narrative:      Aerobic bottle only  Less than seven (7) mL's of blood was collected.  Insufficient quantity may yield false negative results.    Blood Culture - Blood, Hand, Left [[id number]]  (Normal) Collected: 07/01/24 1558    Lab Status: Final result Specimen: Blood from Hand, Left Updated: 07/06/24 1739     Blood Culture No growth at 5 days            No radiology results from the last 24 hrs    Results for orders placed during the hospital encounter of 06/28/24    Adult Transthoracic Echo Complete W/ Cont if Necessary Per Protocol    Interpretation Summary    Left ventricular systolic function is normal. Left ventricular ejection fraction appears to be 61 - 65%.    There is mild calcification of the aortic valve.    Mild aortic valve regurgitation is present.    Mild mitral annular calcification is present.    Mild to moderate mitral valve regurgitation is present.    Mild tricuspid valve regurgitation is present.      Current medications:  Scheduled Meds:mirtazapine, 15 mg, Oral, Nightly  nicotine, 1 patch, Transdermal, Q24H  piperacillin-tazobactam, 3.375 g, Intravenous, Q8H  sodium chloride, 10 mL, Intravenous, Q12H  thiamine, 100 mg, Oral, Daily      Continuous Infusions:   PRN Meds:.  acetaminophen    senna-docusate sodium **AND** polyethylene glycol **AND** bisacodyl **AND** bisacodyl    Calcium Replacement - Follow Nurse / BPA Driven Protocol    Magnesium Standard Dose Replacement - Follow Nurse / BPA Driven Protocol    melatonin    Phosphorus Replacement - Follow Nurse / BPA Driven Protocol    Potassium Replacement - Follow Nurse / BPA Driven Protocol    sodium chloride    sodium chloride    Assessment & Plan   Assessment & Plan     Active Hospital Problems    Diagnosis  POA    Failure to thrive in adult [R62.7]  Yes    Enterocutaneous fistula [K63.2]  Yes    Intra-abdominal fluid collection [R18.8]  Yes    Cognitive impairment [R41.89]  Yes    Severe malnutrition [E43]  Yes      Resolved Hospital  Problems   No resolved problems to display.        Brief Hospital Course to date:  Arcelia Walter is a 79 y.o. female w completely unknown pmhx who presented after being found down by EMS on welfare check. No emergency contacts/collateral available in system.   Admitted for altered mental status but after review appears chronic, working on placement/emergency guardianship.  Area in right lower abdomen of concern noted 7/5, CT without contrast initially attributed to right femoral hernia but then began to leak brown foul smelling fluid 7/12. 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  Given patient's severe malnutrition, chronic failure to thrive, concern also for chronic dementia given the above (though collateral unable to be obtained), discussed with multiple physicians, ethics/palliative and finalized no escalation of care plan (no surgery, no artificial nutrition) on 7/13. Patient still asx from this but will need to plan moving forward. Will work with palliative/ethics and monitor her for symptoms     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/other GI perforation  -draining into bedside placed ostomy bag low volume currently, patient not yet systemically ill/septic  -evaluated by IR 7/13, given self draining, an IR drain plcmt thought not to add  -source control is likely impossible without surgery, but surgery risk very high given BMI 13 and overall significant deconditioning and would lead to poor healing/poor outcome/infection  -on zosyn for now, but will not obtain sx control  -monitor over next 48 hours. Make final abx plan pending progress/overall goals but given above high concern  -appreciate ethics/palliative assistance moving forward     Acute anemia  -downtrending since admission without clear melena/hematochezia reported  -some fluid collection in abdomen could be blood, see above      Severe  chronic malnutrition, BMI 13 - encouraging intake but minimal even before acute condition     Appears chronic dementia/FTT - nothing prior in system, but appears chronic and not acute encephalopathy given stability and overall state     Lack of capacity - no capacity on my assessment and those of my partners prior     Lack of guardian - per charting, APS in process of emergency guardianship. I left a message for APS worker on 7/15 - patient consistently reports to me having three sisters, Frances Lema Art, Caro Tobar Art and Dunia Hooks Art. Will work to communicate this to APS as well. Patient unable to provide any contact information for any of these people    GOC: Given lack of guardian currently, 2 physicians believe CPR/intubation would be all harm without benefit to patient. This code status changed w documentation on 7/13. Surgery also thought to be in this category of harm without benefit, will not escalate care. Given this - appreciate palliative/ethics on best next steps given abdominal process without possible source control    Expected Discharge Location and Transportation: complex [facility]t  Expected Discharge 7/22 (Discharge date is tentative pending patient's medical condition and is subject to change)  Expected Discharge Date: 7/22/2024; Expected Discharge Time:      VTE Prophylaxis:  Mechanical VTE prophylaxis orders are present.         AM-PAC 6 Clicks Score (PT): 16 (07/15/24 5538)    CODE STATUS:   Code Status and Medical Interventions:   Ordered at: 07/13/24 2707     Medical Intervention Limits:    No intubation (DNI)    Other     Code Status (Patient has no pulse and is not breathing):    No CPR (Do Not Attempt to Resuscitate)     Medical Interventions (Patient has pulse or is breathing):    Limited Support     Additional Medical Interventions Limits:    no escalation of care       Leigh Ann Vasquez MD  07/15/24

## 2024-07-15 NOTE — PLAN OF CARE
Goal Outcome Evaluation:           Progress: no change  Outcome Evaluation: VSS. Pt has no complaints of pain during shift. Ambulated to restroom with assist. Pt unsteady on feet. Will continue to monitor.

## 2024-07-15 NOTE — PROGRESS NOTES
"General Surgery Daily Progress Note    Events noted through the weekend.    Subjective:  No new complaints.  Has not eaten any today, not hungry.  Does not complain of any pain currently.    Objective:  /66 (BP Location: Right arm, Patient Position: Lying)   Pulse 70   Temp 97.9 °F (36.6 °C) (Temporal)   Resp 18   Ht 149.9 cm (59.02\")   Wt 29.8 kg (65 lb 11.2 oz)   SpO2 97%   BMI 13.26 kg/m²     General Appearance: Pleasantly confused.  Eyes: Anicteric  Neck: Trachea midline   Cardiovascular:  RRR without murmur nor rub  Lungs:  Bilateral respirations unlabored   Abdomen:  Soft, stoma bag covering the opening, purulent drainage (stool) in the bag.    Extremities:  No cyanosis or edema   Skin:  No obvious rashes   Neurologic: awake and conversant, confused    CBC:  Results from last 7 days   Lab Units 07/14/24  0702   WBC 10*3/mm3 5.78   HEMOGLOBIN g/dL 7.9*   HEMATOCRIT % 25.2*   PLATELETS 10*3/mm3 660*       CMP:  Results from last 7 days   Lab Units 07/13/24  0453 07/12/24  0856   SODIUM mmol/L 140 138   POTASSIUM mmol/L 3.8 3.8   CHLORIDE mmol/L 106 105   CO2 mmol/L 24.0 27.0   BUN mg/dL 10 14   CREATININE mg/dL 0.30* 0.27*   CALCIUM mg/dL 7.7* 7.8*   BILIRUBIN mg/dL  --  0.2   ALK PHOS U/L  --  107   ALT (SGPT) U/L  --  7   AST (SGOT) U/L  --  8   GLUCOSE mg/dL 75 107*         Assessment:  Enterocutaneous fistula, right lower quadrant  Right inguinal hernia  Severe protein malnutrition  Cognitive impairment    Plan:   Secondary to severe protein malnutrition she is not a surgical candidate.  The goal would be to create a well-formed colocutaneous fistula.  Stoma bag in place.  Antibiotics per primary team.  Will see peripherally.    Carlos Eduardo Kaur MD - 7/15/2024, 16:45 EDT         "

## 2024-07-15 NOTE — PLAN OF CARE
"Goal Outcome Evaluation:  Plan of Care Reviewed With: patient        Progress: no change  Outcome Evaluation: Palliative RN saw pt at 1221.  New palliative consult for assistance with GOC per Dr. Vasquez.  No ACP nor NOK on file.  Palliative brochure and meal discount card left at BS in case any friends or family should arrive.  CIARAN Richter saw pt on 7/13 for initial provider visit.  Today, pt was sitting up in bed on RA and did not demonstrate any visible signs of discomfort.  Pt. was very pleasantly confused.  When asked about pain she stated, \"it depends on who it's for like a bunch of flat rocks\".  She proceeded to compare pain to \"uneven concrete-if you use it it hurts and even if you don't use it hurts\".  She finally relayed that her back hurts at times.  When asked how she slept overnight she said, \"about like always\".  When asked if she is able to walk she stated, \"it doesn't set me up to get organized with it\".  She had not touched her lunch tray and when asked about it she stated she ate a lot for breakfast and was still full.  RN stated she ate at least half of breakfast tray.  She stated pt. had appeared comfortable today.  Palliative care to continue to follow for support, POC and ongoing GOC.         Problem: Palliative Care  Goal: Enhanced Quality of Life  Intervention: Promote Advance Care Planning  Flowsheets (Taken 7/15/2024 1232)  Life Transition/Adjustment:   palliative care initiated   palliative care discussed        0930 Palliative IDT meeting: JAGJIT Mejia RN, ADDI; CIARAN Richter; THELMA Cooley MD.; DAVIS Patricio RN, CHTAYLER; THELMA Lerner, [facility], [facility]; DAVIS Johnson MDiv, [facility]; CHELSI Gross RN, CHTAYLER; CLAIRE Melendez MD; ELISABETH Trinh RN, CHPN    After hours, contact Palliative by calling [phone].                         "

## 2024-07-15 NOTE — PLAN OF CARE
Goal Outcome Evaluation:                 VSS, RA, prn tylenol is given r/t c/o pain to melonie lower leg and ankle, effective, up in chair in the morning, urinate w/o difficulty in the bathroom, ostomy bag in place, brown drainage, SCUDs on on, ABX Zosyn is running, no adverse effect noted, sacral dsg is changed, no drainage, resting well in bed.

## 2024-07-16 PROCEDURE — 97530 THERAPEUTIC ACTIVITIES: CPT

## 2024-07-16 PROCEDURE — 25010000002 PIPERACILLIN SOD-TAZOBACTAM PER 1 G: Performed by: FAMILY MEDICINE

## 2024-07-16 PROCEDURE — 99232 SBSQ HOSP IP/OBS MODERATE 35: CPT | Performed by: NURSE PRACTITIONER

## 2024-07-16 PROCEDURE — 97535 SELF CARE MNGMENT TRAINING: CPT

## 2024-07-16 PROCEDURE — 97110 THERAPEUTIC EXERCISES: CPT

## 2024-07-16 RX ADMIN — MIRTAZAPINE 15 MG: 15 TABLET, FILM COATED ORAL at 21:03

## 2024-07-16 RX ADMIN — THIAMINE HCL TAB 100 MG 100 MG: 100 TAB at 09:18

## 2024-07-16 RX ADMIN — Medication 10 ML: at 08:25

## 2024-07-16 RX ADMIN — PIPERACILLIN AND TAZOBACTAM 3.38 G: 3; .375 INJECTION, POWDER, LYOPHILIZED, FOR SOLUTION INTRAVENOUS at 01:03

## 2024-07-16 RX ADMIN — PIPERACILLIN AND TAZOBACTAM 3.38 G: 3; .375 INJECTION, POWDER, LYOPHILIZED, FOR SOLUTION INTRAVENOUS at 09:15

## 2024-07-16 RX ADMIN — Medication 5 MG: at 21:03

## 2024-07-16 RX ADMIN — PIPERACILLIN AND TAZOBACTAM 3.38 G: 3; .375 INJECTION, POWDER, LYOPHILIZED, FOR SOLUTION INTRAVENOUS at 17:27

## 2024-07-16 RX ADMIN — Medication 1 PATCH: at 09:18

## 2024-07-16 NOTE — THERAPY TREATMENT NOTE
Patient Name: Arcelia Waltre  : 1945    MRN: [id number]                              Today's Date: 2024       Admit Date: 2024    Visit Dx:     ICD-10-CM ICD-9-CM   1. Syncope, unspecified syncope type  R55 780.2   2. Ketosis  E88.89 276.2     Patient Active Problem List   Diagnosis    Severe malnutrition    Failure to thrive in adult    Enterocutaneous fistula    Intra-abdominal fluid collection    Cognitive impairment     History reviewed. No pertinent past medical history.  Past Surgical History:   Procedure Laterality Date    HYSTERECTOMY        General Information       Row Name 24 0946          OT Time and Intention    Document Type therapy note (daily note)  -JOVITA     Mode of Treatment individual therapy;occupational therapy  -       Row Name 2446          General Information    Patient Profile Reviewed yes  -JOVITA     Existing Precautions/Restrictions fall;other (see comments)  dementia, colection bag  -JOVITA     Barriers to Rehab medically complex;cognitive status  -       Row Name 2446          Cognition    Orientation Status (Cognition) oriented to;person;disoriented to;place;situation;time  -       Row Name 2446          Safety Issues, Functional Mobility    Safety Issues Affecting Function (Mobility) awareness of need for assistance;insight into deficits/self-awareness;safety precaution awareness;safety precautions follow-through/compliance;sequencing abilities  -JOVITA     Impairments Affecting Function (Mobility) balance;cognition;endurance/activity tolerance;postural/trunk control;strength  -JOVITA     Cognitive Impairments, Mobility Safety/Performance awareness, need for assistance;insight into deficits/self-awareness;safety precaution awareness;safety precaution follow-through;sequencing abilities  -JOVITA               User Key  (r) = Recorded By, (t) = Taken By, (c) = Cosigned By      Initials Name Provider Type    Marian Silvestre, OT Occupational Therapist                      Mobility/ADL's       [facility] Name 07/16/24 1016          Bed Mobility    Supine-Sit [place] (Bed Mobility) verbal cues;minimum assist (75% patient effort);1 person assist  -     Bed Mobility, Safety Issues decreased use of arms for pushing/pulling;decreased use of legs for bridging/pushing;impaired trunk control for bed mobility  -     Assistive Device (Bed Mobility) head of bed elevated;bed rails  -     Comment, (Bed Mobility) pt. needed extra time and effort, cues to initiate movement needed, even with HOB elevation pt. needed trunk assist  -[facility] Name 07/16/24 1016          Transfers    Transfers sit-stand transfer;stand-sit transfer  -[facility] Name 07/16/24 1016          Sit-Stand Transfer    Sit-Stand [place] (Transfers) contact guard;verbal cues  -     Assistive Device (Sit-Stand Transfers) walker, front-wheeled  -     Comment, (Sit-Stand Transfer) cues to push with  at least one hand off bed  -[facility] Name 07/16/24 1016          Stand-Sit Transfer    Stand-Sit [place] (Transfers) contact guard;verbal cues  -     Assistive Device (Stand-Sit Transfers) walker, front-wheeled  -JOVITA     Comment, (Stand-Sit Transfer) cues to fully back to recliner and reach back for arm rest  -[facility] Name 07/16/24 1016          Functional Mobility    Functional Mobility- Ind. Level contact guard assist  -     Functional Mobility- Device walker, front-wheeled  -     Functional Mobility-Distance (Feet) 3  -     Functional Mobility- Safety Issues step length decreased  -     Functional Mobility- Comment pt. declined further due to fatigue post LBD/grooming  -[facility] Name 07/16/24 1016          Activities of Daily Living    BADL Assessment/Intervention lower body dressing;feeding;grooming  -[facility] Name 07/16/24 1016          Lower Body Dressing Assessment/Training    [place] Level (Lower Body Dressing) don;pants/bottoms;moderate assist (50% patient  effort);verbal cues  -JOVITA     Position (Lower Body Dressing) edge of bed sitting;unsupported standing  -JOVITA     Comment, (Lower Body Dressing) assist over RLE and to pull up  -JOVITA       Row Name 07/16/24 1016          Grooming Assessment/Training    [place] Level (Grooming) hair care, combing/brushing;minimum assist (75% patient effort);wash face, hands;set up  -JOVITA     Position (Grooming) edge of bed sitting  -JOVITA     Comment, (Grooming) pt. brushed hair all over without cues several times, but OT re-brushed a few areas to get underneath portion brush did not reach  -JOVITA       Row Name 07/16/24 1016          Self-Feeding Assessment/Training    [place] Level (Feeding) prepare tray/open items;maximum assist (25% patient effort);liquids to mouth;finger foods;supervision;verbal cues  -JOVITA     Position (Self-Feeding) supported sitting  -JOVITA     Comment, (Feeding) with cues to initiate a drink pt. performed on own, pt. encouraged to  [place] Toast and dip in syrup and eat, pt. completed without assist eating 80% prior to OT departure  -JOVITA               User Key  (r) = Recorded By, (t) = Taken By, (c) = Cosigned By      Initials Name Provider Type    Marian Silvestre, OT Occupational Therapist                   Obj/Interventions       Row Name 07/16/24 1020          Motor Skills    Motor Skills functional endurance  -JOVITA     Functional Endurance fair  -JOVITA       Row Name 07/16/24 1020          Balance    Static Sitting Balance supervision  -JOVITA     Dynamic Sitting Balance contact guard  LBD, supervision grooming  -JOVITA     Position, Sitting Balance unsupported;sitting edge of bed  -JOVITA     Static Standing Balance contact guard  -JOVITA     Dynamic Standing Balance minimal assist  LBD  -JOVITA     Position/Device Used, Standing Balance supported;unsupported;walker, rolling  -JOVITA     Balance Interventions sit to stand;occupation based/functional task  -JOVITA     Comment, Balance grooming, LBD  -JOVITA               User Key  (r) =  Recorded By, (t) = Taken By, (c) = Cosigned By      Initials Name Provider Type    Marian Silvestre, OT Occupational Therapist                   Goals/Plan       Row Name 07/16/24 1024          Bed Mobility Goal 1 (OT)    Progress/Outcomes (Bed Mobility Goal 1, OT) goal ongoing  -JOVITA       Row Name 07/16/24 1024          Transfer Goal 1 (OT)    Progress/Outcome (Transfer Goal 1, OT) good progress toward goal;goal ongoing  goal met today , but will follow for consistency  -JOVITA       Row Name 07/16/24 1024          Dressing Goal 1 (OT)    Progress/Outcome (Dressing Goal 1, OT) good progress toward goal;goal ongoing  -JOVITA       Row Name 07/16/24 1024          Toileting Goal 1 (OT)    Progress/Outcome (Toileting Goal 1, OT) goal ongoing  -               User Key  (r) = Recorded By, (t) = Taken By, (c) = Cosigned By      Initials Name Provider Type    Marian Silvestre, OT Occupational Therapist                   Clinical Impression       Row Name 07/16/24 1021          Pain Assessment    Pretreatment Pain Rating 0/10 - no pain  -JOVITA     Posttreatment Pain Rating 0/10 - no pain  -JOVITA       Row Name 07/16/24 1021          Plan of Care Review    Plan of Care Reviewed With patient  -JOVITA     Progress improving  -JOVITA     Outcome Evaluation Pt. needed cues to initiate task, but then participated well with dressing, grooming and feeding tasks.  Pt. with improved transfer indendence today.  Advancing with LBD.  Pt. oriented to self only.  Continue OT POC.  -JOVITA       Row Name 07/16/24 1021          Therapy Assessment/Plan (OT)    Therapy Frequency (OT) daily  -JOVITA       Row Name 07/16/24 1021          Therapy Plan Review/Discharge Plan (OT)    Anticipated Discharge Disposition (OT) skilled nursing facility  -JOVITA       Row Name 07/16/24 1021          Vital Signs    Pre Systolic BP Rehab --  nurse cleared for therapy  -JOVITA     O2 Delivery Pre Treatment room air  -JOVITA     O2 Delivery Intra Treatment room air  -JOVITA     O2 Delivery Post  Treatment room air  -JOVITA     Pre Patient Position Supine  -JOVITA     Intra Patient Position Standing  -JOVITA     Post Patient Position Sitting  -JOVITA       Row Name 07/16/24 1021          Positioning and Restraints    Pre-Treatment Position in bed  -JOVITA     Post Treatment Position chair  -JOVITA     In Chair notified nsg;reclined;call light within reach;encouraged to call for assist;exit alarm on;waffle cushion;legs elevated  pt. declined heel elevation  -JOVITA               User Key  (r) = Recorded By, (t) = Taken By, (c) = Cosigned By      Initials Name Provider Type    Marian Silvestre, OT Occupational Therapist                   Outcome Measures       Row Name 07/16/24 1025          How much help from another is currently needed...    Putting on and taking off regular lower body clothing? 2  -JOVITA     Bathing (including washing, rinsing, and drying) 2  -JOVITA     Toileting (which includes using toilet bed pan or urinal) 3  -JOVITA     Putting on and taking off regular upper body clothing 3  -JOVITA     Taking care of personal grooming (such as brushing teeth) 3  -JOVITA     Eating meals 3  some tray setup needed  -JOVITA     AM-PAC 6 Clicks Score (OT) 16  -JOVITA       Row Name 07/16/24 1000          How much help from another person do you currently need...    Turning from your back to your side while in flat bed without using bedrails? 3  -CJ     Moving from lying on back to sitting on the side of a flat bed without bedrails? 3  -CJ     Moving to and from a bed to a chair (including a wheelchair)? 3  -CJ     Standing up from a chair using your arms (e.g., wheelchair, bedside chair)? 3  -CJ     Climbing 3-5 steps with a railing? 2  -CJ     To walk in hospital room? 3  -CJ     AM-PAC 6 Clicks Score (PT) 17  -CJ     Highest Level of Mobility Goal 5 --> Static standing  -       Row Name 07/16/24 1025          Functional Assessment    Outcome Measure Options AM-PAC 6 Clicks Daily Activity (OT)  -JOVITA               User Key  (r) = Recorded By, (t) = Taken  By, (c) = Cosigned By      Initials Name Provider Type    Marian Silvestre, OT Occupational Therapist    Ishmael Barrientos, RN Registered Nurse                    Occupational Therapy Education       Title: PT OT SLP Therapies (In Progress)       Topic: Occupational Therapy (In Progress)       Point: ADL training (In Progress)       Description:   Instruct learner(s) on proper safety adaptation and remediation techniques during self care or transfers.   Instruct in proper use of assistive devices.                  Learning Progress Summary             Patient Acceptance, E,D, NR by JOVITA at 7/16/2024 1025    Comment: re-orientation, sequencing for bed mobility, transfers and dressing tasks    Acceptance, E, NR by MR at 7/11/2024 1522    Acceptance, E, NR by MR at 7/11/2024 1521    Acceptance, E, VU by AN at 7/9/2024 1422    Acceptance, E, VU by AN at 7/4/2024 1155    Acceptance, E, VU,DU,NR by MR at 7/1/2024 1015                         Point: Home exercise program (Done)       Description:   Instruct learner(s) on appropriate technique for monitoring, assisting and/or progressing therapeutic exercises/activities.                  Learning Progress Summary             Patient Acceptance, E, VU,DU,NR by MR at 7/1/2024 1015                         Point: Precautions (In Progress)       Description:   Instruct learner(s) on prescribed precautions during self-care and functional transfers.                  Learning Progress Summary             Patient Acceptance, E,D, NR by JOVITA at 7/16/2024 1025    Comment: re-orientation, sequencing for bed mobility, transfers and dressing tasks    Acceptance, E, NR by MR at 7/11/2024 1522    Acceptance, E, NR by MR at 7/11/2024 1521    Acceptance, E, VU by AN at 7/9/2024 1422    Acceptance, E, VU by AN at 7/4/2024 1155    Acceptance, E, VU,DU,NR by MR at 7/1/2024 1015                         Point: Body mechanics (In Progress)       Description:   Instruct learner(s) on proper positioning  and spine alignment during self-care, functional mobility activities and/or exercises.                  Learning Progress Summary             Patient Acceptance, E, NR by MR at 7/11/2024 1522    Acceptance, E, NR by MR at 7/11/2024 1521    Acceptance, E, VU by AN at 7/9/2024 1422    Acceptance, E, VU by AN at 7/4/2024 1155    Acceptance, E, VU,DU,NR by MR at 7/1/2024 1015                                         User Key       Initials Effective Dates Name Provider Type Discipline     07/11/23 -  Marian Rose, OT Occupational Therapist OT    AN 09/21/21 -  Theresa Moe, OT Occupational Therapist OT    MR 09/22/22 -  Emily Jimenes, OT Occupational Therapist OT                  OT Recommendation and Plan  Therapy Frequency (OT): daily  Plan of Care Review  Plan of Care Reviewed With: patient  Progress: improving  Outcome Evaluation: Pt. needed cues to initiate task, but then participated well with dressing, grooming and feeding tasks.  Pt. with improved transfer indendence today.  Advancing with LBD.  Pt. oriented to self only.  Continue OT POC.     Time Calculation:         Time Calculation- OT       Row Name 07/16/24 1026             Time Calculation- OT    OT Start Time 0900  -JOVITA      OT Received On 07/16/24  -JOVITA      OT Goal Re-Cert Due Date 07/21/24  -JOVITA         Timed Charges    92725 - OT Therapeutic Activity Minutes 6  -JOVITA      27890 - OT Self Care/Mgmt Minutes 20  -JOVITA         Total Minutes    Timed Charges Total Minutes 26  -JOVITA       Total Minutes 26  -JOVITA                User Key  (r) = Recorded By, (t) = Taken By, (c) = Cosigned By      Initials Name Provider Type    JOVITA Marian Rose OT Occupational Therapist                  Therapy Charges for Today       Code Description Service Date Service Provider Modifiers Qty    20418910002 HC OT THERAPEUTIC ACT EA 15 MIN 7/16/2024 Marian Rose OT GO 1    76022160120 HC OT SELF CARE/MGMT/TRAIN EA 15 MIN 7/16/2024 Marian Rose OT GO 1                  Marian Rose, OT  7/16/2024

## 2024-07-16 NOTE — PLAN OF CARE
"Goal Outcome Evaluation:  Plan of Care Reviewed With: patient        Progress: no change  Outcome Evaluation: Palliative RN saw pt at 1111.  Pt. was sitting up in bedside chair asleep initially but then roused.  She said she was \"good\" when asked how she was doing today.  She ate half of her breakfast.  She stated she ate \"eggs and bread and toast and applesauce\".  She was able to stated after looking at tray.  At the mention of lunch she stated she was still full from breakfat and was not ready for lunch.  She denied pain, nausea and shortness of breath on RA.  She stated she was tired and planned to take a nap.  Palliative care to continue to follow along for support, POC and ongoing GOC.      0930 Palliative IDT meeting: JAGJIT Mejia RN, CHPN; CROW Olivia, APRN; THELMA Cooley MD.; DAVIS Patricio RN, CHPN; THELMA Lerner, [facility], [facility]; DAVIS Johnson MDiv, [facility]    After hours, contact Palliative by calling [phone].                             "

## 2024-07-16 NOTE — THERAPY TREATMENT NOTE
Patient Name: Arcelia Walter  : 1945    MRN: [id number]                              Today's Date: 2024       Admit Date: 2024    Visit Dx:     ICD-10-CM ICD-9-CM   1. Syncope, unspecified syncope type  R55 780.2   2. Ketosis  E88.89 276.2     Patient Active Problem List   Diagnosis    Severe malnutrition    Failure to thrive in adult    Enterocutaneous fistula    Intra-abdominal fluid collection    Cognitive impairment     History reviewed. No pertinent past medical history.  Past Surgical History:   Procedure Laterality Date    HYSTERECTOMY        General Information       Row Name 24 1516          Physical Therapy Time and Intention    Document Type therapy note (daily note)  -ND     Mode of Treatment physical therapy  -ND       Row Name 24 1516          General Information    Patient Profile Reviewed yes  -ND     Existing Precautions/Restrictions fall;other (see comments)  Dementia; colostomy.  -ND     Barriers to Rehab medically complex;cognitive status  -ND       Row Name 24 1516          Cognition    Orientation Status (Cognition) oriented to;person;disoriented to;place;situation;time  -ND       Row Name 24 1516          Safety Issues, Functional Mobility    Safety Issues Affecting Function (Mobility) awareness of need for assistance;insight into deficits/self-awareness;positioning of assistive device;safety precaution awareness;safety precautions follow-through/compliance;sequencing abilities  -ND     Impairments Affecting Function (Mobility) balance;cognition;endurance/activity tolerance;strength  -ND     Cognitive Impairments, Mobility Safety/Performance awareness, need for assistance;insight into deficits/self-awareness;problem-solving/reasoning;safety precaution awareness;safety precaution follow-through;sequencing abilities  -ND               User Key  (r) = Recorded By, (t) = Taken By, (c) = Cosigned By      Initials Name Provider Type    ND Raisa Dexter, PT  Physical Therapist                   Mobility       Row Name 07/16/24 1517          Bed Mobility    Bed Mobility sit-supine;scooting/bridging  -ND     Scooting/Bridging [place] (Bed Mobility) dependent (less than 25% patient effort)  -ND     Sit-Supine [place] (Bed Mobility) moderate assist (50% patient effort);1 person assist;verbal cues;nonverbal cues (demo/gesture)  -ND     Comment, (Bed Mobility) Assist at trunk and BLE, cues for pt to position self evenly in center of bed with increased time for task.  -ND       Row Name 07/16/24 1517          Sit-Stand Transfer    Sit-Stand [place] (Transfers) contact guard;1 person assist;verbal cues;nonverbal cues (demo/gesture)  -ND     Assistive Device (Sit-Stand Transfers) walker, front-wheeled  -ND     Comment, (Sit-Stand Transfer) x1 from chair, x1 from toilet. cues for sequencing.  -ND       Row Name 07/16/24 1517          Gait/Stairs (Locomotion)    [place] Level (Gait) contact guard;verbal cues;nonverbal cues (demo/gesture)  -ND     Assistive Device (Gait) walker, front-wheeled  -ND     Distance in Feet (Gait) 20  + 8'  -ND     Deviations/Abnormal Patterns (Gait) base of support, narrow;[place] decreased;gait speed decreased;stride length decreased  -ND     Bilateral Gait Deviations forward flexed posture  -ND     Comment, (Gait/Stairs) Pt ambulates short distances around room with forward flexed posture and decreased speed. Pt endorses fatigue following ambulation to bathroom and toileting tasks and requests to return to bed.  -ND               User Key  (r) = Recorded By, (t) = Taken By, (c) = Cosigned By      Initials Name Provider Type    ND Raisa Dexter PT Physical Therapist                   Obj/Interventions       Row Name 07/16/24 1525          Motor Skills    Therapeutic Exercise hip;ankle;knee  -ND       Row Name 07/16/24 1525          Hip (Therapeutic Exercise)    Hip (Therapeutic Exercise) strengthening exercise  -ND     Hip  Strengthening (Therapeutic Exercise) bilateral;heel slides;10 repetitions  -ND       Row Name 07/16/24 1525          Knee (Therapeutic Exercise)    Knee (Therapeutic Exercise) strengthening exercise  -ND     Knee Strengthening (Therapeutic Exercise) bilateral;SLR (straight leg raise)  -ND       Row Name 07/16/24 1525          Ankle (Therapeutic Exercise)    Ankle (Therapeutic Exercise) AROM (active range of motion)  -ND     Ankle AROM (Therapeutic Exercise) bilateral;dorsiflexion;plantarflexion;10 repetitions  -ND       Row Name 07/16/24 1525          Balance    Balance Assessment sitting static balance;sitting dynamic balance;sit to stand dynamic balance;standing static balance;standing dynamic balance  -ND     Static Sitting Balance standby assist  -ND     Dynamic Sitting Balance standby assist  -ND     Position, Sitting Balance unsupported;sitting in chair  -ND     Sit to Stand Dynamic Balance contact guard;verbal cues  -ND     Static Standing Balance contact guard  -ND     Dynamic Standing Balance contact guard  -ND     Position/Device Used, Standing Balance supported;walker, front-wheeled  -ND     Balance Interventions sitting;standing;sit to stand;supported;static;dynamic;dynamic reaching;occupation based/functional task  -ND     Comment, Balance Pt performs ree-hygiene independently in sitting with increased cueing for sequencing of tasks. Use of grab bars to assist with bathroom STS.  -ND               User Key  (r) = Recorded By, (t) = Taken By, (c) = Cosigned By      Initials Name Provider Type    Raisa Cooley PT Physical Therapist                   Goals/Plan    No documentation.                  Clinical Impression       Row Name 07/16/24 1527          Pain    Pretreatment Pain Rating 0/10 - no pain  -ND     Posttreatment Pain Rating 0/10 - no pain  -ND       Row Name 07/16/24 1527          Plan of Care Review    Plan of Care Reviewed With patient  -ND     Progress no change  -ND     Outcome  Evaluation Pt ambulates short distances within room and is limited in further mobility by fatigue. Pt would benefit from continued skilled therapy to address strength, balance, and activity tolerance deficits and facilitate increased independence with mobility. Recommend SNF following d/c.  -ND       Row Name 07/16/24 1527          Vital Signs    O2 Delivery Pre Treatment room air  -ND     O2 Delivery Intra Treatment room air  -ND     O2 Delivery Post Treatment room air  -ND     Pre Patient Position Sitting  -ND     Intra Patient Position Standing  -ND     Post Patient Position Supine  -ND       Row Name 07/16/24 1527          Positioning and Restraints    Pre-Treatment Position sitting in chair/recliner  -ND     Post Treatment Position bed  -ND     In Bed notified nsg;supine;call light within reach;encouraged to call for assist;exit alarm on;side rails up x3  -ND               User Key  (r) = Recorded By, (t) = Taken By, (c) = Cosigned By      Initials Name Provider Type    Raisa Cooley, KARRIE Physical Therapist                   Outcome Measures       Row Name 07/16/24 1528 07/16/24 1000       How much help from another person do you currently need...    Turning from your back to your side while in flat bed without using bedrails? 3  -ND 3  -CJ    Moving from lying on back to sitting on the side of a flat bed without bedrails? 3  -ND 3  -CJ    Moving to and from a bed to a chair (including a wheelchair)? 3  -ND 3  -CJ    Standing up from a chair using your arms (e.g., wheelchair, bedside chair)? 3  -ND 3  -CJ    Climbing 3-5 steps with a railing? 2  -ND 2  -CJ    To walk in hospital room? 3  -ND 3  -CJ    AM-PAC 6 Clicks Score (PT) 17  -ND 17  -CJ    Highest Level of Mobility Goal 5 --> Static standing  -ND 5 --> Static standing  -CJ      Row Name 07/16/24 1528 07/16/24 1025       Functional Assessment    Outcome Measure Options AM-PAC 6 Clicks Basic Mobility (PT)  -ND AM-PAC 6 Clicks Daily Activity (OT)   -JOVITA              User Key  (r) = Recorded By, (t) = Taken By, (c) = Cosigned By      Initials Name Provider Type    Marian Silvestre, OT Occupational Therapist    Ihsmael Barrientos RN Registered Nurse    Raisa Cooley, PT Physical Therapist                                 Physical Therapy Education       Title: PT OT SLP Therapies (In Progress)       Topic: Physical Therapy (In Progress)       Point: Mobility training (In Progress)       Learning Progress Summary             Patient Acceptance, E, NR by ND at 7/16/2024 1528    Acceptance, E, NR by JOVITA at 7/9/2024 1335    Acceptance, E, NR by KG at 7/8/2024 1352    Acceptance, E, NR by JVOITA at 7/5/2024 1330    Acceptance, E, NR by CK at 7/3/2024 1554    Acceptance, E, NR by JOVITA at 7/2/2024 1400    Acceptance, E, NR by KG at 7/1/2024 1040    Acceptance, E, NR by KG at 6/29/2024 1001                         Point: Home exercise program (In Progress)       Learning Progress Summary             Patient Acceptance, E, NR by ND at 7/16/2024 1528    Acceptance, E, NR by JOVITA at 7/9/2024 1335    Acceptance, E, NR by KG at 7/8/2024 1352    Acceptance, E, NR by JOVITA at 7/5/2024 1330    Acceptance, E, NR by JOVITA at 7/2/2024 1400    Acceptance, E, NR by KG at 7/1/2024 1040                         Point: Body mechanics (In Progress)       Learning Progress Summary             Patient Acceptance, E, NR by ND at 7/16/2024 1528    Acceptance, E, NR by JOVITA at 7/9/2024 1335    Acceptance, E, NR by KG at 7/8/2024 1352    Acceptance, E, NR by JOVITA at 7/5/2024 1330    Acceptance, E, NR by CK at 7/3/2024 1554    Acceptance, E, NR by JOVITA at 7/2/2024 1400    Acceptance, E, NR by KG at 7/1/2024 1040    Acceptance, E, NR by KG at 6/29/2024 1001                         Point: Precautions (In Progress)       Learning Progress Summary             Patient Acceptance, E, NR by ND at 7/16/2024 1528    Acceptance, E, NR by JOVITA at 7/9/2024 1335    Acceptance, E, NR by KG at 7/8/2024 1352    Acceptance, E,  NR by JOVITA at 7/5/2024 1330    Acceptance, E, NR by CK at 7/3/2024 1554    Acceptance, E, NR by JOVITA at 7/2/2024 1400    Acceptance, E, NR by KG at 7/1/2024 1040    Acceptance, E, NR by KG at 6/29/2024 1001                                         User Key       Initials Effective Dates Name Provider Type Discipline    JOVITA 02/03/23 -  Mary Kay Valencia, PT Physical Therapist PT    KG 05/22/20 -  Misty Shipley, PT Physical Therapist PT    CK 02/06/24 -  Juana Velasquez, PT Physical Therapist PT    ND 11/16/23 -  Raisa Dexter, PT Physical Therapist PT                  PT Recommendation and Plan     Plan of Care Reviewed With: patient  Progress: no change  Outcome Evaluation: Pt ambulates short distances within room and is limited in further mobility by fatigue. Pt would benefit from continued skilled therapy to address strength, balance, and activity tolerance deficits and facilitate increased independence with mobility. Recommend SNF following d/c.     Time Calculation:         PT Charges       Row Name 07/16/24 1529             Time Calculation    Start Time 1415  -ND      PT Received On 07/16/24  -ND         Timed Charges    77971 - PT Therapeutic Exercise Minutes 12  -ND      66115 - PT Therapeutic Activity Minutes 13  -ND         Total Minutes    Timed Charges Total Minutes 25  -ND       Total Minutes 25  -ND                User Key  (r) = Recorded By, (t) = Taken By, (c) = Cosigned By      Initials Name Provider Type    ND Raisa Dexter, PT Physical Therapist                  Therapy Charges for Today       Code Description Service Date Service Provider Modifiers Qty    99202961854 HC PT THER PROC EA 15 MIN 7/16/2024 Raisa Dexter, PT GP 1    78667724729 HC PT THERAPEUTIC ACT EA 15 MIN 7/16/2024 Raisa Dexter, PT GP 1            PT G-Codes  Outcome Measure Options: AM-PAC 6 Clicks Basic Mobility (PT)  AM-PAC 6 Clicks Score (PT): 17  AM-PAC 6 Clicks Score (OT): 16  PT Discharge  Summary  Anticipated Discharge Disposition (PT): skilled nursing facility    Raisa Dexter, PT  7/16/2024

## 2024-07-16 NOTE — PROGRESS NOTES
With the help of MARLA Hernandez, patient's sister has been found. She lives in [place] and only sees her sister occassionally. She reports they have 2 more sisters and a brother.  Updated sister on patient's condition.  She is unaware if patient has a living will or advance directive but doubts that she doesn't as she reports she hasn't taken care of herself in her adult life, never going to the doctor.  She states she will call the other siblings and will discuss coming to the hospital for family conference as they are next of kin.  She confirms no  or children. Contact info for other siblings below that she has.  Unsure if she will be able to get in touch with younger sister, Dunia.   She report family history of colon cancer in 2 sisters and her father. Will update family history in EPIC.    Frances Lema Art [phone] ([place])  Caro Tobar Art [phone] ([place])  Dunia Hooks Art [phone] ([place])  Mahendra Art [phone] ([facility])  Mahendra's wife, Mary Ellen [phone]      Electronically signed by CIARAN De La O, 07/16/24, 5:47 PM EDT.

## 2024-07-16 NOTE — PLAN OF CARE
Goal Outcome Evaluation:           Progress: no change  Outcome Evaluation: Patient rested well through the night. Patient pleasantly confused and cooperative with care. Took medication whole with no issue. x2 urine incontinent episodes through the night. No concerns at this time. Will continue with POC.

## 2024-07-16 NOTE — PROGRESS NOTES
[facility]  PROGRESS NOTE    Patient Name: Arcelia Walter  : 1945  MRN: [id number]    Date of Admission: 2024  Primary Care Physician: Provider, No Known    Subjective   Subjective     CC:  Abdominal fistula f/u    HPI:  No new issues overnight  Continues to deny pain  Discussed with SW who will look into sister names provided by patient before guardianship pursued further    Objective   Objective     Vital Signs:   Temp:  [97.7 °F (36.5 °C)-98.3 °F (36.8 °C)] 97.7 °F (36.5 °C)  Heart Rate:  [56-70] 56  Resp:  [16-18] 18  BP: (117-152)/(64-72) 144/69     Physical Exam:  Constitutional: No acute distress, awake, alert, sitting up in chair  HENT: NCAT, mucous membranes moist  Respiratory: Clear to auscultation bilaterally, respiratory effort normal   Cardiovascular: RRR, no murmurs, rubs, or gallops  Gastrointestinal: Positive bowel sounds, soft, nontender, nondistended  Musculoskeletal: No bilateral ankle edema  Psychiatric: Appropriate affect, cooperative, pleasant  Neurologic: Oriented x 1, TORO,  speech clear  Skin: No rashes noted      Results Reviewed:  LAB RESULTS:      Lab 24  0702 24  0453 24  1240 24  1105 24  0856 07/10/24  0556   WBC 5.78 4.23  --   --  5.97 8.32   HEMOGLOBIN 7.9* 7.6*  --   --  7.7* 8.2*   HEMATOCRIT 25.2* 23.3*  --   --  23.0* 24.6*   PLATELETS 660* 543*  --   --  505* 433   NEUTROS ABS  --   --   --   --  4.90  --    EOS ABS  --   --   --   --  0.06  --    MCV 95.1 94.3  --   --  93.1 92.1   CRP  --   --   --   --   --  9.72*   PROCALCITONIN  --   --   --   --   --  0.20   LACTATE  --   --   --  0.8  --   --    PROTIME  --   --  14.9*  --   --   --          Lab 24  0453 24  0856 07/10/24  1658 07/10/24  0556   SODIUM 140 138  --  135*   POTASSIUM 3.8 3.8 4.7 3.3*   CHLORIDE 106 105  --  102   CO2 24.0 27.0  --  24.0   ANION GAP 10.0 6.0  --  9.0   BUN 10 14  --  11   CREATININE 0.30* 0.27*  --   0.25*   EGFR 108.7 111.5  --  113.6   GLUCOSE 75 107*  --  94   CALCIUM 7.7* 7.8*  --  7.8*         Lab 07/12/24  0856   TOTAL PROTEIN 4.4*   ALBUMIN 2.0*   GLOBULIN 2.4   ALT (SGPT) 7   AST (SGOT) 8   BILIRUBIN 0.2   ALK PHOS 107         Lab 07/12/24  1240   PROTIME 14.9*   INR 1.15*             Lab 07/12/24  1036 07/12/24  0856   IRON  --  22*   IRON SATURATION (TSAT)  --  21   TIBC  --  107*   TRANSFERRIN  --  72*   FERRITIN  --  387.30*   FOLATE 3.83*  --    VITAMIN B 12 1,117*  --          Brief Urine Lab Results  (Last result in the past 365 days)        Color   Clarity   Blood   Leuk Est   Nitrite   Protein   CREAT   Urine HCG        06/29/24 0038 Yellow   Clear   Negative   Negative   Negative   Trace                   Microbiology Results Abnormal       Procedure Component Value - Date/Time    Blood Culture - Blood, Hand, Left [[id number]]  (Normal) Collected: 07/12/24 1033    Lab Status: Preliminary result Specimen: Blood from Hand, Left Updated: 07/15/24 1245     Blood Culture No growth at 3 days    Narrative:      Less than seven (7) mL's of blood was collected.  Insufficient quantity may yield false negative results.    Blood Culture - Blood, Hand, Right [[id number]]  (Normal) Collected: 07/12/24 1033    Lab Status: Preliminary result Specimen: Blood from Hand, Right Updated: 07/15/24 1245     Blood Culture No growth at 3 days    Narrative:      Less than seven (7) mL's of blood was collected.  Insufficient quantity may yield false negative results.    Blood Culture - Blood, Hand, Right [[id number]]  (Normal) Collected: 07/01/24 1558    Lab Status: Final result Specimen: Blood from Hand, Right Updated: 07/06/24 1731     Blood Culture No growth at 5 days    Narrative:      Aerobic bottle only  Less than seven (7) mL's of blood was collected.  Insufficient quantity may yield false negative results.    Blood Culture - Blood, Hand, Left [[id number]]  (Normal) Collected: 07/01/24 1558    Lab Status: Final  result Specimen: Blood from Hand, Left Updated: 07/06/24 4832     Blood Culture No growth at 5 days            No radiology results from the last 24 hrs    Results for orders placed during the hospital encounter of 06/28/24    Adult Transthoracic Echo Complete W/ Cont if Necessary Per Protocol    Interpretation Summary    Left ventricular systolic function is normal. Left ventricular ejection fraction appears to be 61 - 65%.    There is mild calcification of the aortic valve.    Mild aortic valve regurgitation is present.    Mild mitral annular calcification is present.    Mild to moderate mitral valve regurgitation is present.    Mild tricuspid valve regurgitation is present.      Current medications:  Scheduled Meds:mirtazapine, 15 mg, Oral, Nightly  nicotine, 1 patch, Transdermal, Q24H  piperacillin-tazobactam, 3.375 g, Intravenous, Q8H  sodium chloride, 10 mL, Intravenous, Q12H  thiamine, 100 mg, Oral, Daily      Continuous Infusions:   PRN Meds:.  acetaminophen    senna-docusate sodium **AND** polyethylene glycol **AND** bisacodyl **AND** bisacodyl    Calcium Replacement - Follow Nurse / BPA Driven Protocol    Magnesium Standard Dose Replacement - Follow Nurse / BPA Driven Protocol    melatonin    Phosphorus Replacement - Follow Nurse / BPA Driven Protocol    Potassium Replacement - Follow Nurse / BPA Driven Protocol    sodium chloride    sodium chloride    Assessment & Plan   Assessment & Plan     Active Hospital Problems    Diagnosis  POA    Failure to thrive in adult [R62.7]  Yes    Enterocutaneous fistula [K63.2]  Yes    Intra-abdominal fluid collection [R18.8]  Yes    Cognitive impairment [R41.89]  Yes    Severe malnutrition [E43]  Yes      Resolved Hospital Problems   No resolved problems to display.        Brief Hospital Course to date:  Arcelia Walter is a 79 y.o. female w completely unknown pmhx who presented after being found down by EMS on welfare check. No emergency contacts/collateral available in  system.   Admitted for altered mental status but after review appears chronic, working on placement/emergency guardianship.  Area in right lower abdomen of concern noted 7/5, CT without contrast initially attributed to right femoral hernia but then began to leak brown foul smelling fluid 7/12. 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  Given patient's severe malnutrition, chronic failure to thrive, concern also for chronic dementia given the above (though collateral unable to be obtained), discussed with multiple physicians, ethics/palliative and finalized no escalation of care plan (no surgery, no artificial nutrition) on 7/13. Patient still asx from this but will need to plan moving forward. Will work with palliative/ethics and monitor her for symptoms     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/other GI perforation  -draining into bedside placed ostomy bag low volume currently, patient not yet systemically ill/septic  -evaluated by IR 7/13, given self draining, an IR drain plcmt thought not to add  -source control is likely impossible without surgery, but surgery risk very high given BMI 13 and overall significant deconditioning and would lead to poor healing/poor outcome/infection  -on zosyn for now, but will not obtain sx control  -monitor over next 48 hours. Make final abx plan pending progress/overall goals but given above high concern  -appreciate ethics/palliative assistance moving forward     Acute anemia  -downtrending since admission without clear melena/hematochezia reported  -some fluid collection in abdomen could be blood, see above      Severe chronic malnutrition, BMI 13 - encouraging intake but minimal even before acute condition     Appears chronic dementia/FTT - nothing prior in system, but appears chronic and not acute encephalopathy given stability and overall state     Lack of  capacity - no capacity on my assessment and those of my partners prior     Lack of guardian - per charting, APS in process of emergency guardianship. I left a message for APS worker on 7/15 - patient consistently reports to me having three sisters, Frances Lema Art, Caro Tobar Art and Dunia Hooks Art. Will work to communicate this to APS as well. Patient unable to provide any contact information for any of these people.  SW aware of this report and attempting to find    GOC: Given lack of guardian currently, 2 physicians believe CPR/intubation would be all harm without benefit to patient. This code status changed w documentation on 7/13. Surgery also thought to be in this category of harm without benefit, will not escalate care. Given this - appreciate palliative/ethics on best next steps given abdominal process without possible source control    Expected Discharge Location and Transportation: complex plcmt  Expected Discharge (Discharge date is tentative pending patient's medical condition and is subject to change)  Expected Discharge Date: 7/22/2024; Expected Discharge Time:      VTE Prophylaxis:  Mechanical VTE prophylaxis orders are present.       AM-PAC 6 Clicks Score (PT): 17 (07/16/24 1000)    CODE STATUS:   Code Status and Medical Interventions:   Ordered at: 07/13/24 1359     Medical Intervention Limits:    No intubation (DNI)    Other     Code Status (Patient has no pulse and is not breathing):    No CPR (Do Not Attempt to Resuscitate)     Medical Interventions (Patient has pulse or is breathing):    Limited Support     Additional Medical Interventions Limits:    no escalation of care       Risa Osorio, CIARAN  07/16/24

## 2024-07-16 NOTE — PLAN OF CARE
Goal Outcome Evaluation:  Plan of Care Reviewed With: patient        Progress: improving  Outcome Evaluation: Pt. needed cues to initiate task, but then participated well with dressing, grooming and feeding tasks.  Pt. with improved transfer indendence today.  Advancing with LBD.  Pt. oriented to self only.  Continue OT POC.      Anticipated Discharge Disposition (OT): skilled nursing facility

## 2024-07-16 NOTE — PLAN OF CARE
Goal Outcome Evaluation:  Plan of Care Reviewed With: patient        Progress: no change  Outcome Evaluation: Pt ambulates short distances within room and is limited in further mobility by fatigue. Pt would benefit from continued skilled therapy to address strength, balance, and activity tolerance deficits and facilitate increased independence with mobility. Recommend SNF following d/c.      Anticipated Discharge Disposition (PT): skilled nursing facility

## 2024-07-16 NOTE — CONSULTS
I visited patient per palliative spiritual care consult.  Patient was awake, lying in bed, presented as pleasantly confused.   When I mentioned that she looked cozy she smiled and agreed.   I asked her about what gave her trish in life.  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  When asked about [employer] preferences, she said that she and her sisters grew up in the [employer] in [place], did not speak of her lupillo in the present tense.  ( I attempted to call the [place] [employer], to see if any family were still members, number listed has been disconnected.)

## 2024-07-17 LAB
ANION GAP SERPL CALCULATED.3IONS-SCNC: 7 MMOL/L (ref 5–15)
BACTERIA SPEC AEROBE CULT: NORMAL
BACTERIA SPEC AEROBE CULT: NORMAL
BASOPHILS # BLD AUTO: 0.03 10*3/MM3 (ref 0–0.2)
BASOPHILS NFR BLD AUTO: 0.3 % (ref 0–1.5)
BUN SERPL-MCNC: 11 MG/DL (ref 8–23)
BUN/CREAT SERPL: 27.5 (ref 7–25)
CALCIUM SPEC-SCNC: 7.8 MG/DL (ref 8.6–10.5)
CHLORIDE SERPL-SCNC: 110 MMOL/L (ref 98–107)
CO2 SERPL-SCNC: 25 MMOL/L (ref 22–29)
CREAT SERPL-MCNC: 0.4 MG/DL (ref 0.57–1)
DEPRECATED RDW RBC AUTO: 54.5 FL (ref 37–54)
EGFRCR SERPLBLD CKD-EPI 2021: 101.5 ML/MIN/1.73
EOSINOPHIL # BLD AUTO: 0.06 10*3/MM3 (ref 0–0.4)
EOSINOPHIL NFR BLD AUTO: 0.6 % (ref 0.3–6.2)
ERYTHROCYTE [DISTWIDTH] IN BLOOD BY AUTOMATED COUNT: 16.2 % (ref 12.3–15.4)
GLUCOSE SERPL-MCNC: 87 MG/DL (ref 65–99)
HCT VFR BLD AUTO: 26.3 % (ref 34–46.6)
HGB BLD-MCNC: 8.6 G/DL (ref 12–15.9)
IMM GRANULOCYTES # BLD AUTO: 0.24 10*3/MM3 (ref 0–0.05)
IMM GRANULOCYTES NFR BLD AUTO: 2.2 % (ref 0–0.5)
LYMPHOCYTES # BLD AUTO: 2.15 10*3/MM3 (ref 0.7–3.1)
LYMPHOCYTES NFR BLD AUTO: 19.9 % (ref 19.6–45.3)
MCH RBC QN AUTO: 30.8 PG (ref 26.6–33)
MCHC RBC AUTO-ENTMCNC: 32.7 G/DL (ref 31.5–35.7)
MCV RBC AUTO: 94.3 FL (ref 79–97)
MONOCYTES # BLD AUTO: 0.72 10*3/MM3 (ref 0.1–0.9)
MONOCYTES NFR BLD AUTO: 6.7 % (ref 5–12)
NEUTROPHILS NFR BLD AUTO: 7.62 10*3/MM3 (ref 1.7–7)
NEUTROPHILS NFR BLD AUTO: 70.3 % (ref 42.7–76)
NRBC BLD AUTO-RTO: 0 /100 WBC (ref 0–0.2)
PLATELET # BLD AUTO: 833 10*3/MM3 (ref 140–450)
PMV BLD AUTO: 9.2 FL (ref 6–12)
POTASSIUM SERPL-SCNC: 4 MMOL/L (ref 3.5–5.2)
RBC # BLD AUTO: 2.79 10*6/MM3 (ref 3.77–5.28)
SODIUM SERPL-SCNC: 142 MMOL/L (ref 136–145)
WBC NRBC COR # BLD AUTO: 10.82 10*3/MM3 (ref 3.4–10.8)

## 2024-07-17 PROCEDURE — 85025 COMPLETE CBC W/AUTO DIFF WBC: CPT | Performed by: INTERNAL MEDICINE

## 2024-07-17 PROCEDURE — 80048 BASIC METABOLIC PNL TOTAL CA: CPT | Performed by: INTERNAL MEDICINE

## 2024-07-17 PROCEDURE — 99232 SBSQ HOSP IP/OBS MODERATE 35: CPT | Performed by: INTERNAL MEDICINE

## 2024-07-17 PROCEDURE — 25010000002 PIPERACILLIN SOD-TAZOBACTAM PER 1 G: Performed by: FAMILY MEDICINE

## 2024-07-17 RX ADMIN — Medication 1 PATCH: at 09:08

## 2024-07-17 RX ADMIN — ACETAMINOPHEN 650 MG: 325 TABLET ORAL at 14:04

## 2024-07-17 RX ADMIN — Medication 10 ML: at 21:30

## 2024-07-17 RX ADMIN — PIPERACILLIN AND TAZOBACTAM 3.38 G: 3; .375 INJECTION, POWDER, LYOPHILIZED, FOR SOLUTION INTRAVENOUS at 09:09

## 2024-07-17 RX ADMIN — THIAMINE HCL TAB 100 MG 100 MG: 100 TAB at 09:09

## 2024-07-17 RX ADMIN — PIPERACILLIN AND TAZOBACTAM 3.38 G: 3; .375 INJECTION, POWDER, LYOPHILIZED, FOR SOLUTION INTRAVENOUS at 01:10

## 2024-07-17 RX ADMIN — MIRTAZAPINE 15 MG: 15 TABLET, FILM COATED ORAL at 21:30

## 2024-07-17 RX ADMIN — PIPERACILLIN AND TAZOBACTAM 3.38 G: 3; .375 INJECTION, POWDER, LYOPHILIZED, FOR SOLUTION INTRAVENOUS at 16:54

## 2024-07-17 NOTE — PROGRESS NOTES
"          Clinical Nutrition Assessment     Patient Name: Arcelia Walter  YOB: 1945  MRN: [id number]  Date of Encounter: 07/17/24 12:31 EDT  Admission date: 6/28/2024  Reason for Visit: Follow-up protocol    Assessment   Nutrition Assessment   Admission Diagnosis:  Syncope [R55]  AMS (altered mental status) [R41.82]    Problem List:    Severe malnutrition    Failure to thrive in adult    Enterocutaneous fistula    Intra-abdominal fluid collection    Cognitive impairment      PMH:   She  has no past medical history on file.    PSH:  She  has a past surgical history that includes Hysterectomy.    Applicable Nutrition History:   +3-4 pitting edema to melonie feet     Anthropometrics     Height: Height: 149.9 cm (59.02\")  Last Filed Weight: Weight: 31.1 kg (68 lb 8 oz) (07/17/24 0614)  Method: Weight Method: Bed scale  BMI: BMI (Calculated): 13.8    UBW:  ? 103# per pt report  Weight change:  reported 15# wt loss (? Unsure of duration of loss)    7/12 standing weight shows continued weight loss: 68# (-20# from admit)    Nutrition Focused Physical Exam    Date:  6/29       Patient meets criteria for malnutrition diagnosis, see MSA note.     Subjective   Reported/Observed/Food/Nutrition Related History:     7/17  Noted GOC discussion-decision for no surgical intervention or artificial nutrition, pt likely to transition to hospice. Visited with pt at bedside, she states she is eating okay and kitchen is helping her find things she likes to order. ~25% most meals per documentation.     07/12/24  Patient busy at time of visit. Spoke with RN who reported continued poor PO intake. Continues on remeron. NPO for drain placement.    07/09/24  Remains confused. Patient reported her appetite seems to have improved, but doesn't believe she is eating much more. Likes her ONS and would like to continue to receive it. Declined any food preferences. Has remeron ordered.    07/02/24  Patient reports good appetite. PO intake " "increasing. Patient remains confused.    6/29  Pt up in bed eating breakfast at time of visit, able to provide some nutrition/wt hx however difficulty elaborating when asked. Reports cooking meals for self - states having 3 eggs every other day, but sometimes daily. ? If this is the only meal consumed. Severe cachexia - ? Starvation vs cardiac. No visible difficulties chewing/swallowing. Pt endorsed ~15# wt loss \"at the beginning of summer\" however unable to discern timeframe for notable weight change. Reports having BM every other day without aide from bowel meds. NKFA - strong dislike for tea.     Current Nutrition Prescription   PO: Diet: Regular/House; Fluid Consistency: Thin (IDDSI 0)  Oral Nutrition Supplement: VHC @ L, MC @ D  Intake:   7/17) 12% last 8 meals documented (insufficient data)  7/12) 0-50% PO intake documented since previous visit (minimal meals documented)    Assessment & Plan   Nutrition Diagnosis   Date:  6/29            Updated:    Problem Malnutrition  chronic, severe   Etiology ? Energy in < energy out vs cardiac cacehxia   Signs/Symptoms Severe muscle wasting and subcutaneous fat loss   Status: Ongoing    Date:  6/29    Updated:     Problem Underweight   Etiology Inadequate protein-energy intake   Signs/Symptoms BMI 17.77   Status: Ongoing    Goal:   Nutrition to support treatment, Increase intake, and N/A    Nutrition Intervention      Follow treatment progress, Care plan reviewed, Menu provided, Encourage intake, Supplement provided    Continue to encourage increase in PO intake.  Noted decision for no artificial nutrition.  Will continue to follow, please consult if needed.     Monitoring/Evaluation:   Per protocol, PO intake, Supplement intake, Weight, Symptoms, POC/GOC    Nani Busby RD  Time Spent: 25min  "

## 2024-07-17 NOTE — PLAN OF CARE
"  Problem: Palliative Care  Goal: Enhanced Quality of Life  Palliative NP CROW WAGONER and Palliative MARLA DUGANW with patient's sisters Ge at bedside. We updated them on status/prognosis, and physician recommendations. They will discuss with their brother Mahendra cuellar - they will let us know when they have made decision. Family understand that IV abx would be stopped in transition to comfort measures. Palliative Care continues to follow for support and assist with plan of care and end of life care.  Intervention: Promote Advance Care Planning  Recent Flowsheet Documentation  Taken 7/17/2024 1400 by Emily Lerner \"Hyacinth\" ROOPA ALVARADO  Life Transition/Adjustment: palliative care discussed  Intervention: Optimize Psychosocial Wellbeing  Recent Flowsheet Documentation  Taken 7/17/2024 1400 by Emily Lerner \"Hyacinth\" ROOPA ALVARADO  Supportive Measures: (Palliative family conference at bedside with patient's sisters Ge, Palliative NP CROW WAGONER and Palliative MARLA Lerner LCSW)   active listening utilized   counseling provided   decision-making supported   positive reinforcement provided   verbalization of feelings encouraged  Family/Support System Care:   support provided   caregiver stress acknowledged   involvement promoted     "

## 2024-07-17 NOTE — PROGRESS NOTES
[facility] Medicine Services  PROGRESS NOTE    Patient Name: Arcelia Walter  : 1945  MRN: [id number]    Date of Admission: 2024  Primary Care Physician: Provider, No Known    Subjective   Subjective     CC:  Abdominal fistula f/u    HPI:  No new issues overnight.   Objective   Objective     Vital Signs:   Temp:  [98.3 °F (36.8 °C)] 98.3 °F (36.8 °C)  Heart Rate:  [95] 95  Resp:  [18] 18  BP: (114)/(70) 114/70     Physical Exam:  Constitutional: No acute distress, awake, alert, in bed   HENT: NCAT, mucous membranes moist  Respiratory: Clear to auscultation bilaterally, respiratory effort normal   Cardiovascular: RRR, no murmurs, rubs, or gallops  Gastrointestinal: Positive bowel sounds, soft, nontender, nondistended  Musculoskeletal: No bilateral ankle edema  Psychiatric: Appropriate affect, cooperative, pleasant  Neurologic: Oriented x 1, TORO,  speech clear  Skin: No rashes noted      Results Reviewed:  LAB RESULTS:      Lab 24  0702 24  0453 24  1240 24  1105 24  0856   WBC 5.78 4.23  --   --  5.97   HEMOGLOBIN 7.9* 7.6*  --   --  7.7*   HEMATOCRIT 25.2* 23.3*  --   --  23.0*   PLATELETS 660* 543*  --   --  505*   NEUTROS ABS  --   --   --   --  4.90   EOS ABS  --   --   --   --  0.06   MCV 95.1 94.3  --   --  93.1   LACTATE  --   --   --  0.8  --    PROTIME  --   --  14.9*  --   --          Lab 24  0453 24  0856 07/10/24  1658   SODIUM 140 138  --    POTASSIUM 3.8 3.8 4.7   CHLORIDE 106 105  --    CO2 24.0 27.0  --    ANION GAP 10.0 6.0  --    BUN 10 14  --    CREATININE 0.30* 0.27*  --    EGFR 108.7 111.5  --    GLUCOSE 75 107*  --    CALCIUM 7.7* 7.8*  --          Lab 24  0856   TOTAL PROTEIN 4.4*   ALBUMIN 2.0*   GLOBULIN 2.4   ALT (SGPT) 7   AST (SGOT) 8   BILIRUBIN 0.2   ALK PHOS 107         Lab 24  1240   PROTIME 14.9*   INR 1.15*             Lab 24  1036 24  0856   IRON  --  22*   IRON SATURATION (TSAT)  --   21   TIBC  --  107*   TRANSFERRIN  --  72*   FERRITIN  --  387.30*   FOLATE 3.83*  --    VITAMIN B 12 1,117*  --          Brief Urine Lab Results  (Last result in the past 365 days)        Color   Clarity   Blood   Leuk Est   Nitrite   Protein   CREAT   Urine HCG        06/29/24 0038 Yellow   Clear   Negative   Negative   Negative   Trace                   Microbiology Results Abnormal       Procedure Component Value - Date/Time    Blood Culture - Blood, Hand, Left [[id number]]  (Normal) Collected: 07/12/24 1033    Lab Status: Preliminary result Specimen: Blood from Hand, Left Updated: 07/16/24 1245     Blood Culture No growth at 4 days    Narrative:      Less than seven (7) mL's of blood was collected.  Insufficient quantity may yield false negative results.    Blood Culture - Blood, Hand, Right [[id number]]  (Normal) Collected: 07/12/24 1033    Lab Status: Preliminary result Specimen: Blood from Hand, Right Updated: 07/16/24 1245     Blood Culture No growth at 4 days    Narrative:      Less than seven (7) mL's of blood was collected.  Insufficient quantity may yield false negative results.    Blood Culture - Blood, Hand, Right [[id number]]  (Normal) Collected: 07/01/24 1558    Lab Status: Final result Specimen: Blood from Hand, Right Updated: 07/06/24 1731     Blood Culture No growth at 5 days    Narrative:      Aerobic bottle only  Less than seven (7) mL's of blood was collected.  Insufficient quantity may yield false negative results.    Blood Culture - Blood, Hand, Left [[id number]]  (Normal) Collected: 07/01/24 1558    Lab Status: Final result Specimen: Blood from Hand, Left Updated: 07/06/24 1731     Blood Culture No growth at 5 days            No radiology results from the last 24 hrs    Results for orders placed during the hospital encounter of 06/28/24    Adult Transthoracic Echo Complete W/ Cont if Necessary Per Protocol    Interpretation Summary    Left ventricular systolic function is normal. Left  ventricular ejection fraction appears to be 61 - 65%.    There is mild calcification of the aortic valve.    Mild aortic valve regurgitation is present.    Mild mitral annular calcification is present.    Mild to moderate mitral valve regurgitation is present.    Mild tricuspid valve regurgitation is present.      Current medications:  Scheduled Meds:mirtazapine, 15 mg, Oral, Nightly  nicotine, 1 patch, Transdermal, Q24H  piperacillin-tazobactam, 3.375 g, Intravenous, Q8H  sodium chloride, 10 mL, Intravenous, Q12H  thiamine, 100 mg, Oral, Daily      Continuous Infusions:   PRN Meds:.  acetaminophen    senna-docusate sodium **AND** polyethylene glycol **AND** bisacodyl **AND** bisacodyl    Calcium Replacement - Follow Nurse / BPA Driven Protocol    Magnesium Standard Dose Replacement - Follow Nurse / BPA Driven Protocol    melatonin    Phosphorus Replacement - Follow Nurse / BPA Driven Protocol    Potassium Replacement - Follow Nurse / BPA Driven Protocol    sodium chloride    sodium chloride    Assessment & Plan   Assessment & Plan     Active Hospital Problems    Diagnosis  POA    Failure to thrive in adult [R62.7]  Yes    Enterocutaneous fistula [K63.2]  Yes    Intra-abdominal fluid collection [R18.8]  Yes    Cognitive impairment [R41.89]  Yes    Severe malnutrition [E43]  Yes      Resolved Hospital Problems   No resolved problems to display.        Brief Hospital Course to date:  Arcelia Walter is a 79 y.o. female w completely unknown pmhx who presented after being found down by EMS on welfare check. No emergency contacts/collateral available in system. Eventually able to locate family and plan for family meeting in the coming days.     Area in right lower abdomen of concern noted 7/5, CT without contrast initially attributed to right femoral hernia but then began to leak brown foul smelling fluid 7/12. Repeat CT with contrast discovered enterocutaneous fistula, concern for periappendiceal abscess tracking into soft  tissue.  Given patient's severe malnutrition, chronic failure to thrive, concern also for chronic dementia given the above (though collateral unable to be obtained), discussed with multiple physicians, ethics/palliative and finalized no escalation of care plan (no surgery, no artificial nutrition) on 7/13. Patient still asx from this.    This patient's problems and plans were partially entered by my partner and updated as appropriate by me 07/17/24.       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/other GI perforation  -draining into bedside placed ostomy bag low volume currently, patient not yet systemically ill/septic  -evaluated by IR 7/13, given self draining, an IR drain placement thought not to add benefit  -source control is likely impossible without surgery, but surgery risk very high given BMI 13 and overall significant deconditioning and would lead to poor healing/poor outcome/infection  -on zosyn for now, likely will transition to comfort measures pending family conference  -appreciate ethics/palliative assistance moving forward     Acute anemia  -downtrending since admission without clear melena/hematochezia reported  -some fluid collection in abdomen could be blood, see above      Severe chronic malnutrition, BMI 13 - encouraging intake but minimal even before acute condition     Appears chronic dementia/FTT - nothing prior in system, but appears chronic and not acute encephalopathy given stability and overall state     Lack of capacity        GOC: Given lack of guardian currently, 2 physicians believe CPR/intubation would be all harm without benefit to patient. This code status changed w documentation on 7/13. Surgery also thought to be in this category of harm without benefit, will not escalate care. Family now has been contacted and made aware and will meet in the coming days- sister has verified that she does not think patient would want aggressive medical care. Suspect  patient will transition to hospice in the coming days.     Expected Discharge Location and Transportation: complex [facility]  Expected Discharge (Discharge date is tentative pending patient's medical condition and is subject to change)  Expected Discharge Date: 7/22/2024; Expected Discharge Time:      VTE Prophylaxis:  Mechanical VTE prophylaxis orders are present.       AM-PAC 6 Clicks Score (PT): 17 (07/16/24 2000)    CODE STATUS:   Code Status and Medical Interventions:   Ordered at: 07/13/24 1359     Medical Intervention Limits:    No intubation (DNI)    Other     Code Status (Patient has no pulse and is not breathing):    No CPR (Do Not Attempt to Resuscitate)     Medical Interventions (Patient has pulse or is breathing):    Limited Support     Additional Medical Interventions Limits:    no escalation of care       Isadora Mays MD  07/17/24

## 2024-07-17 NOTE — PROGRESS NOTES
"Palliative Care Daily Progress Note     C/C: Patient pleasantly confused.     S: Medical record reviewed. Follow up visit for GOC in the context of complex medical decision making. Events noted. RN reports no family to bedside today. Patient oriented to self only. Reports she does not feel well, denies pain but then states her ankles were \"rebelling\" yesterday. When asked for clarification she states, \"you know when they take steps\".     ROS: Denies pain, nausea. ROS limited by condition.      O: Code Status:   Code Status and Medical Interventions:   Ordered at: 07/13/24 1359     Medical Intervention Limits:    No intubation (DNI)    Other     Code Status (Patient has no pulse and is not breathing):    No CPR (Do Not Attempt to Resuscitate)     Medical Interventions (Patient has pulse or is breathing):    Limited Support     Additional Medical Interventions Limits:    no escalation of care      Advanced Directives: Advance Directive Status: Patient does not have advance directive   Goals of Care: Ongoing.   Palliative Performance Scale Score: 40%     /61   Pulse 66   Temp 97.9 °F (36.6 °C) (Temporal)   Resp 17   Ht 149.9 cm (59.02\")   Wt 31.1 kg (68 lb 8 oz)   SpO2 94%   BMI 13.83 kg/m²     Intake/Output Summary (Last 24 hours) at 7/17/2024 1140  Last data filed at 7/17/2024 0614  Gross per 24 hour   Intake 50 ml   Output 0 ml   Net 50 ml       PE:  General Appearance:    Patient laying in bed, awake, alert, A/C ill appearing, frail, cooperative, NAD, severely malnourished   HEENT:    NC/AT, EOMI, anicteric, MMM, face relaxed   Neck:   supple, trachea midline, no JVD   Lungs:     CTA bilat, diminished in bases; respirations regular, even and unlabored; RR 16-18 on exam, on RA    Heart:    RRR, normal S1 and S2, no M/R/G, HR 66    Abdomen:     Normal bowel sounds, soft, nontender, nondistended   G/U:   Deferred   MSK/Extremities:   Severe wasting, no edema   Pulses:   Pulses palpable and equal " bilaterally   Skin:   Warm, dry   Neurologic:   A/Ox1, cooperative, TORO   Psych:   Calm, appropriate     Meds: Reviewed and changes noted    Labs:   Results from last 7 days   Lab Units 07/14/24  0702   WBC 10*3/mm3 5.78   HEMOGLOBIN g/dL 7.9*   HEMATOCRIT % 25.2*   PLATELETS 10*3/mm3 660*     Results from last 7 days   Lab Units 07/13/24  0453   SODIUM mmol/L 140   POTASSIUM mmol/L 3.8   CHLORIDE mmol/L 106   CO2 mmol/L 24.0   BUN mg/dL 10   CREATININE mg/dL 0.30*   GLUCOSE mg/dL 75   CALCIUM mg/dL 7.7*     Results from last 7 days   Lab Units 07/13/24  0453 07/12/24  0856   SODIUM mmol/L 140 138   POTASSIUM mmol/L 3.8 3.8   CHLORIDE mmol/L 106 105   CO2 mmol/L 24.0 27.0   BUN mg/dL 10 14   CREATININE mg/dL 0.30* 0.27*   CALCIUM mg/dL 7.7* 7.8*   BILIRUBIN mg/dL  --  0.2   ALK PHOS U/L  --  107   ALT (SGPT) U/L  --  7   AST (SGOT) U/L  --  8   GLUCOSE mg/dL 75 107*     Imaging Results (Last 72 Hours)       ** No results found for the last 72 hours. **                  Diagnostics: Reviewed    A:   Severe malnutrition    Failure to thrive in adult    Enterocutaneous fistula    Intra-abdominal fluid collection    Cognitive impairment     79 y.o. female with severe malnutrition, enterocutaneous fistula, concern for GI perforation.   S/S:   Pain -coccyx  -continue Tylenol 650mg PO q 4 hours prn mild pain     2. Nausea -consider Zofran prn if patient complains of nausea, none at present and none noted by RN     3. GOC -7/13 recommend DNR/DNI due to poor nutritional status (Albumin 2.0) and debility, as patient would not benefit from aggressive resuscitation attempts with CPR nor would she benefit from MV  -7/13 reviewed with Dr. Vasquez  -7/13 reviewed with SHANTI Corley to review current attempts to find family/medical decision maker/guardian  -called sister Frances at 1308 who states family have arrived at hospital, plan to meet in room  -met with sisters ,Frances and Caro, at [phone], reviewed patient's  condition and current plan of care, daughters in agreement with current plan of care    P: Follow up visit for GOC in the context of complex medical decision making. Met with sisters, Frances and Caro, at bedside with patient present. They voluntarily participated in GOC discussion, reviewed patient's condition and current plan of care due to patient's poor nutritional status. Reviewed options for comfort focused plan of care in light of patient's terminal prognosis. Sisters in agreement with current plan of care and will speak with next available brother, Mahendra, in order to discuss comfort focused plan of care with him.   Palliative Care Team will continue to follow patient. Please do not hesitate to contact us regarding further sx mgmt or GOC needs.  Gisella Olivia, APRN  7/17/2024  Time spent: 40 minutes

## 2024-07-17 NOTE — PLAN OF CARE
Goal Outcome Evaluation:           Progress: no change  Outcome Evaluation: Pt rested well through the night with PRN melatonin. Pt calm and cooperative regarding care. Pt takes pills whole and with no issue. No concerns at this time. Will continue to monitor.

## 2024-07-17 NOTE — PLAN OF CARE
"Goal Outcome Evaluation:  Plan of Care Reviewed With: patient        Progress: no change  Outcome Evaluation: Palliative RN saw pt at 1139.  Pt. was sitting up in bed on RA.  She denied pain and nausea but endorsed slight shortness of breath at times.  Pt. was able to articulate names of her siblings. She knew she was in the hospital \"the only hospital [facility]\".  She did not know the name of the hospital.  Per staff report she is consuming between 25-50% of meals and is able to feed herself.  IV abx infusing.  CIARAN Hendrickson to meet with patient's sister.  Palliative care to follow for support, POC and ongoing [facility].    0930 Palliative IDT meeting: JAGJIT Mejia RN, CHTAYLER; CIARAN Richter; THELMA Cooley MD.; DAVIS Patricio RN, TAYLER; THELMA Lerner, [facility], [facility]; DAVIS Johnson MDiv, [facility]; CHELSI Gross RN, CHPN    After hours, contact Palliative by calling [phone].                             "

## 2024-07-17 NOTE — CASE MANAGEMENT/SOCIAL WORK
Continued Stay Note  [facility]     Patient Name: Arcelia Walter  MRN: [id number]  Today's Date: 7/17/2024    Admit Date: 6/28/2024    Plan: Ongoing   Discharge Plan       Row Name 07/17/24 1804       Plan    Plan Ongoing    Patient/Family in Agreement with Plan yes  Sister, Frances Lema    Plan Comments Late entry-Risa Osorio and myself located sister, Frances Lema, yesterday.  I spoke with Frances by telephone yesterday afternoon and she verified she was indeed Arcelia's sister, had last spoken with her several weeks ago.  Updated sister regarding hospitalization, she was agreeable to my having CIARAN De La O, call her to discuss sister's medical condition further.  Discussed with Hyacinth Lerner LCSW/Palliative Care this morning, planning family meeting today with siblings for COG discussion.  Case Management will continue to follow along and assist with all needs/planning.  Discussed in unit multidisciplinary rounds this morning and updated Adult Protective Services that family had been located and no need to pursue guardianship.  Susan Hoskins, Ext. [phone]    Final Discharge Disposition Code 30 - still a patient                   Discharge Codes    No documentation.                 Expected Discharge Date and Time       Expected Discharge Date Expected Discharge Time    Jul 22, 2024               SHANTI Jennings

## 2024-07-18 LAB — C DIFF TOX GENS STL QL NAA+PROBE: NOT DETECTED

## 2024-07-18 PROCEDURE — 25010000002 HALOPERIDOL LACTATE PER 5 MG

## 2024-07-18 PROCEDURE — 87493 C DIFF AMPLIFIED PROBE: CPT | Performed by: NURSE PRACTITIONER

## 2024-07-18 PROCEDURE — 25010000002 PIPERACILLIN SOD-TAZOBACTAM PER 1 G: Performed by: FAMILY MEDICINE

## 2024-07-18 PROCEDURE — 25010000002 ONDANSETRON PER 1 MG: Performed by: NURSE PRACTITIONER

## 2024-07-18 PROCEDURE — 25010000002 PROCHLORPERAZINE 10 MG/2ML SOLUTION: Performed by: NURSE PRACTITIONER

## 2024-07-18 PROCEDURE — 25010000002 PROCHLORPERAZINE 10 MG/2ML SOLUTION

## 2024-07-18 PROCEDURE — 99232 SBSQ HOSP IP/OBS MODERATE 35: CPT | Performed by: NURSE PRACTITIONER

## 2024-07-18 RX ORDER — HALOPERIDOL 5 MG/ML
0.5 INJECTION INTRAMUSCULAR EVERY 6 HOURS PRN
Status: DISCONTINUED | OUTPATIENT
Start: 2024-07-18 | End: 2024-08-14 | Stop reason: HOSPADM

## 2024-07-18 RX ORDER — ONDANSETRON 2 MG/ML
4 INJECTION INTRAMUSCULAR; INTRAVENOUS ONCE
Status: COMPLETED | OUTPATIENT
Start: 2024-07-18 | End: 2024-07-18

## 2024-07-18 RX ORDER — PROCHLORPERAZINE EDISYLATE 5 MG/ML
10 INJECTION INTRAMUSCULAR; INTRAVENOUS ONCE
Status: COMPLETED | OUTPATIENT
Start: 2024-07-18 | End: 2024-07-18

## 2024-07-18 RX ORDER — ONDANSETRON 4 MG/1
4 TABLET, ORALLY DISINTEGRATING ORAL EVERY 6 HOURS PRN
Status: DISCONTINUED | OUTPATIENT
Start: 2024-07-18 | End: 2024-07-18

## 2024-07-18 RX ORDER — PROCHLORPERAZINE EDISYLATE 5 MG/ML
5 INJECTION INTRAMUSCULAR; INTRAVENOUS EVERY 6 HOURS PRN
Status: DISCONTINUED | OUTPATIENT
Start: 2024-07-18 | End: 2024-08-14 | Stop reason: HOSPADM

## 2024-07-18 RX ORDER — ONDANSETRON 2 MG/ML
4 INJECTION INTRAMUSCULAR; INTRAVENOUS EVERY 6 HOURS PRN
Status: DISCONTINUED | OUTPATIENT
Start: 2024-07-18 | End: 2024-07-18

## 2024-07-18 RX ORDER — MORPHINE SULFATE 2 MG/ML
1 INJECTION, SOLUTION INTRAMUSCULAR; INTRAVENOUS
Status: DISPENSED | OUTPATIENT
Start: 2024-07-18 | End: 2024-08-04

## 2024-07-18 RX ADMIN — ONDANSETRON 4 MG: 2 INJECTION INTRAMUSCULAR; INTRAVENOUS at 03:23

## 2024-07-18 RX ADMIN — MIRTAZAPINE 15 MG: 15 TABLET, FILM COATED ORAL at 21:59

## 2024-07-18 RX ADMIN — HALOPERIDOL LACTATE 0.5 MG: 5 INJECTION, SOLUTION INTRAMUSCULAR at 20:21

## 2024-07-18 RX ADMIN — Medication 1 PATCH: at 08:09

## 2024-07-18 RX ADMIN — THIAMINE HCL TAB 100 MG 100 MG: 100 TAB at 08:09

## 2024-07-18 RX ADMIN — PROCHLORPERAZINE EDISYLATE 5 MG: 5 INJECTION INTRAMUSCULAR; INTRAVENOUS at 15:53

## 2024-07-18 RX ADMIN — PROCHLORPERAZINE EDISYLATE 10 MG: 5 INJECTION INTRAMUSCULAR; INTRAVENOUS at 10:01

## 2024-07-18 RX ADMIN — PIPERACILLIN AND TAZOBACTAM 3.38 G: 3; .375 INJECTION, POWDER, LYOPHILIZED, FOR SOLUTION INTRAVENOUS at 01:46

## 2024-07-18 RX ADMIN — Medication 10 ML: at 20:24

## 2024-07-18 RX ADMIN — PIPERACILLIN AND TAZOBACTAM 3.38 G: 3; .375 INJECTION, POWDER, LYOPHILIZED, FOR SOLUTION INTRAVENOUS at 10:01

## 2024-07-18 NOTE — PLAN OF CARE
Goal Outcome Evaluation:  Plan of Care Reviewed With: patient, family           Outcome Evaluation: VSS on RA. Pt has been complaining often of Nausea and stomach pain. PRN compazine given as well as one dose of PRN pain medications. Pt then rested well. No appitite nor desire to eat this shift. Palliative made medication adjustments and switched pt to comfort care measures. Dressing in place, clean, dry intact. Up to toilet with one assist and walker with adequate UOP as well as BM noted. Family visited pt this shift. Patent IV in place, saline locked. Continuing POC and reporting as needed.

## 2024-07-18 NOTE — PLAN OF CARE
Goal Outcome Evaluation:  Plan of Care Reviewed With: patient        Progress: no change  Outcome Evaluation: Palliative RN saw pt at 1107.  She was asleep on RA and did not demonstrate any visible signs of distress.  Did not attempt to wake patient.  Per nursing report, pt has had a rough morning with diarrhea.  C. diff test negative.  Pt has been nauseated this morning as well.  Interventions effective. Palliative care speaking with family re. GOC.  Palliative care continuing to follow.    0930 Palliative IDT meeting: JAGJIT Mejia RN, PN; CROW Olivia, APRN; MARTIN Mays, APRN.; ELISABETH Trinh RN, PN; THELMA Lerner, [facility], [facility]; DAVIS Johnson MDiv, [facility]; CHELSI Gross RN, PN    After hours, contact Palliative by calling [phone].

## 2024-07-18 NOTE — PLAN OF CARE
"  Problem: Palliative Care  Goal: Enhanced Quality of Life  Intervention: Promote Advance Care Planning  Recent Flowsheet Documentation  Taken 7/18/2024 1522 by Emily Lerner \"Hyacinth\" ROOPA ALVARADO  Life Transition/Adjustment: (transition to comfort measures) end-of-life care initiated  Intervention: Optimize Psychosocial Wellbeing  Recent Flowsheet Documentation  Taken 7/18/2024 1522 by Emily Lerner \"Hyacinth\" KAILEE ALVARADOW  Supportive Measures: (d/w patients family at bedside)   active listening utilized   counseling provided   decision-making supported   positive reinforcement provided   verbalization of feelings encouraged  Family/Support System Care:   support provided   caregiver stress acknowledged   self-care encouraged   presence promoted     "

## 2024-07-18 NOTE — PROGRESS NOTES
"Palliative Care Daily Progress Note     C/C: Patient sleeping.     S: Medical record reviewed. Follow up visit for GOC in the context of complex medical decision making. Events noted. RN reports patient complaining of abdominal pain today. One time dose of Compazine IV given, patient sleeping at time of visit. C-diff specimen sent, resulted negative.     ROS: ROS limited by patient sleeping.     O: Code Status:   Code Status and Medical Interventions:   Ordered at: 07/13/24 1359     Medical Intervention Limits:    No intubation (DNI)    Other     Code Status (Patient has no pulse and is not breathing):    No CPR (Do Not Attempt to Resuscitate)     Medical Interventions (Patient has pulse or is breathing):    Limited Support     Additional Medical Interventions Limits:    no escalation of care      Advanced Directives: Advance Directive Status: Patient does not have advance directive   Goals of Care: Ongoing.   Palliative Performance Scale Score: 40%     /82 (BP Location: Left arm, Patient Position: Lying)   Pulse 76   Temp 98.1 °F (36.7 °C) (Temporal)   Resp 16   Ht 149.9 cm (59.02\")   Wt 32.3 kg (71 lb 3.2 oz)   SpO2 99%   BMI 14.37 kg/m²     Intake/Output Summary (Last 24 hours) at 7/18/2024 1422  Last data filed at 7/18/2024 0800  Gross per 24 hour   Intake 120 ml   Output 400 ml   Net -280 ml       PE:  General Appearance:    Patient laying in bed, sleeping, A/C ill appearing, frail, cooperative, NAD, severely malnourished   HEENT:    NC/AT, MMM, face relaxed   Neck:   supple, trachea midline, no JVD   Lungs:     CTA bilat, diminished in bases; respirations regular, even and unlabored; RR 16-18 on exam, on RA    Heart:    RRR, normal S1 and S2, no M/R/G, HR 76    Abdomen:     Normal bowel sounds, soft, nontender, nondistended, ostomy pouch in place to draining fistula RLQ   G/U:   Deferred   MSK/Extremities:   Severe wasting, no edema   Pulses:   Pulses palpable and equal bilaterally   Skin:   Warm, " dry   Neurologic:   sleeping   Psych:  sleeping     Meds: Reviewed and changes noted    Labs:   Results from last 7 days   Lab Units 07/17/24  1250   WBC 10*3/mm3 10.82*   HEMOGLOBIN g/dL 8.6*   HEMATOCRIT % 26.3*   PLATELETS 10*3/mm3 833*     Results from last 7 days   Lab Units 07/17/24  1250   SODIUM mmol/L 142   POTASSIUM mmol/L 4.0   CHLORIDE mmol/L 110*   CO2 mmol/L 25.0   BUN mg/dL 11   CREATININE mg/dL 0.40*   GLUCOSE mg/dL 87   CALCIUM mg/dL 7.8*     Results from last 7 days   Lab Units 07/17/24  1250 07/13/24  0453 07/12/24  0856   SODIUM mmol/L 142   < > 138   POTASSIUM mmol/L 4.0   < > 3.8   CHLORIDE mmol/L 110*   < > 105   CO2 mmol/L 25.0   < > 27.0   BUN mg/dL 11   < > 14   CREATININE mg/dL 0.40*   < > 0.27*   CALCIUM mg/dL 7.8*   < > 7.8*   BILIRUBIN mg/dL  --   --  0.2   ALK PHOS U/L  --   --  107   ALT (SGPT) U/L  --   --  7   AST (SGOT) U/L  --   --  8   GLUCOSE mg/dL 87   < > 107*    < > = values in this interval not displayed.     Imaging Results (Last 72 Hours)       ** No results found for the last 72 hours. **                  Diagnostics: Reviewed    A:   Severe malnutrition    Failure to thrive in adult    Enterocutaneous fistula    Intra-abdominal fluid collection    Cognitive impairment     79 y.o. female with severe malnutrition, enterocutaneous fistula, concern for GI perforation.   S/S:   Pain -coccyx, abdominal secondary to enterocutaneous fistula/GI perforation  -continue Tylenol 650mg PO q 4 hours prn mild pain  -started Morphine 1mg IV q 4 hours prn moderate pain     2. Nausea -discontinued Zofran 4mg IV/PO q 6 hours prn N/V as ineffective per RN  -started on Compazine 5mg IV q 6 hours prn N/V  -started Haldol 0.5mg IV q 6 hours prn N/V     3. [facility] -7/13 recommend DNR/DNI due to poor nutritional status (Albumin 2.0) and debility, as patient would not benefit from aggressive resuscitation attempts with CPR nor would she benefit from MV  -7/13 reviewed with Dr. Vasquez  -7/13 reviewed  with MSMARLENE Corley to review current attempts to find family/medical decision maker/guardian  7/17: called sister Frances at [phone] who states family have arrived at hospital, plan to meet in room  7/17: met with sisters ,Frances and Caro, at [phone], reviewed patient's condition and current plan of care, daughters in agreement with current plan of care  7/18: Continue to recommend comfort focused plan of care due to enterocutaneous fistula in context of nonsurgical candidate due to malnutrition.     ADDENDUM: Return visit to patient's room at 7/18 1515 to meet with patient's sisters Frances and sister-in-law, Mary Ellen. Reviewed comfort measures. Family has talked with one another and would like to elect comfort measures per sister and sister-in-law. Orders adjusted.     P: Follow up visit for symptom management of abdominal pain/nausea. Sisters in agreement with current plan of care and will speak with next available brother, Mahendra, in order to discuss comfort focused plan of care with him.   Palliative Care Team will continue to follow patient. Please do not hesitate to contact us regarding further sx mgmt or GOC needs.  Gisella Olivia, APRN  7/18/2024  Time spent: 30 minutes

## 2024-07-18 NOTE — PLAN OF CARE
Problem: Adult Inpatient Plan of Care  Goal: Plan of Care Review  Outcome: Ongoing, Progressing  Flowsheets (Taken 7/18/2024 0643)  Progress: no change  Plan of Care Reviewed With: patient  Outcome Evaluation: VSS. Remains confused. c/o abd cramping. Audible bowel sounds w/o stethoscope. c/o nausea. Zofran prn x1 given. 3 large diarrhea stools. Rested well after bms.   Goal Outcome Evaluation:  Plan of Care Reviewed With: patient        Progress: no change  Outcome Evaluation: VSS. Remains confused. c/o abd cramping. Audible bowel sounds w/o stethoscope. c/o nausea. Zofran prn x1 given. 3 large diarrhea stools. Rested well after bms.

## 2024-07-18 NOTE — PROGRESS NOTES
[facility] Medicine Services  PROGRESS NOTE    Patient Name: Arcelia Walter  : 1945  MRN: [id number]    Date of Admission: 2024  Primary Care Physician: Provider, No Known    Subjective   Subjective     CC:  Abdominal fistula f/u    HPI:  Patient seen resting up in bed awake and alert.  Frail and debilitated.  Confused at baseline.  Holding emesis bag complaining of significant nausea and would not eat any of her breakfast.  Nurse reports persistent nausea not responding to Zofran since yesterday.  Significant diarrhea since yesterday.      Objective   Objective     Vital Signs:   Temp:  [98 °F (36.7 °C)-98.1 °F (36.7 °C)] 98.1 °F (36.7 °C)  Heart Rate:  [76-99] 76  Resp:  [16-18] 16  BP: (142-145)/(74-82) 142/82     Physical Exam:  Constitutional: No acute distress, awake, alert, resting up in bed.  Chronically ill, frail and debilitated appearing elderly female.  HENT: NCAT, mucous membranes moist  Respiratory: Clear to auscultation bilaterally, respiratory effort normal on room air.  Cardiovascular: RRR, no murmurs, rubs, or gallops  Gastrointestinal: Positive bowel sounds, soft, nontender, nondistended.  Cachectic.  Musculoskeletal: No bilateral ankle edema.  Carreno spontaneously.  Psychiatric: Appropriate affect, cooperative  Neurologic: Awake and alert.  Oriented x 1, but very confused conversation.  Speech clear.  Follows simple commands.  Skin: No rashes noted      Results Reviewed:  LAB RESULTS:      Lab 24  1250 24  0702 24  0453 24  1240 24  1105 24  0856   WBC 10.82* 5.78 4.23  --   --  5.97   HEMOGLOBIN 8.6* 7.9* 7.6*  --   --  7.7*   HEMATOCRIT 26.3* 25.2* 23.3*  --   --  23.0*   PLATELETS 833* 660* 543*  --   --  505*   NEUTROS ABS 7.62*  --   --   --   --  4.90   IMMATURE GRANS (ABS) 0.24*  --   --   --   --   --    LYMPHS ABS 2.15  --   --   --   --   --    MONOS ABS 0.72  --   --   --   --   --    EOS ABS 0.06  --   --   --   --   0.06   MCV 94.3 95.1 94.3  --   --  93.1   LACTATE  --   --   --   --  0.8  --    PROTIME  --   --   --  14.9*  --   --          Lab 07/17/24  1250 07/13/24  0453 07/12/24  0856   SODIUM 142 140 138   POTASSIUM 4.0 3.8 3.8   CHLORIDE 110* 106 105   CO2 25.0 24.0 27.0   ANION GAP 7.0 10.0 6.0   BUN 11 10 14   CREATININE 0.40* 0.30* 0.27*   EGFR 101.5 108.7 111.5   GLUCOSE 87 75 107*   CALCIUM 7.8* 7.7* 7.8*         Lab 07/12/24  0856   TOTAL PROTEIN 4.4*   ALBUMIN 2.0*   GLOBULIN 2.4   ALT (SGPT) 7   AST (SGOT) 8   BILIRUBIN 0.2   ALK PHOS 107         Lab 07/12/24  1240   PROTIME 14.9*   INR 1.15*             Lab 07/12/24  1036 07/12/24  0856   IRON  --  22*   IRON SATURATION (TSAT)  --  21   TIBC  --  107*   TRANSFERRIN  --  72*   FERRITIN  --  387.30*   FOLATE 3.83*  --    VITAMIN B 12 1,117*  --          Brief Urine Lab Results  (Last result in the past 365 days)        Color   Clarity   Blood   Leuk Est   Nitrite   Protein   CREAT   Urine HCG        06/29/24 0038 Yellow   Clear   Negative   Negative   Negative   Trace                   Microbiology Results Abnormal       Procedure Component Value - Date/Time    Blood Culture - Blood, Hand, Left [[id number]]  (Normal) Collected: 07/12/24 1033    Lab Status: Final result Specimen: Blood from Hand, Left Updated: 07/17/24 1245     Blood Culture No growth at 5 days    Narrative:      Less than seven (7) mL's of blood was collected.  Insufficient quantity may yield false negative results.    Blood Culture - Blood, Hand, Right [[id number]]  (Normal) Collected: 07/12/24 1033    Lab Status: Final result Specimen: Blood from Hand, Right Updated: 07/17/24 1245     Blood Culture No growth at 5 days    Narrative:      Less than seven (7) mL's of blood was collected.  Insufficient quantity may yield false negative results.    Blood Culture - Blood, Hand, Right [[id number]]  (Normal) Collected: 07/01/24 1558    Lab Status: Final result Specimen: Blood from Hand, Right Updated:  07/06/24 1731     Blood Culture No growth at 5 days    Narrative:      Aerobic bottle only  Less than seven (7) mL's of blood was collected.  Insufficient quantity may yield false negative results.    Blood Culture - Blood, Hand, Left [[id number]]  (Normal) Collected: 07/01/24 1558    Lab Status: Final result Specimen: Blood from Hand, Left Updated: 07/06/24 1731     Blood Culture No growth at 5 days            No radiology results from the last 24 hrs    Results for orders placed during the hospital encounter of 06/28/24    Adult Transthoracic Echo Complete W/ Cont if Necessary Per Protocol    Interpretation Summary    Left ventricular systolic function is normal. Left ventricular ejection fraction appears to be 61 - 65%.    There is mild calcification of the aortic valve.    Mild aortic valve regurgitation is present.    Mild mitral annular calcification is present.    Mild to moderate mitral valve regurgitation is present.    Mild tricuspid valve regurgitation is present.      Current medications:  Scheduled Meds:mirtazapine, 15 mg, Oral, Nightly  nicotine, 1 patch, Transdermal, Q24H  piperacillin-tazobactam, 3.375 g, Intravenous, Q8H  prochlorperazine, 10 mg, Intravenous, Once  sodium chloride, 10 mL, Intravenous, Q12H  thiamine, 100 mg, Oral, Daily      Continuous Infusions:   PRN Meds:.  acetaminophen    senna-docusate sodium **AND** polyethylene glycol **AND** bisacodyl **AND** bisacodyl    Calcium Replacement - Follow Nurse / BPA Driven Protocol    Magnesium Standard Dose Replacement - Follow Nurse / BPA Driven Protocol    melatonin    ondansetron ODT **OR** ondansetron    Phosphorus Replacement - Follow Nurse / BPA Driven Protocol    Potassium Replacement - Follow Nurse / BPA Driven Protocol    sodium chloride    sodium chloride    Assessment & Plan   Assessment & Plan     Active Hospital Problems    Diagnosis  POA    Failure to thrive in adult [R62.7]  Yes    Enterocutaneous fistula [K63.2]  Yes     Intra-abdominal fluid collection [R18.8]  Yes    Cognitive impairment [R41.89]  Yes    Severe malnutrition [E43]  Yes      Resolved Hospital Problems   No resolved problems to display.        Brief Hospital Course to date:  Arcelia Walter is a 79 y.o. female w completely unknown pmhx who presented after being found down by EMS on welfare check. No emergency contacts/collateral available in system. Eventually able to locate family and plan for family meeting in the coming days.     Area in right lower abdomen of concern noted 7/5, CT without contrast initially attributed to right femoral hernia but then began to leak brown foul smelling fluid 7/12. 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  Given patient's severe malnutrition, chronic failure to thrive, concern also for chronic dementia given the above (though collateral unable to be obtained), discussed with multiple physicians, ethics/palliative and finalized no escalation of care plan (no surgery, no artificial nutrition) on 7/13. Patient still asx from this.    This patient's problems and plans were partially entered by my partner and updated as appropriate by me 07/18/24.    Assessment/Plan:     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/other GI perforation  -draining into bedside placed ostomy bag low volume currently, patient not yet systemically ill/septic  -evaluated by IR 7/13, given self draining, an IR drain placement thought not to add benefit  -source control is likely impossible without surgery, but surgery risk very high given BMI 13 and overall significant deconditioning and would lead to poor healing/poor outcome/infection  -on zosyn for now, likely will transition to comfort measures pending family conference  -appreciate ethics/palliative assistance moving forward    Significant nausea  Diarrhea/abd cramps  -- Nurse reports significant  diarrhea since yesterday with some abdominal cramping and persistent nausea not responding to Zofran.  --Patient sitting up in bed confused at baseline but holding emesis bag and telling me that she is very nauseated and will not eat.  --Will try one-time dose of Compazine  --Send stool for C. difficile     Acute anemia  -downtrending since admission without clear melena/hematochezia reported  -some fluid collection in abdomen could be blood, see above      Severe chronic malnutrition, BMI 13 - encouraging intake but minimal even before acute condition  --Dietitian following.     Appears chronic dementia/FTT - nothing prior in system, but appears chronic and not acute encephalopathy given stability and overall state     Lack of capacity   --Family now located.  Coming for family conference soon.  Awaiting disposition.       GOC: Given lack of guardian currently, 2 physicians believe CPR/intubation would be all harm without benefit to patient. This code status changed w documentation on 7/13. Surgery also thought to be in this category of harm without benefit, will not escalate care.     Family now has been contacted and made aware and will meet in the coming days- sister has verified that she does not think patient would want aggressive medical care. Suspect patient will transition to hospice in the coming days.     Expected Discharge Location and Transportation: complex [facility]  Expected Discharge (Discharge date is tentative pending patient's medical condition and is subject to change)  Expected Discharge Date: 7/22/2024; Expected Discharge Time:      VTE Prophylaxis:  Mechanical VTE prophylaxis orders are present.       AM-PAC 6 Clicks Score (PT): 18 (07/18/24 0800)    CODE STATUS:   Code Status and Medical Interventions:   Ordered at: 07/13/24 1359     Medical Intervention Limits:    No intubation (DNI)    Other     Code Status (Patient has no pulse and is not breathing):    No CPR (Do Not Attempt to Resuscitate)      Medical Interventions (Patient has pulse or is breathing):    Limited Support     Additional Medical Interventions Limits:    no escalation of care       Awa Carvajal, APRN  07/18/24

## 2024-07-19 PROCEDURE — 25010000002 HALOPERIDOL LACTATE PER 5 MG

## 2024-07-19 RX ADMIN — HALOPERIDOL LACTATE 0.5 MG: 5 INJECTION, SOLUTION INTRAMUSCULAR at 18:15

## 2024-07-19 RX ADMIN — MIRTAZAPINE 15 MG: 15 TABLET, FILM COATED ORAL at 20:57

## 2024-07-19 RX ADMIN — Medication 10 ML: at 20:58

## 2024-07-19 NOTE — PLAN OF CARE
Problem: Adult Inpatient Plan of Care  Goal: Plan of Care Review  Outcome: Ongoing, Progressing  Flowsheets (Taken 7/19/2024 0552)  Progress: no change  Plan of Care Reviewed With: patient  Outcome Evaluation: VSS. No po intake. c/o abd cramping. c/o nausea. Haldol prn x1. Rested well. One small diarrhea. Unclear if pt had any UOP with stool. Bladder scan of 173 ml.    Goal Outcome Evaluation:  Plan of Care Reviewed With: patient        Progress: no change  Outcome Evaluation: VSS. No po intake. c/o abd cramping. c/o nausea. Haldol prn x1. Rested well. One small diarrhea. Unclear if pt had any UOP with stool.

## 2024-07-19 NOTE — PLAN OF CARE
Goal Outcome Evaluation:           Progress: declining  Outcome Evaluation: Comfort measures ongoing. VSS on RA. Patient denies pain. Coccyx pressure injury dressing changed and wound care performed with pH foam and blue wipes, barrier spray, new allevyn applied. Turned q2. Optifoam dressings in place to bony prominences and bilateral heels that are red, blanchable. BM this shift. RLQ fistula draining into ostomy bag. Patient is not eating, drinking minimal PO fluids. Patient with urinary retention, indwelling cathter placed per order. Calm, cooperative, pleasantly confused. Care ongoing, continue plan of care.                                OT initial evaluation  Activity: OOB w/ assistance

## 2024-07-19 NOTE — CASE MANAGEMENT/SOCIAL WORK
Continued Stay Note  [facility]     Patient Name: Arcelia Walter  MRN: [id number]  Today's Date: 7/19/2024    Admit Date: 6/28/2024    Plan: Ongoing   Discharge Plan       Row Name 07/19/24 1539       Plan    Plan Ongoing    Plan Comments SW'er met with patient at bedside. Patient's family has elected for comfort measure. SW'er following for all discharge planning needs. Palliative Care following and will assist with plan of care and end of life care. Plan ongoing.                   Discharge Codes    No documentation.                 Expected Discharge Date and Time       Expected Discharge Date Expected Discharge Time    Jul 22, 2024               SHANTI Shaw (Kay)

## 2024-07-20 PROCEDURE — 99232 SBSQ HOSP IP/OBS MODERATE 35: CPT | Performed by: NURSE PRACTITIONER

## 2024-07-20 RX ADMIN — Medication 10 ML: at 20:26

## 2024-07-20 RX ADMIN — MIRTAZAPINE 15 MG: 15 TABLET, FILM COATED ORAL at 20:26

## 2024-07-20 NOTE — PROGRESS NOTES
"    [facility] Medicine Services  PROGRESS NOTE    Patient Name: Arcelia Walter  : 1945  MRN: [id number]    Date of Admission: 2024  Primary Care Physician: Provider, No Known    Subjective   Subjective     CC:  Abdominal fistula f/u    HPI:  Patient sitting up in bed with no complaints except that \"I am glad to hear\".  Patient knows her name and her birthday but thinks she is at [facility].  Tried to redirect patient, but unable to.  No diarrhea overnight.    Objective   Objective     Vital Signs:   Temp:  [98.2 °F (36.8 °C)-98.5 °F (36.9 °C)] 98.2 °F (36.8 °C)  Heart Rate:  [68-76] 68  Resp:  [16] 16  BP: (112-130)/(65-72) 130/72     Physical Exam:  Constitutional: Alert, cachectic elderly female sitting up in bed in NAD  ENT: Pink, moist mucous membranes   Respiratory: Nonlabored, symmetrical chest expansion, CTAB, RA  Cardiovascular: RRR, no M/R/G  Gastrointestinal: Soft, NT, ND +BS  Musculoskeletal: TORO; no LE edema bilaterally  Neurologic: Oriented to self and birthday otherwise very confused and thinks she is at [facility], follows all commands, speech clear  Skin: No rashes on exposed skin  Psychiatric: Pleasant and cooperative; normal affect    Results Reviewed:  LAB RESULTS:      Lab 24  1250 24  0702   WBC 10.82* 5.78   HEMOGLOBIN 8.6* 7.9*   HEMATOCRIT 26.3* 25.2*   PLATELETS 833* 660*   NEUTROS ABS 7.62*  --    IMMATURE GRANS (ABS) 0.24*  --    LYMPHS ABS 2.15  --    MONOS ABS 0.72  --    EOS ABS 0.06  --    MCV 94.3 95.1         Lab 24  1250   SODIUM 142   POTASSIUM 4.0   CHLORIDE 110*   CO2 25.0   ANION GAP 7.0   BUN 11   CREATININE 0.40*   EGFR 101.5   GLUCOSE 87   CALCIUM 7.8*                               Brief Urine Lab Results  (Last result in the past 365 days)        Color   Clarity   Blood   Leuk Est   Nitrite   Protein   CREAT   Urine HCG        24 0038 Yellow   Clear   Negative   Negative   Negative   Trace                   Microbiology " Results Abnormal       Procedure Component Value - Date/Time    Clostridioides difficile Toxin - Stool, Per Rectum [[id number]]  (Normal) Collected: 07/18/24 1024    Lab Status: Final result Specimen: Stool from Per Rectum Updated: 07/18/24 1150    Narrative:      The following orders were created for panel order Clostridioides difficile Toxin - Stool, Per Rectum.  Procedure                               Abnormality         Status                     ---------                               -----------         ------                     Clostridioides difficile...[[id number]]  Normal              Final result                 Please view results for these tests on the individual orders.    Clostridioides difficile Toxin, PCR - Stool, Per Rectum [[id number]]  (Normal) Collected: 07/18/24 1024    Lab Status: Final result Specimen: Stool from Per Rectum Updated: 07/18/24 1150     Toxigenic C. difficile by PCR Not Detected    Narrative:      The result indicates the absence of toxigenic C. difficile from stool specimen.     Blood Culture - Blood, Hand, Left [[id number]]  (Normal) Collected: 07/12/24 1033    Lab Status: Final result Specimen: Blood from Hand, Left Updated: 07/17/24 1245     Blood Culture No growth at 5 days    Narrative:      Less than seven (7) mL's of blood was collected.  Insufficient quantity may yield false negative results.    Blood Culture - Blood, Hand, Right [[id number]]  (Normal) Collected: 07/12/24 1033    Lab Status: Final result Specimen: Blood from Hand, Right Updated: 07/17/24 1245     Blood Culture No growth at 5 days    Narrative:      Less than seven (7) mL's of blood was collected.  Insufficient quantity may yield false negative results.    Blood Culture - Blood, Hand, Right [[id number]]  (Normal) Collected: 07/01/24 1558    Lab Status: Final result Specimen: Blood from Hand, Right Updated: 07/06/24 1731     Blood Culture No growth at 5 days    Narrative:      Aerobic bottle only  Less than  seven (7) mL's of blood was collected.  Insufficient quantity may yield false negative results.    Blood Culture - Blood, Hand, Left [[id number]]  (Normal) Collected: 07/01/24 1558    Lab Status: Final result Specimen: Blood from Hand, Left Updated: 07/06/24 1734     Blood Culture No growth at 5 days            No radiology results from the last 24 hrs    Results for orders placed during the hospital encounter of 06/28/24    Adult Transthoracic Echo Complete W/ Cont if Necessary Per Protocol    Interpretation Summary    Left ventricular systolic function is normal. Left ventricular ejection fraction appears to be 61 - 65%.    There is mild calcification of the aortic valve.    Mild aortic valve regurgitation is present.    Mild mitral annular calcification is present.    Mild to moderate mitral valve regurgitation is present.    Mild tricuspid valve regurgitation is present.      Current medications:  Scheduled Meds:mirtazapine, 15 mg, Oral, Nightly  nicotine, 1 patch, Transdermal, Q24H  sodium chloride, 10 mL, Intravenous, Q12H      Continuous Infusions:   PRN Meds:.  acetaminophen    senna-docusate sodium **AND** polyethylene glycol **AND** bisacodyl **AND** bisacodyl    haloperidol lactate    melatonin    Morphine    prochlorperazine    sodium chloride    sodium chloride    Assessment & Plan   Assessment & Plan     Active Hospital Problems    Diagnosis  POA    **Failure to thrive in adult [R62.7]  Yes    Enterocutaneous fistula [K63.2]  Yes    Intra-abdominal fluid collection [R18.8]  Yes    Cognitive impairment [R41.89]  Yes    Severe malnutrition [E43]  Yes      Resolved Hospital Problems   No resolved problems to display.        Brief Hospital Course to date:  Arcelia Walter is a 79 y.o. female w completely unknown pmhx who presented after being found down by EMS on welfare check. No emergency contacts/collateral available in system. Eventually able to locate family and plan for family meeting in the coming days.      Area in right lower abdomen of concern noted 7/5, CT without contrast initially attributed to right femoral hernia but then began to leak brown foul smelling fluid 7/12. 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  Given patient's severe malnutrition, chronic failure to thrive, concern also for chronic dementia given the above (though collateral unable to be obtained), discussed with multiple physicians, ethics/palliative and finalized no escalation of care plan (no surgery, no artificial nutrition) on 7/13. Patient still asx from this.    These problems are new to me today    This patient's problems and plans were partially entered by my partner and updated as appropriate by me 07/20/24.     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/other GI perforation  -draining into bedside placed ostomy bag low volume currently, patient not yet systemically ill/septic  -evaluated by IR 7/13, given self draining, an IR drain placement thought not to add benefit  -source control is likely impossible without surgery, but surgery risk very high given BMI 13 and overall significant deconditioning and would lead to poor healing/poor outcome/infection  --Zosyn Dc'd; family made comfort care  -appreciate ethics/palliative assistance moving forward    Significant nausea-resolved  Diarrhea/abd cramps-resolved  -- Nurse reports significant diarrhea since yesterday with some abdominal cramping and persistent nausea not responding to Zofran.  --Patient sitting up in bed confused at baseline but holding emesis bag and telling me that she is very nauseated and will not eat.  --Will try one-time dose of Compazine  --Stool C. Difficile negative     Acute anemia  -downtrending since admission without clear melena/hematochezia reported  -some fluid collection in abdomen could be blood, see above   --Stopped all labs     Severe chronic  malnutrition, BMI 13 - encouraging intake but minimal even before acute condition  --Dietitian following.     Appears chronic dementia/FTT - nothing prior in system, but appears chronic and not acute encephalopathy given stability and overall state     Lack of capacity   --Family now located.  Coming for family conference soon.  Awaiting disposition.       GOC: Given lack of guardian currently, 2 physicians believe CPR/intubation would be all harm without benefit to patient. This code status changed w documentation on 7/13. Surgery also thought to be in this category of harm without benefit, will not escalate care.     Family now has been contacted and made aware and will meet in the coming days- sister has verified that she does not think patient would want aggressive medical care. Suspect patient will transition to hospice in the coming days.     Expected Discharge Location and Transportation: complex [facility]  Expected Discharge (Discharge date is tentative pending patient's medical condition and is subject to change)  Expected Discharge Date: 7/22/2024; Expected Discharge Time:      VTE Prophylaxis:  Mechanical VTE prophylaxis orders are present.       AM-PAC 6 Clicks Score (PT): 14 (07/20/24 1000)    CODE STATUS:   Code Status and Medical Interventions:   Ordered at: 07/18/24 1522     Level Of Support Discussed With:    Next of Kin (If No Surrogate)     Code Status (Patient has no pulse and is not breathing):    No CPR (Do Not Attempt to Resuscitate)     Medical Interventions (Patient has pulse or is breathing):    Comfort Measures     Comments:    Three siblings (rFances, Caro, and Mahendra/Mary Ellen)       CIARAN Vaughan  07/20/24

## 2024-07-20 NOTE — PLAN OF CARE
Goal Outcome Evaluation:  Plan of Care Reviewed With: patient, family        Progress: declining  Outcome Evaluation: Comfort care provided. VSS. RA. No complaints of pain. Not ambulating well. Not eating well, poor oral intake. Perdomo draining clear urine. Dressings c/d/i. Ostomy bag changed due to leakage. AOx1 to self, confused but calm and cooperative. No further needs identified. Family updated on continued plan of care.

## 2024-07-21 PROCEDURE — 25010000002 HALOPERIDOL LACTATE PER 5 MG

## 2024-07-21 PROCEDURE — 99231 SBSQ HOSP IP/OBS SF/LOW 25: CPT | Performed by: INTERNAL MEDICINE

## 2024-07-21 RX ADMIN — HALOPERIDOL LACTATE 0.5 MG: 5 INJECTION, SOLUTION INTRAMUSCULAR at 23:40

## 2024-07-21 RX ADMIN — Medication 1 PATCH: at 09:45

## 2024-07-21 RX ADMIN — Medication 5 MG: at 20:31

## 2024-07-21 RX ADMIN — Medication 10 ML: at 09:45

## 2024-07-21 RX ADMIN — HALOPERIDOL LACTATE 0.5 MG: 5 INJECTION, SOLUTION INTRAMUSCULAR at 12:26

## 2024-07-21 RX ADMIN — MIRTAZAPINE 15 MG: 15 TABLET, FILM COATED ORAL at 20:31

## 2024-07-21 RX ADMIN — Medication 10 ML: at 20:33

## 2024-07-21 NOTE — PLAN OF CARE
Goal Outcome Evaluation:  Plan of Care Reviewed With: patient        Progress: declining  Outcome Evaluation: Comfort care ongoing. VSS on RA. Patient denies pain. Prn haloperidol lactate x1 for nausea, patient did not have any episodes of emesis this shift. 150 mL UOP via patent lozano. Several liquid BMs this shift. Coccyx allevyn dressing changed each time due to soiling and wound care performed to stage 2 pressure injury. Patient removed protective allevyns from bilateral heels this evening, did not want new dressings reapplied at this time; heels offloaded/floated with pillow. Turned q2. Waffle mattress in place. Patient with very minimal appetite, only ate 25% of breakfast following which she began feeling nauseated; no other meals consumed despite prompting. Patient is pleasantly confused, oriented to self only, alert. Sisters visited this afternoon, attentive to patient. Calm, cooperative. Continue plan of care, care ongoing.

## 2024-07-21 NOTE — PLAN OF CARE
Pleasant and cooperative patient, oriented to self.  Denies pain. Demonstrates ability to turn self in bed.  Skin integrity maintained, dressing changed to sacral wound.  Perdomo cath with adequate output. Minimal intake, patient states she was not hungry for lunch or dinner and denies snack.

## 2024-07-21 NOTE — PROGRESS NOTES
[facility] Medicine Services  PROGRESS NOTE    Patient Name: Arcelia Walter  : 1945  MRN: [id number]    Date of Admission: 2024  Primary Care Physician: Provider, No Known    Subjective   Subjective     CC: Follow-up abdominal fistula    HPI: Patient complaining of some abdominal discomfort    Objective   Objective     Vital Signs:   Temp:  [98.2 °F (36.8 °C)-98.5 °F (36.9 °C)] 98.5 °F (36.9 °C)  Heart Rate:  [68-72] 72  Resp:  [16] 16  BP: (129-130)/(66-72) 129/66     Physical Exam:  Constitutional: Chronically thin, ill-appearing lady.  HENT: NCAT, mucous membranes moist  Respiratory: Clear to auscultation bilaterally, respiratory effort normal   Cardiovascular: RRR, no murmurs, rubs, or gallops  Gastrointestinal: Moderately tender, distended, ostomy bag in place on RLQ musculoskeletal: No bilateral ankle edema  Psychiatric: Appropriate affect, cooperative  Neurologic:  nonfocal      Results Reviewed:  LAB RESULTS:      Lab 24  1250 24  0702   WBC 10.82* 5.78   HEMOGLOBIN 8.6* 7.9*   HEMATOCRIT 26.3* 25.2*   PLATELETS 833* 660*   NEUTROS ABS 7.62*  --    IMMATURE GRANS (ABS) 0.24*  --    LYMPHS ABS 2.15  --    MONOS ABS 0.72  --    EOS ABS 0.06  --    MCV 94.3 95.1         Lab 24  1250   SODIUM 142   POTASSIUM 4.0   CHLORIDE 110*   CO2 25.0   ANION GAP 7.0   BUN 11   CREATININE 0.40*   EGFR 101.5   GLUCOSE 87   CALCIUM 7.8*                         Brief Urine Lab Results  (Last result in the past 365 days)        Color   Clarity   Blood   Leuk Est   Nitrite   Protein   CREAT   Urine HCG        24 0038 Yellow   Clear   Negative   Negative   Negative   Trace                   Microbiology Results Abnormal       Procedure Component Value - Date/Time    Clostridioides difficile Toxin - Stool, Per Rectum [[id number]]  (Normal) Collected: 24 1024    Lab Status: Final result Specimen: Stool from Per Rectum Updated: 24 1150    Narrative:      The  following orders were created for panel order Clostridioides difficile Toxin - Stool, Per Rectum.  Procedure                               Abnormality         Status                     ---------                               -----------         ------                     Clostridioides difficile...[[id number]]  Normal              Final result                 Please view results for these tests on the individual orders.    Clostridioides difficile Toxin, PCR - Stool, Per Rectum [[id number]]  (Normal) Collected: 07/18/24 1024    Lab Status: Final result Specimen: Stool from Per Rectum Updated: 07/18/24 1150     Toxigenic C. difficile by PCR Not Detected    Narrative:      The result indicates the absence of toxigenic C. difficile from stool specimen.     Blood Culture - Blood, Hand, Left [[id number]]  (Normal) Collected: 07/12/24 1033    Lab Status: Final result Specimen: Blood from Hand, Left Updated: 07/17/24 1245     Blood Culture No growth at 5 days    Narrative:      Less than seven (7) mL's of blood was collected.  Insufficient quantity may yield false negative results.    Blood Culture - Blood, Hand, Right [[id number]]  (Normal) Collected: 07/12/24 1033    Lab Status: Final result Specimen: Blood from Hand, Right Updated: 07/17/24 1245     Blood Culture No growth at 5 days    Narrative:      Less than seven (7) mL's of blood was collected.  Insufficient quantity may yield false negative results.    Blood Culture - Blood, Hand, Right [[id number]]  (Normal) Collected: 07/01/24 1558    Lab Status: Final result Specimen: Blood from Hand, Right Updated: 07/06/24 1731     Blood Culture No growth at 5 days    Narrative:      Aerobic bottle only  Less than seven (7) mL's of blood was collected.  Insufficient quantity may yield false negative results.    Blood Culture - Blood, Hand, Left [[id number]]  (Normal) Collected: 07/01/24 1558    Lab Status: Final result Specimen: Blood from Hand, Left Updated: 07/06/24 1731      Blood Culture No growth at 5 days            No radiology results from the last 24 hrs    Results for orders placed during the hospital encounter of 06/28/24    Adult Transthoracic Echo Complete W/ Cont if Necessary Per Protocol    Interpretation Summary    Left ventricular systolic function is normal. Left ventricular ejection fraction appears to be 61 - 65%.    There is mild calcification of the aortic valve.    Mild aortic valve regurgitation is present.    Mild mitral annular calcification is present.    Mild to moderate mitral valve regurgitation is present.    Mild tricuspid valve regurgitation is present.      Current medications:  Scheduled Meds:mirtazapine, 15 mg, Oral, Nightly  nicotine, 1 patch, Transdermal, Q24H  sodium chloride, 10 mL, Intravenous, Q12H      Continuous Infusions:   PRN Meds:.  acetaminophen    senna-docusate sodium **AND** polyethylene glycol **AND** bisacodyl **AND** bisacodyl    haloperidol lactate    melatonin    Morphine    prochlorperazine    sodium chloride    sodium chloride    Assessment & Plan   Assessment & Plan     Active Hospital Problems    Diagnosis  POA    **Failure to thrive in adult [R62.7]  Yes    Enterocutaneous fistula [K63.2]  Yes    Intra-abdominal fluid collection [R18.8]  Yes    Cognitive impairment [R41.89]  Yes    Severe malnutrition [E43]  Yes      Resolved Hospital Problems   No resolved problems to display.        Brief Hospital Course to date:  Arcelia Walter is a 79 y.o. female w completely unknown pmhx who presented after being found down by EMS on welfare check. No emergency contacts/collateral available in system. Eventually able to locate family and plan for family meeting in the coming days.      Area in right lower abdomen of concern noted 7/5, CT without contrast initially attributed to right femoral hernia but then began to leak brown foul smelling fluid 7/12. Repeat CT with contrast discovered enterocutaneous fistula, concern for periappendiceal  abscess tracking into soft tissue.Given patient's severe malnutrition, chronic failure to thrive, concern also for chronic dementia given the above (though collateral unable to be obtained) and discussion with multiple physicians, ethics/palliative, decision was made 7/13 not to escalate care plan, (no surgery, no artificial nutrition) on 7/13. Patient still asx from this.     This patient's problems and plans were partially entered by my partner and updated as appropriate by me 07/21/24.      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/other GI perforation  -s/p drain into bedside placed ostomy, this has since been discontinued,, now with ostomy in place ,patient is not systemically ill/septic  -evaluated by IR 7/13, given self draining, an IR drain placement thought not to add benefit  -source control is likely impossible without surgery, however she remains at high risk for surgery given BMI 13 and overall significant deconditioning and would lead to poor healing/poor outcome/infection  -- Status post IV Zosyn, patient is now comfort care  -appreciate ethics/palliative assistance      Significant nausea-resolved  Diarrhea/abd cramps-resolved  -- Nurse reports significant diarrhea with some abdominal cramping and persistent nausea not responding to Zofran.  --Will try one-time dose of Compazine  --Stool C. Difficile negative     Acute anemia  -Low but stable on last lab draw 7/70/2024, no signs of blood loss  -Patient now comfort measures, monitoring discontinued     Severe chronic malnutrition, BMI 13   - encouraging intake but minimal even before acute condition  --Dietitian following.     Appears chronic dementia/FTT   - appears chronic and not acute encephalopathy given stability and overall state     Lack of capacity   --Family now located.  Coming for family conference soon.  Awaiting disposition.      GOC: Given lack of guardian currently, 2 physicians believe CPR/intubation  would be all harm without benefit to patient. This code status changed w documentation on 7/13. Surgery also thought to be in this category of harm without benefit, will not escalate care.      Family now has been contacted and made aware and will meet in the coming days- sister has verified that she does not think patient would want aggressive medical care. Suspect patient will transition to hospice in the coming days.      Expected Discharge Location and Transportation: Complex disposition  Expected Discharge   Expected Discharge Date: 7/22/2024; Expected Discharge Time:      VTE Prophylaxis:  Mechanical VTE prophylaxis orders are present.         AM-PAC 6 Clicks Score (PT): 14 (07/20/24 3237)    CODE STATUS:   Code Status and Medical Interventions:   Ordered at: 07/18/24 1527     Level Of Support Discussed With:    Next of Kin (If No Surrogate)     Code Status (Patient has no pulse and is not breathing):    No CPR (Do Not Attempt to Resuscitate)     Medical Interventions (Patient has pulse or is breathing):    Comfort Measures     Comments:    Three siblings (Frances, Caro, and Mahendra/Mary Ellen)       Callie Higuera MD  07/21/24

## 2024-07-22 PROCEDURE — 99232 SBSQ HOSP IP/OBS MODERATE 35: CPT | Performed by: NURSE PRACTITIONER

## 2024-07-22 PROCEDURE — 25010000002 PROCHLORPERAZINE 10 MG/2ML SOLUTION

## 2024-07-22 RX ADMIN — MIRTAZAPINE 15 MG: 15 TABLET, FILM COATED ORAL at 20:55

## 2024-07-22 RX ADMIN — PROCHLORPERAZINE EDISYLATE 5 MG: 5 INJECTION INTRAMUSCULAR; INTRAVENOUS at 04:28

## 2024-07-22 RX ADMIN — Medication 10 ML: at 20:55

## 2024-07-22 RX ADMIN — Medication 5 MG: at 20:55

## 2024-07-22 RX ADMIN — Medication 1 PATCH: at 09:05

## 2024-07-22 NOTE — PLAN OF CARE
Problem: Palliative Care  Goal: Enhanced Quality of Life  Intervention: Optimize Psychosocial Wellbeing  Flowsheets (Taken 7/22/2024 9725)  Supportive Measures:   active listening utilized   verbalization of feelings encouraged   Goal Outcome Evaluation:  Plan of Care Reviewed With: patient        Progress: no change  Outcome Evaluation: Pt is pleasantly confused but comfortable. Pt is having intermittent nausea.  Haldol and compazine effective for nausea.  Poor po intake. Pt ate 25% of breakfast. Pt denies severe pain. Prn morphine available for pain/dyspnea.  Pt has a current fistula.  Pt is comfort measures. Continue to monitor for decline.    Palliative Team Meeting [id number]  MD, APRN, SW, RN   Please call Palliative after hours at [phone]

## 2024-07-22 NOTE — THERAPY DISCHARGE NOTE
Patient Name: Arcelia Walter  : 1945    MRN: [id number]                              Today's Date: 2024       Admit Date: 2024    Visit Dx:     ICD-10-CM ICD-9-CM   1. Syncope, unspecified syncope type  R55 780.2   2. Ketosis  E88.89 276.2     Patient Active Problem List   Diagnosis    Severe malnutrition    Failure to thrive in adult    Enterocutaneous fistula    Intra-abdominal fluid collection    Cognitive impairment     History reviewed. No pertinent past medical history.  Past Surgical History:   Procedure Laterality Date    HYSTERECTOMY        General Information       Row Name 24 155          Physical Therapy Time and Intention    Document Type discharge treatment  -NS     Mode of Treatment physical therapy  -NS       Row Name 24 155          General Information    Patient Profile Reviewed yes  -NS               User Key  (r) = Recorded By, (t) = Taken By, (c) = Cosigned By      Initials Name Provider Type    NS Tahmina Lopez, PT Physical Therapist                   Mobility    No documentation.                  Obj/Interventions    No documentation.                  Goals/Plan       Row Name 24 155          Bed Mobility Goal 1 (PT)    Activity/Assistive Device (Bed Mobility Goal 1, PT) sit to supine;supine to sit  -NS     [place] Level/Cues Needed (Bed Mobility Goal 1, PT) standby assist  -NS     Time Frame (Bed Mobility Goal 1, PT) short term goal (STG);3 days  -NS     Progress/Outcomes (Bed Mobility Goal 1, PT) medical status inhibited participation  -NS       Row Name 24 1552          Transfer Goal 1 (PT)    Activity/Assistive Device (Transfer Goal 1, PT) walker, rolling;sit-to-stand/stand-to-sit  -NS     [place] Level/Cues Needed (Transfer Goal 1, PT) standby assist  -NS     Time Frame (Transfer Goal 1, PT) long term goal (LTG);1 week  -NS     Progress/Outcome (Transfer Goal 1, PT) medical status inhibited participation  -NS       Row Name 24 8139           Gait Training Goal 1 (PT)    Activity/Assistive Device (Gait Training Goal 1, PT) gait (walking locomotion);assistive device use;walker, rolling  -NS     [place] Level (Gait Training Goal 1, PT) contact guard required  -NS     Distance (Gait Training Goal 1, PT) 150  -NS     Time Frame (Gait Training Goal 1, PT) long term goal (LTG);10 days  -NS     Progress/Outcome (Gait Training Goal 1, PT) medical status inhibited participation  -NS               User Key  (r) = Recorded By, (t) = Taken By, (c) = Cosigned By      Initials Name Provider Type    Tahmina Alvarez, PT Physical Therapist                   Clinical Impression       Row Name 07/22/24 1556          Pain Scale: FACES Pre/Post-Treatment    Pre/Posttreatment Pain Comment Per pt and pt's sister, pt is now comfort measures. They decline need for further PT services. Will discharge from IP PT.  -NS               User Key  (r) = Recorded By, (t) = Taken By, (c) = Cosigned By      Initials Name Provider Type    Tahmina Alvarez PT Physical Therapist                   Outcome Measures       Row Name 07/22/24 0800          How much help from another person do you currently need...    Turning from your back to your side while in flat bed without using bedrails? 3  -CM     Moving from lying on back to sitting on the side of a flat bed without bedrails? 3  -CM     Moving to and from a bed to a chair (including a wheelchair)? 1  -CM     Standing up from a chair using your arms (e.g., wheelchair, bedside chair)? 1  -CM     Climbing 3-5 steps with a railing? 1  -CM     To walk in hospital room? 1  -CM     AM-PAC 6 Clicks Score (PT) 10  -CM     Highest Level of Mobility Goal 4 --> Transfer to chair/commode  -CM               User Key  (r) = Recorded By, (t) = Taken By, (c) = Cosigned By      Initials Name Provider Type    Devin Casanova, RN Registered Nurse                  Physical Therapy Education       Title: PT OT SLP Therapies (In Progress)        Topic: Physical Therapy (In Progress)       Point: Mobility training (In Progress)       Learning Progress Summary             Patient Acceptance, E, NR by ND at 7/16/2024 1528    Acceptance, E, NR by JOVITA at 7/9/2024 1335    Acceptance, E, NR by KG at 7/8/2024 1352    Acceptance, E, NR by JOVITA at 7/5/2024 1330    Acceptance, E, NR by CK at 7/3/2024 1554    Acceptance, E, NR by JOVITA at 7/2/2024 1400    Acceptance, E, NR by KG at 7/1/2024 1040    Acceptance, E, NR by KG at 6/29/2024 1001                         Point: Home exercise program (In Progress)       Learning Progress Summary             Patient Acceptance, E, NR by ND at 7/16/2024 1528    Acceptance, E, NR by JOVITA at 7/9/2024 1335    Acceptance, E, NR by KG at 7/8/2024 1352    Acceptance, E, NR by JOVITA at 7/5/2024 1330    Acceptance, E, NR by JOVITA at 7/2/2024 1400    Acceptance, E, NR by KG at 7/1/2024 1040                         Point: Body mechanics (In Progress)       Learning Progress Summary             Patient Acceptance, E, NR by ND at 7/16/2024 1528    Acceptance, E, NR by JOVITA at 7/9/2024 1335    Acceptance, E, NR by KG at 7/8/2024 1352    Acceptance, E, NR by JOVITA at 7/5/2024 1330    Acceptance, E, NR by CK at 7/3/2024 1554    Acceptance, E, NR by JOVITA at 7/2/2024 1400    Acceptance, E, NR by KG at 7/1/2024 1040    Acceptance, E, NR by KG at 6/29/2024 1001                         Point: Precautions (In Progress)       Learning Progress Summary             Patient Acceptance, E, NR by ND at 7/16/2024 1528    Acceptance, E, NR by JOVITA at 7/9/2024 1335    Acceptance, E, NR by KG at 7/8/2024 1352    Acceptance, E, NR by JOVITA at 7/5/2024 1330    Acceptance, E, NR by CK at 7/3/2024 1554    Acceptance, E, NR by JOVITA at 7/2/2024 1400    Acceptance, E, NR by KG at 7/1/2024 1040    Acceptance, E, NR by KG at 6/29/2024 1001                                         User Key       Initials Effective Dates Name Provider Type Discipline    JOVITA 02/03/23 -  Mary Kay Valencia, PT Physical  Therapist PT    KG 05/22/20 -  Misty Shipley, PT Physical Therapist PT    CK 02/06/24 -  Juana Velasquez, PT Physical Therapist PT    ND 11/16/23 -  Raisa Dexter PT Physical Therapist PT                  PT Recommendation and Plan           Time Calculation:              PT G-Codes  Outcome Measure Options: AM-PAC 6 Clicks Basic Mobility (PT)  AM-PAC 6 Clicks Score (PT): 10  AM-PAC 6 Clicks Score (OT): 16    PT Discharge Summary  Anticipated Discharge Disposition (PT): inpatient hospice (vs. OP hospice)  Reason for Discharge: Change in medical status    Tahmina Lopez, PT  7/22/2024

## 2024-07-22 NOTE — CASE MANAGEMENT/SOCIAL WORK
Continued Stay Note  [facility]     Patient Name: Arcelia Walter  MRN: [id number]  Today's Date: 7/22/2024    Admit Date: 6/28/2024    Plan: Ongoing   Discharge Plan       Row Name 07/22/24 1330       Plan    Plan Ongoing    Plan Comments SW'er met with patient at bedside. SW'er following for all discharge planning needs. Patient is comfort measure. Palliative Care following. Plan ongoing.                   Discharge Codes    No documentation.                 Expected Discharge Date and Time       Expected Discharge Date Expected Discharge Time    Jul 24, 2024               SHANTI Shaw (Kay)

## 2024-07-22 NOTE — PROGRESS NOTES
[facility] Medicine Services  PROGRESS NOTE    Patient Name: Arcelia Walter  : 1945  MRN: [id number]    Date of Admission: 2024  Primary Care Physician: Provider, No Known    Subjective   Subjective     CC: Follow-up abdominal fistula    HPI:   States abdomen feels better today.  No other complaints.    Objective   Objective     Vital Signs:   Temp:  [98.2 °F (36.8 °C)-98.7 °F (37.1 °C)] 98.2 °F (36.8 °C)  Heart Rate:  [78-80] 78  Resp:  [16] 16  BP: (132-145)/(66-69) 132/69     Physical Exam:  Constitutional: Alert, cachectic elderly female sitting up in bed in NAD  ENT: Pink, moist mucous membranes   Respiratory: Nonlabored, symmetrical chest expansion, CTAB, RA  Cardiovascular: RRR, no M/R/G  Gastrointestinal: Soft, NT, ND +BS  Musculoskeletal: TORO; no LE edema bilaterally  Neurologic: Oriented to self and birthday otherwise very confused, follows all commands, speech clear  Skin: No rashes on exposed skin  Psychiatric: Pleasant and cooperative; normal affect    Results Reviewed:  LAB RESULTS:      Lab 24  1250   WBC 10.82*   HEMOGLOBIN 8.6*   HEMATOCRIT 26.3*   PLATELETS 833*   NEUTROS ABS 7.62*   IMMATURE GRANS (ABS) 0.24*   LYMPHS ABS 2.15   MONOS ABS 0.72   EOS ABS 0.06   MCV 94.3         Lab 24  1250   SODIUM 142   POTASSIUM 4.0   CHLORIDE 110*   CO2 25.0   ANION GAP 7.0   BUN 11   CREATININE 0.40*   EGFR 101.5   GLUCOSE 87   CALCIUM 7.8*                         Brief Urine Lab Results  (Last result in the past 365 days)        Color   Clarity   Blood   Leuk Est   Nitrite   Protein   CREAT   Urine HCG        24 0038 Yellow   Clear   Negative   Negative   Negative   Trace                   Microbiology Results Abnormal       Procedure Component Value - Date/Time    Clostridioides difficile Toxin - Stool, Per Rectum [[id number]]  (Normal) Collected: 24 1024    Lab Status: Final result Specimen: Stool from Per Rectum Updated: 24 1150    Narrative:       The following orders were created for panel order Clostridioides difficile Toxin - Stool, Per Rectum.  Procedure                               Abnormality         Status                     ---------                               -----------         ------                     Clostridioides difficile...[[id number]]  Normal              Final result                 Please view results for these tests on the individual orders.    Clostridioides difficile Toxin, PCR - Stool, Per Rectum [[id number]]  (Normal) Collected: 07/18/24 1024    Lab Status: Final result Specimen: Stool from Per Rectum Updated: 07/18/24 1150     Toxigenic C. difficile by PCR Not Detected    Narrative:      The result indicates the absence of toxigenic C. difficile from stool specimen.     Blood Culture - Blood, Hand, Left [[id number]]  (Normal) Collected: 07/12/24 1033    Lab Status: Final result Specimen: Blood from Hand, Left Updated: 07/17/24 1245     Blood Culture No growth at 5 days    Narrative:      Less than seven (7) mL's of blood was collected.  Insufficient quantity may yield false negative results.    Blood Culture - Blood, Hand, Right [[id number]]  (Normal) Collected: 07/12/24 1033    Lab Status: Final result Specimen: Blood from Hand, Right Updated: 07/17/24 1245     Blood Culture No growth at 5 days    Narrative:      Less than seven (7) mL's of blood was collected.  Insufficient quantity may yield false negative results.    Blood Culture - Blood, Hand, Right [[id number]]  (Normal) Collected: 07/01/24 1558    Lab Status: Final result Specimen: Blood from Hand, Right Updated: 07/06/24 1731     Blood Culture No growth at 5 days    Narrative:      Aerobic bottle only  Less than seven (7) mL's of blood was collected.  Insufficient quantity may yield false negative results.    Blood Culture - Blood, Hand, Left [[id number]]  (Normal) Collected: 07/01/24 1558    Lab Status: Final result Specimen: Blood from Hand, Left Updated: 07/06/24  1731     Blood Culture No growth at 5 days            No radiology results from the last 24 hrs    Results for orders placed during the hospital encounter of 06/28/24    Adult Transthoracic Echo Complete W/ Cont if Necessary Per Protocol    Interpretation Summary    Left ventricular systolic function is normal. Left ventricular ejection fraction appears to be 61 - 65%.    There is mild calcification of the aortic valve.    Mild aortic valve regurgitation is present.    Mild mitral annular calcification is present.    Mild to moderate mitral valve regurgitation is present.    Mild tricuspid valve regurgitation is present.      Current medications:  Scheduled Meds:mirtazapine, 15 mg, Oral, Nightly  nicotine, 1 patch, Transdermal, Q24H  sodium chloride, 10 mL, Intravenous, Q12H      Continuous Infusions:   PRN Meds:.  acetaminophen    senna-docusate sodium **AND** polyethylene glycol **AND** bisacodyl **AND** bisacodyl    haloperidol lactate    melatonin    Morphine    prochlorperazine    sodium chloride    sodium chloride    Assessment & Plan   Assessment & Plan     Active Hospital Problems    Diagnosis  POA    **Failure to thrive in adult [R62.7]  Yes    Enterocutaneous fistula [K63.2]  Yes    Intra-abdominal fluid collection [R18.8]  Yes    Cognitive impairment [R41.89]  Yes    Severe malnutrition [E43]  Yes      Resolved Hospital Problems   No resolved problems to display.        Brief Hospital Course to date:  Arcelia Walter is a 79 y.o. female w completely unknown pmhx who presented after being found down by EMS on welfare check. No emergency contacts/collateral available in system. Eventually able to locate family and plan for family meeting in the coming days.      Area in right lower abdomen of concern noted 7/5, CT without contrast initially attributed to right femoral hernia but then began to leak brown foul smelling fluid 7/12. Repeat CT with contrast discovered enterocutaneous fistula, concern for  periappendiceal abscess tracking into soft tissue.Given patient's severe malnutrition, chronic failure to thrive, concern also for chronic dementia given the above (though collateral unable to be obtained) and discussion with multiple physicians, ethics/palliative, decision was made 7/13 not to escalate care plan, (no surgery, no artificial nutrition) on 7/13. Patient still asx from this.     This patient's problems and plans were partially entered by my partner and updated as appropriate by me 07/22/24.      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/other GI perforation  -s/p drain into bedside placed ostomy, this has since been discontinued,, now with ostomy in place ,patient is not systemically ill/septic  -evaluated by IR 7/13, given self draining, an IR drain placement thought not to add benefit  -source control is likely impossible without surgery, however she remains at high risk for surgery given BMI 13 and overall significant deconditioning and would lead to poor healing/poor outcome/infection  -- Status post IV Zosyn, patient is now comfort care  -appreciate ethics/palliative assistance      Significant nausea-resolved  Diarrhea/abd cramps-resolved  -- Nurse reports significant diarrhea with some abdominal cramping and persistent nausea not responding to Zofran.  --Will try one-time dose of Compazine  --Stool C. Difficile negative     Acute anemia  -Low but stable on last lab draw 7/70/2024, no signs of blood loss  -Patient now comfort measures, monitoring discontinued     Severe chronic malnutrition, BMI 13   - encouraging intake but minimal even before acute condition  --Dietitian following.     Appears chronic dementia/FTT   - appears chronic and not acute encephalopathy given stability and overall state     Lack of capacity   --Family now located.  Coming for family conference soon.  Awaiting disposition.      GOC: Given lack of guardian currently, 2 physicians believe  CPR/intubation would be all harm without benefit to patient. This code status changed w documentation on 7/13. Surgery also thought to be in this category of harm without benefit, will not escalate care.      Family now has been contacted and made aware and will meet in the coming days- sister has verified that she does not think patient would want aggressive medical care. Suspect patient will transition to hospice in the coming days.      Expected Discharge Location and Transportation: Complex disposition  Expected Discharge   Expected Discharge Date: 7/24/2024; Expected Discharge Time:      VTE Prophylaxis:  Mechanical VTE prophylaxis orders are present.         AM-PAC 6 Clicks Score (PT): 10 (07/21/24 2000)    CODE STATUS:   Code Status and Medical Interventions:   Ordered at: 07/18/24 1522     Level Of Support Discussed With:    Next of Kin (If No Surrogate)     Code Status (Patient has no pulse and is not breathing):    No CPR (Do Not Attempt to Resuscitate)     Medical Interventions (Patient has pulse or is breathing):    Comfort Measures     Comments:    Three siblings (Frances, Caro, and Mahendra/Mary Ellen)       CIARAN Vaughan  07/22/24

## 2024-07-22 NOTE — PLAN OF CARE
Goal Outcome Evaluation:  Plan of Care Reviewed With: patient        Progress: declining  Outcome Evaluation: pt rested well, f/c draining and patent, abdomen taut and round, turned q2, comfort care continued

## 2024-07-22 NOTE — THERAPY DISCHARGE NOTE
Acute Care - Occupational Therapy Discharge  [facility]    Patient Name: Arcelia Walter  : 1945    MRN: [id number]                              Today's Date: 2024       Admit Date: 2024    Visit Dx:     ICD-10-CM ICD-9-CM   1. Syncope, unspecified syncope type  R55 780.2   2. Ketosis  E88.89 276.2     Patient Active Problem List   Diagnosis    Severe malnutrition    Failure to thrive in adult    Enterocutaneous fistula    Intra-abdominal fluid collection    Cognitive impairment     History reviewed. No pertinent past medical history.  Past Surgical History:   Procedure Laterality Date    HYSTERECTOMY        General Information       Row Name 24 1400          OT Time and Intention    Document Type discharge treatment  -JOVITA     Mode of Treatment occupational therapy  -JOVITA       Row Name 24 1400          General Information    Patient Profile Reviewed yes  -JOVITA               User Key  (r) = Recorded By, (t) = Taken By, (c) = Cosigned By      Initials Name Provider Type    Marian Silvestre, OT Occupational Therapist                   Mobility/ADL's    No documentation.                  Obj/Interventions    No documentation.                  Goals/Plan       Row Name 24 1401          Bed Mobility Goal 1 (OT)    Progress/Outcomes (Bed Mobility Goal 1, OT) medical status inhibiting progress  -JOVITA       Row Name 24 1401          Transfer Goal 1 (OT)    Progress/Outcome (Transfer Goal 1, OT) medical status inhibited participation  -JOVITA       Row Name 24 1401          Dressing Goal 1 (OT)    Progress/Outcome (Dressing Goal 1, OT) medical status inhibiting progress  -JOVITA       Row Name 24 1401          Toileting Goal 1 (OT)    Progress/Outcome (Toileting Goal 1, OT) medical status inhibiting progress  -JOVITA               User Key  (r) = Recorded By, (t) = Taken By, (c) = Cosigned By      Initials Name Provider Type    Marian Silvestre, OT Occupational Therapist                    Clinical Impression    No documentation.                  Outcome Measures       Row Name 07/22/24 0800          How much help from another person do you currently need...    Turning from your back to your side while in flat bed without using bedrails? 3  -CM     Moving from lying on back to sitting on the side of a flat bed without bedrails? 3  -CM     Moving to and from a bed to a chair (including a wheelchair)? 1  -CM     Standing up from a chair using your arms (e.g., wheelchair, bedside chair)? 1  -CM     Climbing 3-5 steps with a railing? 1  -CM     To walk in hospital room? 1  -CM     AM-PAC 6 Clicks Score (PT) 10  -CM     Highest Level of Mobility Goal 4 --> Transfer to chair/commode  -CM               User Key  (r) = Recorded By, (t) = Taken By, (c) = Cosigned By      Initials Name Provider Type    Devin Casanova, RN Registered Nurse                  Occupational Therapy Education       Title: PT OT SLP Therapies (In Progress)       Topic: Occupational Therapy (In Progress)       Point: ADL training (In Progress)       Description:   Instruct learner(s) on proper safety adaptation and remediation techniques during self care or transfers.   Instruct in proper use of assistive devices.                  Learning Progress Summary             Patient Acceptance, E,D, NR by JOVITA at 7/16/2024 1025    Comment: re-orientation, sequencing for bed mobility, transfers and dressing tasks    Acceptance, E, NR by MR at 7/11/2024 1522    Acceptance, E, NR by MR at 7/11/2024 1521    Acceptance, E, VU by AN at 7/9/2024 1422    Acceptance, E, VU by AN at 7/4/2024 1155    Acceptance, E, VU,DU,NR by MR at 7/1/2024 1015                         Point: Home exercise program (Done)       Description:   Instruct learner(s) on appropriate technique for monitoring, assisting and/or progressing therapeutic exercises/activities.                  Learning Progress Summary             Patient Acceptance, E, VU,DU,NR by MR at 7/1/2024  1015                         Point: Precautions (In Progress)       Description:   Instruct learner(s) on prescribed precautions during self-care and functional transfers.                  Learning Progress Summary             Patient Acceptance, E,D, NR by JOVITA at 7/16/2024 1025    Comment: re-orientation, sequencing for bed mobility, transfers and dressing tasks    Acceptance, E, NR by MR at 7/11/2024 1522    Acceptance, E, NR by MR at 7/11/2024 1521    Acceptance, E, VU by AN at 7/9/2024 1422    Acceptance, E, VU by AN at 7/4/2024 1155    Acceptance, E, VU,DU,NR by MR at 7/1/2024 1015                         Point: Body mechanics (In Progress)       Description:   Instruct learner(s) on proper positioning and spine alignment during self-care, functional mobility activities and/or exercises.                  Learning Progress Summary             Patient Acceptance, E, NR by MR at 7/11/2024 1522    Acceptance, E, NR by MR at 7/11/2024 1521    Acceptance, E, VU by AN at 7/9/2024 1422    Acceptance, E, VU by AN at 7/4/2024 1155    Acceptance, E, VU,DU,NR by MR at 7/1/2024 1015                                         User Key       Initials Effective Dates Name Provider Type Discipline     07/11/23 -  Marian Rose, OT Occupational Therapist OT     09/21/21 -  Theresa Moe, OT Occupational Therapist OT    MR 09/22/22 -  Emily Jimenes, OT Occupational Therapist OT                  OT Recommendation and Plan  Therapy Frequency (OT): daily  Plan of Care Review  Plan of Care Reviewed With: patient  Progress: improving  Outcome Evaluation: Pt. needed cues to initiate task, but then participated well with dressing, grooming and feeding tasks.  Pt. with improved transfer indendence today.  Advancing with LBD.  Pt. oriented to self only.  Continue OT POC.  Plan of Care Reviewed With: patient  Outcome Evaluation: Pt. needed cues to initiate task, but then participated well with dressing, grooming and feeding tasks.   Pt. with improved transfer indendence today.  Advancing with LBD.  Pt. oriented to self only.  Continue OT POC.     Time Calculation:                 OT Discharge Summary  Anticipated Discharge Disposition (OT): other (see comments), inpatient hospice (vs home with hospice)    Marian Rose, OT  7/22/2024

## 2024-07-23 PROCEDURE — 25010000002 HALOPERIDOL LACTATE PER 5 MG

## 2024-07-23 PROCEDURE — 99232 SBSQ HOSP IP/OBS MODERATE 35: CPT | Performed by: NURSE PRACTITIONER

## 2024-07-23 RX ADMIN — Medication 1 PATCH: at 09:04

## 2024-07-23 RX ADMIN — MIRTAZAPINE 15 MG: 15 TABLET, FILM COATED ORAL at 21:37

## 2024-07-23 RX ADMIN — Medication 10 ML: at 21:37

## 2024-07-23 RX ADMIN — Medication 5 MG: at 23:22

## 2024-07-23 RX ADMIN — HALOPERIDOL LACTATE 0.5 MG: 5 INJECTION, SOLUTION INTRAMUSCULAR at 02:26

## 2024-07-23 RX ADMIN — HALOPERIDOL LACTATE 0.5 MG: 5 INJECTION, SOLUTION INTRAMUSCULAR at 23:22

## 2024-07-23 NOTE — PLAN OF CARE
"  Problem: Palliative Care  Goal: Enhanced Quality of Life  Intervention: Optimize Psychosocial Wellbeing  Recent Flowsheet Documentation  Taken 7/23/2024 1540 by Emily Lerner \"Hyacinth\" ROOPA ALVARADO  Supportive Measures: (d/w sisters Caro Art and Frances Walter)   active listening utilized   counseling provided   decision-making supported   positive reinforcement provided   verbalization of feelings encouraged  Family/Support System Care:   support provided   self-care encouraged   caregiver stress acknowledged  Visit with patient's sisters Frances and Caro at bedside.  Plan remains comfort measures.  Patient receiving IV medications for symptom management, patient ate some breakfast this morning but has eaten nothing the rest of the day - prior po intake has been bites at most intermittently, with sips.  Patient is a non-surgical candidate and is in terminal decline, she remains pleasantly confused but with some restlessness and less engagement compared to prior visits.  Hospice is following for potential inpatient hospice admission - family agreeable to hospice when she meets criteria for admission.  In the meantime, Palliative Care continues to follow for support and assist with plan of care and terminal symptom management.       "

## 2024-07-23 NOTE — PROGRESS NOTES
[facility] Medicine Services  PROGRESS NOTE    Patient Name: Arcelia Walter  : 1945  MRN: [id number]    Date of Admission: 2024  Primary Care Physician: Provider, No Known    Subjective   Subjective     CC: Follow-up abdominal fistula    HPI:   Patient sitting up in bed visiting with her sister.  Sister along with her siblings have requested a hospice consult.  Patient still not eating or drinking.    Objective   Objective     Vital Signs:   Temp:  [98.4 °F (36.9 °C)] 98.4 °F (36.9 °C)  Heart Rate:  [77] 77  Resp:  [18] 18  BP: (132)/(75) 132/75     Physical Exam:  Constitutional: Alert, cachectic elderly female sitting up in bed in NAD  ENT: Pink, moist mucous membranes   Respiratory: Nonlabored, symmetrical chest expansion, CTAB, RA  Cardiovascular: RRR, no M/R/G  Gastrointestinal: Soft, NT, ND +BS  Musculoskeletal: TORO; no LE edema bilaterally  Neurologic: Oriented to self and birthday otherwise very confused, follows all commands, speech clear  Skin: No rashes on exposed skin  Psychiatric: Pleasant and cooperative; normal affect    Results Reviewed:  LAB RESULTS:      Lab 24  1250   WBC 10.82*   HEMOGLOBIN 8.6*   HEMATOCRIT 26.3*   PLATELETS 833*   NEUTROS ABS 7.62*   IMMATURE GRANS (ABS) 0.24*   LYMPHS ABS 2.15   MONOS ABS 0.72   EOS ABS 0.06   MCV 94.3         Lab 24  1250   SODIUM 142   POTASSIUM 4.0   CHLORIDE 110*   CO2 25.0   ANION GAP 7.0   BUN 11   CREATININE 0.40*   EGFR 101.5   GLUCOSE 87   CALCIUM 7.8*                         Brief Urine Lab Results  (Last result in the past 365 days)        Color   Clarity   Blood   Leuk Est   Nitrite   Protein   CREAT   Urine HCG        24 0038 Yellow   Clear   Negative   Negative   Negative   Trace                   Microbiology Results Abnormal       Procedure Component Value - Date/Time    Clostridioides difficile Toxin - Stool, Per Rectum [[id number]]  (Normal) Collected: 24 1024    Lab Status: Final  result Specimen: Stool from Per Rectum Updated: 07/18/24 1150    Narrative:      The following orders were created for panel order Clostridioides difficile Toxin - Stool, Per Rectum.  Procedure                               Abnormality         Status                     ---------                               -----------         ------                     Clostridioides difficile...[[id number]]  Normal              Final result                 Please view results for these tests on the individual orders.    Clostridioides difficile Toxin, PCR - Stool, Per Rectum [[id number]]  (Normal) Collected: 07/18/24 1024    Lab Status: Final result Specimen: Stool from Per Rectum Updated: 07/18/24 1150     Toxigenic C. difficile by PCR Not Detected    Narrative:      The result indicates the absence of toxigenic C. difficile from stool specimen.     Blood Culture - Blood, Hand, Left [[id number]]  (Normal) Collected: 07/12/24 1033    Lab Status: Final result Specimen: Blood from Hand, Left Updated: 07/17/24 1245     Blood Culture No growth at 5 days    Narrative:      Less than seven (7) mL's of blood was collected.  Insufficient quantity may yield false negative results.    Blood Culture - Blood, Hand, Right [[id number]]  (Normal) Collected: 07/12/24 1033    Lab Status: Final result Specimen: Blood from Hand, Right Updated: 07/17/24 1245     Blood Culture No growth at 5 days    Narrative:      Less than seven (7) mL's of blood was collected.  Insufficient quantity may yield false negative results.    Blood Culture - Blood, Hand, Right [[id number]]  (Normal) Collected: 07/01/24 1558    Lab Status: Final result Specimen: Blood from Hand, Right Updated: 07/06/24 1731     Blood Culture No growth at 5 days    Narrative:      Aerobic bottle only  Less than seven (7) mL's of blood was collected.  Insufficient quantity may yield false negative results.    Blood Culture - Blood, Hand, Left [[id number]]  (Normal) Collected: 07/01/24 8048     Lab Status: Final result Specimen: Blood from Hand, Left Updated: 07/06/24 1731     Blood Culture No growth at 5 days            No radiology results from the last 24 hrs    Results for orders placed during the hospital encounter of 06/28/24    Adult Transthoracic Echo Complete W/ Cont if Necessary Per Protocol    Interpretation Summary    Left ventricular systolic function is normal. Left ventricular ejection fraction appears to be 61 - 65%.    There is mild calcification of the aortic valve.    Mild aortic valve regurgitation is present.    Mild mitral annular calcification is present.    Mild to moderate mitral valve regurgitation is present.    Mild tricuspid valve regurgitation is present.      Current medications:  Scheduled Meds:mirtazapine, 15 mg, Oral, Nightly  nicotine, 1 patch, Transdermal, Q24H  sodium chloride, 10 mL, Intravenous, Q12H      Continuous Infusions:   PRN Meds:.  acetaminophen    senna-docusate sodium **AND** polyethylene glycol **AND** bisacodyl **AND** bisacodyl    haloperidol lactate    melatonin    Morphine    prochlorperazine    sodium chloride    sodium chloride    Assessment & Plan   Assessment & Plan     Active Hospital Problems    Diagnosis  POA    **Failure to thrive in adult [R62.7]  Yes    Enterocutaneous fistula [K63.2]  Yes    Intra-abdominal fluid collection [R18.8]  Yes    Cognitive impairment [R41.89]  Yes    Severe malnutrition [E43]  Yes      Resolved Hospital Problems   No resolved problems to display.        Brief Hospital Course to date:  Arcelia Walter is a 79 y.o. female w completely unknown pmhx who presented after being found down by EMS on welfare check. No emergency contacts/collateral available in system. Eventually able to locate family and plan for family meeting in the coming days.      Area in right lower abdomen of concern noted 7/5, CT without contrast initially attributed to right femoral hernia but then began to leak brown foul smelling fluid 7/12. Repeat CT  with contrast discovered enterocutaneous fistula, concern for periappendiceal abscess tracking into soft tissue.Given patient's severe malnutrition, chronic failure to thrive, concern also for chronic dementia given the above (though collateral unable to be obtained) and discussion with multiple physicians, ethics/palliative, decision was made 7/13 not to escalate care plan, (no surgery, no artificial nutrition) on 7/13. Patient still asx from this.     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/other GI perforation  -s/p drain into bedside placed ostomy, this has since been discontinued,, now with ostomy in place ,patient is not systemically ill/septic  -evaluated by IR 7/13, given self draining, an IR drain placement thought not to add benefit  -source control is likely impossible without surgery, however she remains at high risk for surgery given BMI 13 and overall significant deconditioning and would lead to poor healing/poor outcome/infection  -- Status post IV Zosyn, patient is now comfort care  -appreciate ethics/palliative assistance      Significant nausea-resolved  Diarrhea/abd cramps-resolved  -- Nurse reports significant diarrhea with some abdominal cramping and persistent nausea not responding to Zofran.  --Will try one-time dose of Compazine  --Stool C. Difficile negative     Acute anemia  -Low but stable on last lab draw 7/17/2024, no signs of blood loss  -Patient now comfort measures, monitoring discontinued     Severe chronic malnutrition, BMI 13   - encouraging intake but minimal even before acute condition  --Dietitian following.     Appears chronic dementia/FTT   - appears chronic and not acute encephalopathy given stability and overall state     Lack of capacity   --Family now located.  Coming for family conference soon.  Awaiting disposition.      GOC: Given lack of guardian currently, 2 physicians believe CPR/intubation would be all harm without benefit to  patient. This code status changed w documentation on 7/13. Surgery also thought to be in this category of harm without benefit, will not escalate care.      Family now has been contacted and made aware of patient's admission. A sister and son are in the room today (7/23) and have requested a hospice consult.        Expected Discharge Location and Transportation: Complex disposition  Expected Discharge   Expected Discharge Date: 7/24/2024; Expected Discharge Time:      VTE Prophylaxis:  Mechanical VTE prophylaxis orders are present.         AM-PAC 6 Clicks Score (PT): 7 (07/22/24 2000)    CODE STATUS:   Code Status and Medical Interventions:   Ordered at: 07/18/24 1522     Level Of Support Discussed With:    Next of Kin (If No Surrogate)     Code Status (Patient has no pulse and is not breathing):    No CPR (Do Not Attempt to Resuscitate)     Medical Interventions (Patient has pulse or is breathing):    Comfort Measures     Comments:    Three siblings (Frances, Caro, and Mahendra/Mary Ellen)       CIARAN Vaughan  07/23/24

## 2024-07-23 NOTE — CONSULTS
Hospice consult received. Chart reviewed. Hospice visit made w/ pt/Frances (sister),/Caro (sister)/Ulises (nephew) to educate on hospice services/POC/philosophy. Hospice related questions/concerns answered/addressed. Family did ask about in pt hospice. Writer did educate on Medicare in pt hospice criteria & explained that today the pt would not meet in pt hospice criteria, but that this is an ongoing assessment while the pt is here. Family state that they do not feel that the pt will be able to return home & asked what their options were. Writer explained that the options are  family being able to support the pt in the home setting as hospice is a visiting agency or LTC placement. Family then expressed concerns about being able to pay the pt.'s bills & asked about a financial POA. Writer explained that the hospital does not assist w/ financial POA & that a pt will have to have capacity to complete a POA & that they would have to access the services of a mobile notary, if the pt can sign the POA documents.  Writer explained that if the pt does not have capacity, the only option would be guardianship. Family asked if the process for capacity could be starting in the hospital. Writer explained that she would reach out to the pt.'s attending to ask, verbal understanding expressed. Hospice contact information was provided to the pt./family for [facility] & the hospice 24hr number. Message has been sent to attending, Leela, to ask is would be open to placing a consult for capacity. Leela agreeable. Hospice will continue to follow. If further assistance is needed, please call [phone].    *Pt barely engaged in any conversation during visit & struggled to ask questions appropriately*

## 2024-07-23 NOTE — PLAN OF CARE
Problem: Fall Injury Risk  Goal: Absence of Fall and Fall-Related Injury  Outcome: Ongoing, Progressing   Goal Outcome Evaluation:              Outcome Evaluation: Resting well. No complaints. Fx at BS. Family speaking with multiple disciplines today. Now awaiting Neuro to come do a decisional capacity check. Complex care. Still no CPR. Continue comfort care.

## 2024-07-23 NOTE — PROGRESS NOTES
"Palliative Care Daily Progress Note     C/C: Patient complaining of coccyx pain and nausea.     S: Medical record reviewed. Follow up visit for GOC in the context of complex medical decision making. Events noted. RN reports patient tolerated breakfast this morning. Family at bedside. Ongoing comfort focused plan of care.     ROS: +nausea, reports increased nausea with increased PO intake. +pain, coccyx, improves with repositioning.     O: Code Status:   Code Status and Medical Interventions:   Ordered at: 07/18/24 1522     Level Of Support Discussed With:    Next of Kin (If No Surrogate)     Code Status (Patient has no pulse and is not breathing):    No CPR (Do Not Attempt to Resuscitate)     Medical Interventions (Patient has pulse or is breathing):    Comfort Measures     Comments:    Three siblings (Frances, Caro, and Mahendra/Mary Ellen)      Advanced Directives: Advance Directive Status: Patient does not have advance directive   Goals of Care: Ongoing.   Palliative Performance Scale Score: 30%     /73 (BP Location: Left arm, Patient Position: Lying)   Pulse 72   Temp 97.3 °F (36.3 °C) (Temporal)   Resp 16   Ht 149.9 cm (59.02\")   Wt 33.2 kg (73 lb 3.2 oz)   SpO2 98%   BMI 14.78 kg/m²     Intake/Output Summary (Last 24 hours) at 7/23/2024 1243  Last data filed at 7/23/2024 0300  Gross per 24 hour   Intake --   Output 200 ml   Net -200 ml       PE:  General Appearance:    Patient laying in bed, awake, alert, A/C ill appearing, frail, cooperative, NAD, severely malnourished   HEENT:    NC/AT, MMM, face relaxed   Neck:   supple, trachea midline, no JVD   Lungs:     CTA bilat, diminished in bases; respirations regular, even and unlabored; RR 16-18 on exam, on RA    Heart:    RRR, normal S1 and S2, no M/R/G, HR 72   Abdomen:     Normal bowel sounds, soft, nontender, nondistended, ostomy pouch in place to fistula RLQ   G/U:   Deferred   MSK/Extremities:   Severe wasting, no edema   Pulses:   Pulses palpable and " equal bilaterally   Skin:   Warm, dry   Neurologic:   Oriented to self only,    Psych:  Calm, appropriate     Meds: Reviewed and changes noted    Labs:   Results from last 7 days   Lab Units 07/17/24  1250   WBC 10*3/mm3 10.82*   HEMOGLOBIN g/dL 8.6*   HEMATOCRIT % 26.3*   PLATELETS 10*3/mm3 833*     Results from last 7 days   Lab Units 07/17/24  1250   SODIUM mmol/L 142   POTASSIUM mmol/L 4.0   CHLORIDE mmol/L 110*   CO2 mmol/L 25.0   BUN mg/dL 11   CREATININE mg/dL 0.40*   GLUCOSE mg/dL 87   CALCIUM mg/dL 7.8*     Results from last 7 days   Lab Units 07/17/24  1250   SODIUM mmol/L 142   POTASSIUM mmol/L 4.0   CHLORIDE mmol/L 110*   CO2 mmol/L 25.0   BUN mg/dL 11   CREATININE mg/dL 0.40*   CALCIUM mg/dL 7.8*   GLUCOSE mg/dL 87     Imaging Results (Last 72 Hours)       ** No results found for the last 72 hours. **                  Diagnostics: Reviewed    A:   Failure to thrive in adult    Severe malnutrition    Enterocutaneous fistula    Intra-abdominal fluid collection    Cognitive impairment     79 y.o. female with severe malnutrition, enterocutaneous fistula, concern for GI perforation.   S/S:   Pain -coccyx, abdominal secondary to enterocutaneous fistula/GI perforation  -continue Tylenol 650mg PO q 4 hours prn mild pain  -continue Morphine 1mg IV q 4 hours prn moderate pain     2. Nausea -discontinued Zofran 4mg IV/PO q 6 hours prn N/V as ineffective per RN  -continue Compazine 5mg IV q 6 hours prn N/V  -continue Haldol 0.5mg IV q 6 hours prn N/V     3. GOC -7/13 recommend DNR/DNI due to poor nutritional status (Albumin 2.0) and debility, as patient would not benefit from aggressive resuscitation attempts with CPR nor would she benefit from MV  -7/13 reviewed with Dr. Vasquez  -7/13 reviewed with SHANTI Corley to review current attempts to find family/medical decision maker/guardian  7/17: called sister Frances at 1308 who states family have arrived at hospital, plan to meet in room  7/17: met with sisters  ,Frances and Caro, at [phone], reviewed patient's condition and current plan of care, daughters in agreement with current plan of care  7/18: Continue to recommend comfort focused plan of care due to enterocutaneous fistula in context of nonsurgical candidate due to malnutrition. Return visit to patient's room at 7/18 1515 to meet with patient's sisters Frances and sister-in-law, Mary Ellen. Reviewed comfort measures. Family has talked with one another and would like to elect comfort measures per sister and sister-in-law. Orders adjusted.   7/23: Patient with ongoing nausea. Hospice consulted.    P: Follow up visit for symptom management of abdominal pain/nausea. Hospice consult in place.   Palliative Care Team will continue to follow patient. Please do not hesitate to contact us regarding further sx mgmt or GOC needs.  Gisella Olivia, APRN  7/23/2024  Time spent: 10 minutes

## 2024-07-23 NOTE — PLAN OF CARE
Goal Outcome Evaluation:  Plan of Care Reviewed With: patient        Progress: no change  Outcome Evaluation: pt rested well this shift, pleasantly confused. no vomiting this shift, haldol given x1 for pulling at lines, attempts to get out of bed, and successful attempts at removing all dressings

## 2024-07-23 NOTE — PROGRESS NOTES
Visited patient and family today per family request.  One sister of patient and patient's nephew at bedside.  Had questions about living will, GOC, POA for financial matters.  Passed their concerns on to the RN who contacted APRN for a hospice referral and to the  to speak with them about financial concerns.

## 2024-07-23 NOTE — DISCHARGE PLACEMENT REQUEST
"Arcelia Walter (79 y.o. Female)       Date of Birth   1945    Social Security Number       Address   [address]   [address] [facility] [address]    Home Phone   [phone]    MRN   [id number]       Episcopal   Hoahaoism    Marital Status   Single                            Admission Date   6/28/24    Admission Type   Emergency    Admitting Provider   Callie Higuera MD    Attending Provider   Callie Higuera MD    Department, Room/Bed   [address], [address]1       Discharge Date       Discharge Disposition       Discharge Destination                                 Attending Provider: Callie Higuera MD    Allergies: No Known Allergies    Isolation: None   Infection: None   Code Status: No CPR    Ht: 149.9 cm (59.02\")   Wt: 33.2 kg (73 lb 3.2 oz)    Admission Cmt: None   Principal Problem: Failure to thrive in adult [R62.7]                   Active Insurance as of 6/28/2024       Primary Coverage       Payor Plan Insurance Group Employer/Plan Group    MEDICARE MEDICARE A & B        Payor Plan Address Payor Plan Phone Number Payor Plan Fax Number Effective Dates    [address] [phone]  2/1/2011 - None Entered    [place] [id number]         Subscriber Name Subscriber Birth Date Member ID       ARCELIA WALTER 1945 [id number]                     Emergency Contacts        (Rel.) Home Phone Work Phone Mobile Phone    Frances Walter (Sister) -- -- [phone]    Caro Walter (Sister) [phone] -- --    Mahendra Walter (Brother) -- -- [phone]    Mary Ellen Walter (Mahendra's wife) (Other) -- -- [phone]    Dunia Walter [place] (Sister) -- -- [phone]              Emergency Contact Information       Name Relation Home Work Mobile    Frances Walter Sister   [phone]    Caro Walter Sister [phone]      Mahendra Walter Brother   [phone]    Mary Ellen Walter (Mahendra's wife) Other   [phone]    Dunia Walter Sister   [phone]          Insurance Information                  " MEDICARE/MEDICARE A & B Phone: [phone]    Subscriber: Arcelia Walter Subscriber#: [id number]    Group#: -- Precert#: --             History & Physical        Olga Lidia Yu APRN at 24 043       Attestation signed by Antonino Salvador MD at 24 8324    I have reviewed this documentation and agree.                      [facility] Medicine Services  HISTORY AND PHYSICAL    Patient Name: Arcelia Walter  : 1945  MRN: [id number]  Primary Care Physician: Provider, No Known  Date of admission: 2024    Subjective  Subjective     Chief Complaint:  Found in floor     HPI:  Arcelia Walter is a 79 y.o. female with unknown PMH who presents to the ED per EMS after neighbors called for a welfare check.  EMS reports finding patient and floor.  She reportedly informed them that she had slid to the floor 2 days ago.  She was noted to be hypoglycemic with BG of 45.  25 g of D10 was administered per EMS with improvement of blood glucose to 91.  Upon arrival to the ED, patient is unable to contribute to her history.  There is no emergency contact on file.  During this exam she is oriented to self only.  Labs are concerning for hypokalemia, elevated troponin, leukocytosis, elevated CRP, elevated sed rate.  EKG notes arrhythmia with nonspecific ST and T wave abnormality and prolonged QT.  CXR and pelvic x-ray are negative for acute findings  She will be admitted to hospital medicine for further evaluation.    Review of Systems   Unable to perform ROS: Mental status change          Personal History     No past medical history on file.    No past surgical history on file.    Family History:  Family history is unknown by patient.     Social History:    Social History     Social History Narrative    Not on file       Medications:       No Known Allergies    Objective  Objective     Vital Signs:   Temp:  [97.5 °F (36.4 °C)] 97.5 °F (36.4 °C)  Heart Rate:  [51-67] 55  Resp:  [18] 18  BP:  (117-153)/(58-86) 153/71    Physical Exam   Constitutional: Awake, alert, no acute distress, thin limbs, muscle wasting  Eyes: PERRLA, sclerae anicteric, no conjunctival injection  HENT: NCAT, mucous membranes moist  Neck: Supple, no thyromegaly, no lymphadenopathy, trachea midline  Respiratory: Clear to auscultation bilaterally, nonlabored respirations   Cardiovascular: bradycardic, irregular, no murmurs, rubs, or gallops, palpable pedal pulses bilaterally  Gastrointestinal: Positive bowel sounds, soft, nontender, nondistended  Musculoskeletal: +2 pedal edema, no clubbing or cyanosis to extremities  Psychiatric: Appropriate affect, cooperative  Neurologic: Oriented to self only during this exam, nursing previously reported oriented to self and place, strength symmetric in all extremities, Cranial Nerves grossly intact to confrontation, speech clear  Skin: No rashes      Result Review:  I have personally reviewed the results from the time of this admission to 6/29/2024 04:51 EDT and agree with these findings:  [x]  Laboratory list / accordion  [x]  Microbiology  [x]  Radiology  []  EKG/Telemetry   []  Cardiology/Vascular   []  Pathology  [x]  Old records  []  Other:  Most notable findings include:     LAB RESULTS:      Lab 06/29/24 0033   WBC 15.60*   HEMOGLOBIN 12.3   HEMATOCRIT 37.5   PLATELETS 510*   NEUTROS ABS 14.21*   IMMATURE GRANS (ABS) 0.08*   LYMPHS ABS 0.82   MONOS ABS 0.41   EOS ABS 0.02   MCV 93.3   SED RATE 40*   CRP 16.13*   PROCALCITONIN 0.15   LACTATE 1.8   PROTIME 14.2   APTT 40.4*   CK TOTAL 117         Lab 06/29/24 0033   SODIUM 141   POTASSIUM 3.1*   CHLORIDE 101   CO2 25.0   ANION GAP 15.0   BUN 10   CREATININE 0.34*   EGFR 105.5   GLUCOSE 174*   CALCIUM 8.4*   MAGNESIUM 1.8   PHOSPHORUS 2.1*         Lab 06/29/24 0033   TOTAL PROTEIN 5.6*   ALBUMIN 2.6*   GLOBULIN 3.0   ALT (SGPT) 15   AST (SGOT) 28   BILIRUBIN 0.3   ALK PHOS 127*   LIPASE 15         Lab 06/29/24  0240 06/29/24 0033    PROBNP  --  4,377.0*   HSTROP T 59* 61*   PROTIME  --  14.2   INR  --  1.09                 Brief Urine Lab Results  (Last result in the past 365 days)        Color   Clarity   Blood   Leuk Est   Nitrite   Protein   CREAT   Urine HCG        06/29/24 0038 Yellow   Clear   Negative   Negative   Negative   Trace                 Microbiology Results (last 10 days)       ** No results found for the last 240 hours. **            XR Pelvis 1 or 2 View    Result Date: 6/29/2024  XR PELVIS 1 OR 2 VW Date of Exam: 6/29/2024 12:02 AM EDT Indication: fall Comparison: None available. Findings: There is no evidence of an acute fracture. There is no evidence of hip dislocation. There are no lytic or destructive bony lesions.     Impression: Impression: No acute bony abnormality. Electronically Signed: Curly Jack MD  6/29/2024 12:44 AM EDT  Workstation ID: [id number]    XR Chest 1 View    Result Date: 6/29/2024  XR CHEST 1 VW Date of Exam: 6/29/2024 12:01 AM EDT Indication: fall Comparison: None available. Findings: The heart size and pulmonary vascular markings are normal. There is diffuse emphysema. There are no focal consolidations to indicate pneumonia. The osseous structures are normal for age.     Impression: Impression: Emphysema. No active disease. Electronically Signed: Curly Jack MD  6/29/2024 12:42 AM EDT  Workstation ID: [id number]         Assessment & Plan  Assessment & Plan       Syncope    Hypokalemia    Elevated troponin    Elevated C-reactive protein (CRP)    Elevated sed rate    Leukocytosis    79-year-old female brought in per EMS after being found down during a welfare check.  Unclear of exact events.  No emergency contact on file.  Patient with altered mental status.    Syncope  Altered mental status  - Unclear exact events, originally reported to EMS on the floor x 2 days, later states that she is unsure what happened  - CT head pending  - Ethanol pending  - UDS pending  - CBC, BMP, hemoglobin A1c, B12, TSH  in a.m.    Elevated troponin  - Patient currently denies chest pain  - EKG without acute ischemia  - Trending down  - Lipid panel in the a.m.    Leukocytosis  Elevated CRP  Elevated sed rate  - Procalcitonin, lactic normal  - BC x 2 pending  - CBC in the a.m.  - UA negative for acute findings  - CXR negative for acute findings    Hypokalemia  - Electrolyte replacement    Malnourishment  Failure to thrive  - No emergency contact, unclear living situation  - Case management/ consult      DVT prophylaxis: SCDs    CODE STATUS:    Code Status (Patient has no pulse and is not breathing): CPR (Attempt to Resuscitate)  Medical Interventions (Patient has pulse or is breathing): Full Support      Expected Discharge  Expected discharge date/ time has not been documented.    Signature: Electronically signed by CIARAN Reddy, 06/29/24, 4:55 AM EDT.                      Electronically signed by Antonino Salvador MD at 06/29/24 0566

## 2024-07-24 PROCEDURE — 25010000002 PROCHLORPERAZINE 10 MG/2ML SOLUTION

## 2024-07-24 PROCEDURE — 99232 SBSQ HOSP IP/OBS MODERATE 35: CPT | Performed by: INTERNAL MEDICINE

## 2024-07-24 RX ADMIN — PROCHLORPERAZINE EDISYLATE 5 MG: 5 INJECTION INTRAMUSCULAR; INTRAVENOUS at 04:20

## 2024-07-24 RX ADMIN — ACETAMINOPHEN 650 MG: 325 TABLET ORAL at 09:46

## 2024-07-24 RX ADMIN — Medication 10 ML: at 09:41

## 2024-07-24 RX ADMIN — Medication 1 PATCH: at 09:41

## 2024-07-24 RX ADMIN — Medication 10 ML: at 21:46

## 2024-07-24 RX ADMIN — Medication 5 MG: at 21:46

## 2024-07-24 RX ADMIN — MIRTAZAPINE 15 MG: 15 TABLET, FILM COATED ORAL at 21:46

## 2024-07-24 RX ADMIN — PROCHLORPERAZINE EDISYLATE 5 MG: 5 INJECTION INTRAMUSCULAR; INTRAVENOUS at 21:53

## 2024-07-24 NOTE — PLAN OF CARE
Goal Outcome Evaluation:            Patient has visited with sisters today. She refused lunch. VSS, no other changes. Will continue to monitor

## 2024-07-24 NOTE — PROGRESS NOTES
"          Clinical Nutrition Assessment     Patient Name: Arcelia Walter  YOB: 1945  MRN: [id number]  Date of Encounter: 07/24/24 11:44 EDT  Admission date: 6/28/2024  Reason for Visit: Follow-up protocol    Assessment   Nutrition Assessment   Admission Diagnosis:  Syncope [R55]  AMS (altered mental status) [R41.82]    Problem List:    Failure to thrive in adult    Severe malnutrition    Enterocutaneous fistula    Intra-abdominal fluid collection    Cognitive impairment      PMH:   She  has no past medical history on file.    PSH:  She  has a past surgical history that includes Hysterectomy.    Applicable Nutrition History:   +3-4 pitting edema to melonie feet     Anthropometrics     Height: Height: 149.9 cm (59.02\")  Last Filed Weight: Weight: 33.2 kg (73 lb 3.2 oz) (07/23/24 0500)  Method: Weight Method: Bed scale  BMI: BMI (Calculated): 14.8    UBW:  ? 103# per pt report  Weight change:  reported 15# wt loss (? Unsure of duration of loss)    7/12 standing weight shows continued weight loss: 68# (-20# from admit)    Nutrition Focused Physical Exam    Date:  6/29       Patient meets criteria for malnutrition diagnosis, see MSA note.     Subjective   Reported/Observed/Food/Nutrition Related History:     7/24  Pt now with comfort measures only. Family was located and prognosis discussed as poor. Hospice following pt does not meet IP criteria and will remain in house. Discussed pt with RN, unsure if she is drinking supplements. Continues with poor PO. She also c/o some abdominal pain this am.     7/17  Noted GOC discussion-decision for no surgical intervention or artificial nutrition, pt likely to transition to hospice. Visited with pt at bedside, she states she is eating okay and kitchen is helping her find things she likes to order. ~25% most meals per documentation.     07/12/24  Patient busy at time of visit. Spoke with RN who reported continued poor PO intake. Continues on remeron. NPO for drain " "placement.    07/09/24  Remains confused. Patient reported her appetite seems to have improved, but doesn't believe she is eating much more. Likes her ONS and would like to continue to receive it. Declined any food preferences. Has remeron ordered.    07/02/24  Patient reports good appetite. PO intake increasing. Patient remains confused.    6/29  Pt up in bed eating breakfast at time of visit, able to provide some nutrition/wt hx however difficulty elaborating when asked. Reports cooking meals for self - states having 3 eggs every other day, but sometimes daily. ? If this is the only meal consumed. Severe cachexia - ? Starvation vs cardiac. No visible difficulties chewing/swallowing. Pt endorsed ~15# wt loss \"at the beginning of summer\" however unable to discern timeframe for notable weight change. Reports having BM every other day without aide from bowel meds. NKFA - strong dislike for tea.     Current Nutrition Prescription   PO: Diet: Regular/House; Fluid Consistency: Thin (IDDSI 0)  Oral Nutrition Supplement: VHC @ L, MC @ D  Intake:   7/24) since last visit- 0% X 5 meals documented, bites X 2 meals, and 25% X 2 meals documented.   7/17) 12% last 8 meals documented (insufficient data)  7/12) 0-50% PO intake documented since previous visit (minimal meals documented)    Assessment & Plan   Nutrition Diagnosis   Date:  6/29            Updated:    Problem Malnutrition  chronic, severe   Etiology ? Energy in < energy out vs cardiac cacehxia   Signs/Symptoms Severe muscle wasting and subcutaneous fat loss   Status: Ongoing    Date:  6/29    Updated:     Problem Underweight   Etiology Inadequate protein-energy intake   Signs/Symptoms BMI 17.77   Status: Ongoing    Goal:   Nutrition to support treatment, Comfort Measures, and N/A    Nutrition Intervention      Follow treatment progress, Care plan reviewed    RD notes pt with comfort measures only. Please adjust diet per pt wishes/comfort measures. RD will follow in the " peripheral, please consult Nutrition if needed for specific dietary needs or in the event that GOC should change, thank you.  Will stop Boost all meals as pt is not consuming anything. Please reorder if desired.   Continue to encourage PO as able and appropriate within comfort measures GOC.     Monitoring/Evaluation:   Per protocol, PO intake, Supplement intake, Weight, Symptoms, POC/GOC    Nani Busby RD  Time Spent: 10min

## 2024-07-24 NOTE — PLAN OF CARE
Goal Outcome Evaluation:      Plan of Care Review  Add  Today at 1406 by Adriana Mejia, RN  Add  Flowsheets  Taken today at 1406  Progress  no change  Plan of Care Reviewed With  patient  Outcome Evaluation  Palliative RN saw pt at 1209. Pt. was asleep on RA and did not demonstrate any visible signs of discomfort. Did not attempt to wake patient. Pt. had some periods of restlessness overnight and required haldol as well as a dose of compazine. No intake documented since 7/22. Per report, pt is not eating nor drinking anything at all now. She did sit up in bedside chair for a couple hours this monring. Palliative care to continue to follow along for support and ongoing POC.        1300 Palliative IDT meeting: JAGJIT Mejia RN, CHPN; CROW Olivia, APRN; ELISABETH Trinh RN, PN; DAVIS Johnson MDiv, [facility] ; THELMA Lerner, [facility], [facility]-; MARTIN Mays, APRN      After hours, weekends and holidays, contact Palliative Provider by calling [phone].

## 2024-07-24 NOTE — PROGRESS NOTES
[facility] Medicine Services  PROGRESS NOTE    Patient Name: Arcelia Walter  : 1945  MRN: [id number]    Date of Admission: 2024  Primary Care Physician: Provider, No Known    Subjective   Subjective     CC: Follow-up abdominal fistula    HPI: No acute events overnight, patient does endorse some abdominal discomfort.      Objective   Objective     Vital Signs:   Temp:  [97.3 °F (36.3 °C)-98.4 °F (36.9 °C)] 98.4 °F (36.9 °C)  Heart Rate:  [72-73] 73  Resp:  [16-18] 18  BP: (115-132)/(72-73) 115/72     Physical Exam:  Constitutional: Chronically ill-appearing, thin, cachectic female   HENT: NCAT, mucous membranes moist  Respiratory: Clear to auscultation bilaterally, respiratory effort normal   Cardiovascular: RRR, no murmurs, rubs, or gallops  Gastrointestinal: Positive bowel sounds, distended, mildly tender, ostomy in place with puslike drainage  Musculoskeletal: No bilateral ankle edema  Psychiatric: Appropriate affect, cooperative  Neurologic: Nonfocal    Results Reviewed:  LAB RESULTS:      Lab 24  1250   WBC 10.82*   HEMOGLOBIN 8.6*   HEMATOCRIT 26.3*   PLATELETS 833*   NEUTROS ABS 7.62*   IMMATURE GRANS (ABS) 0.24*   LYMPHS ABS 2.15   MONOS ABS 0.72   EOS ABS 0.06   MCV 94.3         Lab 24  1250   SODIUM 142   POTASSIUM 4.0   CHLORIDE 110*   CO2 25.0   ANION GAP 7.0   BUN 11   CREATININE 0.40*   EGFR 101.5   GLUCOSE 87   CALCIUM 7.8*                         Brief Urine Lab Results  (Last result in the past 365 days)        Color   Clarity   Blood   Leuk Est   Nitrite   Protein   CREAT   Urine HCG        24 0038 Yellow   Clear   Negative   Negative   Negative   Trace                   Microbiology Results Abnormal       Procedure Component Value - Date/Time    Clostridioides difficile Toxin - Stool, Per Rectum [[id number]]  (Normal) Collected: 24 1024    Lab Status: Final result Specimen: Stool from Per Rectum Updated: 24 1150    Narrative:      The  following orders were created for panel order Clostridioides difficile Toxin - Stool, Per Rectum.  Procedure                               Abnormality         Status                     ---------                               -----------         ------                     Clostridioides difficile...[[id number]]  Normal              Final result                 Please view results for these tests on the individual orders.    Clostridioides difficile Toxin, PCR - Stool, Per Rectum [[id number]]  (Normal) Collected: 07/18/24 1024    Lab Status: Final result Specimen: Stool from Per Rectum Updated: 07/18/24 1150     Toxigenic C. difficile by PCR Not Detected    Narrative:      The result indicates the absence of toxigenic C. difficile from stool specimen.     Blood Culture - Blood, Hand, Left [[id number]]  (Normal) Collected: 07/12/24 1033    Lab Status: Final result Specimen: Blood from Hand, Left Updated: 07/17/24 1245     Blood Culture No growth at 5 days    Narrative:      Less than seven (7) mL's of blood was collected.  Insufficient quantity may yield false negative results.    Blood Culture - Blood, Hand, Right [[id number]]  (Normal) Collected: 07/12/24 1033    Lab Status: Final result Specimen: Blood from Hand, Right Updated: 07/17/24 1245     Blood Culture No growth at 5 days    Narrative:      Less than seven (7) mL's of blood was collected.  Insufficient quantity may yield false negative results.    Blood Culture - Blood, Hand, Right [[id number]]  (Normal) Collected: 07/01/24 1558    Lab Status: Final result Specimen: Blood from Hand, Right Updated: 07/06/24 1731     Blood Culture No growth at 5 days    Narrative:      Aerobic bottle only  Less than seven (7) mL's of blood was collected.  Insufficient quantity may yield false negative results.    Blood Culture - Blood, Hand, Left [[id number]]  (Normal) Collected: 07/01/24 1558    Lab Status: Final result Specimen: Blood from Hand, Left Updated: 07/06/24 1731      Blood Culture No growth at 5 days            No radiology results from the last 24 hrs    Results for orders placed during the hospital encounter of 06/28/24    Adult Transthoracic Echo Complete W/ Cont if Necessary Per Protocol    Interpretation Summary    Left ventricular systolic function is normal. Left ventricular ejection fraction appears to be 61 - 65%.    There is mild calcification of the aortic valve.    Mild aortic valve regurgitation is present.    Mild mitral annular calcification is present.    Mild to moderate mitral valve regurgitation is present.    Mild tricuspid valve regurgitation is present.      Current medications:  Scheduled Meds:mirtazapine, 15 mg, Oral, Nightly  nicotine, 1 patch, Transdermal, Q24H  sodium chloride, 10 mL, Intravenous, Q12H      Continuous Infusions:   PRN Meds:.  acetaminophen    senna-docusate sodium **AND** polyethylene glycol **AND** bisacodyl **AND** bisacodyl    haloperidol lactate    melatonin    Morphine    prochlorperazine    sodium chloride    sodium chloride    Assessment & Plan   Assessment & Plan     Active Hospital Problems    Diagnosis  POA    **Failure to thrive in adult [R62.7]  Yes    Enterocutaneous fistula [K63.2]  Yes    Intra-abdominal fluid collection [R18.8]  Yes    Cognitive impairment [R41.89]  Yes    Severe malnutrition [E43]  Yes      Resolved Hospital Problems   No resolved problems to display.        Brief Hospital Course to date:  Arcelia Walter is a 79 y.o. female w completely unknown pmhx who presented after being found down by EMS on welfare check. Found to have an area in right lower abdomen of concern noted 7/5, CT without contrast initially attributed to right femoral hernia but then began to leak brown foul smelling fluid 7/12. Repeat CT with contrast discovered enterocutaneous fistula, concern for periappendiceal abscess tracking into soft tissue.Given patient's severe malnutrition, chronic failure to thrive, concern also for chronic  dementia given the above (though collateral unable to be obtained) and discussion with multiple physicians, ethics/palliative, decision was made 7/13 not to escalate care plan, (no surgery, no artificial nutrition) on 7/13. Patient still asx from this.  Patient's family has since been located, prognosis has been deemed to be poor, she is currently on comfort measures with palliative and hospice following.    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/other GI perforation  -s/p drain into bedside placed ostomy, this has since been discontinued, now with ostomy in place ,patient is not systemically ill/septic  -evaluated by IR 7/13, given self draining, an IR drain placement thought not to add benefit  -Patient was deemed to be a poor surgical candidate given a BMI 13 and overall significant deconditioning and would lead to poor healing/poor outcome/infection, she is s/p IV Zosyn, currently on comfort care  -appreciate ethics/palliative assistance      Significant nausea-resolved  Diarrhea/abd cramps-resolved  -- Nurse reports significant diarrhea with some abdominal cramping and persistent nausea not responding to Zofran.  -- Continue supportive therapy s/p Compazine  --Stool C. Difficile negative     Acute anemia  -Low but stable on last lab draw 7/17/2024, no signs of blood loss  -Patient now comfort measures, monitoring discontinued     Severe chronic malnutrition, BMI 13   - encouraging intake but minimal even before acute condition  --Dietitian following.     Appears chronic dementia/FTT   - appears chronic and not acute encephalopathy given stability and overall state     Lack of capacity   --Family now located.  Coming for family conference soon.  Awaiting disposition.      GOC:   -Prognosis remains poor, patient undergoing terminal decline, she continues to remain a poor surgical candidate.Family has now been contacted and are present.  Palliative care following, per discussions  with palliative 7/18 with patient's Sister Frances and sister-in-law Mary Ellen, they have elected for comfort measures.  Hospice was consulted and are now following, patient continues to remain on comfort measures per hospice discussion with patient's sisters Frances and Caro on 7/23/2024.  Patient currently does not meet inpatient hospice criteria, anticipate she will remain in-house for now.     Expected Discharge Location and Transportation: Hospice  Expected Discharge   Expected Discharge Date: 7/24/2024; Expected Discharge Time:      VTE Prophylaxis:  Mechanical VTE prophylaxis orders are present.         AM-PAC 6 Clicks Score (PT): 7 (07/23/24 2000)    CODE STATUS:   Code Status and Medical Interventions:   Ordered at: 07/18/24 1522     Level Of Support Discussed With:    Next of Kin (If No Surrogate)     Code Status (Patient has no pulse and is not breathing):    No CPR (Do Not Attempt to Resuscitate)     Medical Interventions (Patient has pulse or is breathing):    Comfort Measures     Comments:    Three siblings (Frances, Caro, and Mahendra/Mary Ellen)       Callie Higuera MD  07/24/24

## 2024-07-24 NOTE — CASE MANAGEMENT/SOCIAL WORK
Continued Stay Note  [facility]     Patient Name: Arcelia Walter  MRN: [id number]  Today's Date: 7/24/2024    Admit Date: 6/28/2024    Plan: Comfort Care   Discharge Plan       Row Name 07/24/24 1437       Plan    Plan Comfort Care    Plan Comments Case Management continues to follow for all needs, discussed at length with Palliative Care LCSW.  Patient now comfort care and will remain inpatient.  Discussed in unit multidisciplinary rounds, with Hospitalist MD/JOE and with nursing.  Discussed with Neuro APRN yesterday.  Susan Hoskins, Ext. [phone]    Final Discharge Disposition Code 30 - still a patient                   Discharge Codes    No documentation.                 Expected Discharge Date and Time       Expected Discharge Date Expected Discharge Time    Jul 31, 2024               SHANTI Jennings

## 2024-07-24 NOTE — PLAN OF CARE
Goal Outcome Evaluation:         Patient was up to chair today but would not stay sitting up in it but a few minutes, then wanted back in the bed. She has not eaten much today. VSS. Confused off and on this evening. No other changes, will continue to monitor.

## 2024-07-24 NOTE — PLAN OF CARE
Goal Outcome Evaluation:  Plan of Care Reviewed With: patient        Progress: no change  Outcome Evaluation: rested well will periods of restlessness, prn haldol given, turned q2, continue comfort care

## 2024-07-25 PROCEDURE — 99232 SBSQ HOSP IP/OBS MODERATE 35: CPT | Performed by: NURSE PRACTITIONER

## 2024-07-25 RX ADMIN — Medication 10 ML: at 20:53

## 2024-07-25 RX ADMIN — Medication 10 ML: at 10:24

## 2024-07-25 RX ADMIN — MIRTAZAPINE 15 MG: 15 TABLET, FILM COATED ORAL at 20:53

## 2024-07-25 RX ADMIN — Medication 5 MG: at 20:53

## 2024-07-25 RX ADMIN — Medication 1 PATCH: at 10:23

## 2024-07-25 NOTE — PLAN OF CARE
Goal Outcome Evaluation:         Patient had visitors much of the day. Dressings to back and coccyx changed. She was able to eat her soup for lunch today by putting it in a cup for her to hold better. VSS no other changes still confused at times. Will continue to monitor

## 2024-07-25 NOTE — PROGRESS NOTES
[facility] Medicine Services  PROGRESS NOTE    Patient Name: Arcelia Walter  : 1945  MRN: [id number]    Date of Admission: 2024  Primary Care Physician: Provider, No Known    Subjective   Subjective     CC: Follow-up abdominal fistula    HPI: patient up in bed eating small amt of breakfast this morning. Confused conversation, but reports feeling well      Objective   Objective     Vital Signs:   Temp:  [98 °F (36.7 °C)-98.1 °F (36.7 °C)] 98 °F (36.7 °C)  Heart Rate:  [71-87] 71  Resp:  [17-18] 17  BP: (136-138)/(55-80) 136/55     Physical Exam:  Constitutional: Chronically ill-appearing, cachectic female   HENT: NCAT, mucous membranes moist  Respiratory: Clear to auscultation bilaterally, respiratory effort normal   Cardiovascular: RRR,  Gastrointestinal: + BS, flat, NT, ostomy RLQ with drainage  Musculoskeletal: No edema  Psychiatric: Appropriate affect, cooperative  Neurologic: speech clear, but confused. TORO    Results Reviewed:  LAB RESULTS:                                  Brief Urine Lab Results  (Last result in the past 365 days)        Color   Clarity   Blood   Leuk Est   Nitrite   Protein   CREAT   Urine HCG        24 0038 Yellow   Clear   Negative   Negative   Negative   Trace                   Microbiology Results Abnormal       Procedure Component Value - Date/Time    Clostridioides difficile Toxin - Stool, Per Rectum [[id number]]  (Normal) Collected: 24 1024    Lab Status: Final result Specimen: Stool from Per Rectum Updated: 24 1150    Narrative:      The following orders were created for panel order Clostridioides difficile Toxin - Stool, Per Rectum.  Procedure                               Abnormality         Status                     ---------                               -----------         ------                     Clostridioides difficile...[[id number]]  Normal              Final result                 Please view results for these tests on  the individual orders.    Clostridioides difficile Toxin, PCR - Stool, Per Rectum [[id number]]  (Normal) Collected: 07/18/24 1024    Lab Status: Final result Specimen: Stool from Per Rectum Updated: 07/18/24 1150     Toxigenic C. difficile by PCR Not Detected    Narrative:      The result indicates the absence of toxigenic C. difficile from stool specimen.     Blood Culture - Blood, Hand, Left [[id number]]  (Normal) Collected: 07/12/24 1033    Lab Status: Final result Specimen: Blood from Hand, Left Updated: 07/17/24 1245     Blood Culture No growth at 5 days    Narrative:      Less than seven (7) mL's of blood was collected.  Insufficient quantity may yield false negative results.    Blood Culture - Blood, Hand, Right [[id number]]  (Normal) Collected: 07/12/24 1033    Lab Status: Final result Specimen: Blood from Hand, Right Updated: 07/17/24 1245     Blood Culture No growth at 5 days    Narrative:      Less than seven (7) mL's of blood was collected.  Insufficient quantity may yield false negative results.    Blood Culture - Blood, Hand, Right [[id number]]  (Normal) Collected: 07/01/24 1558    Lab Status: Final result Specimen: Blood from Hand, Right Updated: 07/06/24 1731     Blood Culture No growth at 5 days    Narrative:      Aerobic bottle only  Less than seven (7) mL's of blood was collected.  Insufficient quantity may yield false negative results.    Blood Culture - Blood, Hand, Left [[id number]]  (Normal) Collected: 07/01/24 1558    Lab Status: Final result Specimen: Blood from Hand, Left Updated: 07/06/24 1731     Blood Culture No growth at 5 days            No radiology results from the last 24 hrs    Results for orders placed during the hospital encounter of 06/28/24    Adult Transthoracic Echo Complete W/ Cont if Necessary Per Protocol    Interpretation Summary    Left ventricular systolic function is normal. Left ventricular ejection fraction appears to be 61 - 65%.    There is mild calcification of the  aortic valve.    Mild aortic valve regurgitation is present.    Mild mitral annular calcification is present.    Mild to moderate mitral valve regurgitation is present.    Mild tricuspid valve regurgitation is present.      Current medications:  Scheduled Meds:mirtazapine, 15 mg, Oral, Nightly  nicotine, 1 patch, Transdermal, Q24H  sodium chloride, 10 mL, Intravenous, Q12H      Continuous Infusions:   PRN Meds:.  acetaminophen    senna-docusate sodium **AND** polyethylene glycol **AND** bisacodyl **AND** bisacodyl    haloperidol lactate    melatonin    Morphine    prochlorperazine    sodium chloride    sodium chloride    Assessment & Plan   Assessment & Plan     Active Hospital Problems    Diagnosis  POA    **Failure to thrive in adult [R62.7]  Yes    Enterocutaneous fistula [K63.2]  Yes    Intra-abdominal fluid collection [R18.8]  Yes    Cognitive impairment [R41.89]  Yes    Severe malnutrition [E43]  Yes      Resolved Hospital Problems   No resolved problems to display.        Brief Hospital Course to date:  Arcelia Walter is a 79 y.o. female w completely unknown pmhx who presented after being found down by EMS on welfare check. Found to have an area in right lower abdomen of concern noted 7/5, CT without contrast initially attributed to right femoral hernia but then began to leak brown foul smelling fluid 7/12. Repeat CT with contrast discovered enterocutaneous fistula, concern for periappendiceal abscess tracking into soft tissue.Given patient's severe malnutrition, chronic failure to thrive, concern also for chronic dementia given the above (though collateral unable to be obtained) and discussion with multiple physicians, ethics/palliative, decision was made 7/13 not to escalate care plan, (no surgery, no artificial nutrition) on 7/13. Patient still asx from this.  Patient's family has since been located, prognosis has been deemed to be poor, she is currently on comfort measures with palliative and hospice  following.    This patient's problems and plans were partially entered by my partner and updated as appropriate by me 07/25/24.      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/other GI perforation  -s/p drain into bedside placed ostomy, this has since been discontinued, now with ostomy in place patient is not systemically ill/septic  -evaluated by IR 7/13, given self draining, an IR drain placement thought not to add benefit  -Patient was deemed to be a poor surgical candidate given a BMI 13 and overall significant deconditioning and would lead to poor healing/poor outcome/infection, she is s/p IV Zosyn, currently on comfort care  -appreciate ethics/palliative assistance      Significant nausea-resolved  Diarrhea/abd cramps-resolved  -- Nurse reports significant diarrhea with some abdominal cramping and persistent nausea not responding to Zofran.  -- Continue supportive therapy s/p Compazine  --Stool C. Difficile negative     Acute anemia  -Low but stable on last lab draw 7/17/2024, no signs of blood loss  -Patient now comfort measures, monitoring discontinued     Severe chronic malnutrition, BMI 13   - encouraging intake but minimal even before acute condition  --Dietitian following.     Appears chronic dementia/FTT   - appears chronic and not acute encephalopathy given stability and overall state     Lack of capacity   --Family now located.  Coming for family conference soon.  Awaiting disposition.      GOC:   -Prognosis remains poor, patient undergoing terminal decline, she continues to remain a poor surgical candidate.Family has now been contacted and are present.  Palliative care following, per discussions with palliative 7/18 with patient's Sister Frances and sister-in-law Mary Ellen, they have elected for comfort measures.  No further labs draws/ invasive measures. Hospice was consulted and are now following, patient continues to remain on comfort measures per hospice discussion with  patient's sisters Frances and Caro on 7/23/2024.  Patient currently does not meet inpatient hospice criteria, anticipate she will remain in-house for now.     Expected Discharge Location and Transportation: Hospice  Expected Discharge   Expected Discharge Date: 7/31/2024; Expected Discharge Time:      VTE Prophylaxis:  Mechanical VTE prophylaxis orders are present.         AM-PAC 6 Clicks Score (PT): 12 (07/24/24 2000)    CODE STATUS:   Code Status and Medical Interventions:   Ordered at: 07/18/24 1522     Level Of Support Discussed With:    Next of Kin (If No Surrogate)     Code Status (Patient has no pulse and is not breathing):    No CPR (Do Not Attempt to Resuscitate)     Medical Interventions (Patient has pulse or is breathing):    Comfort Measures     Comments:    Three siblings (Frances, Caro, and Mahendra/Mary Ellen)       Apolonia Guardado, APRN  07/25/24

## 2024-07-25 NOTE — PLAN OF CARE
Goal Outcome Evaluation:                 Palliative RN visit at bedside. Pt sitting up in bed, watching TV; pt's sister (Frances) sitting at recliner.    Pt denied pain/nausea/SOA at this time. Pt did not seem to completely understand questions because her answers were at times not making sense. Spoke with sister at bedside and Frances states that pt seems to be fixated/keeps talking about work-related things today. Frances stated that pt has had episodes where she reports seeing their mom. Frances also stated that yesterday, pt slept pretty much the whole time but today has had several periods where pt was awake. However, Frances states that pt does not seem as interactive today.    Addressed Frances's questions and concerns at this time. The pt stated she was tired and wanted to rest for a little.    Problem: Palliative Care  Goal: Enhanced Quality of Life  Outcome: Ongoing, Progressing

## 2024-07-25 NOTE — PLAN OF CARE
Goal Outcome Evaluation:  Plan of Care Reviewed With: patient        Progress: no change  Outcome Evaluation: VSS on RA. PRN melatonin and compazine given. No c/o pain or soa. Poor PO intake. A&O to self only. Perdomo catheter and mucosa fistula patent. No family present at bedside. Pt resting well in between care at this time.

## 2024-07-26 PROCEDURE — 99232 SBSQ HOSP IP/OBS MODERATE 35: CPT | Performed by: NURSE PRACTITIONER

## 2024-07-26 RX ORDER — ONDANSETRON 2 MG/ML
4 INJECTION INTRAMUSCULAR; INTRAVENOUS EVERY 6 HOURS PRN
Status: DISCONTINUED | OUTPATIENT
Start: 2024-07-26 | End: 2024-08-14 | Stop reason: HOSPADM

## 2024-07-26 RX ADMIN — Medication 1 PATCH: at 09:39

## 2024-07-26 RX ADMIN — Medication 10 ML: at 09:40

## 2024-07-26 RX ADMIN — MIRTAZAPINE 15 MG: 15 TABLET, FILM COATED ORAL at 21:33

## 2024-07-26 RX ADMIN — Medication 5 MG: at 21:33

## 2024-07-26 RX ADMIN — Medication 10 ML: at 21:33

## 2024-07-26 NOTE — NURSING NOTE
[facility] follow-up to coccyx, right groin fistula.  Right groin fistula with less output.  Output in bag is thin and malodorous.  This small pediatric size ostomy appliance which better if it is decreasing in this area.  Recommend applying new ostomy appliance should leakage occur.  Appliance is in stock room should suffice.  Will continue to follow.    Chronic friction and moisture area present on bottom has improved with Allevyn use despite patient's severe malnutrition and declining medical state.  Area surrounding the wound has improved wound itself is a little bit smaller.  Cleansed with teal perineal wipes, [place] used to protect wound.  New Allevyn placed with notch over coccyx area.  Will place dressing change orders for every 3 days now.   Recommend continuing these dressings, continue to place Allevyn dressing over entire tailbone.    Noted some blanchable erythema across patient's left ribs.  Will go ahead and order Low Air Loss specialty bed to help alternate pressure.     Patient does however still need to be turned.

## 2024-07-26 NOTE — CASE MANAGEMENT/SOCIAL WORK
Continued Stay Note  [facility]     Patient Name: Arcelia Walter  MRN: [id number]  Today's Date: 7/26/2024    Admit Date: 6/28/2024    Plan: Ongoing   Discharge Plan       Row Name 07/26/24 1348       Plan    Plan Ongoing    Plan Comments SW'er met with patient at bedside. Patient is comfort measure.SW'er following for all discharge planning needs. Palliative Care following. Plan ongoing.                   Discharge Codes    No documentation.                 Expected Discharge Date and Time       Expected Discharge Date Expected Discharge Time    Jul 31, 2024               SHANTI Shaw (Kay)

## 2024-07-26 NOTE — PROGRESS NOTES
[facility] Medicine Services  PROGRESS NOTE    Patient Name: Arcelia Walter  : 1945  MRN: [id number]    Date of Admission: 2024  Primary Care Physician: Provider, No Known    Subjective   Subjective     CC: Follow-up abdominal fistula    HPI: patient up in bed this am with breakfast. States she is nauseated today. Denies pain      Objective   Objective     Vital Signs:   Temp:  [97.9 °F (36.6 °C)-98.3 °F (36.8 °C)] 98.3 °F (36.8 °C)  Heart Rate:  [76-80] 76  Resp:  [15-16] 15  BP: (128-138)/(78-79) 128/78     Physical Exam:  Constitutional: Chronically ill-appearing, cachectic female, NAD  HENT: NCAT, mucous membranes moist  Respiratory: Clear to auscultation bilaterally, respiratory effort normal   Cardiovascular: RRR,  Gastrointestinal: + BS, flat, NT, ostomy RLQ with drainage  Musculoskeletal: No edema  Psychiatric: Appropriate affect, cooperative  Neurologic: speech clear, but confused- oriented to self only. TORO    Results Reviewed:  LAB RESULTS:                                  Brief Urine Lab Results  (Last result in the past 365 days)        Color   Clarity   Blood   Leuk Est   Nitrite   Protein   CREAT   Urine HCG        24 0038 Yellow   Clear   Negative   Negative   Negative   Trace                   Microbiology Results Abnormal       Procedure Component Value - Date/Time    Clostridioides difficile Toxin - Stool, Per Rectum [[id number]]  (Normal) Collected: 24 1024    Lab Status: Final result Specimen: Stool from Per Rectum Updated: 24 1150    Narrative:      The following orders were created for panel order Clostridioides difficile Toxin - Stool, Per Rectum.  Procedure                               Abnormality         Status                     ---------                               -----------         ------                     Clostridioides difficile...[[id number]]  Normal              Final result                 Please view results for these  tests on the individual orders.    Clostridioides difficile Toxin, PCR - Stool, Per Rectum [[id number]]  (Normal) Collected: 07/18/24 1024    Lab Status: Final result Specimen: Stool from Per Rectum Updated: 07/18/24 1150     Toxigenic C. difficile by PCR Not Detected    Narrative:      The result indicates the absence of toxigenic C. difficile from stool specimen.     Blood Culture - Blood, Hand, Left [[id number]]  (Normal) Collected: 07/12/24 1033    Lab Status: Final result Specimen: Blood from Hand, Left Updated: 07/17/24 1245     Blood Culture No growth at 5 days    Narrative:      Less than seven (7) mL's of blood was collected.  Insufficient quantity may yield false negative results.    Blood Culture - Blood, Hand, Right [[id number]]  (Normal) Collected: 07/12/24 1033    Lab Status: Final result Specimen: Blood from Hand, Right Updated: 07/17/24 1245     Blood Culture No growth at 5 days    Narrative:      Less than seven (7) mL's of blood was collected.  Insufficient quantity may yield false negative results.    Blood Culture - Blood, Hand, Right [[id number]]  (Normal) Collected: 07/01/24 1558    Lab Status: Final result Specimen: Blood from Hand, Right Updated: 07/06/24 1731     Blood Culture No growth at 5 days    Narrative:      Aerobic bottle only  Less than seven (7) mL's of blood was collected.  Insufficient quantity may yield false negative results.    Blood Culture - Blood, Hand, Left [[id number]]  (Normal) Collected: 07/01/24 1558    Lab Status: Final result Specimen: Blood from Hand, Left Updated: 07/06/24 1731     Blood Culture No growth at 5 days            No radiology results from the last 24 hrs    Results for orders placed during the hospital encounter of 06/28/24    Adult Transthoracic Echo Complete W/ Cont if Necessary Per Protocol    Interpretation Summary    Left ventricular systolic function is normal. Left ventricular ejection fraction appears to be 61 - 65%.    There is mild calcification  of the aortic valve.    Mild aortic valve regurgitation is present.    Mild mitral annular calcification is present.    Mild to moderate mitral valve regurgitation is present.    Mild tricuspid valve regurgitation is present.      Current medications:  Scheduled Meds:mirtazapine, 15 mg, Oral, Nightly  nicotine, 1 patch, Transdermal, Q24H  sodium chloride, 10 mL, Intravenous, Q12H      Continuous Infusions:   PRN Meds:.  acetaminophen    senna-docusate sodium **AND** polyethylene glycol **AND** bisacodyl **AND** bisacodyl    haloperidol lactate    melatonin    Morphine    prochlorperazine    sodium chloride    sodium chloride    Assessment & Plan   Assessment & Plan     Active Hospital Problems    Diagnosis  POA    **Failure to thrive in adult [R62.7]  Yes    Enterocutaneous fistula [K63.2]  Yes    Intra-abdominal fluid collection [R18.8]  Yes    Cognitive impairment [R41.89]  Yes    Severe malnutrition [E43]  Yes      Resolved Hospital Problems   No resolved problems to display.        Brief Hospital Course to date:  Arcelia Walter is a 79 y.o. female w completely unknown pmhx who presented after being found down by EMS on welfare check. Found to have an area in right lower abdomen of concern noted 7/5, CT without contrast initially attributed to right femoral hernia but then began to leak brown foul smelling fluid 7/12. Repeat CT with contrast discovered enterocutaneous fistula, concern for periappendiceal abscess tracking into soft tissue.Given patient's severe malnutrition, chronic failure to thrive, concern also for chronic dementia given the above (though collateral unable to be obtained) and discussion with multiple physicians, ethics/palliative, decision was made 7/13 not to escalate care plan, (no surgery, no artificial nutrition) on 7/13. Patient still asx from this.  Patient's family has since been located, prognosis has been deemed to be poor, she is currently on comfort measures with palliative and hospice  following.    This patient's problems and plans were partially entered by my partner and updated as appropriate by me 07/26/24.      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/other GI perforation  -s/p drain into bedside placed ostomy, this has since been discontinued, now with ostomy in place patient is not systemically ill/septic  -evaluated by IR 7/13, given self draining, an IR drain placement thought not to add benefit  -Patient was deemed to be a poor surgical candidate given a BMI 13 and overall significant deconditioning and would lead to poor healing/poor outcome/infection, she is s/p IV Zosyn, currently on comfort care  -appreciate ethics/palliative assistance      Significant nausea-resolved  Diarrhea/abd cramps-resolved  -- Nurse reports significant diarrhea with some abdominal cramping and persistent nausea not responding to Zofran.  -- Continue supportive therapy s/p Compazine  --Stool C. Difficile negative     Acute anemia  -Low but stable on last lab draw 7/17/2024, no signs of blood loss  -Patient now comfort measures, monitoring discontinued     Severe chronic malnutrition, BMI 13   - encouraging intake but minimal even before acute condition  --Dietitian following.     Appears chronic dementia/FTT   - appears chronic and not acute encephalopathy given stability and overall state     Lack of capacity   --Family now located.  Coming for family conference soon.  Awaiting disposition.      GOC:   -Prognosis remains poor, patient undergoing terminal decline, and is not a surgical candidate.Family has now been present.  Palliative care following, per discussions with palliative 7/18 with patient's Sister Frances and sister-in-law Mary Ellen, they have elected for comfort measures.  No further labs draws/ invasive measures. Patient currently does not meet inpatient hospice criteria, anticipate she will remain in-house for now.     Expected Discharge Location and Transportation:  Hospice  Expected Discharge   Expected Discharge Date: 7/31/2024; Expected Discharge Time:      VTE Prophylaxis:  Mechanical VTE prophylaxis orders are present.         AM-PAC 6 Clicks Score (PT): 11 (07/26/24 0809)    CODE STATUS:   Code Status and Medical Interventions:   Ordered at: 07/18/24 1522     Level Of Support Discussed With:    Next of Kin (If No Surrogate)     Code Status (Patient has no pulse and is not breathing):    No CPR (Do Not Attempt to Resuscitate)     Medical Interventions (Patient has pulse or is breathing):    Comfort Measures     Comments:    Three siblings (Frances, Caro, and Mahendra/Mary Ellen)       Apolonia Guardado, APRN  07/26/24

## 2024-07-26 NOTE — PLAN OF CARE
Problem: Adult Inpatient Plan of Care  Goal: Plan of Care Review  7/26/2024 0532 by Trinh Woodward, RN  Outcome: Ongoing, Progressing  Flowsheets (Taken 7/26/2024 0532)  Progress: no change  Plan of Care Reviewed With: patient  Outcome Evaluation: VSS on RA. PRN melatonin given. No c/o pain or soa. Poor PO intake. A&O to self only. Perdomo catheter and mucosa fistula patent. No family present at bedside. Pt resting well in between care at this time.   Goal Outcome Evaluation:  Plan of Care Reviewed With: patient        Progress: no change  Outcome Evaluation: VSS on RA. PRN melatonin given. No c/o pain or soa. Poor PO intake. A&O to self only. Perdomo catheter and mucosa fistula patent. No family present at bedside. Pt resting well in between care at this time.

## 2024-07-26 NOTE — PROGRESS NOTES
"Palliative Care Daily Progress Note     C/C: Patient sleeping at time of visit.     S: Medical record reviewed. Follow up visit for GOC in the context of complex medical decision making. Events noted. RN reports patient tolerating a few bites of lunch, fistula output 50ml this past shift. RN reports no complaints of pain or nausea. No family at bedside. Ongoing comfort focused plan of care.     ROS: ROS limited by patient sleeping.      O: Code Status:   Code Status and Medical Interventions:   Ordered at: 07/18/24 1522     Level Of Support Discussed With:    Next of Kin (If No Surrogate)     Code Status (Patient has no pulse and is not breathing):    No CPR (Do Not Attempt to Resuscitate)     Medical Interventions (Patient has pulse or is breathing):    Comfort Measures     Comments:    Three siblings (Frances, Caro, and Mahendra/Mary Ellen)      Advanced Directives: Advance Directive Status: Patient does not have advance directive   Goals of Care: Ongoing.   Palliative Performance Scale Score: 30%     /78 (BP Location: Left arm, Patient Position: Lying)   Pulse 76   Temp 98.3 °F (36.8 °C) (Temporal)   Resp 15   Ht 149.9 cm (59.02\")   Wt 32.1 kg (70 lb 11.2 oz)   SpO2 95%   BMI 14.27 kg/m²     Intake/Output Summary (Last 24 hours) at 7/26/2024 1539  Last data filed at 7/26/2024 1444  Gross per 24 hour   Intake 512 ml   Output 450 ml   Net 62 ml       PE:  General Appearance:    Patient laying in bed, sleeping, A/C ill appearing, frail, cooperative, NAD, severely malnourished   HEENT:    NC/AT, MMM, face relaxed   Neck:   supple, trachea midline, no JVD   Lungs:     CTA bilat, diminished in bases; respirations regular, even and unlabored; RR 16-18 on exam, on RA    Heart:    RRR, normal S1 and S2, no M/R/G, HR 72   Abdomen:     Normal bowel sounds, soft, nontender, nondistended, ostomy pouch in place to fistula RLQ   G/U:   Deferred   MSK/Extremities:   Severe wasting, no edema   Pulses:   Pulses palpable and " "equal bilaterally   Skin:   Warm, dry   Neurologic:   sleeping   Psych:  Calm, appropriate     Meds: Reviewed and changes noted    Labs:                     Invalid input(s): \"LABALBU\", \"PROT\"    Imaging Results (Last 72 Hours)       ** No results found for the last 72 hours. **                  Diagnostics: Reviewed    A:   Failure to thrive in adult    Severe malnutrition    Enterocutaneous fistula    Intra-abdominal fluid collection    Cognitive impairment     79 y.o. female with severe malnutrition, enterocutaneous fistula, concern for GI perforation.   S/S:   Pain -coccyx, abdominal secondary to enterocutaneous fistula/GI perforation  -continue Tylenol 650mg PO q 4 hours prn mild pain  -continue Morphine 1mg IV q 4 hours prn moderate pain     2. Nausea -discontinued Zofran 4mg IV/PO q 6 hours prn N/V as ineffective per RN  -continue Compazine 5mg IV q 6 hours prn N/V  -continue Haldol 0.5mg IV q 6 hours prn N/V     3. GOC -7/13 recommend DNR/DNI due to poor nutritional status (Albumin 2.0) and debility, as patient would not benefit from aggressive resuscitation attempts with CPR nor would she benefit from MV  -7/13 reviewed with Dr. Vasquez  -7/13 reviewed with MSMARLENE Corley to review current attempts to find family/medical decision maker/guardian  7/17: called sister Frances at [phone] who states family have arrived at hospital, plan to meet in room  7/17: met with sisters ,Frances and Caro, at [phone], reviewed patient's condition and current plan of care, daughters in agreement with current plan of care  7/18: Continue to recommend comfort focused plan of care due to enterocutaneous fistula in context of nonsurgical candidate due to malnutrition. Return visit to patient's room at 7/18 1515 to meet with patient's sisters Frances and sister-in-lawMary Ellen. Reviewed comfort measures. Family has talked with one another and would like to elect comfort measures per sister and sister-in-law. Orders adjusted. "   7/23: Patient with ongoing nausea. Hospice consulted.  7/26: Patient continues on comfort measures. Symptoms currently managed on current regimen.     P: Follow up visit for symptom management of abdominal pain/nausea. Ongoing comfort focused plan of care with symptom management throughout end of life. Patient continues to decline, weaker, eating less.    Palliative Care Team will continue to follow patient. Please do not hesitate to contact us regarding further sx mgmt or GOC needs.  Gisella Olivia, APRN  7/26/2024  Time spent: 10 minutes

## 2024-07-27 PROCEDURE — 99232 SBSQ HOSP IP/OBS MODERATE 35: CPT | Performed by: NURSE PRACTITIONER

## 2024-07-27 RX ADMIN — Medication 10 ML: at 11:18

## 2024-07-27 RX ADMIN — Medication 10 ML: at 21:10

## 2024-07-27 RX ADMIN — MIRTAZAPINE 15 MG: 15 TABLET, FILM COATED ORAL at 21:09

## 2024-07-27 RX ADMIN — Medication 5 MG: at 21:09

## 2024-07-27 NOTE — PLAN OF CARE
Goal Outcome Evaluation:         VSS on RA. Pt had little PO intake today aside from sips of water. Denies pain. Fistula continues to drain. Will continue plan of care.

## 2024-07-27 NOTE — PLAN OF CARE
Goal Outcome Evaluation:  Plan of Care Reviewed With: patient        Progress: no change  Outcome Evaluation: VSS on RA. PRN melatonin given. No c/o pain, n/v, or soa. Poor PO intake. A&O to self only. Perdomo catheter and mucosa fistula patent. No family present at bedside. Pt resting well in between care at this time. Continuing to turn q2hrs.

## 2024-07-27 NOTE — PROGRESS NOTES
[facility] Medicine Services  PROGRESS NOTE    Patient Name: Arcelia Walter  : 1945  MRN: [id number]    Date of Admission: 2024  Primary Care Physician: Provider, No Known    Subjective   Subjective     CC: Follow-up abdominal fistula    HPI:   Patient sitting up in bed with complaint of her right lower back/hip hurting.  Patient was resituated to take pressure off the right side.    Objective   Objective     Vital Signs:   Temp:  [97.7 °F (36.5 °C)-98.3 °F (36.8 °C)] 97.7 °F (36.5 °C)  Heart Rate:  [70-78] 70  Resp:  [16-17] 17  BP: (108-113)/(68-76) 108/68     Physical Exam:  Constitutional: Alert, frail cachectic elderly female sitting up in bed in NAD  ENT: Pink, moist mucous membranes   Respiratory: Nonlabored, symmetrical chest expansion, CTAB  Cardiovascular: RRR, no M/R/G  Gastrointestinal: Soft, NT, ND +BS, + colostomy   Musculoskeletal: TORO; no LE edema bilaterally  Neurologic: Confused, follows commands, speech clear  Skin: No rashes on exposed skin  Psychiatric: Pleasant and cooperative; normal affect    Results Reviewed:  LAB RESULTS:                                  Brief Urine Lab Results  (Last result in the past 365 days)        Color   Clarity   Blood   Leuk Est   Nitrite   Protein   CREAT   Urine HCG        24 0038 Yellow   Clear   Negative   Negative   Negative   Trace                   Microbiology Results Abnormal       Procedure Component Value - Date/Time    Clostridioides difficile Toxin - Stool, Per Rectum [[id number]]  (Normal) Collected: 24 1024    Lab Status: Final result Specimen: Stool from Per Rectum Updated: 24 1150    Narrative:      The following orders were created for panel order Clostridioides difficile Toxin - Stool, Per Rectum.  Procedure                               Abnormality         Status                     ---------                               -----------         ------                     Clostridioides  difficile...[[id number]]  Normal              Final result                 Please view results for these tests on the individual orders.    Clostridioides difficile Toxin, PCR - Stool, Per Rectum [[id number]]  (Normal) Collected: 07/18/24 1024    Lab Status: Final result Specimen: Stool from Per Rectum Updated: 07/18/24 1150     Toxigenic C. difficile by PCR Not Detected    Narrative:      The result indicates the absence of toxigenic C. difficile from stool specimen.     Blood Culture - Blood, Hand, Left [[id number]]  (Normal) Collected: 07/12/24 1033    Lab Status: Final result Specimen: Blood from Hand, Left Updated: 07/17/24 1245     Blood Culture No growth at 5 days    Narrative:      Less than seven (7) mL's of blood was collected.  Insufficient quantity may yield false negative results.    Blood Culture - Blood, Hand, Right [[id number]]  (Normal) Collected: 07/12/24 1033    Lab Status: Final result Specimen: Blood from Hand, Right Updated: 07/17/24 1245     Blood Culture No growth at 5 days    Narrative:      Less than seven (7) mL's of blood was collected.  Insufficient quantity may yield false negative results.    Blood Culture - Blood, Hand, Right [[id number]]  (Normal) Collected: 07/01/24 1558    Lab Status: Final result Specimen: Blood from Hand, Right Updated: 07/06/24 1731     Blood Culture No growth at 5 days    Narrative:      Aerobic bottle only  Less than seven (7) mL's of blood was collected.  Insufficient quantity may yield false negative results.    Blood Culture - Blood, Hand, Left [[id number]]  (Normal) Collected: 07/01/24 1558    Lab Status: Final result Specimen: Blood from Hand, Left Updated: 07/06/24 1731     Blood Culture No growth at 5 days            No radiology results from the last 24 hrs    Results for orders placed during the hospital encounter of 06/28/24    Adult Transthoracic Echo Complete W/ Cont if Necessary Per Protocol    Interpretation Summary    Left ventricular systolic  function is normal. Left ventricular ejection fraction appears to be 61 - 65%.    There is mild calcification of the aortic valve.    Mild aortic valve regurgitation is present.    Mild mitral annular calcification is present.    Mild to moderate mitral valve regurgitation is present.    Mild tricuspid valve regurgitation is present.      Current medications:  Scheduled Meds:mirtazapine, 15 mg, Oral, Nightly  nicotine, 1 patch, Transdermal, Q24H  sodium chloride, 10 mL, Intravenous, Q12H      Continuous Infusions:   PRN Meds:.  acetaminophen    senna-docusate sodium **AND** polyethylene glycol **AND** bisacodyl **AND** bisacodyl    haloperidol lactate    melatonin    Morphine    ondansetron    prochlorperazine    sodium chloride    sodium chloride    Assessment & Plan   Assessment & Plan     Active Hospital Problems    Diagnosis  POA    **Failure to thrive in adult [R62.7]  Yes    Enterocutaneous fistula [K63.2]  Yes    Intra-abdominal fluid collection [R18.8]  Yes    Cognitive impairment [R41.89]  Yes    Severe malnutrition [E43]  Yes      Resolved Hospital Problems   No resolved problems to display.        Brief Hospital Course to date:  Arcelia Walter is a 79 y.o. female w completely unknown pmhx who presented after being found down by EMS on welfare check. Found to have an area in right lower abdomen of concern noted 7/5, CT without contrast initially attributed to right femoral hernia but then began to leak brown foul smelling fluid 7/12. Repeat CT with contrast discovered enterocutaneous fistula, concern for periappendiceal abscess tracking into soft tissue.Given patient's severe malnutrition, chronic failure to thrive, concern also for chronic dementia given the above (though collateral unable to be obtained) and discussion with multiple physicians, ethics/palliative, decision was made 7/13 not to escalate care plan, (no surgery, no artificial nutrition) on 7/13. Patient still asx from this.  Patient's family has  since been located, prognosis has been deemed to be poor, she is currently on comfort measures with palliative and hospice following.    This patient's problems and plans were partially entered by my partner and updated as appropriate by me 07/27/24.    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/other GI perforation  -s/p drain into bedside placed ostomy, this has since been discontinued, now with ostomy in place patient is not systemically ill/septic  -evaluated by IR 7/13, given self draining, an IR drain placement thought not to add benefit  -Patient was deemed to be a poor surgical candidate given a BMI 13 and overall significant deconditioning and would lead to poor healing/poor outcome/infection, she is s/p IV Zosyn, currently on comfort care  -appreciate ethics/palliative assistance      Significant nausea-resolved  Diarrhea/abd cramps-resolved  -- Nurse reports significant diarrhea with some abdominal cramping and persistent nausea not responding to Zofran.  -- Continue supportive therapy s/p Compazine  --Stool C. Difficile negative     Acute anemia  -Low but stable on last lab draw 7/17/2024, no signs of blood loss  -Patient now comfort measures, monitoring discontinued     Severe chronic malnutrition, BMI 13   - encouraging intake but minimal even before acute condition  --Dietitian following.     Appears chronic dementia/FTT   - appears chronic and not acute encephalopathy given stability and overall state     Lack of capacity   --Family now located.  Coming for family conference soon.  Awaiting disposition.      GOC:   -Prognosis remains poor, patient undergoing terminal decline, and is not a surgical candidate.Family has now been present.  Palliative care following, per discussions with palliative 7/18 with patient's Sister Frances and sister-in-law Mary Ellen, they have elected for comfort measures.  No further labs draws/ invasive measures. Patient currently does not meet  inpatient hospice criteria, anticipate she will remain in-house for now.     Expected Discharge Location and Transportation: Hospice  Expected Discharge   Expected Discharge Date: 7/31/2024; Expected Discharge Time:      VTE Prophylaxis:  Mechanical VTE prophylaxis orders are present.         AM-PAC 6 Clicks Score (PT): 11 (07/27/24 0800)    CODE STATUS:   Code Status and Medical Interventions:   Ordered at: 07/18/24 1522     Level Of Support Discussed With:    Next of Kin (If No Surrogate)     Code Status (Patient has no pulse and is not breathing):    No CPR (Do Not Attempt to Resuscitate)     Medical Interventions (Patient has pulse or is breathing):    Comfort Measures     Comments:    Three siblings (Frances, Caro, and Mahendra/Mary Ellen)       Leela Jack, CIARAN  07/27/24

## 2024-07-28 PROCEDURE — 99232 SBSQ HOSP IP/OBS MODERATE 35: CPT | Performed by: NURSE PRACTITIONER

## 2024-07-28 PROCEDURE — 25010000002 HALOPERIDOL LACTATE PER 5 MG

## 2024-07-28 PROCEDURE — 25010000002 MORPHINE PER 10 MG

## 2024-07-28 RX ADMIN — Medication 10 ML: at 08:51

## 2024-07-28 RX ADMIN — MIRTAZAPINE 15 MG: 15 TABLET, FILM COATED ORAL at 21:37

## 2024-07-28 RX ADMIN — MORPHINE SULFATE 1 MG: 2 INJECTION, SOLUTION INTRAMUSCULAR; INTRAVENOUS at 15:28

## 2024-07-28 RX ADMIN — Medication 1 PATCH: at 08:50

## 2024-07-28 RX ADMIN — Medication 10 ML: at 21:42

## 2024-07-28 RX ADMIN — HALOPERIDOL LACTATE 0.5 MG: 5 INJECTION, SOLUTION INTRAMUSCULAR at 15:51

## 2024-07-28 NOTE — PLAN OF CARE
Goal Outcome Evaluation:  Plan of Care Reviewed With: patient        Progress: no change  Outcome Evaluation: VSS, on RA. No c/o pain. UOP-WNL. Pt slept well during the night. Took P.O. pills well. No PRN's needed. F/C patent. Will continue to monitor.

## 2024-07-28 NOTE — PROGRESS NOTES
[facility] Medicine Services  PROGRESS NOTE    Patient Name: Arcelia Walter  : 1945  MRN: [id number]    Date of Admission: 2024  Primary Care Physician: Provider, No Known    Subjective   Subjective     CC: Follow-up abdominal fistula    HPI:   Patient sitting up in bed and appears much weaker than yesterday.  She states she is no appetite and does not want to eat.  Staff repositioning her often due to pain on her right side and encouraging patient to eat.    Objective   Objective     Vital Signs:   Temp:  [98.1 °F (36.7 °C)] 98.1 °F (36.7 °C)  Heart Rate:  [74] 74  Resp:  [18] 18  BP: (131)/(82) 131/82     Physical Exam:  Constitutional: Alert, frail cachectic elderly female sitting up in bed in NAD  ENT: Pink, dry mucous membranes   Respiratory: Nonlabored, symmetrical chest expansion, CTAB  Cardiovascular: RRR, no M/R/G  Gastrointestinal: Soft, NT, ND +BS, +ostomy pouch   Musculoskeletal: TORO; no LE edema bilaterally  Neurologic: Confused, follows commands, speech clear  Skin: No rashes on exposed skin  Psychiatric: Pleasant and cooperative; flat affect    Results Reviewed:  LAB RESULTS:                                  Brief Urine Lab Results  (Last result in the past 365 days)        Color   Clarity   Blood   Leuk Est   Nitrite   Protein   CREAT   Urine HCG        24 0038 Yellow   Clear   Negative   Negative   Negative   Trace                   Microbiology Results Abnormal       Procedure Component Value - Date/Time    Clostridioides difficile Toxin - Stool, Per Rectum [[id number]]  (Normal) Collected: 24 1024    Lab Status: Final result Specimen: Stool from Per Rectum Updated: 24 1150    Narrative:      The following orders were created for panel order Clostridioides difficile Toxin - Stool, Per Rectum.  Procedure                               Abnormality         Status                     ---------                               -----------         ------                      Clostridioides difficile...[[id number]]  Normal              Final result                 Please view results for these tests on the individual orders.    Clostridioides difficile Toxin, PCR - Stool, Per Rectum [[id number]]  (Normal) Collected: 07/18/24 1024    Lab Status: Final result Specimen: Stool from Per Rectum Updated: 07/18/24 1150     Toxigenic C. difficile by PCR Not Detected    Narrative:      The result indicates the absence of toxigenic C. difficile from stool specimen.     Blood Culture - Blood, Hand, Left [[id number]]  (Normal) Collected: 07/12/24 1033    Lab Status: Final result Specimen: Blood from Hand, Left Updated: 07/17/24 1245     Blood Culture No growth at 5 days    Narrative:      Less than seven (7) mL's of blood was collected.  Insufficient quantity may yield false negative results.    Blood Culture - Blood, Hand, Right [[id number]]  (Normal) Collected: 07/12/24 1033    Lab Status: Final result Specimen: Blood from Hand, Right Updated: 07/17/24 1245     Blood Culture No growth at 5 days    Narrative:      Less than seven (7) mL's of blood was collected.  Insufficient quantity may yield false negative results.    Blood Culture - Blood, Hand, Right [[id number]]  (Normal) Collected: 07/01/24 1558    Lab Status: Final result Specimen: Blood from Hand, Right Updated: 07/06/24 1731     Blood Culture No growth at 5 days    Narrative:      Aerobic bottle only  Less than seven (7) mL's of blood was collected.  Insufficient quantity may yield false negative results.    Blood Culture - Blood, Hand, Left [[id number]]  (Normal) Collected: 07/01/24 1558    Lab Status: Final result Specimen: Blood from Hand, Left Updated: 07/06/24 1731     Blood Culture No growth at 5 days            No radiology results from the last 24 hrs    Results for orders placed during the hospital encounter of 06/28/24    Adult Transthoracic Echo Complete W/ Cont if Necessary Per Protocol    Interpretation Summary     Left ventricular systolic function is normal. Left ventricular ejection fraction appears to be 61 - 65%.    There is mild calcification of the aortic valve.    Mild aortic valve regurgitation is present.    Mild mitral annular calcification is present.    Mild to moderate mitral valve regurgitation is present.    Mild tricuspid valve regurgitation is present.      Current medications:  Scheduled Meds:mirtazapine, 15 mg, Oral, Nightly  nicotine, 1 patch, Transdermal, Q24H  sodium chloride, 10 mL, Intravenous, Q12H      Continuous Infusions:   PRN Meds:.  acetaminophen    senna-docusate sodium **AND** polyethylene glycol **AND** bisacodyl **AND** bisacodyl    haloperidol lactate    melatonin    Morphine    ondansetron    prochlorperazine    sodium chloride    sodium chloride    Assessment & Plan   Assessment & Plan     Active Hospital Problems    Diagnosis  POA    **Failure to thrive in adult [R62.7]  Yes    Enterocutaneous fistula [K63.2]  Yes    Intra-abdominal fluid collection [R18.8]  Yes    Cognitive impairment [R41.89]  Yes    Severe malnutrition [E43]  Yes      Resolved Hospital Problems   No resolved problems to display.        Brief Hospital Course to date:  Arcelia Walter is a 79 y.o. female w completely unknown pmhx who presented after being found down by EMS on welfare check. Found to have an area in right lower abdomen of concern noted 7/5, CT without contrast initially attributed to right femoral hernia but then began to leak brown foul smelling fluid 7/12. Repeat CT with contrast discovered enterocutaneous fistula, concern for periappendiceal abscess tracking into soft tissue.Given patient's severe malnutrition, chronic failure to thrive, concern also for chronic dementia given the above (though collateral unable to be obtained) and discussion with multiple physicians, ethics/palliative, decision was made 7/13 not to escalate care plan, (no surgery, no artificial nutrition) on 7/13. Patient still asx from  this.  Patient's family has since been located, prognosis has been deemed to be poor, she is currently on comfort measures with palliative and hospice following.    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/other GI perforation  -s/p drain into bedside placed ostomy, this has since been discontinued, now with ostomy in place patient is not systemically ill/septic  -evaluated by IR 7/13, given self draining, an IR drain placement thought not to add benefit  -Patient was deemed to be a poor surgical candidate given a BMI 13 and overall significant deconditioning and would lead to poor healing/poor outcome/infection, she is s/p IV Zosyn, currently on comfort care  -appreciate ethics/palliative assistance      Significant nausea-resolved  Diarrhea/abd cramps-resolved  -- Nurse reports significant diarrhea with some abdominal cramping and persistent nausea not responding to Zofran.  -- Continue supportive therapy s/p Compazine  --Stool C. Difficile negative     Acute anemia  -Low but stable on last lab draw 7/17/2024, no signs of blood loss  -Patient now comfort measures, monitoring discontinued     Severe chronic malnutrition, BMI 13   - encouraging intake but minimal even before acute condition  --Dietitian following.     Appears chronic dementia/FTT   - appears chronic and not acute encephalopathy given stability and overall state     Lack of capacity   --Family now located.  Coming for family conference soon.  Awaiting disposition.      GOC:   -Prognosis remains poor, patient undergoing terminal decline, and is not a surgical candidate.Family has now been present.  Palliative care following, per discussions with palliative 7/18 with patient's Sister Frances and sister-in-law Mary Ellen, they have elected for comfort measures.  No further labs draws/ invasive measures. Patient currently does not meet inpatient hospice criteria, anticipate she will remain in-house for now.     Expected  Discharge Location and Transportation: Hospice  Expected Discharge   Expected Discharge Date: 7/31/2024; Expected Discharge Time:      VTE Prophylaxis:  Mechanical VTE prophylaxis orders are present.         AM-PAC 6 Clicks Score (PT): 11 (07/27/24 2000)    CODE STATUS:   Code Status and Medical Interventions:   Ordered at: 07/18/24 1522     Level Of Support Discussed With:    Next of Kin (If No Surrogate)     Code Status (Patient has no pulse and is not breathing):    No CPR (Do Not Attempt to Resuscitate)     Medical Interventions (Patient has pulse or is breathing):    Comfort Measures     Comments:    Three siblings (Frances, Caro, and Mahendra/Mary Ellen)       Leela Jack, CIARAN  07/28/24

## 2024-07-28 NOTE — PLAN OF CARE
Goal Outcome Evaluation:         VSS on RA. Patient given PRN meds for pain/restlessness. Fistula putting out watery stool. Oral intake decreased. Will continue plan of care.

## 2024-07-29 PROCEDURE — 25010000002 HALOPERIDOL LACTATE PER 5 MG

## 2024-07-29 PROCEDURE — 99232 SBSQ HOSP IP/OBS MODERATE 35: CPT | Performed by: STUDENT IN AN ORGANIZED HEALTH CARE EDUCATION/TRAINING PROGRAM

## 2024-07-29 RX ADMIN — Medication 5 MG: at 21:36

## 2024-07-29 RX ADMIN — Medication 10 ML: at 09:53

## 2024-07-29 RX ADMIN — MIRTAZAPINE 15 MG: 15 TABLET, FILM COATED ORAL at 20:00

## 2024-07-29 RX ADMIN — ACETAMINOPHEN 650 MG: 325 TABLET ORAL at 10:24

## 2024-07-29 RX ADMIN — HALOPERIDOL LACTATE 0.5 MG: 5 INJECTION, SOLUTION INTRAMUSCULAR at 04:59

## 2024-07-29 RX ADMIN — Medication 1 PATCH: at 09:55

## 2024-07-29 RX ADMIN — Medication 10 ML: at 20:00

## 2024-07-29 NOTE — PROGRESS NOTES
[facility] Medicine Services  PROGRESS NOTE    Patient Name: Arcelia Walter  : 1945  MRN: [id number]    Date of Admission: 2024  Primary Care Physician: Provider, No Known    Subjective   Subjective     CC: Follow-up abdominal fistula    HPI:   Watching TV.  Not eating much.  Denied any pain except with movement.    Objective   Objective     Vital Signs:   Temp:  [97.8 °F (36.6 °C)-98.8 °F (37.1 °C)] 97.8 °F (36.6 °C)  Heart Rate:  [74-87] 74  Resp:  [16-18] 16  BP: (104-116)/(62-78) 104/62     Physical Exam:  Constitutional: No acute distress, awake, alert, frail, cachectic, appears older than stated age  HENT: NCAT, mucous membranes moist  Respiratory: Clear to auscultation bilaterally, respiratory effort normal   Cardiovascular: RRR, no murmurs  Gastrointestinal: Positive bowel sounds, soft, nontender, nondistended, ostomy pouch with output  Musculoskeletal: No bilateral ankle edema  Psychiatric: Flat affect, cooperative  Neurologic: Confused, follows commands, speech clear  Skin: No rashes      Results Reviewed:  LAB RESULTS:                                  Brief Urine Lab Results  (Last result in the past 365 days)        Color   Clarity   Blood   Leuk Est   Nitrite   Protein   CREAT   Urine HCG        24 0038 Yellow   Clear   Negative   Negative   Negative   Trace                   Microbiology Results Abnormal       Procedure Component Value - Date/Time    Clostridioides difficile Toxin - Stool, Per Rectum [[id number]]  (Normal) Collected: 24 1024    Lab Status: Final result Specimen: Stool from Per Rectum Updated: 24 1150    Narrative:      The following orders were created for panel order Clostridioides difficile Toxin - Stool, Per Rectum.  Procedure                               Abnormality         Status                     ---------                               -----------         ------                     Clostridioides difficile...[[id number]]   Normal              Final result                 Please view results for these tests on the individual orders.    Clostridioides difficile Toxin, PCR - Stool, Per Rectum [[id number]]  (Normal) Collected: 07/18/24 1024    Lab Status: Final result Specimen: Stool from Per Rectum Updated: 07/18/24 1150     Toxigenic C. difficile by PCR Not Detected    Narrative:      The result indicates the absence of toxigenic C. difficile from stool specimen.     Blood Culture - Blood, Hand, Left [[id number]]  (Normal) Collected: 07/12/24 1033    Lab Status: Final result Specimen: Blood from Hand, Left Updated: 07/17/24 1245     Blood Culture No growth at 5 days    Narrative:      Less than seven (7) mL's of blood was collected.  Insufficient quantity may yield false negative results.    Blood Culture - Blood, Hand, Right [[id number]]  (Normal) Collected: 07/12/24 1033    Lab Status: Final result Specimen: Blood from Hand, Right Updated: 07/17/24 1245     Blood Culture No growth at 5 days    Narrative:      Less than seven (7) mL's of blood was collected.  Insufficient quantity may yield false negative results.    Blood Culture - Blood, Hand, Right [[id number]]  (Normal) Collected: 07/01/24 1558    Lab Status: Final result Specimen: Blood from Hand, Right Updated: 07/06/24 1731     Blood Culture No growth at 5 days    Narrative:      Aerobic bottle only  Less than seven (7) mL's of blood was collected.  Insufficient quantity may yield false negative results.    Blood Culture - Blood, Hand, Left [[id number]]  (Normal) Collected: 07/01/24 1558    Lab Status: Final result Specimen: Blood from Hand, Left Updated: 07/06/24 1731     Blood Culture No growth at 5 days            No radiology results from the last 24 hrs    Results for orders placed during the hospital encounter of 06/28/24    Adult Transthoracic Echo Complete W/ Cont if Necessary Per Protocol    Interpretation Summary    Left ventricular systolic function is normal. Left  ventricular ejection fraction appears to be 61 - 65%.    There is mild calcification of the aortic valve.    Mild aortic valve regurgitation is present.    Mild mitral annular calcification is present.    Mild to moderate mitral valve regurgitation is present.    Mild tricuspid valve regurgitation is present.      Current medications:  Scheduled Meds:mirtazapine, 15 mg, Oral, Nightly  nicotine, 1 patch, Transdermal, Q24H  sodium chloride, 10 mL, Intravenous, Q12H      Continuous Infusions:   PRN Meds:.  acetaminophen    senna-docusate sodium **AND** polyethylene glycol **AND** bisacodyl **AND** bisacodyl    haloperidol lactate    melatonin    Morphine    ondansetron    prochlorperazine    sodium chloride    sodium chloride    Assessment & Plan   Assessment & Plan     Active Hospital Problems    Diagnosis  POA    **Failure to thrive in adult [R62.7]  Yes    Enterocutaneous fistula [K63.2]  Yes    Intra-abdominal fluid collection [R18.8]  Yes    Cognitive impairment [R41.89]  Yes    Severe malnutrition [E43]  Yes      Resolved Hospital Problems   No resolved problems to display.        Brief Hospital Course to date:  Arcelia Walter is a 79 y.o. female w completely unknown pmhx who presented after being found down by EMS on welfare check. Found to have an area in right lower abdomen of concern noted 7/5, CT without contrast initially attributed to right femoral hernia but then began to leak brown foul smelling fluid 7/12. 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 Given patient's severe malnutrition, chronic failure to thrive, concern also for chronic dementia given the above (though collateral unable to be obtained) and discussion with multiple physicians, ethics/palliative, decision was made 7/13 not to escalate care plan (no surgery, no artificial nutrition) on 7/13. Patient's family has since been located, prognosis has been deemed to be  poor, she is currently on comfort measures with palliative and hospice following.    This patient's problems and plans were partially entered by my partner and updated as appropriate by me 07/29/24.    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/other GI perforation  -s/p drain into bedside placed ostomy, this has since been discontinued, now with ostomy in place; patient is not systemically ill/septic  -evaluated by IR 7/13, given self draining, an IR drain placement thought not to add benefit  -Patient was deemed to be a poor surgical candidate given a BMI 13-14 and overall significant deconditioning and would lead to poor healing/poor outcome/infection, she is s/p IV Zosyn, currently on comfort care  -appreciate ethics/palliative assistance      Significant nausea-resolved  Diarrhea/abd cramps-resolved  -Nurse reported significant diarrhea with some abdominal cramping and persistent nausea not responding to Zofran. Resolved.  -Continue supportive therapy s/p Compazine  -Stool C. Difficile negative     Acute anemia  -Low but stable on last lab draw 7/17/2024, no signs of blood loss  -Patient now comfort measures, monitoring discontinued     Severe chronic malnutrition, BMI 13   -Encouraging intake but minimal even before acute condition  -Dietitian followed     Appears chronic dementia/FTT   -Appears chronic and not acute encephalopathy given stability and overall state     Lack of capacity   -Family now located. Awaiting disposition.      GOC:   -Prognosis remains poor, patient undergoing terminal decline, and is not a surgical candidate. Family has now been present.  Palliative care following, per discussions with palliative 7/18 with patient's Sister Frances and sister-in-law Mary Ellen, they have elected for comfort measures.  No further labs draws/invasive measures. Patient currently does not meet inpatient hospice criteria, anticipate she will remain in-house for now.     Expected  Discharge Location and Transportation: Hospice  Expected Discharge   Expected Discharge Date: 7/31/2024; Expected Discharge Time:      VTE Prophylaxis:  Mechanical VTE prophylaxis orders are present.         AM-PAC 6 Clicks Score (PT): 11 (07/28/24 2000)    CODE STATUS:   Code Status and Medical Interventions:   Ordered at: 07/18/24 1522     Level Of Support Discussed With:    Next of Kin (If No Surrogate)     Code Status (Patient has no pulse and is not breathing):    No CPR (Do Not Attempt to Resuscitate)     Medical Interventions (Patient has pulse or is breathing):    Comfort Measures     Comments:    Three siblings (Frances, Caro, and Mahendra/Mary Ellen)       Carolyn Hamilton MD  07/29/24

## 2024-07-29 NOTE — PLAN OF CARE
Goal Outcome Evaluation:  Plan of Care Reviewed With: patient        Progress: no change  Outcome Evaluation: VSS on RA. No reports of pain. Catheter patency maintained. Patient resting comfortably throughout shift. Pt disoriented to place, time and situation. Took pills whole with no issues. No known issues to report at this time.

## 2024-07-29 NOTE — PLAN OF CARE
Goal Outcome Evaluation:                VSS on RA. Patient given PRN meds for pain(R shoulder), effective, ostomy bag, intact  for fistula which makes brown  watery stool. Oral fluids(cranberry juice, apple juice offered and encouraged), po intake decreased, IV is patent, no s/sx of infiltration, redness, edema,will cont. to mx.

## 2024-07-29 NOTE — PLAN OF CARE
"Goal Outcome Evaluation:  Plan of Care Reviewed With: patient        Progress: no change  Outcome Evaluation: Palliative RN saw pt at 1056.  Pt. was sitting up in bed on RA.  She denied pain, nausea and shortness of breath.  When asked what she had for breakfast she stated \"cranberry juice\".  Pt. then lowered her voice and relayed that she had a recent heart attack.  \"It scared me to death you know\".  \"My dad had a heart attack too but not like this one\".  When asked when she had a heart attack she stated \"very recently, just ask the nurse and she will tell you\".  More temporal wasting noted today.  RN confirmed that all pt. had for breakfast was her juice.  Palliative care to follow along for support, POC and ongoing GOC.    1300 Palliative IDT meeting: JAGJIT Mejia RN, PN; CROW Olivia, APRN; ADDI Black; DAVIS Johnson MDiv, [facility] ; THELMA Cooley MD.    After hours, weekends and holidays, contact Palliative Provider by calling [phone].                                     "

## 2024-07-29 NOTE — CASE MANAGEMENT/SOCIAL WORK
Continued Stay Note  [facility]     Patient Name: Arcelia Walter  MRN: [id number]  Today's Date: 7/29/2024    Admit Date: 6/28/2024    Plan: ongoing   Discharge Plan       Row Name 07/29/24 1257       Plan    Plan ongoing    Plan Comments Per discussing in MDR, plan is for the patient to remain here on comfort measures through end of life. CM following.    Final Discharge Disposition Code 30 - still a patient                   Discharge Codes    No documentation.                 Expected Discharge Date and Time       Expected Discharge Date Expected Discharge Time    Jul 31, 2024               Jeana Camacho RN

## 2024-07-29 NOTE — PROGRESS NOTES
Continued Stay Note  [facility]     Patient Name: Arcelia Walter  MRN: [id number]  Today's Date: 7/29/2024    Admit Date: 6/28/2024    Plan: TBD   Discharge Plan       Row Name 07/29/24 1352       Plan    Plan TBD    Patient/Family in Agreement with Plan yes    Plan Comments Hospice visit made. Pt was lying in bed comfortable w/o s/s of distress noted. Pt.'s PO intake remain poor. Pt does not meet in pt hospice criteria @ this time. Hospice will continue to follow. If we can be of further assistance, please call [phone].                       Discharge Codes    No documentation.                 Expected Discharge Date and Time       Expected Discharge Date Expected Discharge Time    Jul 31, 2024               Shanika Gomes

## 2024-07-30 PROCEDURE — 99232 SBSQ HOSP IP/OBS MODERATE 35: CPT | Performed by: FAMILY MEDICINE

## 2024-07-30 PROCEDURE — 25010000002 HALOPERIDOL LACTATE PER 5 MG

## 2024-07-30 RX ADMIN — Medication 10 ML: at 09:13

## 2024-07-30 RX ADMIN — Medication 10 ML: at 20:42

## 2024-07-30 RX ADMIN — MIRTAZAPINE 15 MG: 15 TABLET, FILM COATED ORAL at 20:42

## 2024-07-30 RX ADMIN — HALOPERIDOL LACTATE 0.5 MG: 5 INJECTION, SOLUTION INTRAMUSCULAR at 11:13

## 2024-07-30 RX ADMIN — Medication 1 PATCH: at 09:15

## 2024-07-30 RX ADMIN — ACETAMINOPHEN 650 MG: 325 TABLET ORAL at 18:12

## 2024-07-30 NOTE — PLAN OF CARE
"Goal Outcome Evaluation:  Plan of Care Reviewed With: patient        Progress: no change  Outcome Evaluation: Palliative RN saw pt at 1141.  Pt. was making weak attempt to get out of bed and talking nonsensically about \"stacking things up\" and \"something in the corner of the room that her sister had placed there after mowing several yards as was part of her plan\".  She was very pleasantly confused but fairly determined to get up and \"get dressed into something more suitable\".  She also expressed somewhat of a desire to go outside.  RN administered haldol for agitation and sat at bedside to ensure patient safety.  Comfort measures continued.  Palliative care to follow for support and ongoing POC.    1300 Palliative IDT meeting: JAGJIT Mejia RN, CHPN; CROW Olivia, APRN; DAVIS Patricio RN, CHPN; TIFFANI Lima MDiv; CHELSI Gross RN, CHPN    After hours, weekends and holidays, contact Palliative Provider by calling [phone].                                   "

## 2024-07-30 NOTE — PLAN OF CARE
Goal Outcome Evaluation:                 VSS on RA.. prn tylenol is administered for shoulder pain, effective,  ostomy bag intact  fror fistula which makes brown  watery stool. po intake decreased, offered, and drank some juice from me, IV is patent, no s/sx of infiltration, redness, edema,will cont. to mx.

## 2024-07-30 NOTE — PROGRESS NOTES
"    [facility] Medicine Services  PROGRESS NOTE    Patient Name: Arcelia Walter  : 1945  MRN: [id number]    Date of Admission: 2024  Primary Care Physician: Provider, No Known    Subjective   Subjective     CC:  Follow-up abdominal fistula    HPI:  Patient resting comfortably in bed.  Not eating very much.      Objective   Objective     Vital Signs:   Temp:  [97.3 °F (36.3 °C)-98.2 °F (36.8 °C)] 97.3 °F (36.3 °C)  Heart Rate:  [64-98] 64  Resp:  [16] 16  BP: ()/(60-77) 102/60     Physical Exam:  Constitutional: Chronically ill-appearing  female no acute distress, awake, alert  HENT: NCAT, mucous membranes moist  Respiratory: Clear to auscultation bilaterally, respiratory effort normal   Cardiovascular: RRR, no murmurs, rubs, or gallops  Gastrointestinal: Positive bowel sounds, soft, nontender, nondistended  Musculoskeletal: No bilateral ankle edema  Psychiatric: Flat affect, cooperative  Neurologic: Confused.  speech clear  Skin: No rashes    Results Reviewed:  LAB RESULTS:                              Brief Urine Lab Results  (Last result in the past 365 days)        Color   Clarity   Blood   Leuk Est   Nitrite   Protein   CREAT   Urine HCG        24 0038 Yellow   Clear   Negative   Negative   Negative   Trace                   Cultures:  No results found for: \"BLOODCX\", \"URINECX\", \"WOUNDCX\", \"MRSACX\", \"RESPCX\", \"STOOLCX\"    Microbiology Results Abnormal       Procedure Component Value - Date/Time    Clostridioides difficile Toxin - Stool, Per Rectum [[id number]]  (Normal) Collected: 24 1024    Lab Status: Final result Specimen: Stool from Per Rectum Updated: 24 1150    Narrative:      The following orders were created for panel order Clostridioides difficile Toxin - Stool, Per Rectum.  Procedure                               Abnormality         Status                     ---------                               -----------         ------                "      Clostridioides difficile...[[id number]]  Normal              Final result                 Please view results for these tests on the individual orders.    Clostridioides difficile Toxin, PCR - Stool, Per Rectum [[id number]]  (Normal) Collected: 07/18/24 1024    Lab Status: Final result Specimen: Stool from Per Rectum Updated: 07/18/24 1150     Toxigenic C. difficile by PCR Not Detected    Narrative:      The result indicates the absence of toxigenic C. difficile from stool specimen.     Blood Culture - Blood, Hand, Left [[id number]]  (Normal) Collected: 07/12/24 1033    Lab Status: Final result Specimen: Blood from Hand, Left Updated: 07/17/24 1245     Blood Culture No growth at 5 days    Narrative:      Less than seven (7) mL's of blood was collected.  Insufficient quantity may yield false negative results.    Blood Culture - Blood, Hand, Right [[id number]]  (Normal) Collected: 07/12/24 1033    Lab Status: Final result Specimen: Blood from Hand, Right Updated: 07/17/24 1245     Blood Culture No growth at 5 days    Narrative:      Less than seven (7) mL's of blood was collected.  Insufficient quantity may yield false negative results.    Blood Culture - Blood, Hand, Right [[id number]]  (Normal) Collected: 07/01/24 1558    Lab Status: Final result Specimen: Blood from Hand, Right Updated: 07/06/24 1731     Blood Culture No growth at 5 days    Narrative:      Aerobic bottle only  Less than seven (7) mL's of blood was collected.  Insufficient quantity may yield false negative results.    Blood Culture - Blood, Hand, Left [[id number]]  (Normal) Collected: 07/01/24 1558    Lab Status: Final result Specimen: Blood from Hand, Left Updated: 07/06/24 1731     Blood Culture No growth at 5 days            No radiology results from the last 24 hrs    Results for orders placed during the hospital encounter of 06/28/24    Adult Transthoracic Echo Complete W/ Cont if Necessary Per Protocol    Interpretation Summary    Left  ventricular systolic function is normal. Left ventricular ejection fraction appears to be 61 - 65%.    There is mild calcification of the aortic valve.    Mild aortic valve regurgitation is present.    Mild mitral annular calcification is present.    Mild to moderate mitral valve regurgitation is present.    Mild tricuspid valve regurgitation is present.      Current medications:  Scheduled Meds:mirtazapine, 15 mg, Oral, Nightly  nicotine, 1 patch, Transdermal, Q24H  sodium chloride, 10 mL, Intravenous, Q12H      Continuous Infusions:   PRN Meds:.  acetaminophen    senna-docusate sodium **AND** polyethylene glycol **AND** bisacodyl **AND** bisacodyl    haloperidol lactate    melatonin    Morphine    ondansetron    prochlorperazine    sodium chloride    sodium chloride    Assessment & Plan   Assessment & Plan     Active Hospital Problems    Diagnosis  POA    **Failure to thrive in adult [R62.7]  Yes    Enterocutaneous fistula [K63.2]  Yes    Intra-abdominal fluid collection [R18.8]  Yes    Cognitive impairment [R41.89]  Yes    Severe malnutrition [E43]  Yes      Resolved Hospital Problems   No resolved problems to display.        Brief Hospital Course to date:  Arcelia Walter is a 79 y.o. female w completely unknown pmhx who presented after being found down by EMS on welfare check. Found to have an area in right lower abdomen of concern noted 7/5, CT without contrast initially attributed to right femoral hernia but then began to leak brown foul smelling fluid 7/12. 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 Given patient's severe malnutrition, chronic failure to thrive, concern also for chronic dementia given the above (though collateral unable to be obtained) and discussion with multiple physicians, ethics/palliative, decision was made 7/13 not to escalate care plan (no surgery, no artificial nutrition) on 7/13. Patient's family has since  been located, prognosis has been deemed to be poor, she is currently on comfort measures with palliative and hospice following.     This patient's problems and plans were partially entered by my partner and updated as appropriate by me 07/29/24.     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/other GI perforation  -s/p drain into bedside placed ostomy, this has since been discontinued, now with ostomy in place; patient is not systemically ill/septic  -evaluated by IR 7/13, given self draining, an IR drain placement thought not to add benefit  -Patient was deemed to be a poor surgical candidate given a BMI 13-14 and overall significant deconditioning and would lead to poor healing/poor outcome/infection, she is s/p IV Zosyn, currently on comfort care  -appreciate ethics/palliative assistance      Significant nausea-resolved  Diarrhea/abd cramps-resolved  -Nurse reported significant diarrhea with some abdominal cramping and persistent nausea not responding to Zofran. Resolved.  -Continue supportive therapy s/p Compazine  -Stool C. Difficile negative     Acute anemia  -Low but stable on last lab draw 7/17/2024, no signs of blood loss  -Patient now comfort measures, monitoring discontinued     Severe chronic malnutrition, BMI 13   -Encouraging intake but minimal even before acute condition  -Dietitian followed     Appears chronic dementia/FTT   -Appears chronic and not acute encephalopathy given stability and overall state     Lack of capacity   -Family now located. Awaiting disposition.      GOC:   -Prognosis remains poor, patient undergoing terminal decline, and is not a surgical candidate. Family has now been present.  Palliative care following, per discussions with palliative 7/18 with patient's Sister Frances and sister-in-law Mary Ellen, they have elected for comfort measures.  No further labs draws/invasive measures. Patient currently does not meet inpatient hospice criteria, anticipate she  will remain in-house for now.    Expected Discharge Location and Transportation: hospice   Expected Discharge 08/01/2024  Expected Discharge Date: 7/31/2024; Expected Discharge Time:      VTE Prophylaxis:  Mechanical VTE prophylaxis orders are present.         AM-PAC 6 Clicks Score (PT): 11 (07/29/24 2000)    CODE STATUS:   Code Status and Medical Interventions:   Ordered at: 07/18/24 1522     Level Of Support Discussed With:    Next of Kin (If No Surrogate)     Code Status (Patient has no pulse and is not breathing):    No CPR (Do Not Attempt to Resuscitate)     Medical Interventions (Patient has pulse or is breathing):    Comfort Measures     Comments:    Three siblings (Frances, Caro, and Mahendra/Mary Ellen)       Verito Morrison MD  07/30/24

## 2024-07-31 PROCEDURE — 99231 SBSQ HOSP IP/OBS SF/LOW 25: CPT | Performed by: FAMILY MEDICINE

## 2024-07-31 PROCEDURE — 25010000002 MORPHINE PER 10 MG

## 2024-07-31 RX ADMIN — Medication 1 PATCH: at 08:44

## 2024-07-31 RX ADMIN — Medication 10 ML: at 20:49

## 2024-07-31 RX ADMIN — Medication 10 ML: at 08:42

## 2024-07-31 RX ADMIN — MORPHINE SULFATE 1 MG: 2 INJECTION, SOLUTION INTRAMUSCULAR; INTRAVENOUS at 17:15

## 2024-07-31 RX ADMIN — MIRTAZAPINE 15 MG: 15 TABLET, FILM COATED ORAL at 20:49

## 2024-07-31 NOTE — PLAN OF CARE
Goal Outcome Evaluation:  Plan of Care Reviewed With: patient        Progress: declining  Outcome Evaluation: Comfort measures ongoing. Patient reports headache pain, managed with prn morphine x1. No other prns administered. Patient is not eating meals, refused dinner tray. Turned. Minimal UOP via lozano. Percutaneous fistula with 60 mL output; output resembles stool in appearance and odor. Patient had 3 small BMs this shift. Patient is alert, oriented to self, pleasantly confused. Family visited this shift. Care ongoing, continue plan of care.

## 2024-07-31 NOTE — PLAN OF CARE
Goal Outcome Evaluation:  Plan of Care Reviewed With: patient        Progress: no change  Outcome Evaluation: Palliative RN saw pt at 1146.  Pt. was asleep on RA and did not demonstrate any visible signs of distress during palliative nursing visit.  Did not attempt to wake patient.  No IV prns required today.  Palliative care continuing to follow for support, symptom management and ongoing POC.       1300 Palliative IDT meeting: JAGJIT Mejia RN, CHPN; CROW Olivia, APRN; DAVIS Patricio RN, CHPN; DAVIS Johnson MDiv, [facility] ; THELMA Cooley MD.; CHELSI Gross RN, CHPN      After hours, weekends and holidays, contact Palliative Provider by calling [phone].

## 2024-07-31 NOTE — CASE MANAGEMENT/SOCIAL WORK
Continued Stay Note   [place]     Patient Name: Arcelia Walter  MRN: [id number]  Today's Date: 7/31/2024    Admit Date: 6/28/2024    Plan: Comfort Care/End of Life   Discharge Plan       Row Name 07/31/24 1923       Plan    Plan Comfort Care/End of Life    Patient/Family in Agreement with Plan yes  Siblings    Plan Comments Remains comfort care, Palliative Care and Hospice following.  Discussed in unit multidisciplinary rounds this morning and with Palliative Care RN.  Case Management will continue to follow should any needs arise.  Susan Hoskins, Ext. [phone]    Final Discharge Disposition Code 30 - still a patient                   Discharge Codes    No documentation.                 Expected Discharge Date and Time       Expected Discharge Date Expected Discharge Time    Aug 2, 2024               SHANTI Jennings

## 2024-07-31 NOTE — PLAN OF CARE
Goal Outcome Evaluation:  Plan of Care Reviewed With: patient        Progress: no change  Outcome Evaluation: Pt. had family at bedside at beginning of shift. Pt was more alert and answered all orientation questions appropriately and correctly. Pt has slept well without having to give any PRN meds. VSS on RA. Taking meds whole with no issues.No periods of confusion or trying to get out of bed during shift. Will continue plan of care.

## 2024-07-31 NOTE — PROGRESS NOTES
"    [facility] Medicine Services  PROGRESS NOTE    Patient Name: Arcelia Walter  : 1945  MRN: [id number]    Date of Admission: 2024  Primary Care Physician: Provider, No Known    Subjective   Subjective     CC:  Follow-up abdominal fistula    HPI:  Patient resting comfortably in bed.  Not eating very much. Family at the bedside. She's A&O xs 2 self and place.       Objective   Objective     Vital Signs:   Temp:  [97.3 °F (36.3 °C)] 97.3 °F (36.3 °C)  Heart Rate:  [64-70] 70  Resp:  [16] 16  BP: (102-113)/(60-70) 113/70     Physical Exam:  Constitutional: Chronically ill-appearing  female no acute distress, awake, alert  HENT: NCAT, mucous membranes moist  Respiratory: Clear to auscultation bilaterally, respiratory effort normal   Cardiovascular: RRR, no murmurs, rubs, or gallops  Gastrointestinal: Positive bowel sounds, soft, nontender, nondistended  Musculoskeletal: No bilateral ankle edema  Psychiatric: Flat affect, cooperative  Neurologic: A&O xs 2, speech clear  Skin: No rashes    Results Reviewed:  LAB RESULTS:                              Brief Urine Lab Results  (Last result in the past 365 days)        Color   Clarity   Blood   Leuk Est   Nitrite   Protein   CREAT   Urine HCG        24 0038 Yellow   Clear   Negative   Negative   Negative   Trace                   Cultures:  No results found for: \"BLOODCX\", \"URINECX\", \"WOUNDCX\", \"MRSACX\", \"RESPCX\", \"STOOLCX\"    Microbiology Results Abnormal       Procedure Component Value - Date/Time    Clostridioides difficile Toxin - Stool, Per Rectum [[id number]]  (Normal) Collected: 24 1024    Lab Status: Final result Specimen: Stool from Per Rectum Updated: 24 1150    Narrative:      The following orders were created for panel order Clostridioides difficile Toxin - Stool, Per Rectum.  Procedure                               Abnormality         Status                     ---------                               " -----------         ------                     Clostridioides difficile...[[id number]]  Normal              Final result                 Please view results for these tests on the individual orders.    Clostridioides difficile Toxin, PCR - Stool, Per Rectum [[id number]]  (Normal) Collected: 07/18/24 1024    Lab Status: Final result Specimen: Stool from Per Rectum Updated: 07/18/24 1150     Toxigenic C. difficile by PCR Not Detected    Narrative:      The result indicates the absence of toxigenic C. difficile from stool specimen.     Blood Culture - Blood, Hand, Left [[id number]]  (Normal) Collected: 07/12/24 1033    Lab Status: Final result Specimen: Blood from Hand, Left Updated: 07/17/24 1245     Blood Culture No growth at 5 days    Narrative:      Less than seven (7) mL's of blood was collected.  Insufficient quantity may yield false negative results.    Blood Culture - Blood, Hand, Right [[id number]]  (Normal) Collected: 07/12/24 1033    Lab Status: Final result Specimen: Blood from Hand, Right Updated: 07/17/24 1245     Blood Culture No growth at 5 days    Narrative:      Less than seven (7) mL's of blood was collected.  Insufficient quantity may yield false negative results.    Blood Culture - Blood, Hand, Right [[id number]]  (Normal) Collected: 07/01/24 1558    Lab Status: Final result Specimen: Blood from Hand, Right Updated: 07/06/24 1731     Blood Culture No growth at 5 days    Narrative:      Aerobic bottle only  Less than seven (7) mL's of blood was collected.  Insufficient quantity may yield false negative results.    Blood Culture - Blood, Hand, Left [[id number]]  (Normal) Collected: 07/01/24 1558    Lab Status: Final result Specimen: Blood from Hand, Left Updated: 07/06/24 1731     Blood Culture No growth at 5 days            No radiology results from the last 24 hrs    Results for orders placed during the hospital encounter of 06/28/24    Adult Transthoracic Echo Complete W/ Cont if Necessary Per  Protocol    Interpretation Summary    Left ventricular systolic function is normal. Left ventricular ejection fraction appears to be 61 - 65%.    There is mild calcification of the aortic valve.    Mild aortic valve regurgitation is present.    Mild mitral annular calcification is present.    Mild to moderate mitral valve regurgitation is present.    Mild tricuspid valve regurgitation is present.      Current medications:  Scheduled Meds:mirtazapine, 15 mg, Oral, Nightly  nicotine, 1 patch, Transdermal, Q24H  sodium chloride, 10 mL, Intravenous, Q12H      Continuous Infusions:   PRN Meds:.  acetaminophen    senna-docusate sodium **AND** polyethylene glycol **AND** bisacodyl **AND** bisacodyl    haloperidol lactate    melatonin    Morphine    ondansetron    prochlorperazine    sodium chloride    sodium chloride    Assessment & Plan   Assessment & Plan     Active Hospital Problems    Diagnosis  POA    **Failure to thrive in adult [R62.7]  Yes    Enterocutaneous fistula [K63.2]  Yes    Intra-abdominal fluid collection [R18.8]  Yes    Cognitive impairment [R41.89]  Yes    Severe malnutrition [E43]  Yes      Resolved Hospital Problems   No resolved problems to display.        Brief Hospital Course to date:  Arcelia Walter is a 79 y.o. female w completely unknown pmhx who presented after being found down by EMS on welfare check. Found to have an area in right lower abdomen of concern noted 7/5, CT without contrast initially attributed to right femoral hernia but then began to leak brown foul smelling fluid 7/12. 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 Given patient's severe malnutrition, chronic failure to thrive, concern also for chronic dementia given the above (though collateral unable to be obtained) and discussion with multiple physicians, ethics/palliative, decision was made 7/13 not to escalate care plan (no surgery, no artificial  nutrition) on 7/13. Patient's family has since been located, prognosis has been deemed to be poor, she is currently on comfort measures with palliative and hospice following.     This patient's problems and plans were partially entered by my partner and updated as appropriate by me 07/29/24.     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/other GI perforation  -s/p drain into bedside placed ostomy, this has since been discontinued, now with ostomy in place; patient is not systemically ill/septic  -evaluated by IR 7/13, given self draining, an IR drain placement thought not to add benefit  -Patient was deemed to be a poor surgical candidate given a BMI 13-14 and overall significant deconditioning and would lead to poor healing/poor outcome/infection, she is s/p IV Zosyn, currently on comfort care  -appreciate ethics/palliative assistance      Significant nausea-resolved  Diarrhea/abd cramps-resolved  -Nurse reported significant diarrhea with some abdominal cramping and persistent nausea not responding to Zofran. Resolved.  -Continue supportive therapy s/p Compazine  -Stool C. Difficile negative     Acute anemia  -Low but stable on last lab draw 7/17/2024, no signs of blood loss  -Patient now comfort measures, monitoring discontinued     Severe chronic malnutrition, BMI 13   -Encouraging intake but minimal even before acute condition  -Dietitian followed     Appears chronic dementia/FTT   -Appears chronic and not acute encephalopathy given stability and overall state     Lack of capacity   -Family now located. Awaiting disposition.      GOC:   -Prognosis remains poor, patient undergoing terminal decline, and is not a surgical candidate. Family has now been present.  Palliative care following, per discussions with palliative 7/18 with patient's Sister Frances and sister-in-law Mary Ellen, they have elected for comfort measures.  No further labs draws/invasive measures. Patient currently does not  meet inpatient hospice criteria, anticipate she will remain in-house for now.    Expected Discharge Location and Transportation: hospice   Expected Discharge 08/01/2024  Expected Discharge Date: 7/31/2024; Expected Discharge Time:      VTE Prophylaxis:  Mechanical VTE prophylaxis orders are present.         AM-PAC 6 Clicks Score (PT): 11 (07/30/24 2000)    CODE STATUS:   Code Status and Medical Interventions:   Ordered at: 07/18/24 1522     Level Of Support Discussed With:    Next of Kin (If No Surrogate)     Code Status (Patient has no pulse and is not breathing):    No CPR (Do Not Attempt to Resuscitate)     Medical Interventions (Patient has pulse or is breathing):    Comfort Measures     Comments:    Three siblings (Frances, Caro, and Mahendra/Mary Ellen)       Verito Morrison MD  07/31/24

## 2024-08-01 PROCEDURE — 99231 SBSQ HOSP IP/OBS SF/LOW 25: CPT | Performed by: FAMILY MEDICINE

## 2024-08-01 RX ADMIN — MIRTAZAPINE 15 MG: 15 TABLET, FILM COATED ORAL at 20:12

## 2024-08-01 RX ADMIN — Medication 1 PATCH: at 08:44

## 2024-08-01 RX ADMIN — Medication 10 ML: at 08:54

## 2024-08-01 NOTE — PROGRESS NOTES
"    [facility] Medicine Services  PROGRESS NOTE    Patient Name: Arcelia Walter  : 1945  MRN: [id number]    Date of Admission: 2024  Primary Care Physician: Provider, No Known    Subjective   Subjective     CC:  Follow-up abdominal fistula    HPI:  Patient resting comfortably in bed.  Not eating very much. Family at the bedside. She's A&O xs 2 self and place.       Objective   Objective     Vital Signs:   Temp:  [97.8 °F (36.6 °C)] 97.8 °F (36.6 °C)  Heart Rate:  [74] 74  Resp:  [16] 16  BP: (127)/(86) 127/86     Physical Exam:  Constitutional: Chronically ill-appearing  female no acute distress, awake, alert  HENT: NCAT, mucous membranes moist  Respiratory: Clear to auscultation bilaterally, respiratory effort normal   Cardiovascular: RRR, no murmurs, rubs, or gallops  Gastrointestinal: Positive bowel sounds, soft, nontender, nondistended  Musculoskeletal: No bilateral ankle edema  Psychiatric: Flat affect, cooperative  Neurologic: A&O xs 2, speech clear  Skin: No rashes    Results Reviewed:  LAB RESULTS:                              Brief Urine Lab Results  (Last result in the past 365 days)        Color   Clarity   Blood   Leuk Est   Nitrite   Protein   CREAT   Urine HCG        24 0038 Yellow   Clear   Negative   Negative   Negative   Trace                   Cultures:  No results found for: \"BLOODCX\", \"URINECX\", \"WOUNDCX\", \"MRSACX\", \"RESPCX\", \"STOOLCX\"    Microbiology Results Abnormal       Procedure Component Value - Date/Time    Clostridioides difficile Toxin - Stool, Per Rectum [[id number]]  (Normal) Collected: 24 1024    Lab Status: Final result Specimen: Stool from Per Rectum Updated: 24 1150    Narrative:      The following orders were created for panel order Clostridioides difficile Toxin - Stool, Per Rectum.  Procedure                               Abnormality         Status                     ---------                               -----------    "      ------                     Clostridioides difficile...[[id number]]  Normal              Final result                 Please view results for these tests on the individual orders.    Clostridioides difficile Toxin, PCR - Stool, Per Rectum [[id number]]  (Normal) Collected: 07/18/24 1024    Lab Status: Final result Specimen: Stool from Per Rectum Updated: 07/18/24 1150     Toxigenic C. difficile by PCR Not Detected    Narrative:      The result indicates the absence of toxigenic C. difficile from stool specimen.     Blood Culture - Blood, Hand, Left [[id number]]  (Normal) Collected: 07/12/24 1033    Lab Status: Final result Specimen: Blood from Hand, Left Updated: 07/17/24 1245     Blood Culture No growth at 5 days    Narrative:      Less than seven (7) mL's of blood was collected.  Insufficient quantity may yield false negative results.    Blood Culture - Blood, Hand, Right [[id number]]  (Normal) Collected: 07/12/24 1033    Lab Status: Final result Specimen: Blood from Hand, Right Updated: 07/17/24 1245     Blood Culture No growth at 5 days    Narrative:      Less than seven (7) mL's of blood was collected.  Insufficient quantity may yield false negative results.    Blood Culture - Blood, Hand, Right [[id number]]  (Normal) Collected: 07/01/24 1558    Lab Status: Final result Specimen: Blood from Hand, Right Updated: 07/06/24 1731     Blood Culture No growth at 5 days    Narrative:      Aerobic bottle only  Less than seven (7) mL's of blood was collected.  Insufficient quantity may yield false negative results.    Blood Culture - Blood, Hand, Left [[id number]]  (Normal) Collected: 07/01/24 1558    Lab Status: Final result Specimen: Blood from Hand, Left Updated: 07/06/24 1731     Blood Culture No growth at 5 days            No radiology results from the last 24 hrs    Results for orders placed during the hospital encounter of 06/28/24    Adult Transthoracic Echo Complete W/ Cont if Necessary Per  Protocol    Interpretation Summary    Left ventricular systolic function is normal. Left ventricular ejection fraction appears to be 61 - 65%.    There is mild calcification of the aortic valve.    Mild aortic valve regurgitation is present.    Mild mitral annular calcification is present.    Mild to moderate mitral valve regurgitation is present.    Mild tricuspid valve regurgitation is present.      Current medications:  Scheduled Meds:mirtazapine, 15 mg, Oral, Nightly  nicotine, 1 patch, Transdermal, Q24H  sodium chloride, 10 mL, Intravenous, Q12H      Continuous Infusions:   PRN Meds:.  acetaminophen    senna-docusate sodium **AND** polyethylene glycol **AND** bisacodyl **AND** bisacodyl    haloperidol lactate    melatonin    Morphine    ondansetron    prochlorperazine    sodium chloride    sodium chloride    Assessment & Plan   Assessment & Plan     Active Hospital Problems    Diagnosis  POA    **Failure to thrive in adult [R62.7]  Yes    Enterocutaneous fistula [K63.2]  Yes    Intra-abdominal fluid collection [R18.8]  Yes    Cognitive impairment [R41.89]  Yes    Severe malnutrition [E43]  Yes      Resolved Hospital Problems   No resolved problems to display.        Brief Hospital Course to date:  Arcelia Walter is a 79 y.o. female w completely unknown pmhx who presented after being found down by EMS on welfare check. Found to have an area in right lower abdomen of concern noted 7/5, CT without contrast initially attributed to right femoral hernia but then began to leak brown foul smelling fluid 7/12. 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 Given patient's severe malnutrition, chronic failure to thrive, concern also for chronic dementia given the above (though collateral unable to be obtained) and discussion with multiple physicians, ethics/palliative, decision was made 7/13 not to escalate care plan (no surgery, no artificial  nutrition) on 7/13. Patient's family has since been located, prognosis has been deemed to be poor, she is currently on comfort measures with palliative and hospice following.     This patient's problems and plans were partially entered by my partner and updated as appropriate by me 07/29/24.     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/other GI perforation  -s/p drain into bedside placed ostomy, this has since been discontinued, now with ostomy in place; patient is not systemically ill/septic  -evaluated by IR 7/13, given self draining, an IR drain placement thought not to add benefit  -Patient was deemed to be a poor surgical candidate given a BMI 13-14 and overall significant deconditioning and would lead to poor healing/poor outcome/infection, she is s/p IV Zosyn, currently on comfort care  -appreciate ethics/palliative assistance      Significant nausea-resolved  Diarrhea/abd cramps-resolved  -Nurse reported significant diarrhea with some abdominal cramping and persistent nausea not responding to Zofran. Resolved.  -Continue supportive therapy s/p Compazine  -Stool C. Difficile negative     Acute anemia  -Low but stable on last lab draw 7/17/2024, no signs of blood loss  -Patient now comfort measures, monitoring discontinued     Severe chronic malnutrition, BMI 13   -Encouraging intake but minimal even before acute condition  -Dietitian followed     Appears chronic dementia/FTT   -Appears chronic and not acute encephalopathy given stability and overall state     Lack of capacity   -Family now located. Awaiting disposition.      GOC:   -Prognosis remains poor, patient undergoing terminal decline, and is not a surgical candidate. Family has now been present.  Palliative care following, per discussions with palliative 7/18 with patient's Sister Frances and sister-in-law Mary Ellen, they have elected for comfort measures.  No further labs draws/invasive measures. Patient currently does not  meet inpatient hospice criteria, anticipate she will remain in-house for now.    Expected Discharge Location and Transportation: hospice   Expected Discharge TBD  Expected Discharge Date: 8/2/2024; Expected Discharge Time:      VTE Prophylaxis:  Mechanical VTE prophylaxis orders are present.         AM-PAC 6 Clicks Score (PT): 7 (08/01/24 3889)    CODE STATUS:   Code Status and Medical Interventions:   Ordered at: 07/18/24 1522     Level Of Support Discussed With:    Next of Kin (If No Surrogate)     Code Status (Patient has no pulse and is not breathing):    No CPR (Do Not Attempt to Resuscitate)     Medical Interventions (Patient has pulse or is breathing):    Comfort Measures     Comments:    Three siblings (Frances, Caro, and Mahendra/Mary Ellen)       Verito Morrison MD  08/01/24

## 2024-08-01 NOTE — PROGRESS NOTES
"Palliative Care Daily Progress Note     C/C: Patient denies symptoms at present, pleasantly confused.     S: Medical record reviewed. Follow up visit for GOC in the context of complex medical decision making. Events noted. RN reports patient with BLE pain to touch and increased BLE edema. No family at bedside. Ongoing comfort focused plan of care.     ROS: Denies pain, nausea, shortness of breath.      O: Code Status:   Code Status and Medical Interventions:   Ordered at: 07/18/24 1522     Level Of Support Discussed With:    Next of Kin (If No Surrogate)     Code Status (Patient has no pulse and is not breathing):    No CPR (Do Not Attempt to Resuscitate)     Medical Interventions (Patient has pulse or is breathing):    Comfort Measures     Comments:    Three siblings (Frances, Caro, and Mahendra/Mary Ellen)      Advanced Directives: Advance Directive Status: Patient does not have advance directive   Goals of Care: Ongoing.   Palliative Performance Scale Score: 30%     /75   Pulse 74   Temp 97.7 °F (36.5 °C)   Resp 16   Ht 149.9 cm (59.02\")   Wt 33.2 kg (73 lb 3.2 oz)   SpO2 97%   BMI 14.78 kg/m²     Intake/Output Summary (Last 24 hours) at 8/1/2024 1054  Last data filed at 8/1/2024 0800  Gross per 24 hour   Intake 100 ml   Output 380 ml   Net -280 ml       PE:  General Appearance:    Patient laying in bed, awake, alert, A/C ill appearing, frail, cooperative, NAD, severely malnourished   HEENT:    NC/AT, MMM, face relaxed   Neck:   supple, trachea midline, no JVD   Lungs:     CTA bilat, diminished in bases; respirations regular, even and unlabored; RR 16-18 on exam, on RA    Heart:    RRR, normal S1 and S2, no M/R/G, HR 72   Abdomen:     Normal bowel sounds, soft, nontender, nondistended, ostomy pouch in place to fistula RLQ   G/U:   Deferred   MSK/Extremities:   Severe wasting, no edema   Pulses:   Pulses palpable and equal bilaterally   Skin:   Warm, dry   Neurologic:   Alert, oriented x1, cooperative " "  Psych:  Calm, appropriate     Meds: Reviewed and changes noted    Labs:                     Invalid input(s): \"LABALBU\", \"PROT\"    Imaging Results (Last 72 Hours)       ** No results found for the last 72 hours. **                  Diagnostics: Reviewed    A:   Failure to thrive in adult    Severe malnutrition    Enterocutaneous fistula    Intra-abdominal fluid collection    Cognitive impairment     79 y.o. female with severe malnutrition, enterocutaneous fistula, concern for GI perforation.   S/S:   Pain -coccyx, abdominal secondary to enterocutaneous fistula/GI perforation  -continue Tylenol 650mg PO q 4 hours prn mild pain  -continue Morphine 1mg IV q 4 hours prn moderate pain     2. Nausea -discontinued Zofran 4mg IV/PO q 6 hours prn N/V as ineffective per RN  -continue Compazine 5mg IV q 6 hours prn N/V  -continue Haldol 0.5mg IV q 6 hours prn N/V     3. GOC -7/13 recommend DNR/DNI due to poor nutritional status (Albumin 2.0) and debility, as patient would not benefit from aggressive resuscitation attempts with CPR nor would she benefit from MV  -7/13 reviewed with Dr. Vasquez  -7/13 reviewed with SHANTI Corley to review current attempts to find family/medical decision maker/guardian  7/17: called sister Frances at [phone] who states family have arrived at hospital, plan to meet in room  7/17: met with sisters ,Frances and Caro, at [phone], reviewed patient's condition and current plan of care, daughters in agreement with current plan of care  7/18: Continue to recommend comfort focused plan of care due to enterocutaneous fistula in context of nonsurgical candidate due to malnutrition. Return visit to patient's room at 7/18 1515 to meet with patient's sisters Frances and sister-in-lawMary Ellen. Reviewed comfort measures. Family has talked with one another and would like to elect comfort measures per sister and sister-in-law. Orders adjusted.   7/23: Patient with ongoing nausea. Hospice consulted.  7/26: " Patient continues on comfort measures. Symptoms currently managed on current regimen.   8/1: Ongoing comfort measures.     P: Follow up visit for symptom management of abdominal pain/nausea. Ongoing comfort focused plan of care with symptom management throughout end of life. Patient continues to decline, weaker, eating less.    Palliative Care Team will continue to follow patient. Please do not hesitate to contact us regarding further sx mgmt or GOC needs.  Gisella Olivia, APRN  8/1/2024  Time spent: 10 minutes

## 2024-08-01 NOTE — PROGRESS NOTES
Continued Stay Note  [facility]     Patient Name: Arcelia Walter  MRN: [id number]  Today's Date: 8/1/2024    Admit Date: 6/28/2024    Plan: Ongoing   Discharge Plan       Row Name 08/01/24 1503       Plan    Plan Ongoing    Plan Comments Chart reviewed, visit made to pt. Pt alert visiting with family member and staff nurse. Pt's lunch tray at the bedside, does not appear that pt ate any lunch. Pt denies any needs. Will continue to follow and assess for changes in pt's condition. Please call [phone] if can be of further assistance.                   Discharge Codes    No documentation.                 Expected Discharge Date and Time       Expected Discharge Date Expected Discharge Time    Aug 2, 2024               Mildred Gross RN

## 2024-08-01 NOTE — PLAN OF CARE
Problem: Fall Injury Risk  Goal: Absence of Fall and Fall-Related Injury  Outcome: Ongoing, Progressing  Intervention: Promote Injury-Free Environment  Recent Flowsheet Documentation  Taken 8/1/2024 1600 by Xochitl Alex RN  Safety Promotion/Fall Prevention:   safety round/check completed   room organization consistent   clutter free environment maintained   assistive device/personal items within reach  Taken 8/1/2024 1400 by Xochitl Alex RN  Safety Promotion/Fall Prevention:   assistive device/personal items within reach   clutter free environment maintained   room organization consistent   safety round/check completed  Goal: Absence of Fall and Fall-Related Injury  Outcome: Ongoing, Progressing  Intervention: Promote Injury-Free Environment  Recent Flowsheet Documentation  Taken 8/1/2024 1600 by Xochitl Alex RN  Safety Promotion/Fall Prevention:   safety round/check completed   room organization consistent   clutter free environment maintained   assistive device/personal items within reach  Taken 8/1/2024 1400 by Xochitl Alex RN  Safety Promotion/Fall Prevention:   assistive device/personal items within reach   clutter free environment maintained   room organization consistent   safety round/check completed     Problem: Adult Inpatient Plan of Care  Goal: Plan of Care Review  Outcome: Ongoing, Progressing  Goal: Patient-Specific Goal (Individualized)  Outcome: Ongoing, Progressing  Goal: Absence of Hospital-Acquired Illness or Injury  Outcome: Ongoing, Progressing  Intervention: Identify and Manage Fall Risk  Recent Flowsheet Documentation  Taken 8/1/2024 1600 by Xochitl Alex RN  Safety Promotion/Fall Prevention:   safety round/check completed   room organization consistent   clutter free environment maintained   assistive device/personal items within reach  Taken 8/1/2024 1400 by Xochitl Alex RN  Safety Promotion/Fall Prevention:   assistive  device/personal items within reach   clutter free environment maintained   room organization consistent   safety round/check completed  Intervention: Prevent Skin Injury  Recent Flowsheet Documentation  Taken 8/1/2024 1600 by Xochitl Alex RN  Body Position: position changed independently  Taken 8/1/2024 1400 by Xochitl Alex RN  Body Position:   position changed independently   weight shifting  Taken 8/1/2024 1100 by Xochitl Alex RN  Body Position: position changed independently  Intervention: Prevent and Manage VTE (Venous Thromboembolism) Risk  Recent Flowsheet Documentation  Taken 8/1/2024 1600 by Xochitl Alex RN  Activity Management: bedrest  Taken 8/1/2024 1400 by Xochitl Alex RN  Activity Management: bedrest  Goal: Optimal Comfort and Wellbeing  Outcome: Ongoing, Progressing  Goal: Readiness for Transition of Care  Outcome: Ongoing, Progressing  Goal: Plan of Care Review  Outcome: Ongoing, Progressing  Goal: Patient-Specific Goal (Individualized)  Outcome: Ongoing, Progressing  Goal: Absence of Hospital-Acquired Illness or Injury  Outcome: Ongoing, Progressing  Intervention: Identify and Manage Fall Risk  Recent Flowsheet Documentation  Taken 8/1/2024 1600 by Xochitl Alex RN  Safety Promotion/Fall Prevention:   safety round/check completed   room organization consistent   clutter free environment maintained   assistive device/personal items within reach  Taken 8/1/2024 1400 by Xochitl Alex RN  Safety Promotion/Fall Prevention:   assistive device/personal items within reach   clutter free environment maintained   room organization consistent   safety round/check completed  Intervention: Prevent Skin Injury  Recent Flowsheet Documentation  Taken 8/1/2024 1600 by Xochitl Alex RN  Body Position: position changed independently  Taken 8/1/2024 1400 by Xochitl Alex RN  Body Position:   position changed independently    weight shifting  Taken 8/1/2024 1100 by Xochitl Alex RN  Body Position: position changed independently  Intervention: Prevent and Manage VTE (Venous Thromboembolism) Risk  Recent Flowsheet Documentation  Taken 8/1/2024 1600 by Xochitl Alex RN  Activity Management: bedrest  Taken 8/1/2024 1400 by Xochitl Alex RN  Activity Management: bedrest  Goal: Optimal Comfort and Wellbeing  Outcome: Ongoing, Progressing  Goal: Readiness for Transition of Care  Outcome: Ongoing, Progressing     Problem: Skin Injury Risk Increased  Goal: Skin Health and Integrity  Outcome: Ongoing, Progressing  Intervention: Optimize Skin Protection  Recent Flowsheet Documentation  Taken 8/1/2024 1600 by Xochitl Alex, RN  Head of Bed (HOB) Positioning: HOB at 20-30 degrees  Taken 8/1/2024 1400 by Xochitl Alex RN  Head of Bed (HOB) Positioning: HOB elevated  Goal: Skin Health and Integrity  Outcome: Ongoing, Progressing  Intervention: Optimize Skin Protection  Recent Flowsheet Documentation  Taken 8/1/2024 1600 by Xochitl Alex RN  Head of Bed (HOB) Positioning: HOB at 20-30 degrees  Taken 8/1/2024 1400 by Xochitl Alex RN  Head of Bed (HOB) Positioning: HOB elevated     Problem: Palliative Care  Goal: Enhanced Quality of Life  Outcome: Ongoing, Progressing   Goal Outcome Evaluation:

## 2024-08-02 PROCEDURE — 25010000002 MORPHINE PER 10 MG

## 2024-08-02 PROCEDURE — 99232 SBSQ HOSP IP/OBS MODERATE 35: CPT | Performed by: NURSE PRACTITIONER

## 2024-08-02 RX ADMIN — MORPHINE SULFATE 1 MG: 2 INJECTION, SOLUTION INTRAMUSCULAR; INTRAVENOUS at 18:21

## 2024-08-02 RX ADMIN — MORPHINE SULFATE 1 MG: 2 INJECTION, SOLUTION INTRAMUSCULAR; INTRAVENOUS at 16:15

## 2024-08-02 RX ADMIN — Medication 10 ML: at 22:22

## 2024-08-02 RX ADMIN — MIRTAZAPINE 15 MG: 15 TABLET, FILM COATED ORAL at 22:22

## 2024-08-02 RX ADMIN — MORPHINE SULFATE 1 MG: 2 INJECTION, SOLUTION INTRAMUSCULAR; INTRAVENOUS at 10:34

## 2024-08-02 RX ADMIN — Medication 1 PATCH: at 09:22

## 2024-08-02 RX ADMIN — Medication 10 ML: at 09:22

## 2024-08-02 NOTE — PROGRESS NOTES
[facility] Medicine Services  PROGRESS NOTE    Patient Name: Arcelia Walter  : 1945  MRN: [id number]    Date of Admission: 2024  Primary Care Physician: Provider, No Known    Subjective   Subjective     CC:  Follow-up abdominal fistula    HPI:  Patient seen resting in bed eating breakfast in no apparent distress.  No acute events overnight per nursing.  No family currently at bedside.  Reports that she is currently comfortable.  Denies any new complaints at this time.      Objective   Objective     Vital Signs:   Temp:  [97.2 °F (36.2 °C)-98.2 °F (36.8 °C)] 97.7 °F (36.5 °C)  Heart Rate:  [71-80] 80  Resp:  [16] 16  BP: (107-122)/(70-74) 107/70     Physical Exam:  Constitutional: No acute distress, awake, alert, frail, chronically ill-appearing  HENT: NCAT, mucous membranes moist  Respiratory: Clear to auscultation bilaterally, respiratory effort normal on room air  Cardiovascular: RRR, no murmurs, cap refill brisk   Gastrointestinal: Positive bowel sounds, soft, nontender, nondistended  Musculoskeletal: Trace BLE edema   Psychiatric: Flat affect, cooperative  Neurologic: Oriented x 2, moves all extremities, speech clear  Skin: warm, dry, BLE foot dressings in place    Results Reviewed:  LAB RESULTS:                              Brief Urine Lab Results  (Last result in the past 365 days)        Color   Clarity   Blood   Leuk Est   Nitrite   Protein   CREAT   Urine HCG        24 0038 Yellow   Clear   Negative   Negative   Negative   Trace                   Microbiology Results Abnormal       Procedure Component Value - Date/Time    Clostridioides difficile Toxin - Stool, Per Rectum [[id number]]  (Normal) Collected: 24 1024    Lab Status: Final result Specimen: Stool from Per Rectum Updated: 24 1150    Narrative:      The following orders were created for panel order Clostridioides difficile Toxin - Stool, Per Rectum.  Procedure                                Abnormality         Status                     ---------                               -----------         ------                     Clostridioides difficile...[[id number]]  Normal              Final result                 Please view results for these tests on the individual orders.    Clostridioides difficile Toxin, PCR - Stool, Per Rectum [[id number]]  (Normal) Collected: 07/18/24 1024    Lab Status: Final result Specimen: Stool from Per Rectum Updated: 07/18/24 1150     Toxigenic C. difficile by PCR Not Detected    Narrative:      The result indicates the absence of toxigenic C. difficile from stool specimen.     Blood Culture - Blood, Hand, Left [[id number]]  (Normal) Collected: 07/12/24 1033    Lab Status: Final result Specimen: Blood from Hand, Left Updated: 07/17/24 1245     Blood Culture No growth at 5 days    Narrative:      Less than seven (7) mL's of blood was collected.  Insufficient quantity may yield false negative results.    Blood Culture - Blood, Hand, Right [[id number]]  (Normal) Collected: 07/12/24 1033    Lab Status: Final result Specimen: Blood from Hand, Right Updated: 07/17/24 1245     Blood Culture No growth at 5 days    Narrative:      Less than seven (7) mL's of blood was collected.  Insufficient quantity may yield false negative results.    Blood Culture - Blood, Hand, Right [[id number]]  (Normal) Collected: 07/01/24 1558    Lab Status: Final result Specimen: Blood from Hand, Right Updated: 07/06/24 1731     Blood Culture No growth at 5 days    Narrative:      Aerobic bottle only  Less than seven (7) mL's of blood was collected.  Insufficient quantity may yield false negative results.    Blood Culture - Blood, Hand, Left [[id number]]  (Normal) Collected: 07/01/24 1558    Lab Status: Final result Specimen: Blood from Hand, Left Updated: 07/06/24 1731     Blood Culture No growth at 5 days            No radiology results from the last 24 hrs    Results for orders placed during the hospital  encounter of 06/28/24    Adult Transthoracic Echo Complete W/ Cont if Necessary Per Protocol    Interpretation Summary    Left ventricular systolic function is normal. Left ventricular ejection fraction appears to be 61 - 65%.    There is mild calcification of the aortic valve.    Mild aortic valve regurgitation is present.    Mild mitral annular calcification is present.    Mild to moderate mitral valve regurgitation is present.    Mild tricuspid valve regurgitation is present.      Current medications:  Scheduled Meds:mirtazapine, 15 mg, Oral, Nightly  nicotine, 1 patch, Transdermal, Q24H  sodium chloride, 10 mL, Intravenous, Q12H      Continuous Infusions:   PRN Meds:.  acetaminophen    senna-docusate sodium **AND** polyethylene glycol **AND** bisacodyl **AND** bisacodyl    haloperidol lactate    melatonin    Morphine    ondansetron    prochlorperazine    sodium chloride    sodium chloride    Assessment & Plan   Assessment & Plan     Active Hospital Problems    Diagnosis  POA    **Failure to thrive in adult [R62.7]  Yes    Enterocutaneous fistula [K63.2]  Yes    Intra-abdominal fluid collection [R18.8]  Yes    Cognitive impairment [R41.89]  Yes    Severe malnutrition [E43]  Yes      Resolved Hospital Problems   No resolved problems to display.        Brief Hospital Course to date:  Arcelia Walter is a 79 y.o. female w completely unknown pmhx who presented after being found down by EMS on welfare check. Found to have an area in right lower abdomen of concern noted 7/5, CT without contrast initially attributed to right femoral hernia but then began to leak brown foul smelling fluid 7/12. 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 Given patient's severe malnutrition, chronic failure to thrive, concern also for chronic dementia given the above (though collateral unable to be obtained) and discussion with multiple physicians,  ethics/palliative, decision was made 7/13 not to escalate care plan (no surgery, no artificial nutrition) on 7/13. Patient's family has since been located, prognosis has been deemed to be poor, she is currently on comfort measures with palliative and hospice following.     This patient's problems and plans were partially entered by my partner and updated as appropriate by me 08/02/24.     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/other GI perforation  -s/p drain into bedside placed ostomy, this has since been discontinued, now with ostomy in place; patient is not systemically ill/septic  -evaluated by IR 7/13, given self draining, an IR drain placement thought not to add benefit  -Patient was deemed to be a poor surgical candidate given a BMI 13-14 and overall significant deconditioning and would lead to poor healing/poor outcome/infection, she is s/p IV Zosyn, currently on comfort care  -appreciate ethics/palliative assistance   -Hospice following      Significant nausea-resolved  Diarrhea/abd cramps-resolved  -Nurse reported significant diarrhea with some abdominal cramping and persistent nausea not responding to Zofran. Resolved.  -Continue supportive therapy s/p Compazine  -Stool C. Difficile negative     Acute anemia  -Low but stable on last lab draw 7/17/2024, no signs of blood loss  -Patient now comfort measures, monitoring discontinued     Severe chronic malnutrition, BMI 13   -Encouraging intake but minimal even before acute condition  -Dietitian followed     Appears chronic dementia/FTT   -Appears chronic and not acute encephalopathy given stability and overall state     Lack of capacity   -Family now located. Awaiting disposition.      GOC:   -Prognosis remains poor, patient undergoing terminal decline, and is not a surgical candidate. Family has now been present.  Palliative care following, per discussions with palliative 7/18 with patient's Sister Frances and sister-in-law  Mary Ellen, they have elected for comfort measures.  No further labs draws/invasive measures. Patient currently does not meet inpatient hospice criteria, anticipate she will remain in-house for now.     Expected Discharge Location and Transportation: hospice   Expected Discharge TBD  Expected Discharge Date: 8/3/2024; Expected Discharge Time:       VTE Prophylaxis:  Mechanical VTE prophylaxis orders are present.    AM-PAC 6 Clicks Score (PT): 9 (08/01/24 1918)    CODE STATUS:   Code Status and Medical Interventions:   Ordered at: 07/18/24 1522     Level Of Support Discussed With:    Next of Kin (If No Surrogate)     Code Status (Patient has no pulse and is not breathing):    No CPR (Do Not Attempt to Resuscitate)     Medical Interventions (Patient has pulse or is breathing):    Comfort Measures     Comments:    Three siblings (Frances, Caro, and Mahendra/Mary Ellen)       Guilherme Schultz, CIARAN  08/02/24

## 2024-08-02 NOTE — PLAN OF CARE
"Goal Outcome Evaluation:  Plan of Care Reviewed With: patient        Progress: no change  Outcome Evaluation: Palliative RN saw pt at 08.  Pt. woke to the voice of tech waking her to sit her up for breakfast tray.  \"You like that don't you\", pt. asked.  \"Taking off my blankets\".  Tech informed pt that she was simply pulling blankets from her hands so that she may eat her breakfast.  Pt. denied pain, nause and shortness of breath.  She did not demonstrate any visible signs of discomfort on RA.  Per nursing report, pt. IV has  and she plans to establish SQ access today.  Palliative care to continue to folow for support and ongoing POC.      1300 Palliative IDT meeting: JAGJIT Mejia RN, CHPN; CROW Olivia, CIARAN; ELISABETH Trinh RN, CHPN ; THELMA Cooley MD.; CHELSI Gross RN, CHPN      After hours, weekends and holidays, contact Palliative Provider by calling [phone].                                   "

## 2024-08-02 NOTE — CASE MANAGEMENT/SOCIAL WORK
Continued Stay Note  [facility]     Patient Name: Arcelia Walter  MRN: [id number]  Today's Date: 8/2/2024    Admit Date: 6/28/2024    Plan: Comfort care/End of life care   Discharge Plan       Row Name 08/02/24 1156       Plan    Plan Comfort care/End of life care    Plan Comments SW/CM will continue to follow for any discharge planning needs. Patient is being followed by Palliative Care and Hospice for comfort care and end of life care. Patient remains comfort care. SW/CM available                   Discharge Codes    No documentation.                 Expected Discharge Date and Time       Expected Discharge Date Expected Discharge Time    Aug 2, 2024               SHANTI Shaw (Kay)

## 2024-08-02 NOTE — PLAN OF CARE
No complaints of pain.  Skin integrity maintained, patient able to turn self with instruction. Ostomy appliance intact with moderate output.

## 2024-08-02 NOTE — PLAN OF CARE
Goal Outcome Evaluation:  Plan of Care Reviewed With: patient        Progress: no change  Outcome Evaluation: Comfort measures ongoing. Patient reports headache and abdominal pain, managed with prn morphine. No other prns administered. Patient slept throughout much of shift. Minimal appetite, requires feeding assistance. Turned. Minimal UOP via lozano. Percutaneous fistula draining into ostomy bag.  Patient is alert, oriented to self, pleasantly confused. No family at bedside this shift. Care ongoing, continue plan of care.

## 2024-08-03 PROCEDURE — 25010000002 MORPHINE PER 10 MG

## 2024-08-03 PROCEDURE — 25010000002 FUROSEMIDE PER 20 MG: Performed by: FAMILY MEDICINE

## 2024-08-03 PROCEDURE — 99231 SBSQ HOSP IP/OBS SF/LOW 25: CPT | Performed by: NURSE PRACTITIONER

## 2024-08-03 RX ORDER — FUROSEMIDE 10 MG/ML
40 INJECTION INTRAMUSCULAR; INTRAVENOUS ONCE
Status: COMPLETED | OUTPATIENT
Start: 2024-08-03 | End: 2024-08-03

## 2024-08-03 RX ORDER — FUROSEMIDE 10 MG/ML
40 INJECTION INTRAMUSCULAR; INTRAVENOUS ONCE
Status: DISCONTINUED | OUTPATIENT
Start: 2024-08-03 | End: 2024-08-03

## 2024-08-03 RX ADMIN — Medication 5 MG: at 23:21

## 2024-08-03 RX ADMIN — FUROSEMIDE 40 MG: 10 INJECTION, SOLUTION INTRAMUSCULAR; INTRAVENOUS at 16:10

## 2024-08-03 RX ADMIN — MIRTAZAPINE 15 MG: 15 TABLET, FILM COATED ORAL at 21:55

## 2024-08-03 RX ADMIN — MORPHINE SULFATE 1 MG: 2 INJECTION, SOLUTION INTRAMUSCULAR; INTRAVENOUS at 12:25

## 2024-08-03 RX ADMIN — MORPHINE SULFATE 1 MG: 2 INJECTION, SOLUTION INTRAMUSCULAR; INTRAVENOUS at 19:35

## 2024-08-03 RX ADMIN — Medication 10 ML: at 21:55

## 2024-08-03 RX ADMIN — MORPHINE SULFATE 1 MG: 2 INJECTION, SOLUTION INTRAMUSCULAR; INTRAVENOUS at 04:03

## 2024-08-03 NOTE — PROGRESS NOTES
[facility] Medicine Services  PROGRESS NOTE    Patient Name: Arcelia Walter  : 1945  MRN: [id number]    Date of Admission: 2024  Primary Care Physician: Provider, No Known    Subjective   Subjective     CC:  Follow-up abdominal fistula    HPI:  Patient was seen resting in bed no apparent distress.  No acute events overnight per nursing.  No family currently at bedside.  Patient remains pleasantly confused.  Reports that she is feeling well and denies any new complaints at this time.      Objective   Objective     Vital Signs:   Temp:  [97.9 °F (36.6 °C)] 97.9 °F (36.6 °C)  Heart Rate:  [80] 80  Resp:  [16] 16  BP: (125)/(79) 125/79     Physical Exam:  Constitutional: No acute distress, awake, alert, frail, chronically ill-appearing  HENT: NCAT, mucous membranes moist  Respiratory: Clear to auscultation bilaterally, respiratory effort normal on room air  Cardiovascular: RRR, no murmurs, cap refill brisk   Gastrointestinal: Positive bowel sounds, soft, nontender, nondistended  Musculoskeletal: Trace BLE edema   Psychiatric: Flat affect, cooperative  Neurologic: Oriented to self, moves all extremities, speech clear  Skin: warm, dry, BLE foot dressings in place      Results Reviewed:  LAB RESULTS:                              Brief Urine Lab Results  (Last result in the past 365 days)        Color   Clarity   Blood   Leuk Est   Nitrite   Protein   CREAT   Urine HCG        24 0038 Yellow   Clear   Negative   Negative   Negative   Trace                   Microbiology Results Abnormal       Procedure Component Value - Date/Time    Clostridioides difficile Toxin - Stool, Per Rectum [[id number]]  (Normal) Collected: 24 1024    Lab Status: Final result Specimen: Stool from Per Rectum Updated: 24 1150    Narrative:      The following orders were created for panel order Clostridioides difficile Toxin - Stool, Per Rectum.  Procedure                               Abnormality          Status                     ---------                               -----------         ------                     Clostridioides difficile...[[id number]]  Normal              Final result                 Please view results for these tests on the individual orders.    Clostridioides difficile Toxin, PCR - Stool, Per Rectum [[id number]]  (Normal) Collected: 07/18/24 1024    Lab Status: Final result Specimen: Stool from Per Rectum Updated: 07/18/24 1150     Toxigenic C. difficile by PCR Not Detected    Narrative:      The result indicates the absence of toxigenic C. difficile from stool specimen.     Blood Culture - Blood, Hand, Left [[id number]]  (Normal) Collected: 07/12/24 1033    Lab Status: Final result Specimen: Blood from Hand, Left Updated: 07/17/24 1245     Blood Culture No growth at 5 days    Narrative:      Less than seven (7) mL's of blood was collected.  Insufficient quantity may yield false negative results.    Blood Culture - Blood, Hand, Right [[id number]]  (Normal) Collected: 07/12/24 1033    Lab Status: Final result Specimen: Blood from Hand, Right Updated: 07/17/24 1245     Blood Culture No growth at 5 days    Narrative:      Less than seven (7) mL's of blood was collected.  Insufficient quantity may yield false negative results.    Blood Culture - Blood, Hand, Right [[id number]]  (Normal) Collected: 07/01/24 1558    Lab Status: Final result Specimen: Blood from Hand, Right Updated: 07/06/24 1731     Blood Culture No growth at 5 days    Narrative:      Aerobic bottle only  Less than seven (7) mL's of blood was collected.  Insufficient quantity may yield false negative results.    Blood Culture - Blood, Hand, Left [[id number]]  (Normal) Collected: 07/01/24 1558    Lab Status: Final result Specimen: Blood from Hand, Left Updated: 07/06/24 1731     Blood Culture No growth at 5 days            No radiology results from the last 24 hrs    Results for orders placed during the hospital encounter of  06/28/24    Adult Transthoracic Echo Complete W/ Cont if Necessary Per Protocol    Interpretation Summary    Left ventricular systolic function is normal. Left ventricular ejection fraction appears to be 61 - 65%.    There is mild calcification of the aortic valve.    Mild aortic valve regurgitation is present.    Mild mitral annular calcification is present.    Mild to moderate mitral valve regurgitation is present.    Mild tricuspid valve regurgitation is present.      Current medications:  Scheduled Meds:mirtazapine, 15 mg, Oral, Nightly  nicotine, 1 patch, Transdermal, Q24H  sodium chloride, 10 mL, Intravenous, Q12H      Continuous Infusions:   PRN Meds:.  acetaminophen    senna-docusate sodium **AND** polyethylene glycol **AND** bisacodyl **AND** bisacodyl    haloperidol lactate    melatonin    Morphine    ondansetron    prochlorperazine    sodium chloride    sodium chloride    Assessment & Plan   Assessment & Plan     Active Hospital Problems    Diagnosis  POA    **Failure to thrive in adult [R62.7]  Yes    Enterocutaneous fistula [K63.2]  Yes    Intra-abdominal fluid collection [R18.8]  Yes    Cognitive impairment [R41.89]  Yes    Severe malnutrition [E43]  Yes      Resolved Hospital Problems   No resolved problems to display.        Brief Hospital Course to date:  Arcelia Walter is a 79 y.o. female w completely unknown pmhx who presented after being found down by EMS on welfare check. Found to have an area in right lower abdomen of concern noted 7/5, CT without contrast initially attributed to right femoral hernia but then began to leak brown foul smelling fluid 7/12. 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 Given patient's severe malnutrition, chronic failure to thrive, concern also for chronic dementia given the above (though collateral unable to be obtained) and discussion with multiple physicians, ethics/palliative, decision  was made 7/13 not to escalate care plan (no surgery, no artificial nutrition) on 7/13. Patient's family has since been located, prognosis has been deemed to be poor, she is currently on comfort measures with palliative and hospice following.     This patient's problems and plans were partially entered by my partner and updated as appropriate by me 08/03/24.     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/other GI perforation  -s/p drain into bedside placed ostomy, this has since been discontinued, now with ostomy in place; patient is not systemically ill/septic  -evaluated by IR 7/13, given self draining, an IR drain placement thought not to add benefit  -Patient was deemed to be a poor surgical candidate given a BMI 13-14 and overall significant deconditioning and would lead to poor healing/poor outcome/infection, she is s/p IV Zosyn, currently on comfort care  -appreciate ethics/palliative assistance   -Hospice following      Significant nausea-resolved  Diarrhea/abd cramps-resolved  -Nurse reported significant diarrhea with some abdominal cramping and persistent nausea not responding to Zofran. Resolved.  -Continue supportive therapy s/p Compazine  -Stool C. Difficile negative     Acute anemia  -Low but stable on last lab draw 7/17/2024, no signs of blood loss  -Patient now comfort measures, monitoring discontinued     Severe chronic malnutrition, BMI 13   -Encouraging intake but minimal even before acute condition  -Dietitian followed     Appears chronic dementia/FTT   -Appears chronic and not acute encephalopathy given stability and overall state     Lack of capacity   -Family now located. Awaiting disposition.      GOC:   -Prognosis remains poor, patient undergoing terminal decline, and is not a surgical candidate. Family has been present.  Palliative care following, per discussions with palliative 7/18 with patient's Sister Frances and sister-in-law Mary Ellen, they have elected for  comfort measures.  No further labs draws/invasive measures. Patient currently does not meet inpatient hospice criteria, anticipate she will remain in-house for now.     Expected Discharge Location and Transportation: hospice   Expected Discharge TBD  Expected Discharge Date: 8/5/2024; Expected Discharge Time:       VTE Prophylaxis:  Mechanical VTE prophylaxis orders are present.         AM-PAC 6 Clicks Score (PT): 9 (08/02/24 2222)    CODE STATUS:   Code Status and Medical Interventions:   Ordered at: 07/18/24 1522     Level Of Support Discussed With:    Next of Kin (If No Surrogate)     Code Status (Patient has no pulse and is not breathing):    No CPR (Do Not Attempt to Resuscitate)     Medical Interventions (Patient has pulse or is breathing):    Comfort Measures     Comments:    Three siblings (Frances, Caro, and Mahendra/Mary Ellen)       CIARAN Herrera  08/03/24

## 2024-08-03 NOTE — PLAN OF CARE
Problem: Adult Inpatient Plan of Care  Goal: Plan of Care Review  Outcome: Ongoing, Progressing  Flowsheets (Taken 8/3/2024 2511)  Progress: no change  Plan of Care Reviewed With: patient  Outcome Evaluation: VSS. Denies pain. Rested most of shift. Restless at times. Morphine prnx1.   Goal Outcome Evaluation:  Plan of Care Reviewed With: patient        Progress: no change  Outcome Evaluation: VSS. Denies pain. Rested most of shift. Restless at times. Morphine prnx1.

## 2024-08-03 NOTE — PLAN OF CARE
Goal Outcome Evaluation:  Plan of Care Reviewed With: patient        Progress: no change  Outcome Evaluation: Patient on RA. Prn morphine x1, patient endorses ankle pain. Patient's bilteral lower extremities are edematous and very painful upon light palpation, palliative furosemide one-time dose ordered; noticeable decrease in swelling noted and 875 mL UOP via lozano. Turned. Patient awake and alert for most of shift, oriented to self only. Confused, very pleasant. Patient requires feeding assistance. Sister and nephew visited this shift. Care ongoing, continue plan of care.

## 2024-08-04 PROCEDURE — 99231 SBSQ HOSP IP/OBS SF/LOW 25: CPT | Performed by: NURSE PRACTITIONER

## 2024-08-04 PROCEDURE — 25010000002 HALOPERIDOL LACTATE PER 5 MG

## 2024-08-04 PROCEDURE — 25010000002 MORPHINE PER 10 MG: Performed by: FAMILY MEDICINE

## 2024-08-04 PROCEDURE — 25010000002 MORPHINE PER 10 MG

## 2024-08-04 RX ORDER — MORPHINE SULFATE 2 MG/ML
1 INJECTION, SOLUTION INTRAMUSCULAR; INTRAVENOUS
Status: DISPENSED | OUTPATIENT
Start: 2024-08-04 | End: 2024-08-11

## 2024-08-04 RX ADMIN — HALOPERIDOL LACTATE 0.5 MG: 5 INJECTION, SOLUTION INTRAMUSCULAR at 16:23

## 2024-08-04 RX ADMIN — MORPHINE SULFATE 1 MG: 2 INJECTION, SOLUTION INTRAMUSCULAR; INTRAVENOUS at 03:21

## 2024-08-04 RX ADMIN — MORPHINE SULFATE 1 MG: 2 INJECTION, SOLUTION INTRAMUSCULAR; INTRAVENOUS at 16:39

## 2024-08-04 RX ADMIN — MIRTAZAPINE 15 MG: 15 TABLET, FILM COATED ORAL at 21:51

## 2024-08-04 RX ADMIN — MORPHINE SULFATE 1 MG: 2 INJECTION, SOLUTION INTRAMUSCULAR; INTRAVENOUS at 05:24

## 2024-08-04 RX ADMIN — Medication 1 PATCH: at 15:48

## 2024-08-04 NOTE — PLAN OF CARE
Problem: Adult Inpatient Plan of Care  Goal: Plan of Care Review  Outcome: Ongoing, Progressing  Flowsheets (Taken 8/4/2024 8751)  Progress: no change  Plan of Care Reviewed With: patient  Outcome Evaluation: VSS. Se LE edema much improved after lasix administered on day shift.  Se LE edema began returning by 2200.  Ostomy changed. Restless at times. Morphine prn x 3. Melatonin prn x1.   Goal Outcome Evaluation:  Plan of Care Reviewed With: patient        Progress: no change  Outcome Evaluation: VSS. Se LE edema much improved after lasix administered on day shift.  Se LE edema began returning by 2200.  Ostomy changed. Restless at times. Morphine prn x 3. Melatonin prn x1.

## 2024-08-04 NOTE — PROGRESS NOTES
[facility] Medicine Services  PROGRESS NOTE    Patient Name: Arcelia Walter  : 1945  MRN: [id number]    Date of Admission: 2024  Primary Care Physician: Provider, No Known    Subjective   Subjective     CC:  Follow-up abdominal fistula    HPI:  Patient seen resting in bed with eyes closed.  She appears to be resting comfortably.  Did not attempt to awaken.    Objective   Objective     Vital Signs:   Temp:  [97.8 °F (36.6 °C)-98.2 °F (36.8 °C)] 97.8 °F (36.6 °C)  Heart Rate:  [73-98] 73  Resp:  [17-18] 17  BP: (123-127)/(77-88) 123/77     Physical Exam:  Constitutional: No acute distress, appears to be resting comfortably with eyes closed  HENT: NCAT, mucous membranes dry  Respiratory: respiratory effort appears normal on RA   Musculoskeletal: trace BLE edema   Psychiatric: Resting with eyes closed  Neurologic: Resting with eyes closed  Skin: warm, dry, no visible rash     Results Reviewed:  LAB RESULTS:                              Brief Urine Lab Results  (Last result in the past 365 days)        Color   Clarity   Blood   Leuk Est   Nitrite   Protein   CREAT   Urine HCG        24 0038 Yellow   Clear   Negative   Negative   Negative   Trace                   Microbiology Results Abnormal       Procedure Component Value - Date/Time    Clostridioides difficile Toxin - Stool, Per Rectum [[id number]]  (Normal) Collected: 24 1024    Lab Status: Final result Specimen: Stool from Per Rectum Updated: 24 1150    Narrative:      The following orders were created for panel order Clostridioides difficile Toxin - Stool, Per Rectum.  Procedure                               Abnormality         Status                     ---------                               -----------         ------                     Clostridioides difficile...[[id number]]  Normal              Final result                 Please view results for these tests on the individual orders.    Clostridioides  difficile Toxin, PCR - Stool, Per Rectum [[id number]]  (Normal) Collected: 07/18/24 1024    Lab Status: Final result Specimen: Stool from Per Rectum Updated: 07/18/24 1150     Toxigenic C. difficile by PCR Not Detected    Narrative:      The result indicates the absence of toxigenic C. difficile from stool specimen.     Blood Culture - Blood, Hand, Left [[id number]]  (Normal) Collected: 07/12/24 1033    Lab Status: Final result Specimen: Blood from Hand, Left Updated: 07/17/24 1245     Blood Culture No growth at 5 days    Narrative:      Less than seven (7) mL's of blood was collected.  Insufficient quantity may yield false negative results.    Blood Culture - Blood, Hand, Right [[id number]]  (Normal) Collected: 07/12/24 1033    Lab Status: Final result Specimen: Blood from Hand, Right Updated: 07/17/24 1245     Blood Culture No growth at 5 days    Narrative:      Less than seven (7) mL's of blood was collected.  Insufficient quantity may yield false negative results.    Blood Culture - Blood, Hand, Right [[id number]]  (Normal) Collected: 07/01/24 1558    Lab Status: Final result Specimen: Blood from Hand, Right Updated: 07/06/24 1731     Blood Culture No growth at 5 days    Narrative:      Aerobic bottle only  Less than seven (7) mL's of blood was collected.  Insufficient quantity may yield false negative results.    Blood Culture - Blood, Hand, Left [[id number]]  (Normal) Collected: 07/01/24 1558    Lab Status: Final result Specimen: Blood from Hand, Left Updated: 07/06/24 1731     Blood Culture No growth at 5 days            No radiology results from the last 24 hrs    Results for orders placed during the hospital encounter of 06/28/24    Adult Transthoracic Echo Complete W/ Cont if Necessary Per Protocol    Interpretation Summary    Left ventricular systolic function is normal. Left ventricular ejection fraction appears to be 61 - 65%.    There is mild calcification of the aortic valve.    Mild aortic valve  regurgitation is present.    Mild mitral annular calcification is present.    Mild to moderate mitral valve regurgitation is present.    Mild tricuspid valve regurgitation is present.      Current medications:  Scheduled Meds:mirtazapine, 15 mg, Oral, Nightly  nicotine, 1 patch, Transdermal, Q24H  sodium chloride, 10 mL, Intravenous, Q12H      Continuous Infusions:   PRN Meds:.  acetaminophen    senna-docusate sodium **AND** polyethylene glycol **AND** bisacodyl **AND** bisacodyl    haloperidol lactate    melatonin    Morphine    ondansetron    prochlorperazine    sodium chloride    sodium chloride    Assessment & Plan   Assessment & Plan     Active Hospital Problems    Diagnosis  POA    **Failure to thrive in adult [R62.7]  Yes    Enterocutaneous fistula [K63.2]  Yes    Intra-abdominal fluid collection [R18.8]  Yes    Cognitive impairment [R41.89]  Yes    Severe malnutrition [E43]  Yes      Resolved Hospital Problems   No resolved problems to display.        Brief Hospital Course to date:  Arcelia Walter is a 79 y.o. female w completely unknown pmhx who presented after being found down by EMS on welfare check. Found to have an area in right lower abdomen of concern noted 7/5, CT without contrast initially attributed to right femoral hernia but then began to leak brown foul smelling fluid 7/12. 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 Given patient's severe malnutrition, chronic failure to thrive, concern also for chronic dementia given the above (though collateral unable to be obtained) and discussion with multiple physicians, ethics/palliative, decision was made 7/13 not to escalate care plan (no surgery, no artificial nutrition) on 7/13. Patient's family has since been located, prognosis has been deemed to be poor, she is currently on comfort measures with hospice following.     This patient's problems and plans were partially entered  by my partner and updated as appropriate by me 08/04/24.     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/other GI perforation  -s/p drain into bedside placed ostomy, this has since been discontinued, now with ostomy in place; patient is not systemically ill/septic  -evaluated by IR 7/13, given self draining, an IR drain placement thought not to add benefit  -Patient was deemed to be a poor surgical candidate given a BMI 13-14 and overall significant deconditioning and would lead to poor healing/poor outcome/infection, she is s/p IV Zosyn, currently on comfort care  -appreciate ethics/palliative assistance   -Hospice following      Significant nausea-resolved  Diarrhea/abd cramps-resolved  -Nurse reported significant diarrhea with some abdominal cramping and persistent nausea not responding to Zofran. Resolved.  -Continue supportive therapy s/p Compazine  -Stool C. Difficile negative     Acute anemia  -Low but stable on last lab draw 7/17/2024, no signs of blood loss  -Patient now comfort measures, monitoring discontinued     Severe chronic malnutrition, BMI 13   -Encouraging intake but minimal even before acute condition  -Dietitian followed     Appears chronic dementia/FTT   -Appears chronic and not acute encephalopathy given stability and overall state     Lack of capacity   -Family now located. Awaiting disposition.      GOC:   -Prognosis remains poor, patient undergoing terminal decline, and is not a surgical candidate. Family has been present.  Palliative care following, per discussions with palliative 7/18 with patient's Sister Frances and sister-in-law Mary Ellen, they have elected for comfort measures.  No further labs draws/invasive measures. Patient currently does not meet inpatient hospice criteria, anticipate she will remain in-house for now.     Expected Discharge Location and Transportation: hospice   Expected Discharge TBD  Expected Discharge Date: 8/5/2024; Expected Discharge  Time:       VTE Prophylaxis:  Mechanical VTE prophylaxis orders are present.         AM-PAC 6 Clicks Score (PT): 8 (08/03/24 8946)    CODE STATUS:   Code Status and Medical Interventions:   Ordered at: 07/18/24 0672     Level Of Support Discussed With:    Next of Kin (If No Surrogate)     Code Status (Patient has no pulse and is not breathing):    No CPR (Do Not Attempt to Resuscitate)     Medical Interventions (Patient has pulse or is breathing):    Comfort Measures     Comments:    Three siblings (Frances, Caro, and Mahendra/Mary Ellen)       Guilherme Schultz, CIARAN  08/04/24

## 2024-08-04 NOTE — PLAN OF CARE
Goal Outcome Evaluation: pt has had some confusion and agitation today.  Tried to re-direct but unsuccessful.  PRN's given.  Pt complaining of pain on coccyx, tried multiple repositionings and morphine PRN.

## 2024-08-05 PROCEDURE — 99232 SBSQ HOSP IP/OBS MODERATE 35: CPT | Performed by: STUDENT IN AN ORGANIZED HEALTH CARE EDUCATION/TRAINING PROGRAM

## 2024-08-05 RX ADMIN — ACETAMINOPHEN 650 MG: 325 TABLET ORAL at 14:59

## 2024-08-05 RX ADMIN — SENNOSIDES AND DOCUSATE SODIUM 2 TABLET: 50; 8.6 TABLET ORAL at 14:59

## 2024-08-05 RX ADMIN — MIRTAZAPINE 15 MG: 15 TABLET, FILM COATED ORAL at 21:07

## 2024-08-05 RX ADMIN — Medication 1 PATCH: at 08:08

## 2024-08-05 RX ADMIN — Medication 10 ML: at 08:05

## 2024-08-05 RX ADMIN — Medication 10 ML: at 21:10

## 2024-08-05 RX ADMIN — Medication 5 MG: at 21:07

## 2024-08-05 NOTE — PLAN OF CARE
Problem: Fall Injury Risk  Goal: Absence of Fall and Fall-Related Injury  Outcome: Ongoing, Progressing  Intervention: Promote Injury-Free Environment  Recent Flowsheet Documentation  Taken 8/5/2024 1600 by Xochitl Aelx RN  Safety Promotion/Fall Prevention:   safety round/check completed   room organization consistent   assistive device/personal items within reach  Taken 8/5/2024 1415 by Xochitl Alex RN  Safety Promotion/Fall Prevention:   assistive device/personal items within reach   clutter free environment maintained   room organization consistent   safety round/check completed  Taken 8/5/2024 1200 by Xochitl Alex RN  Safety Promotion/Fall Prevention:   assistive device/personal items within reach   clutter free environment maintained   safety round/check completed   room organization consistent  Taken 8/5/2024 1000 by Xochitl Alex RN  Safety Promotion/Fall Prevention:   assistive device/personal items within reach   clutter free environment maintained   room organization consistent   safety round/check completed  Taken 8/5/2024 0800 by Xochitl Alex RN  Safety Promotion/Fall Prevention:   activity supervised   assistive device/personal items within reach   clutter free environment maintained   room organization consistent   safety round/check completed  Goal: Absence of Fall and Fall-Related Injury  Outcome: Ongoing, Progressing  Intervention: Promote Injury-Free Environment  Recent Flowsheet Documentation  Taken 8/5/2024 1600 by Xochitl Alex RN  Safety Promotion/Fall Prevention:   safety round/check completed   room organization consistent   assistive device/personal items within reach  Taken 8/5/2024 1415 by Xochitl Alex RN  Safety Promotion/Fall Prevention:   assistive device/personal items within reach   clutter free environment maintained   room organization consistent   safety round/check completed  Taken 8/5/2024 1200 by  Xochitl Alex RN  Safety Promotion/Fall Prevention:   assistive device/personal items within reach   clutter free environment maintained   safety round/check completed   room organization consistent  Taken 8/5/2024 1000 by Xochitl Alex RN  Safety Promotion/Fall Prevention:   assistive device/personal items within reach   clutter free environment maintained   room organization consistent   safety round/check completed  Taken 8/5/2024 0800 by Xochitl Alex RN  Safety Promotion/Fall Prevention:   activity supervised   assistive device/personal items within reach   clutter free environment maintained   room organization consistent   safety round/check completed     Problem: Adult Inpatient Plan of Care  Goal: Plan of Care Review  Outcome: Ongoing, Progressing  Flowsheets (Taken 8/5/2024 1720)  Progress: no change  Plan of Care Reviewed With: patient  Goal: Patient-Specific Goal (Individualized)  Outcome: Ongoing, Progressing  Goal: Absence of Hospital-Acquired Illness or Injury  Outcome: Ongoing, Progressing  Intervention: Identify and Manage Fall Risk  Recent Flowsheet Documentation  Taken 8/5/2024 1600 by Xochitl Alex RN  Safety Promotion/Fall Prevention:   safety round/check completed   room organization consistent   assistive device/personal items within reach  Taken 8/5/2024 1415 by Xochitl Alex RN  Safety Promotion/Fall Prevention:   assistive device/personal items within reach   clutter free environment maintained   room organization consistent   safety round/check completed  Taken 8/5/2024 1200 by Xochitl Alex RN  Safety Promotion/Fall Prevention:   assistive device/personal items within reach   clutter free environment maintained   safety round/check completed   room organization consistent  Taken 8/5/2024 1000 by Xochitl Alex RN  Safety Promotion/Fall Prevention:   assistive device/personal items within reach   clutter free environment  maintained   room organization consistent   safety round/check completed  Taken 8/5/2024 0800 by Xochitl Alex RN  Safety Promotion/Fall Prevention:   activity supervised   assistive device/personal items within reach   clutter free environment maintained   room organization consistent   safety round/check completed  Intervention: Prevent Skin Injury  Recent Flowsheet Documentation  Taken 8/5/2024 1600 by Xochitl Alex RN  Body Position:   right   neutral body alignment   neutral head position  Taken 8/5/2024 1415 by Xochitl Alex RN  Body Position:   left   legs elevated  Taken 8/5/2024 1200 by Xochitl Alex RN  Body Position:   right   lower extremity elevated  Taken 8/5/2024 1000 by Xochitl Alex RN  Body Position:   left   legs elevated  Taken 8/5/2024 0800 by Xochitl Alex RN  Body Position:   turned   right  Skin Protection:   adhesive use limited   drying agents applied   incontinence pads utilized   protective footwear used   silicone foam dressing in place   transparent dressing maintained  Intervention: Prevent and Manage VTE (Venous Thromboembolism) Risk  Recent Flowsheet Documentation  Taken 8/5/2024 1600 by Xochitl Alex RN  Activity Management: activity minimized  Taken 8/5/2024 1415 by Xochitl Alex RN  Activity Management: activity minimized  Taken 8/5/2024 1200 by Xochitl Alex RN  Activity Management: activity minimized  Taken 8/5/2024 1000 by Xochitl Alex RN  Activity Management: activity minimized  Taken 8/5/2024 0800 by Xochitl Alex RN  Activity Management: activity minimized  VTE Prevention/Management: (comfort) other (see comments)  Goal: Optimal Comfort and Wellbeing  Outcome: Ongoing, Progressing  Intervention: Provide Person-Centered Care  Recent Flowsheet Documentation  Taken 8/5/2024 0800 by Xochitl Alex RN  Trust Relationship/Rapport:   care explained   choices  provided   reassurance provided   thoughts/feelings acknowledged  Goal: Readiness for Transition of Care  Outcome: Ongoing, Progressing  Goal: Plan of Care Review  Outcome: Ongoing, Progressing  Flowsheets (Taken 8/5/2024 1720)  Progress: no change  Plan of Care Reviewed With: patient  Goal: Patient-Specific Goal (Individualized)  Outcome: Ongoing, Progressing  Goal: Absence of Hospital-Acquired Illness or Injury  Outcome: Ongoing, Progressing  Intervention: Identify and Manage Fall Risk  Recent Flowsheet Documentation  Taken 8/5/2024 1600 by Xochitl Alex RN  Safety Promotion/Fall Prevention:   safety round/check completed   room organization consistent   assistive device/personal items within reach  Taken 8/5/2024 1415 by Xochitl Alex RN  Safety Promotion/Fall Prevention:   assistive device/personal items within reach   clutter free environment maintained   room organization consistent   safety round/check completed  Taken 8/5/2024 1200 by Xochitl Alex RN  Safety Promotion/Fall Prevention:   assistive device/personal items within reach   clutter free environment maintained   safety round/check completed   room organization consistent  Taken 8/5/2024 1000 by Xochitl Alex RN  Safety Promotion/Fall Prevention:   assistive device/personal items within reach   clutter free environment maintained   room organization consistent   safety round/check completed  Taken 8/5/2024 0800 by Xochitl Alex RN  Safety Promotion/Fall Prevention:   activity supervised   assistive device/personal items within reach   clutter free environment maintained   room organization consistent   safety round/check completed  Intervention: Prevent Skin Injury  Recent Flowsheet Documentation  Taken 8/5/2024 1600 by Xochitl Alex RN  Body Position:   right   neutral body alignment   neutral head position  Taken 8/5/2024 1415 by Xochitl Alex RN  Body Position:   left   legs  elevated  Taken 8/5/2024 1200 by Xochitl Alex RN  Body Position:   right   lower extremity elevated  Taken 8/5/2024 1000 by Xochitl Alex RN  Body Position:   left   legs elevated  Taken 8/5/2024 0800 by Xochitl Alex RN  Body Position:   turned   right  Skin Protection:   adhesive use limited   drying agents applied   incontinence pads utilized   protective footwear used   silicone foam dressing in place   transparent dressing maintained  Intervention: Prevent and Manage VTE (Venous Thromboembolism) Risk  Recent Flowsheet Documentation  Taken 8/5/2024 1600 by Xochitl Alex RN  Activity Management: activity minimized  Taken 8/5/2024 1415 by Xochitl Alex RN  Activity Management: activity minimized  Taken 8/5/2024 1200 by Xochitl Alex RN  Activity Management: activity minimized  Taken 8/5/2024 1000 by Xochitl Alex RN  Activity Management: activity minimized  Taken 8/5/2024 0800 by Xochitl Alex RN  Activity Management: activity minimized  VTE Prevention/Management: (comfort) other (see comments)  Goal: Optimal Comfort and Wellbeing  Outcome: Ongoing, Progressing  Intervention: Provide Person-Centered Care  Recent Flowsheet Documentation  Taken 8/5/2024 0800 by Xochitl Alex RN  Trust Relationship/Rapport:   care explained   choices provided   reassurance provided   thoughts/feelings acknowledged  Goal: Readiness for Transition of Care  Outcome: Ongoing, Progressing     Problem: Skin Injury Risk Increased  Goal: Skin Health and Integrity  Outcome: Ongoing, Progressing  Intervention: Optimize Skin Protection  Recent Flowsheet Documentation  Taken 8/5/2024 1600 by Xochitl Alex RN  Head of Bed (HOB) Positioning: HOB at 20-30 degrees  Taken 8/5/2024 1415 by Xochitl Alex RN  Head of Bed (HOB) Positioning: HOB at 20-30 degrees  Taken 8/5/2024 1200 by Xochitl Alex RN  Head of Bed (HOB) Positioning:  HOB at 20-30 degrees  Taken 8/5/2024 1000 by Xochitl Alex RN  Head of Bed (HOB) Positioning: HOB at 20-30 degrees  Taken 8/5/2024 0800 by Xochitl Alex RN  Pressure Reduction Techniques:   positioned off wounds   weight shift assistance provided  Head of Bed (HOB) Positioning: HOB at 20-30 degrees  Pressure Reduction Devices:   specialty bed utilized   heel offloading device utilized   foam padding utilized  Skin Protection:   adhesive use limited   drying agents applied   incontinence pads utilized   protective footwear used   silicone foam dressing in place   transparent dressing maintained  Goal: Skin Health and Integrity  Outcome: Ongoing, Progressing  Intervention: Optimize Skin Protection  Recent Flowsheet Documentation  Taken 8/5/2024 1600 by Xochitl Alex RN  Head of Bed (HOB) Positioning: HOB at 20-30 degrees  Taken 8/5/2024 1415 by Xochitl Alex RN  Head of Bed (HOB) Positioning: HOB at 20-30 degrees  Taken 8/5/2024 1200 by Xochitl Alex RN  Head of Bed (HOB) Positioning: HOB at 20-30 degrees  Taken 8/5/2024 1000 by Xochitl Alex RN  Head of Bed (HOB) Positioning: HOB at 20-30 degrees  Taken 8/5/2024 0800 by Xochitl Alex RN  Pressure Reduction Techniques:   positioned off wounds   weight shift assistance provided  Head of Bed (HOB) Positioning: HOB at 20-30 degrees  Pressure Reduction Devices:   specialty bed utilized   heel offloading device utilized   foam padding utilized  Skin Protection:   adhesive use limited   drying agents applied   incontinence pads utilized   protective footwear used   silicone foam dressing in place   transparent dressing maintained     Problem: Palliative Care  Goal: Enhanced Quality of Life  Outcome: Ongoing, Progressing  Intervention: Optimize Function  Recent Flowsheet Documentation  Taken 8/5/2024 0800 by Xochitl Alex RN  Sensory Stimulation Regulation: care clustered  Sleep/Rest  Enhancement:   awakenings minimized   consistent schedule promoted   noise level reduced   natural light exposure provided   regular sleep/rest pattern promoted   relaxation techniques promoted   Goal Outcome Evaluation:  Plan of Care Reviewed With: patient        Progress: no change

## 2024-08-05 NOTE — PROGRESS NOTES
"Palliative Care Daily Progress Note     C/C: Patient sleeping.     S: Medical record reviewed. Follow up visit for GOC in the context of complex medical decision making. Events noted. RN reports patient with BLE pain to touch and increased BLE edema. No family at bedside. Ongoing comfort focused plan of care. Patient required Haldol IV for agitation.     ROS: ROS limited by sleeping.      O: Code Status:   Code Status and Medical Interventions:   Ordered at: 07/18/24 1522     Level Of Support Discussed With:    Next of Kin (If No Surrogate)     Code Status (Patient has no pulse and is not breathing):    No CPR (Do Not Attempt to Resuscitate)     Medical Interventions (Patient has pulse or is breathing):    Comfort Measures     Comments:    Three siblings (Frances, Caro, and Mahendra/Mary Ellen)      Advanced Directives: Advance Directive Status: Patient does not have advance directive   Goals of Care: Ongoing.   Palliative Performance Scale Score: 30%     /81 (BP Location: Right arm, Patient Position: Lying)   Pulse 95   Temp 97.1 °F (36.2 °C) (Temporal)   Resp 16   Ht 149.9 cm (59.02\")   Wt 31.8 kg (70 lb 3.2 oz)   SpO2 100%   BMI 14.17 kg/m²     Intake/Output Summary (Last 24 hours) at 8/5/2024 1225  Last data filed at 8/5/2024 0900  Gross per 24 hour   Intake 268 ml   Output 400 ml   Net -132 ml       PE:  General Appearance:    Patient laying in bed, eyes closed, asleep A/C ill appearing, frail, cooperative, NAD, severely malnourished   HEENT:    NC/AT, MMM, face relaxed   Neck:   supple, trachea midline, no JVD   Lungs:     CTA bilat, diminished in bases; respirations regular, even and unlabored; RR 16-18 on exam, on RA    Heart:    RRR, normal S1 and S2, no M/R/G, HR 95   Abdomen:     Normal bowel sounds, soft, nontender, nondistended, ostomy pouch in place to fistula RLQ   G/U:   Deferred   MSK/Extremities:   Severe wasting, no edema   Pulses:   Pulses palpable and equal bilaterally   Skin:   Warm, dry " "  Neurologic:   sleeping   Psych:  sleeping     Meds: Reviewed and changes noted    Labs:                     Invalid input(s): \"LABALBU\", \"PROT\"    Imaging Results (Last 72 Hours)       ** No results found for the last 72 hours. **                  Diagnostics: Reviewed    A:   Failure to thrive in adult    Severe malnutrition    Enterocutaneous fistula    Intra-abdominal fluid collection    Cognitive impairment     79 y.o. female with severe malnutrition, enterocutaneous fistula, concern for GI perforation.   S/S:   Pain -coccyx, abdominal secondary to enterocutaneous fistula/GI perforation  -continue Tylenol 650mg PO q 4 hours prn mild pain  -continue Morphine 1mg IV q 4 hours prn moderate pain     2. Nausea -discontinued Zofran 4mg IV/PO q 6 hours prn N/V as ineffective per RN  -continue Compazine 5mg IV q 6 hours prn N/V  -continue Haldol 0.5mg IV q 6 hours prn N/V     3. GOC -7/13 recommend DNR/DNI due to poor nutritional status (Albumin 2.0) and debility, as patient would not benefit from aggressive resuscitation attempts with CPR nor would she benefit from MV  -7/13 reviewed with Dr. Vasquez  -7/13 reviewed with MSMARLENE Corley to review current attempts to find family/medical decision maker/guardian  7/17: called sister Frances at [phone] who states family have arrived at hospital, plan to meet in room  7/17: met with sisters ,Frances and Caro, at [phone], reviewed patient's condition and current plan of care, daughters in agreement with current plan of care  7/18: Continue to recommend comfort focused plan of care due to enterocutaneous fistula in context of nonsurgical candidate due to malnutrition. Return visit to patient's room at 7/18 1515 to meet with patient's sisters Frances and sister-in-lawMary Ellen. Reviewed comfort measures. Family has talked with one another and would like to elect comfort measures per sister and sister-in-law. Orders adjusted.   7/23: Patient with ongoing nausea. Hospice " consulted.  7/26: Patient continues on comfort measures. Symptoms currently managed on current regimen.   8/1: Ongoing comfort measures.   8/5: Ongoing comfort measures.    P: Follow up visit for symptom management of abdominal pain/nausea. Ongoing comfort focused plan of care with symptom management throughout end of life. Patient continues to decline, weaker, eating less.    Palliative Care Team will continue to follow patient. Please do not hesitate to contact us regarding further sx mgmt or GOC needs.  Gisella Olivia, APRN  8/5/2024  Time spent: 10 minutes

## 2024-08-05 NOTE — PROGRESS NOTES
[facility] Medicine Services  PROGRESS NOTE    Patient Name: Arcelia Walter  : 1945  MRN: [id number]    Date of Admission: 2024  Primary Care Physician: Provider, No Known    Subjective   Subjective     CC:  Follow-up abdominal fistula    HPI:  Sleeping, appears comfortable, no acute overnight events      Objective   Objective     Vital Signs:   Temp:  [97.1 °F (36.2 °C)] 97.1 °F (36.2 °C)  Heart Rate:  [60-95] 95  Resp:  [16] 16  BP: (121-125)/(76-81) 125/81     Physical Exam:  Constitutional: No acute distress, appears to be resting comfortably with eyes closed  HENT: NCAT, mucous membranes dry  Respiratory: respiratory effort appears normal on RA   Musculoskeletal: trace BLE edema   Psychiatric: Resting with eyes closed  Neurologic: Resting with eyes closed  Skin: warm, dry, no visible rash     Results Reviewed:  LAB RESULTS:                              Brief Urine Lab Results  (Last result in the past 365 days)        Color   Clarity   Blood   Leuk Est   Nitrite   Protein   CREAT   Urine HCG        24 0038 Yellow   Clear   Negative   Negative   Negative   Trace                   Microbiology Results Abnormal       Procedure Component Value - Date/Time    Clostridioides difficile Toxin - Stool, Per Rectum [[id number]]  (Normal) Collected: 24 1024    Lab Status: Final result Specimen: Stool from Per Rectum Updated: 24 1150    Narrative:      The following orders were created for panel order Clostridioides difficile Toxin - Stool, Per Rectum.  Procedure                               Abnormality         Status                     ---------                               -----------         ------                     Clostridioides difficile...[[id number]]  Normal              Final result                 Please view results for these tests on the individual orders.    Clostridioides difficile Toxin, PCR - Stool, Per Rectum [[id number]]  (Normal) Collected: 24  1024    Lab Status: Final result Specimen: Stool from Per Rectum Updated: 07/18/24 1150     Toxigenic C. difficile by PCR Not Detected    Narrative:      The result indicates the absence of toxigenic C. difficile from stool specimen.     Blood Culture - Blood, Hand, Left [[id number]]  (Normal) Collected: 07/12/24 1033    Lab Status: Final result Specimen: Blood from Hand, Left Updated: 07/17/24 1245     Blood Culture No growth at 5 days    Narrative:      Less than seven (7) mL's of blood was collected.  Insufficient quantity may yield false negative results.    Blood Culture - Blood, Hand, Right [[id number]]  (Normal) Collected: 07/12/24 1033    Lab Status: Final result Specimen: Blood from Hand, Right Updated: 07/17/24 1245     Blood Culture No growth at 5 days    Narrative:      Less than seven (7) mL's of blood was collected.  Insufficient quantity may yield false negative results.    Blood Culture - Blood, Hand, Right [[id number]]  (Normal) Collected: 07/01/24 1558    Lab Status: Final result Specimen: Blood from Hand, Right Updated: 07/06/24 1731     Blood Culture No growth at 5 days    Narrative:      Aerobic bottle only  Less than seven (7) mL's of blood was collected.  Insufficient quantity may yield false negative results.    Blood Culture - Blood, Hand, Left [[id number]]  (Normal) Collected: 07/01/24 1558    Lab Status: Final result Specimen: Blood from Hand, Left Updated: 07/06/24 1731     Blood Culture No growth at 5 days            No radiology results from the last 24 hrs    Results for orders placed during the hospital encounter of 06/28/24    Adult Transthoracic Echo Complete W/ Cont if Necessary Per Protocol    Interpretation Summary    Left ventricular systolic function is normal. Left ventricular ejection fraction appears to be 61 - 65%.    There is mild calcification of the aortic valve.    Mild aortic valve regurgitation is present.    Mild mitral annular calcification is present.    Mild to  moderate mitral valve regurgitation is present.    Mild tricuspid valve regurgitation is present.      Current medications:  Scheduled Meds:mirtazapine, 15 mg, Oral, Nightly  nicotine, 1 patch, Transdermal, Q24H  sodium chloride, 10 mL, Intravenous, Q12H      Continuous Infusions:   PRN Meds:.  acetaminophen    senna-docusate sodium **AND** polyethylene glycol **AND** bisacodyl **AND** bisacodyl    haloperidol lactate    melatonin    Morphine    ondansetron    prochlorperazine    sodium chloride    sodium chloride    Assessment & Plan   Assessment & Plan     Active Hospital Problems    Diagnosis  POA    **Failure to thrive in adult [R62.7]  Yes    Enterocutaneous fistula [K63.2]  Yes    Intra-abdominal fluid collection [R18.8]  Yes    Cognitive impairment [R41.89]  Yes    Severe malnutrition [E43]  Yes      Resolved Hospital Problems   No resolved problems to display.        Brief Hospital Course to date:  Arcelia Walter is a 79 y.o. female w completely unknown pmhx who presented after being found down by EMS on welfare check. Found to have an area in right lower abdomen of concern noted 7/5, CT without contrast initially attributed to right femoral hernia but then began to leak brown foul smelling fluid 7/12. 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 Given patient's severe malnutrition, chronic failure to thrive, concern also for chronic dementia given the above (though collateral unable to be obtained) and discussion with multiple physicians, ethics/palliative, decision was made 7/13 not to escalate care plan (no surgery, no artificial nutrition) on 7/13. Patient's family has since been located, prognosis has been deemed to be poor, she is currently on comfort measures with hospice following.     This patient's problems and plans were partially entered by my partner and updated as appropriate by me 08/05/24.     Spontaneous  enterocutaneous fistula, concern for appendiceal perforation/other GI perforation  -s/p drain into bedside placed ostomy, this has since been discontinued, now with ostomy in place; patient is not systemically ill/septic  -evaluated by IR 7/13, given self draining, an IR drain placement thought not to add benefit  -Patient was deemed to be a poor surgical candidate given a BMI 13-14 and overall significant deconditioning and would lead to poor healing/poor outcome/infection, she is s/p IV Zosyn, currently on comfort care  -appreciate ethics/palliative assistance   -Hospice following      Significant nausea-resolved  Diarrhea/abd cramps-resolved  -Nurse reported significant diarrhea with some abdominal cramping and persistent nausea not responding to Zofran. Resolved.  -Continue supportive therapy s/p Compazine  -Stool C. Difficile negative     Acute anemia  -Low but stable on last lab draw 7/17/2024, no signs of blood loss  -Patient now comfort measures, monitoring discontinued     Severe chronic malnutrition, BMI 13   -Encouraging intake but minimal even before acute condition  -Dietitian followed     Appears chronic dementia/FTT   -Appears chronic and not acute encephalopathy given stability and overall state     Lack of capacity   -Family now located. Awaiting disposition.      GOC:   -Prognosis remains poor, patient undergoing terminal decline, and is not a surgical candidate. Family has been present.  Palliative care following, per discussions with palliative 7/18 with patient's Sister Frances and sister-in-law Mary Ellen, they have elected for comfort measures.  No further labs draws/invasive measures. Patient currently does not meet inpatient hospice criteria, anticipate she will remain in-house for now.     Expected Discharge Location and Transportation: hospice   Expected Discharge TBD  Expected Discharge Date: 8/14/2024; Expected Discharge Time:       VTE Prophylaxis:  Mechanical VTE prophylaxis orders are  present.         AM-PAC 6 Clicks Score (PT): 7 (08/05/24 0800)    CODE STATUS:   Code Status and Medical Interventions:   Ordered at: 07/18/24 1522     Level Of Support Discussed With:    Next of Kin (If No Surrogate)     Code Status (Patient has no pulse and is not breathing):    No CPR (Do Not Attempt to Resuscitate)     Medical Interventions (Patient has pulse or is breathing):    Comfort Measures     Comments:    Three siblings (Frances, Caro, and Mahendra/Mary Ellen)       Kaitlynn Chu MD  08/05/24

## 2024-08-05 NOTE — PLAN OF CARE
Problem: Adult Inpatient Plan of Care  Goal: Plan of Care Review  Outcome: Ongoing, Progressing  Flowsheets  Taken 8/5/2024 0404  Progress: no change  Plan of Care Reviewed With: patient  Taken 8/4/2024 0703  Outcome Evaluation: VSS. Se LE edema. Pt resting well after receiving haldol on day shift.   Coughing noted when taking pills with liquid. Tolerating pills better with applesauce--no  coughing noted.  Goal Outcome Evaluation:  Plan of Care Reviewed With: patient        Progress: no change  Outcome Evaluation: VSS. Se LE edema much improved after lasix administered on day shift.  Se LE edema began returning by 2200.  Ostomy changed. Restless at times. Morphine prn x 3. Melatonin prn x1.

## 2024-08-05 NOTE — PLAN OF CARE
Goal Outcome Evaluation:  Plan of Care Reviewed With: patient        Progress: no change  Outcome Evaluation: Palliative RN saw pt at 1117.  Pt. was asleep on RA and did not demonstrate any visible signs of discomfort at rest.  Pt. had dose of haldol over night.  Took bites of breakfast this morning.  BLE edema persists per nursing report.  Palliative care to continue to follow for support, POC and ongoing symptom management.      1300 Palliative IDT meeting: JAGJIT Mejia RN, CHPN; CROW Olivia, APRN; DAVIS Patricio RN, CHPN; THELMA Lerner, [facility], [facility]-    After hours, weekends and holidays, contact Palliative Provider by calling [phone].

## 2024-08-05 NOTE — CASE MANAGEMENT/SOCIAL WORK
Continued Stay Note  [facility]     Patient Name: Arcelia Walter  MRN: [id number]  Today's Date: 8/5/2024    Admit Date: 6/28/2024    Plan: Comfort care/End of life care   Discharge Plan       Row Name 08/05/24 1014       Plan    Plan Comfort care/End of life care    Plan Comments SW'er met with patient at bedside. Patient is being followed by Palliative Care and Hospice for comfort care and end of life care. SW'er will continue to follow for all discharge planning needs. Patient remains comfort care. SW/CM available                   Discharge Codes    No documentation.                 Expected Discharge Date and Time       Expected Discharge Date Expected Discharge Time    Aug 2, 2024               SHANTI Shaw (Kay)

## 2024-08-05 NOTE — PROGRESS NOTES
Continued Stay Note  [facility]     Patient Name: Arcelia Walter  MRN: [id number]  Today's Date: 8/5/2024    Admit Date: 6/28/2024    Plan: Comfort care   Discharge Plan       Row Name 08/05/24 1300       Plan    Plan Comfort care    Plan Comments Visit made to pt, pt has 2 visitors present. Noted pt's lunch tray at the bedside, no food had been eating. Pt stated some of the fruit leaves a bad taste. Pt denies any pain or distress. Pt began talking about the cat in the room. Hospice liaison will continue to follow. Please call [phone] if can be of further assistance.      Row Name 08/05/24 1014       Plan    Plan     Plan Comments                    Discharge Codes    No documentation.                 Expected Discharge Date and Time       Expected Discharge Date Expected Discharge Time    Aug 2, 2024               Mildred Gross RN

## 2024-08-06 PROCEDURE — 25010000002 HALOPERIDOL LACTATE PER 5 MG

## 2024-08-06 PROCEDURE — 25010000002 ONDANSETRON PER 1 MG

## 2024-08-06 PROCEDURE — 99232 SBSQ HOSP IP/OBS MODERATE 35: CPT | Performed by: INTERNAL MEDICINE

## 2024-08-06 PROCEDURE — 25010000002 FUROSEMIDE PER 20 MG

## 2024-08-06 RX ORDER — FUROSEMIDE 10 MG/ML
20 INJECTION INTRAMUSCULAR; INTRAVENOUS ONCE
Status: COMPLETED | OUTPATIENT
Start: 2024-08-06 | End: 2024-08-06

## 2024-08-06 RX ADMIN — MIRTAZAPINE 15 MG: 15 TABLET, FILM COATED ORAL at 21:21

## 2024-08-06 RX ADMIN — HALOPERIDOL LACTATE 0.5 MG: 5 INJECTION, SOLUTION INTRAMUSCULAR at 18:36

## 2024-08-06 RX ADMIN — ACETAMINOPHEN 650 MG: 325 TABLET ORAL at 13:34

## 2024-08-06 RX ADMIN — ONDANSETRON 4 MG: 2 INJECTION INTRAMUSCULAR; INTRAVENOUS at 13:48

## 2024-08-06 RX ADMIN — POLYETHYLENE GLYCOL 3350 17 G: 17 POWDER, FOR SOLUTION ORAL at 08:50

## 2024-08-06 RX ADMIN — Medication 1 PATCH: at 08:50

## 2024-08-06 RX ADMIN — FUROSEMIDE 20 MG: 10 INJECTION, SOLUTION INTRAMUSCULAR; INTRAVENOUS at 11:58

## 2024-08-06 RX ADMIN — Medication 5 MG: at 21:21

## 2024-08-06 NOTE — PROGRESS NOTES
[facility] Medicine Services  PROGRESS NOTE    Patient Name: Arcelia Walter  : 1945  MRN: [id number]    Date of Admission: 2024  Primary Care Physician: Provider, No Known    Subjective   Subjective     CC:  Abdominal fistula f/u    HPI:  Has declined significantly since my last evaluation of her in mid-July - much skinnier  Does not remember recent events  Reports nausea as only current symptom and this comes and goes    Objective   Objective     Vital Signs:   Temp:  [97.2 °F (36.2 °C)-97.6 °F (36.4 °C)] 97.6 °F (36.4 °C)  Heart Rate:  [69-77] 69  Resp:  [16] 16  BP: (124)/(76-79) 124/76     Physical Exam:  Constitutional: Ill appearing, awake, alert cachectic female lying in bed  HENT: NCAT, mucous membranes dry  Respiratory: Clear to auscultation bilaterally, respiratory effort normal   Cardiovascular: RRR, no murmurs, rubs, or gallops  Gastrointestinal: Soft, ostomy RLQ w brown output  Musculoskeletal: Muscle tone significantly decreased throughout no joint effusions appreciated  Psychiatric: Appropriate affect, cooperative  Neurologic: Alert, oriented only to self not to location/date/or situation. Does not remember me. speech clear  Skin: No rashes    Results Reviewed:  LAB RESULTS:                                        Brief Urine Lab Results  (Last result in the past 365 days)        Color   Clarity   Blood   Leuk Est   Nitrite   Protein   CREAT   Urine HCG        24 0038 Yellow   Clear   Negative   Negative   Negative   Trace                   Microbiology Results Abnormal       Procedure Component Value - Date/Time    Clostridioides difficile Toxin - Stool, Per Rectum [[id number]]  (Normal) Collected: 24 1024    Lab Status: Final result Specimen: Stool from Per Rectum Updated: 24 1150    Narrative:      The following orders were created for panel order Clostridioides difficile Toxin - Stool, Per Rectum.  Procedure                                Abnormality         Status                     ---------                               -----------         ------                     Clostridioides difficile...[[id number]]  Normal              Final result                 Please view results for these tests on the individual orders.    Clostridioides difficile Toxin, PCR - Stool, Per Rectum [[id number]]  (Normal) Collected: 07/18/24 1024    Lab Status: Final result Specimen: Stool from Per Rectum Updated: 07/18/24 1150     Toxigenic C. difficile by PCR Not Detected    Narrative:      The result indicates the absence of toxigenic C. difficile from stool specimen.     Blood Culture - Blood, Hand, Left [[id number]]  (Normal) Collected: 07/12/24 1033    Lab Status: Final result Specimen: Blood from Hand, Left Updated: 07/17/24 1245     Blood Culture No growth at 5 days    Narrative:      Less than seven (7) mL's of blood was collected.  Insufficient quantity may yield false negative results.    Blood Culture - Blood, Hand, Right [[id number]]  (Normal) Collected: 07/12/24 1033    Lab Status: Final result Specimen: Blood from Hand, Right Updated: 07/17/24 1245     Blood Culture No growth at 5 days    Narrative:      Less than seven (7) mL's of blood was collected.  Insufficient quantity may yield false negative results.    Blood Culture - Blood, Hand, Right [[id number]]  (Normal) Collected: 07/01/24 1558    Lab Status: Final result Specimen: Blood from Hand, Right Updated: 07/06/24 1731     Blood Culture No growth at 5 days    Narrative:      Aerobic bottle only  Less than seven (7) mL's of blood was collected.  Insufficient quantity may yield false negative results.    Blood Culture - Blood, Hand, Left [[id number]]  (Normal) Collected: 07/01/24 1558    Lab Status: Final result Specimen: Blood from Hand, Left Updated: 07/06/24 1731     Blood Culture No growth at 5 days            No radiology results from the last 24 hrs    Results for orders placed during the hospital  encounter of 06/28/24    Adult Transthoracic Echo Complete W/ Cont if Necessary Per Protocol    Interpretation Summary    Left ventricular systolic function is normal. Left ventricular ejection fraction appears to be 61 - 65%.    There is mild calcification of the aortic valve.    Mild aortic valve regurgitation is present.    Mild mitral annular calcification is present.    Mild to moderate mitral valve regurgitation is present.    Mild tricuspid valve regurgitation is present.      Current medications:  Scheduled Meds:mirtazapine, 15 mg, Oral, Nightly  nicotine, 1 patch, Transdermal, Q24H  sodium chloride, 10 mL, Intravenous, Q12H      Continuous Infusions:   PRN Meds:.  acetaminophen    senna-docusate sodium **AND** polyethylene glycol **AND** bisacodyl **AND** bisacodyl    haloperidol lactate    melatonin    Morphine    ondansetron    prochlorperazine    sodium chloride    sodium chloride    Assessment & Plan   Assessment & Plan     Active Hospital Problems    Diagnosis  POA    **Failure to thrive in adult [R62.7]  Yes    Enterocutaneous fistula [K63.2]  Yes    Intra-abdominal fluid collection [R18.8]  Yes    Cognitive impairment [R41.89]  Yes    Severe malnutrition [E43]  Yes      Resolved Hospital Problems   No resolved problems to display.        Brief Hospital Course to date:  Arcelia Walter is a 79 y.o. female w completely unknown pmhx who presented after being found down by EMS on welfare check. No emergency contacts/collateral available in system.   Admitted for altered mental status but after review appears chronic, working on placement/emergency guardianship.  Area in right lower abdomen of concern noted 7/5, CT without contrast initially attributed to right femoral hernia but then began to leak brown foul smelling fluid 7/12. 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 Given patient's severe malnutrition, chronic failure to  thrive, extensive surgery not thought to be in her best interest and family in agreement. Transitioned to comfort care goals mid July  Declining quickly, eating very little. Requiring IV meds from time to time.     Continue to follow for hospice appropriateness     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/other GI perforation  -draining into bedside placed ostomy bag low volume  -palliative/hospice following    Severe chronic malnutrition, BMI 13  Appears chronic dementia/FTT       Expected Discharge Location and Transportation: SNF w hospice v IP hospice  Expected Discharge when arranged w hospice team  Expected Discharge Date: 8/9/2024; Expected Discharge Time:      VTE Prophylaxis:  Mechanical VTE prophylaxis orders are present.         AM-PAC 6 Clicks Score (PT): 7 (08/05/24 2000)    CODE STATUS:   Code Status and Medical Interventions:   Ordered at: 07/18/24 1522     Level Of Support Discussed With:    Next of Kin (If No Surrogate)     Code Status (Patient has no pulse and is not breathing):    No CPR (Do Not Attempt to Resuscitate)     Medical Interventions (Patient has pulse or is breathing):    Comfort Measures     Comments:    Three siblings (Frances, Caro, and Mahendra/Mary Ellen)       Leigh Ann Vasquez MD  08/06/24

## 2024-08-06 NOTE — PLAN OF CARE
Problem: Fall Injury Risk  Goal: Absence of Fall and Fall-Related Injury  Outcome: Ongoing, Progressing     Problem: Fall Injury Risk  Goal: Absence of Fall and Fall-Related Injury  Intervention: Identify and Manage Contributors  Recent Flowsheet Documentation  Taken 8/6/2024 0200 by Neda Mishra RN  Medication Review/Management: medications reviewed  Taken 8/6/2024 0000 by Neda Mishra RN  Medication Review/Management: medications reviewed  Taken 8/5/2024 2200 by Neda Mishra RN  Medication Review/Management: medications reviewed  Taken 8/5/2024 2000 by Neda Mishra RN  Medication Review/Management: medications reviewed     Problem: Fall Injury Risk  Goal: Absence of Fall and Fall-Related Injury  Intervention: Promote Injury-Free Environment  Recent Flowsheet Documentation  Taken 8/6/2024 0200 by Neda Mishra RN  Safety Promotion/Fall Prevention:   activity supervised   assistive device/personal items within reach   clutter free environment maintained   fall prevention program maintained   room organization consistent   safety round/check completed  Taken 8/6/2024 0000 by Neda Mishra RN  Safety Promotion/Fall Prevention:   activity supervised   assistive device/personal items within reach   clutter free environment maintained   fall prevention program maintained   room organization consistent   safety round/check completed  Taken 8/5/2024 2200 by Neda Mishra RN  Safety Promotion/Fall Prevention:   room organization consistent   safety round/check completed   assistive device/personal items within reach   activity supervised   clutter free environment maintained   fall prevention program maintained  Taken 8/5/2024 2000 by Neda Mishra RN  Safety Promotion/Fall Prevention:   activity supervised   assistive device/personal items within reach   clutter free environment maintained   fall prevention program maintained   room organization consistent   safety round/check completed   Goal  Outcome Evaluation:              Outcome Evaluation: Pt on room air. Moderate edema noted on bilateral feet and ankles. Pt is able to report name and birthdate but unaware of year or location. Protective dressings along spine clean dry and intact. Perdomo clean and intact. PRN melatonin given to promote sleep. Pt has been resting well this shift.

## 2024-08-06 NOTE — PLAN OF CARE
Goal Outcome Evaluation:  Plan of Care Reviewed With: patient        Progress: no change  Outcome Evaluation: VSS on RA. Abdominal pain managed with prn acetaminophen x1 and prn ondansetron x1, patient reports improvement. Large BM following prn polyethylene glycol in addition to 50 mL liquid output via percutaneous fistula. Sacral spine PI dressing changed. Adequate UOP via indwelling catheter. Patient requires feeding assistance, eating 25% or less of meals. One-time dose of furosemide administered for moderate bilateral lower extremity edema. Slept periodically throughout shift. Prn haloperidol lactate x1 for restlessness, agitation. No family at bedside this shift. Calm, cooperative. Alert & oriented to self only. Care ongoing, continue plan of care.

## 2024-08-07 PROCEDURE — 25010000002 MORPHINE PER 10 MG: Performed by: FAMILY MEDICINE

## 2024-08-07 PROCEDURE — 25010000002 HALOPERIDOL LACTATE PER 5 MG

## 2024-08-07 PROCEDURE — 99232 SBSQ HOSP IP/OBS MODERATE 35: CPT | Performed by: NURSE PRACTITIONER

## 2024-08-07 RX ADMIN — Medication 10 ML: at 20:15

## 2024-08-07 RX ADMIN — HALOPERIDOL LACTATE 0.5 MG: 5 INJECTION, SOLUTION INTRAMUSCULAR at 04:20

## 2024-08-07 RX ADMIN — Medication 5 MG: at 20:11

## 2024-08-07 RX ADMIN — MIRTAZAPINE 15 MG: 15 TABLET, FILM COATED ORAL at 20:11

## 2024-08-07 RX ADMIN — MORPHINE SULFATE 1 MG: 2 INJECTION, SOLUTION INTRAMUSCULAR; INTRAVENOUS at 04:20

## 2024-08-07 RX ADMIN — Medication 1 PATCH: at 08:37

## 2024-08-07 NOTE — PROGRESS NOTES
[facility] Medicine Services  PROGRESS NOTE    Patient Name: Arcelia Walter  : 1945  MRN: [id number]    Date of Admission: 2024  Primary Care Physician: Provider, No Known    Subjective   Subjective     CC:  F/u abdominal fistula    HPI:  Patient resting in bed.  NAD.  Denies concerns.  Per palliative team, patient required increased as needed medications last night and they are discussing admission to inpatient hospice with hospice team.      Objective   Objective     Vital Signs:   Temp:  [97.6 °F (36.4 °C)] 97.6 °F (36.4 °C)  Heart Rate:  [69] 69  Resp:  [16] 16  BP: (124)/(76) 124/76     Physical Exam:  Constitutional: No acute distress, awake, alert, cachectic, frail-appearing  HENT: NCAT, mucous membranes moist  Respiratory: Clear to auscultation bilaterally, respiratory effort normal, room air   Cardiovascular: RRR, no murmurs, rubs, or gallops  Gastrointestinal: Positive bowel sounds, soft, nontender, nondistended, RLQ ostomy over fistula intact, scant brown drainage  Genitourinary: Perdomo catheter in place  Musculoskeletal: No bilateral ankle edema  Psychiatric: Appropriate affect, cooperative  Neurologic: Oriented to self, moves all extremities, speech clear  Skin: No rashes      Results Reviewed:  LAB RESULTS:                              Brief Urine Lab Results  (Last result in the past 365 days)        Color   Clarity   Blood   Leuk Est   Nitrite   Protein   CREAT   Urine HCG        24 0038 Yellow   Clear   Negative   Negative   Negative   Trace                   Microbiology Results Abnormal       Procedure Component Value - Date/Time    Clostridioides difficile Toxin - Stool, Per Rectum [[id number]]  (Normal) Collected: 24 1024    Lab Status: Final result Specimen: Stool from Per Rectum Updated: 24 1150    Narrative:      The following orders were created for panel order Clostridioides difficile Toxin - Stool, Per Rectum.  Procedure                                Abnormality         Status                     ---------                               -----------         ------                     Clostridioides difficile...[[id number]]  Normal              Final result                 Please view results for these tests on the individual orders.    Clostridioides difficile Toxin, PCR - Stool, Per Rectum [[id number]]  (Normal) Collected: 07/18/24 1024    Lab Status: Final result Specimen: Stool from Per Rectum Updated: 07/18/24 1150     Toxigenic C. difficile by PCR Not Detected    Narrative:      The result indicates the absence of toxigenic C. difficile from stool specimen.     Blood Culture - Blood, Hand, Left [[id number]]  (Normal) Collected: 07/12/24 1033    Lab Status: Final result Specimen: Blood from Hand, Left Updated: 07/17/24 1245     Blood Culture No growth at 5 days    Narrative:      Less than seven (7) mL's of blood was collected.  Insufficient quantity may yield false negative results.    Blood Culture - Blood, Hand, Right [[id number]]  (Normal) Collected: 07/12/24 1033    Lab Status: Final result Specimen: Blood from Hand, Right Updated: 07/17/24 1245     Blood Culture No growth at 5 days    Narrative:      Less than seven (7) mL's of blood was collected.  Insufficient quantity may yield false negative results.    Blood Culture - Blood, Hand, Right [[id number]]  (Normal) Collected: 07/01/24 1558    Lab Status: Final result Specimen: Blood from Hand, Right Updated: 07/06/24 1731     Blood Culture No growth at 5 days    Narrative:      Aerobic bottle only  Less than seven (7) mL's of blood was collected.  Insufficient quantity may yield false negative results.    Blood Culture - Blood, Hand, Left [[id number]]  (Normal) Collected: 07/01/24 1558    Lab Status: Final result Specimen: Blood from Hand, Left Updated: 07/06/24 1731     Blood Culture No growth at 5 days            No radiology results from the last 24 hrs    Results for orders placed during the  hospital encounter of 06/28/24    Adult Transthoracic Echo Complete W/ Cont if Necessary Per Protocol    Interpretation Summary    Left ventricular systolic function is normal. Left ventricular ejection fraction appears to be 61 - 65%.    There is mild calcification of the aortic valve.    Mild aortic valve regurgitation is present.    Mild mitral annular calcification is present.    Mild to moderate mitral valve regurgitation is present.    Mild tricuspid valve regurgitation is present.      Current medications:  Scheduled Meds:mirtazapine, 15 mg, Oral, Nightly  nicotine, 1 patch, Transdermal, Q24H  sodium chloride, 10 mL, Intravenous, Q12H      Continuous Infusions:   PRN Meds:.  acetaminophen    senna-docusate sodium **AND** polyethylene glycol **AND** bisacodyl **AND** bisacodyl    haloperidol lactate    melatonin    Morphine    ondansetron    prochlorperazine    sodium chloride    sodium chloride    Assessment & Plan   Assessment & Plan     Active Hospital Problems    Diagnosis  POA    **Failure to thrive in adult [R62.7]  Yes    Enterocutaneous fistula [K63.2]  Yes    Intra-abdominal fluid collection [R18.8]  Yes    Cognitive impairment [R41.89]  Yes    Severe malnutrition [E43]  Yes      Resolved Hospital Problems   No resolved problems to display.        Brief Hospital Course to date:  Arcelia Walter is a 79 y.o. female  w completely unknown pmhx who presented after being found down by EMS on welfare check, initially no emergency contacts/collateral available in system. Patient's sisters and daughters located by MSW.     Admitted for altered mental status, but after review appears chronic,   Area in right lower abdomen of concern noted 7/5, CT without contrast initially attributed to right femoral hernia but then began to leak brown foul smelling fluid 7/12. 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 Given patient's severe  malnutrition, chronic failure to thrive, extensive surgery not thought to be in her best interest and family in agreement.     Transitioned to comfort care goals mid July. Declining quickly, eating very little. Requiring IV meds from time to time. Continue to follow for inpatient hospice appropriateness.     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/other GI perforation  -draining into bedside placed ostomy bag low volume  -palliative/hospice following  -hospice to evaluate for inpatient hospice admission today, pt required additional prn meds overnight (morphine, haldol)     Severe chronic malnutrition, BMI 13  Appears chronic dementia/FTT        Expected Discharge Location and Transportation: Inpatient hospice  Expected Discharge   Expected Discharge Date: 8/9/2024; Expected Discharge Time:      VTE Prophylaxis:  Mechanical VTE prophylaxis orders are present.         AM-PAC 6 Clicks Score (PT): 6 (08/06/24 2000)    CODE STATUS:   Code Status and Medical Interventions:   Ordered at: 07/18/24 1522     Level Of Support Discussed With:    Next of Kin (If No Surrogate)     Code Status (Patient has no pulse and is not breathing):    No CPR (Do Not Attempt to Resuscitate)     Medical Interventions (Patient has pulse or is breathing):    Comfort Measures     Comments:    Three siblings (Frances, Caro, and Mahendra/Mary Ellen)       Gaby Ojeda, APRN  08/07/24

## 2024-08-07 NOTE — PLAN OF CARE
Goal Outcome Evaluation: awaiting care planning.  Pt confused converses but pleasant.

## 2024-08-07 NOTE — PROGRESS NOTES
Continued Stay Note  [facility]     Patient Name: Arcelia Walter  MRN: [id number]  Today's Date: 8/7/2024    Admit Date: 6/28/2024    Plan: Comfort care  Inpatient hospice consult received. Chart reviewed. Hospice RN to bedside. She denies pain, nausea, dyspnea, anxiety. She is asking to be repositioned and asks for RN to turn the tv on. She had prns of injectable Morphine and Haldol x1 each overnight. No prns given this shift. No family present at time of visit. Inpatient hospice will follow up with patient and family tomorrow.          Arely Vega RN

## 2024-08-07 NOTE — PROGRESS NOTES
"Palliative Care Daily Progress Note     C/C: ***    S: Medical record reviewed. Follow up visit for *** . Events noted.    ROS: ***    O: Code Status:   Code Status and Medical Interventions:   Ordered at: 07/18/24 1522     Level Of Support Discussed With:    Next of Kin (If No Surrogate)     Code Status (Patient has no pulse and is not breathing):    No CPR (Do Not Attempt to Resuscitate)     Medical Interventions (Patient has pulse or is breathing):    Comfort Measures     Comments:    Three siblings (Frances, Caro, and Mahendra/Mary Ellen)      Advanced Directives: Advance Directive Status: Patient does not have advance directive   Goals of Care: Ongoing.   Palliative Performance Scale Score: 30%     /70 (BP Location: Right arm, Patient Position: Lying)   Pulse 71   Temp 96.9 °F (36.1 °C) (Temporal)   Resp 15   Ht 149.9 cm (59.02\")   Wt 31.8 kg (70 lb 3.2 oz)   SpO2 91%   BMI 14.17 kg/m²     Intake/Output Summary (Last 24 hours) at 8/7/2024 1554  Last data filed at 8/7/2024 1130  Gross per 24 hour   Intake 272 ml   Output 800 ml   Net -528 ml       PE:  General Appearance:    Alert, cooperative, NAD   HEENT:    NC/AT, EOMI, anicteric, MMM, face relaxed   Neck:   supple, trachea midline, no JVD   Lungs:     CTA bilat, diminished in bases; respirations regular, even and unlabored; RR on exam    Heart:    RRR, normal S1 and S2, no M/R/G   Abdomen:     Normal bowel sounds, soft, non-tender, non-distended   G/U:   Deferred   MSK/Extremities:   Wasting, no edema   Pulses:   Pulses palpable and equal bilaterally   Skin:   Warm, dry   Neurologic:   A/Ox3, cooperative, TORO   Psych:   Calm, appropriate     Meds: Reviewed and changes {Blank single:75558::\"noted\",\"not noted\"}    Labs:                 Invalid input(s): \"LABALBU\", \"PROT\"  Imaging Results (Last 72 Hours)       ** No results found for the last 72 hours. **                  Diagnostics: Reviewed    A:   Failure to thrive in adult    Severe malnutrition    " Enterocutaneous fistula    Intra-abdominal fluid collection    Cognitive impairment       S/Sx:  1. ***    P:   Palliative Care Team will continue to follow patient. Please do not hesitate to contact us regarding further sx mgmt or GOC needs.  Harriet Mays, APRN  8/7/2024  Time spent:***

## 2024-08-07 NOTE — PLAN OF CARE
Goal Outcome Evaluation:  Plan of Care Reviewed With: patient        Progress: no change  Outcome Evaluation: VSS, pt rested well, no periods of restlessness this shift, F/C remains patent, ostomy appliance over fistula intact, turned q 2

## 2024-08-07 NOTE — CASE MANAGEMENT/SOCIAL WORK
Continued Stay Note   [place]     Patient Name: Arcelia Walter  MRN: [id number]  Today's Date: 8/7/2024    Admit Date: 6/28/2024    Plan: Comfort care/end of life care   Discharge Plan       Row Name 08/07/24 0880       Plan    Plan Comfort Care    Patient/Family in Agreement with Plan yes  Siblings    Plan Comments Remains comfort care, Palliative Care Team and Hospice following.  Case Management available should any needs arise.  Discussed in unit multidisciplinary rounds this morning.  Susan Hoskins, Ext. [phone]    Final Discharge Disposition Code 30 - still a patient                   Discharge Codes    No documentation.                 Expected Discharge Date and Time       Expected Discharge Date Expected Discharge Time    Aug 9, 2024               SHANTI Jennings

## 2024-08-08 PROCEDURE — 99232 SBSQ HOSP IP/OBS MODERATE 35: CPT | Performed by: NURSE PRACTITIONER

## 2024-08-08 PROCEDURE — 25010000002 HALOPERIDOL LACTATE PER 5 MG

## 2024-08-08 RX ADMIN — MIRTAZAPINE 15 MG: 15 TABLET, FILM COATED ORAL at 20:17

## 2024-08-08 RX ADMIN — HALOPERIDOL LACTATE 0.5 MG: 5 INJECTION, SOLUTION INTRAMUSCULAR at 15:12

## 2024-08-08 RX ADMIN — Medication 1 PATCH: at 18:17

## 2024-08-08 RX ADMIN — Medication 5 MG: at 20:17

## 2024-08-08 NOTE — PROGRESS NOTES
"Continued Stay Note  [facility]     Patient Name: Arcelia Walter  MRN: [id number]  Today's Date: 8/8/2024    Admit Date: 6/28/2024    Plan: TBD   Discharge Plan       Row Name 08/08/24 1045       Plan    Plan TBD    Plan Comments Hospice RN to bedside. Patient lying awake in bed. She denies dyspnea, nausea, anxiety. Asked if she had any pain and she replied \"hunger pains\". She is asking for tomato soup. Nurse offered her oatmeal and she said she'd rather have soup. Helped nurse reposition patient. Last injectable prns given was on 8/6. Patient had PO melatonin last night. Discussed with Spring Miguel WAGONER who called hospice medical director, Dr. Eliceo Brown. He did not approve patient for inpatient hospice admission as she currently has no unmanaged symptoms. He has asked for inpatient hospice to continue following patient until Monday 8/12 to assess for signs of decline and/or increased symptom management needs. Have updated hospital team, palliative team, nurse, CM. If inpatient hospice team can be of any assist, please call ext [phone].                   Discharge Codes    No documentation.                 Expected Discharge Date and Time       Expected Discharge Date Expected Discharge Time    Aug 9, 2024               Arely Vega RN    "

## 2024-08-08 NOTE — NURSING NOTE
[facility] follow up for coccyx, groin fistula.    Wound on coccyx still present, similar in appearance to previous visits, allevyn dressing is still fitting over wound well, continue current plan of care.     Mucous fistula changed to 2 1/4 two piece to high output bag to bedside drain to make emptying easier.  Extra supplies left at bedside. Patient comfort measures awaiting possible hospice. Will follow peripherally for fistula supply management and assistance.

## 2024-08-08 NOTE — PROGRESS NOTES
[facility] Medicine Services  PROGRESS NOTE    Patient Name: Arcelia Walter  : 1945  MRN: [id number]    Date of Admission: 2024  Primary Care Physician: Provider, No Known    Subjective   Subjective     CC:  F/u abdominal fistula    HPI:   Patient resting in bed.  Is alert and interactive.  Denies pain and says she is comfortable.  Says she has not eaten this morning.    Objective   Objective     Vital Signs:   Temp:  [97.9 °F (36.6 °C)] 97.9 °F (36.6 °C)  Heart Rate:  [73] 73  Resp:  [16] 16  BP: (123)/(81) 123/81     Physical Exam:   Constitutional: No acute distress, awake, alert, cachectic, frail-appearing  HENT: NCAT, mucous membranes moist  Respiratory: Clear to auscultation bilaterally, respiratory effort normal, room air   Cardiovascular: RRR, no murmurs, rubs, or gallops  Gastrointestinal: Positive bowel sounds, soft, nontender, nondistended, RLQ ostomy over fistula intact, brown drainage  Genitourinary: Perdomo catheter in place  Musculoskeletal: No bilateral ankle edema  Psychiatric: Appropriate affect, cooperative  Neurologic: Oriented to self, moves all extremities, speech clear  Skin: No rashes      Results Reviewed:  LAB RESULTS:                              Brief Urine Lab Results  (Last result in the past 365 days)        Color   Clarity   Blood   Leuk Est   Nitrite   Protein   CREAT   Urine HCG        24 0038 Yellow   Clear   Negative   Negative   Negative   Trace                   Microbiology Results Abnormal       Procedure Component Value - Date/Time    Clostridioides difficile Toxin - Stool, Per Rectum [[id number]]  (Normal) Collected: 24 1024    Lab Status: Final result Specimen: Stool from Per Rectum Updated: 24 1150    Narrative:      The following orders were created for panel order Clostridioides difficile Toxin - Stool, Per Rectum.  Procedure                               Abnormality         Status                     ---------                                -----------         ------                     Clostridioides difficile...[[id number]]  Normal              Final result                 Please view results for these tests on the individual orders.    Clostridioides difficile Toxin, PCR - Stool, Per Rectum [[id number]]  (Normal) Collected: 07/18/24 1024    Lab Status: Final result Specimen: Stool from Per Rectum Updated: 07/18/24 1150     Toxigenic C. difficile by PCR Not Detected    Narrative:      The result indicates the absence of toxigenic C. difficile from stool specimen.     Blood Culture - Blood, Hand, Left [[id number]]  (Normal) Collected: 07/12/24 1033    Lab Status: Final result Specimen: Blood from Hand, Left Updated: 07/17/24 1245     Blood Culture No growth at 5 days    Narrative:      Less than seven (7) mL's of blood was collected.  Insufficient quantity may yield false negative results.    Blood Culture - Blood, Hand, Right [[id number]]  (Normal) Collected: 07/12/24 1033    Lab Status: Final result Specimen: Blood from Hand, Right Updated: 07/17/24 1245     Blood Culture No growth at 5 days    Narrative:      Less than seven (7) mL's of blood was collected.  Insufficient quantity may yield false negative results.    Blood Culture - Blood, Hand, Right [[id number]]  (Normal) Collected: 07/01/24 1558    Lab Status: Final result Specimen: Blood from Hand, Right Updated: 07/06/24 1731     Blood Culture No growth at 5 days    Narrative:      Aerobic bottle only  Less than seven (7) mL's of blood was collected.  Insufficient quantity may yield false negative results.    Blood Culture - Blood, Hand, Left [[id number]]  (Normal) Collected: 07/01/24 1558    Lab Status: Final result Specimen: Blood from Hand, Left Updated: 07/06/24 1731     Blood Culture No growth at 5 days            No radiology results from the last 24 hrs    Results for orders placed during the hospital encounter of 06/28/24    Adult Transthoracic Echo Complete W/ Cont if  Necessary Per Protocol    Interpretation Summary    Left ventricular systolic function is normal. Left ventricular ejection fraction appears to be 61 - 65%.    There is mild calcification of the aortic valve.    Mild aortic valve regurgitation is present.    Mild mitral annular calcification is present.    Mild to moderate mitral valve regurgitation is present.    Mild tricuspid valve regurgitation is present.      Current medications:  Scheduled Meds:mirtazapine, 15 mg, Oral, Nightly  nicotine, 1 patch, Transdermal, Q24H  sodium chloride, 10 mL, Intravenous, Q12H      Continuous Infusions:   PRN Meds:.  acetaminophen    senna-docusate sodium **AND** polyethylene glycol **AND** bisacodyl **AND** bisacodyl    haloperidol lactate    melatonin    Morphine    ondansetron    prochlorperazine    sodium chloride    sodium chloride    Assessment & Plan   Assessment & Plan     Active Hospital Problems    Diagnosis  POA    **Failure to thrive in adult [R62.7]  Yes    Enterocutaneous fistula [K63.2]  Yes    Intra-abdominal fluid collection [R18.8]  Yes    Cognitive impairment [R41.89]  Yes    Severe malnutrition [E43]  Yes      Resolved Hospital Problems   No resolved problems to display.        Brief Hospital Course to date:  Arcelia Walter is a 79 y.o. female  w unknown PMH who presented after being found down by EMS on welfare check, initially no emergency contacts/collateral available in system. Patient's sisters and daughters subsequently located by MSW.     Area of concern in right lower abdomen noted 7/5, initially attributed to right femoral hernia per CT w/o contrast but then began to leak brown foul smelling fluid 7/12. 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 Given patient's severe malnutrition, chronic failure to thrive, extensive surgery not thought to be in her best interest and family in agreement.     Transitioned to comfort care  goals mid July. Declining quickly, eating very little. Requiring IV meds from time to time. Continue to follow for inpatient hospice appropriateness.     This patient's problems and plans were partially entered by my partner and updated as appropriate by me 08/08/24.    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/other GI perforation  -draining into bedside-placed ostomy bag, low volume  -palliative/hospice following  -hospice meeting with family today to evaluating for inpatient hospice admission      Severe chronic malnutrition, BMI 13  Appears chronic dementia/FTT        Expected Discharge Location and Transportation: Inpatient hospice  Expected Discharge   Expected Discharge Date: 8/9/2024; Expected Discharge Time:      VTE Prophylaxis:  Mechanical VTE prophylaxis orders are present.         AM-PAC 6 Clicks Score (PT): 8 (08/07/24 2016)    CODE STATUS:   Code Status and Medical Interventions:   Ordered at: 07/18/24 1522     Level Of Support Discussed With:    Next of Kin (If No Surrogate)     Code Status (Patient has no pulse and is not breathing):    No CPR (Do Not Attempt to Resuscitate)     Medical Interventions (Patient has pulse or is breathing):    Comfort Measures     Comments:    Three siblings (Frances, Caro, and Mahendra/Mary Ellen)       Gaby Ojeda, CIARAN  08/08/24

## 2024-08-08 NOTE — PLAN OF CARE
Pleasantly confused.  Skin integrity maintained.  Ostomy intact with minimal output.   PRN x 1 given; melatonin.

## 2024-08-09 PROCEDURE — 25010000002 ONDANSETRON PER 1 MG

## 2024-08-09 PROCEDURE — 99232 SBSQ HOSP IP/OBS MODERATE 35: CPT | Performed by: NURSE PRACTITIONER

## 2024-08-09 RX ADMIN — ONDANSETRON 4 MG: 2 INJECTION INTRAMUSCULAR; INTRAVENOUS at 22:25

## 2024-08-09 RX ADMIN — MIRTAZAPINE 15 MG: 15 TABLET, FILM COATED ORAL at 21:53

## 2024-08-09 RX ADMIN — Medication 10 ML: at 21:55

## 2024-08-09 RX ADMIN — Medication 1 PATCH: at 10:46

## 2024-08-09 RX ADMIN — ACETAMINOPHEN 650 MG: 325 TABLET ORAL at 10:43

## 2024-08-09 RX ADMIN — ACETAMINOPHEN 650 MG: 325 TABLET ORAL at 01:31

## 2024-08-09 NOTE — CASE MANAGEMENT/SOCIAL WORK
Continued Stay Note  [facility]     Patient Name: Arcelia Walter  MRN: [id number]  Today's Date: 8/9/2024    Admit Date: 6/28/2024    Plan: Ongoing   Discharge Plan       Row Name 08/09/24 1156       Plan    Plan Remains comfort care, Palliative Care     Plan Comments Discussed in MDS. Patient remains comfort care, Palliative Care Team and Hospice following. Case Management available for any needs. Ongoing                   Discharge Codes    No documentation.                 Expected Discharge Date and Time       Expected Discharge Date Expected Discharge Time    Aug 10, 2024               SHANTI Shaw (Kay)

## 2024-08-09 NOTE — PLAN OF CARE
Goal Outcome Evaluation:         Pt resting comfortably. PRN Tylenol given for complaints of leg pain with relief. Decreased oral intake. Palliative and hospice following for placement plans. Will continue plan of care.

## 2024-08-09 NOTE — PROGRESS NOTES
[facility] Medicine Services  PROGRESS NOTE    Patient Name: Arcelia Walter  : 1945  MRN: [id number]    Date of Admission: 2024  Primary Care Physician: Provider, No Known    Subjective   Subjective     CC:  F/u abdominal fistula    HPI:   Awake lying in bed and has no complaints.  She has been eating and drinking.    Objective   Objective     Vital Signs:   Temp:  [97.2 °F (36.2 °C)] 97.2 °F (36.2 °C)  Heart Rate:  [53] 53  Resp:  [16] 16  BP: (125)/(82) 125/82     Physical Exam:   Constitutional: Alert, frail cachectic chronically ill-appearing female sitting up in bed in NAD  ENT: Pink, moist mucous membranes   Respiratory: Nonlabored, symmetrical chest expansion, CTAB  Cardiovascular: RRR, no M/R/G  Gastrointestinal: Soft, NT, ND +BS, RLQ fistula w/bag in place draining brown secretions  : +lozano w/yellow urine  Musculoskeletal: TORO; +2 feet edema bilaterally; lower extremities no edema  Neurologic: Oriented to self only, very weak, follows all commands, speech clear  Skin: No rashes on exposed skin  Psychiatric: Pleasant and cooperative; normal affect    Results Reviewed:  LAB RESULTS:                              Brief Urine Lab Results  (Last result in the past 365 days)        Color   Clarity   Blood   Leuk Est   Nitrite   Protein   CREAT   Urine HCG        24 0038 Yellow   Clear   Negative   Negative   Negative   Trace                   Microbiology Results Abnormal       Procedure Component Value - Date/Time    Clostridioides difficile Toxin - Stool, Per Rectum [[id number]]  (Normal) Collected: 24 1024    Lab Status: Final result Specimen: Stool from Per Rectum Updated: 24 1150    Narrative:      The following orders were created for panel order Clostridioides difficile Toxin - Stool, Per Rectum.  Procedure                               Abnormality         Status                     ---------                               -----------         ------                      Clostridioides difficile...[[id number]]  Normal              Final result                 Please view results for these tests on the individual orders.    Clostridioides difficile Toxin, PCR - Stool, Per Rectum [[id number]]  (Normal) Collected: 07/18/24 1024    Lab Status: Final result Specimen: Stool from Per Rectum Updated: 07/18/24 1150     Toxigenic C. difficile by PCR Not Detected    Narrative:      The result indicates the absence of toxigenic C. difficile from stool specimen.     Blood Culture - Blood, Hand, Left [[id number]]  (Normal) Collected: 07/12/24 1033    Lab Status: Final result Specimen: Blood from Hand, Left Updated: 07/17/24 1245     Blood Culture No growth at 5 days    Narrative:      Less than seven (7) mL's of blood was collected.  Insufficient quantity may yield false negative results.    Blood Culture - Blood, Hand, Right [[id number]]  (Normal) Collected: 07/12/24 1033    Lab Status: Final result Specimen: Blood from Hand, Right Updated: 07/17/24 1245     Blood Culture No growth at 5 days    Narrative:      Less than seven (7) mL's of blood was collected.  Insufficient quantity may yield false negative results.    Blood Culture - Blood, Hand, Right [[id number]]  (Normal) Collected: 07/01/24 1558    Lab Status: Final result Specimen: Blood from Hand, Right Updated: 07/06/24 1731     Blood Culture No growth at 5 days    Narrative:      Aerobic bottle only  Less than seven (7) mL's of blood was collected.  Insufficient quantity may yield false negative results.    Blood Culture - Blood, Hand, Left [[id number]]  (Normal) Collected: 07/01/24 1558    Lab Status: Final result Specimen: Blood from Hand, Left Updated: 07/06/24 1731     Blood Culture No growth at 5 days            No radiology results from the last 24 hrs    Results for orders placed during the hospital encounter of 06/28/24    Adult Transthoracic Echo Complete W/ Cont if Necessary Per Protocol    Interpretation Summary     Left ventricular systolic function is normal. Left ventricular ejection fraction appears to be 61 - 65%.    There is mild calcification of the aortic valve.    Mild aortic valve regurgitation is present.    Mild mitral annular calcification is present.    Mild to moderate mitral valve regurgitation is present.    Mild tricuspid valve regurgitation is present.      Current medications:  Scheduled Meds:mirtazapine, 15 mg, Oral, Nightly  nicotine, 1 patch, Transdermal, Q24H  sodium chloride, 10 mL, Intravenous, Q12H      Continuous Infusions:   PRN Meds:.  acetaminophen    senna-docusate sodium **AND** polyethylene glycol **AND** bisacodyl **AND** bisacodyl    haloperidol lactate    melatonin    Morphine    ondansetron    prochlorperazine    sodium chloride    sodium chloride    Assessment & Plan   Assessment & Plan     Active Hospital Problems    Diagnosis  POA    **Failure to thrive in adult [R62.7]  Yes    Enterocutaneous fistula [K63.2]  Yes    Intra-abdominal fluid collection [R18.8]  Yes    Cognitive impairment [R41.89]  Yes    Severe malnutrition [E43]  Yes      Resolved Hospital Problems   No resolved problems to display.        Brief Hospital Course to date:  Arcelia Walter is a 79 y.o. female  w unknown PMH who presented after being found down by EMS on welfare check, initially no emergency contacts/collateral available in system. Patient's sisters and daughters subsequently located by MSW.     Area of concern in right lower abdomen noted 7/5, initially attributed to right femoral hernia per CT w/o contrast but then began to leak brown foul smelling fluid 7/12. 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 Given patient's severe malnutrition, chronic failure to thrive, extensive surgery not thought to be in her best interest and family in agreement.     Transitioned to comfort care goals mid July. Declining quickly, eating very little.  Requiring IV meds from time to time. Continue to follow for inpatient hospice appropriateness.     These problems are new to me today    This patient's problems and plans were partially entered by my partner and updated as appropriate by me 08/09/24.    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/other GI perforation  -draining into bedside-placed ostomy bag, low volume  -palliative/hospice following  -hospice meeting with family today to evaluating for inpatient hospice admission      Severe chronic malnutrition, BMI 13  Appears chronic dementia/FTT        Expected Discharge Location and Transportation: Inpatient hospice  Expected Discharge   Expected Discharge Date: 8/10/2024; Expected Discharge Time:      VTE Prophylaxis:  Mechanical VTE prophylaxis orders are present.         AM-PAC 6 Clicks Score (PT): 7 (08/08/24 2011)    CODE STATUS:   Code Status and Medical Interventions:   Ordered at: 07/18/24 1522     Level Of Support Discussed With:    Next of Kin (If No Surrogate)     Code Status (Patient has no pulse and is not breathing):    No CPR (Do Not Attempt to Resuscitate)     Medical Interventions (Patient has pulse or is breathing):    Comfort Measures     Comments:    Three siblings (Frances, Caro, and Mahendra/Mary Ellen)       CIARAN Vaughan  08/09/24

## 2024-08-09 NOTE — PLAN OF CARE
Pleasantly confused. Conversing with this RN.  C/o bilateral leg pain; tylenol given.  Ostomy to BSD with increased output; 300 ml.  Perdomo with adequate output.  Pale this AM, decreased alertness.

## 2024-08-09 NOTE — PLAN OF CARE
"Goal Outcome Evaluation:  Plan of Care Reviewed With: patient        Progress: no change  Outcome Evaluation: Palliative RN saw pt at 1120.  Pt. was sitting up in bed very pleasantly confused on RA.  When asked if she had any pain she stated, \"None that I know of\".  When asked if she was nauseated she stated, \"not often\".  She stated she ate one blueberry muffin for breakfast and that one was enough.  She then asked, \"Can I ask you a question?\"  \"How often are these items used\", she asked fingering the blanket on her bed.  Informed pt. that the blankets are used daily.  \"That's one less thing I need to worry about\", she stated.  Palliative care continuing to follow for support and ongoing POC.    1300 Palliative IDT meeting: JAGJIT Mejia RN, CHPN; CROW Olivia, APRN; ELISABETH Trinh RN, CHPN; JAGJIT Reeder RN; MARTIN Mays, APRN; NICOLE Daley MD.       After hours, weekends and holidays, contact Palliative Provider by calling [phone].                                 "

## 2024-08-09 NOTE — PROGRESS NOTES
"Continued Stay Note  [facility]     Patient Name: Arcelia Walter  MRN: [id number]  Today's Date: 8/9/2024    Admit Date: 6/28/2024    Plan: TBD   Discharge Plan       Row Name 08/09/24 1030       Plan    Plan TBD    Plan Comments Hospice RN to bedside. Patient lying awake in bed. Asked if she had any pain, nausea, anxiety. She relays her \"head and tailbone\" hurt. Asked nurse to give prn Tylenol. Patient is pleasantly confused. Inpatient hospice team continues to follow assessing for decline and/or increased symptom management needs.                    Discharge Codes    No documentation.                 Expected Discharge Date and Time       Expected Discharge Date Expected Discharge Time    Aug 10, 2024               Arely Vega RN    "

## 2024-08-10 PROCEDURE — 99232 SBSQ HOSP IP/OBS MODERATE 35: CPT | Performed by: NURSE PRACTITIONER

## 2024-08-10 PROCEDURE — 25010000002 MORPHINE PER 10 MG: Performed by: FAMILY MEDICINE

## 2024-08-10 RX ADMIN — MIRTAZAPINE 15 MG: 15 TABLET, FILM COATED ORAL at 20:00

## 2024-08-10 RX ADMIN — MORPHINE SULFATE 1 MG: 2 INJECTION, SOLUTION INTRAMUSCULAR; INTRAVENOUS at 17:50

## 2024-08-10 RX ADMIN — Medication 5 MG: at 20:00

## 2024-08-10 NOTE — PLAN OF CARE
Goal Outcome Evaluation:  Plan of Care Reviewed With: patient        Progress: no change     The patient is alert and disoriented x3. The patient is only oriented to self. No complaints of pain throughout the night. The patient rested comfortably. The patient had one episode of nausea and vomiting. PRN Zofran administered as ordered. Q2 turns and speciality bed in place. Pressure wound on bottom was cleansed and dressing was changed. Comfort measures in place.

## 2024-08-10 NOTE — PLAN OF CARE
"Goal Outcome Evaluation:  Plan of Care Reviewed With: patient        Progress: declining  Outcome Evaluation: VSS on RA. Prn morphine x1 for back pain that patient states \"wraps around her spine and goes down into the pelvis somewhat.\" No other prns administered. Patient denies nausea. Tolerating diet with minimal appetite, will only eat tomato soup and a few sips of sprite. Turned. Specialty mattress. 75 mL UOP and 50 mL brown output via fistula. Alert and oriented to self, slept throughout much of shift. Calm, cooperative. Care ongoing, continue plan of care.                               "

## 2024-08-10 NOTE — PROGRESS NOTES
"Continued Stay Note  [facility]     Patient Name: Arcelia Walter  MRN: [id number]  Today's Date: 8/10/2024    Admit Date: 6/28/2024    Plan: TBD   Discharge Plan       Row Name 08/10/24 1258       Plan    Plan Comments Hospice RN visit to bedside. Patient opened eyes to voice. Relaxed face, jaw, body posture, respirations. Nonverbal indicators of comfort present. Patient relays \"no\" to pain, shortness of air, anxiety, n/v. Relays \"yes\" to comfortable. Patient states to Rn, \"I feel fine.\" Patient declines hunger, declines offer of water or liquids. Patient states, \"no\" to offer of need of more blankets and \"no\" to is she cold. Rn reassures patient that she is safe and cared for. Rn asks patient is she is sleepy, patient, \"Yes, I am. Thank you for asking me.\" Patient is pleasantly confused but able to relay symptom needs. Inpatient hospice team continues to follow assessing for decline and/or increased symptom management needs. Discussed with SUE WAGONER.                    Discharge Codes    No documentation.                 Expected Discharge Date and Time       Expected Discharge Date Expected Discharge Time    Aug 10, 2024               Joanne Armando RN    "

## 2024-08-10 NOTE — PROGRESS NOTES
[facility] Medicine Services  PROGRESS NOTE    Patient Name: Arcelia Walter  : 1945  MRN: [id number]    Date of Admission: 2024  Primary Care Physician: Provider, No Known    Subjective   Subjective     CC:  F/u abdominal fistula    HPI:   Sleeping and did not awaken, because nurse stated she tried to get up to eat but she refused and just want to go back to sleep.  No adverse events overnight per nursing.    Objective   Objective     Vital Signs:   Temp:  [96.9 °F (36.1 °C)] 96.9 °F (36.1 °C)  Resp:  [16] 16  BP: (113)/(73) 113/73     Physical Exam:   Constitutional: Sleeping, frail cachectic chronically ill-appearing female sitting up in bed in NAD  ENT: Pink, moist mucous membranes   Respiratory: Nonlabored, symmetrical chest expansion, CTAB  Cardiovascular: RRR, no M/R/G  Gastrointestinal: Soft, NT, ND +BS, RLQ fistula w/bag in place draining brown secretions that are increasing in amount  : +lozano w/yellow urine  Musculoskeletal: TORO; +2 feet edema bilaterally; lower extremities no edema  Neurologic: JOSÉ LUIS sleeping  Skin: No rashes on exposed skin  Psychiatric: JOSÉ LUIS sleeping    Results Reviewed:  LAB RESULTS:                              Brief Urine Lab Results  (Last result in the past 365 days)        Color   Clarity   Blood   Leuk Est   Nitrite   Protein   CREAT   Urine HCG        24 0038 Yellow   Clear   Negative   Negative   Negative   Trace                   Microbiology Results Abnormal       Procedure Component Value - Date/Time    Clostridioides difficile Toxin - Stool, Per Rectum [[id number]]  (Normal) Collected: 24 1024    Lab Status: Final result Specimen: Stool from Per Rectum Updated: 24 1150    Narrative:      The following orders were created for panel order Clostridioides difficile Toxin - Stool, Per Rectum.  Procedure                               Abnormality         Status                     ---------                                -----------         ------                     Clostridioides difficile...[[id number]]  Normal              Final result                 Please view results for these tests on the individual orders.    Clostridioides difficile Toxin, PCR - Stool, Per Rectum [[id number]]  (Normal) Collected: 07/18/24 1024    Lab Status: Final result Specimen: Stool from Per Rectum Updated: 07/18/24 1150     Toxigenic C. difficile by PCR Not Detected    Narrative:      The result indicates the absence of toxigenic C. difficile from stool specimen.     Blood Culture - Blood, Hand, Left [[id number]]  (Normal) Collected: 07/12/24 1033    Lab Status: Final result Specimen: Blood from Hand, Left Updated: 07/17/24 1245     Blood Culture No growth at 5 days    Narrative:      Less than seven (7) mL's of blood was collected.  Insufficient quantity may yield false negative results.    Blood Culture - Blood, Hand, Right [[id number]]  (Normal) Collected: 07/12/24 1033    Lab Status: Final result Specimen: Blood from Hand, Right Updated: 07/17/24 1245     Blood Culture No growth at 5 days    Narrative:      Less than seven (7) mL's of blood was collected.  Insufficient quantity may yield false negative results.    Blood Culture - Blood, Hand, Right [[id number]]  (Normal) Collected: 07/01/24 1558    Lab Status: Final result Specimen: Blood from Hand, Right Updated: 07/06/24 1731     Blood Culture No growth at 5 days    Narrative:      Aerobic bottle only  Less than seven (7) mL's of blood was collected.  Insufficient quantity may yield false negative results.    Blood Culture - Blood, Hand, Left [[id number]]  (Normal) Collected: 07/01/24 1558    Lab Status: Final result Specimen: Blood from Hand, Left Updated: 07/06/24 1731     Blood Culture No growth at 5 days            No radiology results from the last 24 hrs    Results for orders placed during the hospital encounter of 06/28/24    Adult Transthoracic Echo Complete W/ Cont if Necessary Per  Protocol    Interpretation Summary    Left ventricular systolic function is normal. Left ventricular ejection fraction appears to be 61 - 65%.    There is mild calcification of the aortic valve.    Mild aortic valve regurgitation is present.    Mild mitral annular calcification is present.    Mild to moderate mitral valve regurgitation is present.    Mild tricuspid valve regurgitation is present.      Current medications:  Scheduled Meds:mirtazapine, 15 mg, Oral, Nightly  nicotine, 1 patch, Transdermal, Q24H  sodium chloride, 10 mL, Intravenous, Q12H      Continuous Infusions:   PRN Meds:.  acetaminophen    senna-docusate sodium **AND** polyethylene glycol **AND** bisacodyl **AND** bisacodyl    haloperidol lactate    melatonin    Morphine    ondansetron    prochlorperazine    sodium chloride    sodium chloride    Assessment & Plan   Assessment & Plan     Active Hospital Problems    Diagnosis  POA    **Failure to thrive in adult [R62.7]  Yes    Enterocutaneous fistula [K63.2]  Yes    Intra-abdominal fluid collection [R18.8]  Yes    Cognitive impairment [R41.89]  Yes    Severe malnutrition [E43]  Yes      Resolved Hospital Problems   No resolved problems to display.        Brief Hospital Course to date:  Arcelia Walter is a 79 y.o. female  w unknown PMH who presented after being found down by EMS on welfare check, initially no emergency contacts/collateral available in system. Patient's sisters and daughters subsequently located by MSW.     Area of concern in right lower abdomen noted 7/5, initially attributed to right femoral hernia per CT w/o contrast but then began to leak brown foul smelling fluid 7/12. 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 Given patient's severe malnutrition, chronic failure to thrive, extensive surgery not thought to be in her best interest and family in agreement.     Transitioned to comfort care goals mid July.  Declining quickly, eating very little. Requiring IV meds from time to time. Continue to follow for inpatient hospice appropriateness.      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/other GI perforation  -draining into bedside-placed ostomy bag, low volume  -palliative/hospice following  -hospice meeting with family today to evaluating for inpatient hospice admission      Severe chronic malnutrition, BMI 13  Appears chronic dementia/FTT      No change from 8/9/2024      Expected Discharge Location and Transportation: Inpatient hospice  Expected Discharge   Expected Discharge Date: 8/10/2024; Expected Discharge Time:      VTE Prophylaxis:  Mechanical VTE prophylaxis orders are present.         AM-PAC 6 Clicks Score (PT): 8 (08/09/24 4709)    CODE STATUS:   Code Status and Medical Interventions:   Ordered at: 07/18/24 1522     Level Of Support Discussed With:    Next of Kin (If No Surrogate)     Code Status (Patient has no pulse and is not breathing):    No CPR (Do Not Attempt to Resuscitate)     Medical Interventions (Patient has pulse or is breathing):    Comfort Measures     Comments:    Three siblings (Frances, Caro, and Mahendra/Mary Ellen)       CIARAN Vaughan  08/10/24

## 2024-08-11 PROCEDURE — 99232 SBSQ HOSP IP/OBS MODERATE 35: CPT | Performed by: NURSE PRACTITIONER

## 2024-08-11 RX ADMIN — Medication 5 MG: at 20:08

## 2024-08-11 RX ADMIN — MIRTAZAPINE 15 MG: 15 TABLET, FILM COATED ORAL at 20:08

## 2024-08-11 NOTE — PROGRESS NOTES
[facility]  PROGRESS NOTE    Patient Name: Arcelia Walter  : 1945  MRN: [id number]    Date of Admission: 2024  Primary Care Physician: Provider, No Known    Subjective   Subjective     CC:  F/u abdominal fistula    HPI:   Awake and very pleasant.  Had no complaints this morning except when legs were checked.    Objective   Objective     Vital Signs:   Temp:  [96.9 °F (36.1 °C)-97.5 °F (36.4 °C)] 97.5 °F (36.4 °C)  Heart Rate:  [63-72] 63  Resp:  [16] 16  BP: (113-133)/(73-77) 133/74     Physical Exam:   Constitutional: Sleeping, frail cachectic chronically ill-appearing female sitting up in bed in NAD  ENT: Pink, moist mucous membranes   Respiratory: Nonlabored, symmetrical chest expansion, CTAB  Cardiovascular: RRR, no M/R/G  Gastrointestinal: Soft, NT, ND +BS, RLQ fistula w/bag in place draining brown secretions that are increasing in amount  : +lozano w/yellow urine  Musculoskeletal: TORO; +2 edema bilaterally up to knees-worsening   Neurologic: Confused, follows commands, clear speech  Skin: No rashes on exposed skin  Psychiatric: Pleasant and cooperative, flat affect    Results Reviewed:  LAB RESULTS:                              Brief Urine Lab Results  (Last result in the past 365 days)        Color   Clarity   Blood   Leuk Est   Nitrite   Protein   CREAT   Urine HCG        24 0038 Yellow   Clear   Negative   Negative   Negative   Trace                   Microbiology Results Abnormal       Procedure Component Value - Date/Time    Clostridioides difficile Toxin - Stool, Per Rectum [[id number]]  (Normal) Collected: 24 1024    Lab Status: Final result Specimen: Stool from Per Rectum Updated: 24 1150    Narrative:      The following orders were created for panel order Clostridioides difficile Toxin - Stool, Per Rectum.  Procedure                               Abnormality         Status                     ---------                                -----------         ------                     Clostridioides difficile...[[id number]]  Normal              Final result                 Please view results for these tests on the individual orders.    Clostridioides difficile Toxin, PCR - Stool, Per Rectum [[id number]]  (Normal) Collected: 07/18/24 1024    Lab Status: Final result Specimen: Stool from Per Rectum Updated: 07/18/24 1150     Toxigenic C. difficile by PCR Not Detected    Narrative:      The result indicates the absence of toxigenic C. difficile from stool specimen.     Blood Culture - Blood, Hand, Left [[id number]]  (Normal) Collected: 07/12/24 1033    Lab Status: Final result Specimen: Blood from Hand, Left Updated: 07/17/24 1245     Blood Culture No growth at 5 days    Narrative:      Less than seven (7) mL's of blood was collected.  Insufficient quantity may yield false negative results.    Blood Culture - Blood, Hand, Right [[id number]]  (Normal) Collected: 07/12/24 1033    Lab Status: Final result Specimen: Blood from Hand, Right Updated: 07/17/24 1245     Blood Culture No growth at 5 days    Narrative:      Less than seven (7) mL's of blood was collected.  Insufficient quantity may yield false negative results.    Blood Culture - Blood, Hand, Right [[id number]]  (Normal) Collected: 07/01/24 1558    Lab Status: Final result Specimen: Blood from Hand, Right Updated: 07/06/24 1731     Blood Culture No growth at 5 days    Narrative:      Aerobic bottle only  Less than seven (7) mL's of blood was collected.  Insufficient quantity may yield false negative results.    Blood Culture - Blood, Hand, Left [[id number]]  (Normal) Collected: 07/01/24 1558    Lab Status: Final result Specimen: Blood from Hand, Left Updated: 07/06/24 1731     Blood Culture No growth at 5 days            No radiology results from the last 24 hrs    Results for orders placed during the hospital encounter of 06/28/24    Adult Transthoracic Echo Complete W/ Cont if Necessary Per  Protocol    Interpretation Summary    Left ventricular systolic function is normal. Left ventricular ejection fraction appears to be 61 - 65%.    There is mild calcification of the aortic valve.    Mild aortic valve regurgitation is present.    Mild mitral annular calcification is present.    Mild to moderate mitral valve regurgitation is present.    Mild tricuspid valve regurgitation is present.      Current medications:  Scheduled Meds:mirtazapine, 15 mg, Oral, Nightly  nicotine, 1 patch, Transdermal, Q24H  sodium chloride, 10 mL, Intravenous, Q12H      Continuous Infusions:   PRN Meds:.  acetaminophen    senna-docusate sodium **AND** polyethylene glycol **AND** bisacodyl **AND** bisacodyl    haloperidol lactate    melatonin    Morphine    ondansetron    prochlorperazine    sodium chloride    sodium chloride    Assessment & Plan   Assessment & Plan     Active Hospital Problems    Diagnosis  POA    **Failure to thrive in adult [R62.7]  Yes    Enterocutaneous fistula [K63.2]  Yes    Intra-abdominal fluid collection [R18.8]  Yes    Cognitive impairment [R41.89]  Yes    Severe malnutrition [E43]  Yes      Resolved Hospital Problems   No resolved problems to display.        Brief Hospital Course to date:  Arcelia Walter is a 79 y.o. female  w unknown PMH who presented after being found down by EMS on welfare check, initially no emergency contacts/collateral available in system. Patient's sisters and daughters subsequently located by MSW.     Area of concern in right lower abdomen noted 7/5, initially attributed to right femoral hernia per CT w/o contrast but then began to leak brown foul smelling fluid 7/12. 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 Given patient's severe malnutrition, chronic failure to thrive, extensive surgery not thought to be in her best interest and family in agreement.     Transitioned to comfort care goals mid July.  Declining quickly, eating very little. Requiring IV meds from time to time. Continue to follow for inpatient hospice appropriateness.    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/other GI perforation  -draining into bedside-placed ostomy bag, low volume  -palliative/hospice following  -hospice meeting with family today to evaluating for inpatient hospice admission      Severe chronic malnutrition, BMI 13  Appears chronic dementia/FTT      No change from 8/10/2024      Expected Discharge Location and Transportation: Inpatient hospice  Expected Discharge   Expected Discharge Date: 8/10/2024; Expected Discharge Time:      VTE Prophylaxis:  Mechanical VTE prophylaxis orders are present.         AM-PAC 6 Clicks Score (PT): 6 (08/10/24 2000)    CODE STATUS:   Code Status and Medical Interventions:   Ordered at: 07/18/24 1522     Level Of Support Discussed With:    Next of Kin (If No Surrogate)     Code Status (Patient has no pulse and is not breathing):    No CPR (Do Not Attempt to Resuscitate)     Medical Interventions (Patient has pulse or is breathing):    Comfort Measures     Comments:    Three siblings (Frances, Caro, and Mahendra/Mary Ellen)       CIARAN Vaughan  08/11/24

## 2024-08-11 NOTE — PROGRESS NOTES
"Continued Stay Note  [facility]     Patient Name: rAcelia Walter  MRN: [id number]  Today's Date: 8/11/2024    Admit Date: 6/28/2024    Plan: TBD   Discharge Plan       Row Name 08/11/24 1040       Plan    Plan Comments Hospice visit to bedside. Patient observed awake, alert, oriented to self. Pleasantly confused. Patient relays no to pain, shortness of air, anxiety, n/v. Yes to comfort. She relays that she is not hungry but would eat some jello later. She is undecided on what flavor. She also speaks to Rn about \"I think we need to talk about the ASP. I think your people discuss that, it's common for the system here but let's save it for later.\" Patient smiled and thanked Rn for visiting today. Rn reassured patient that she is safe and cared for. Rn notes patient received 1 prn injectable morphine yesterday-none since. Denies symptoms today. Declines need for intervention. Discussed with July GOMES who relays patient without symptoms today as well and patient has relayed to her comfortable this am. Discussed with SUE WAGONER. Inpatient hospice continues team continues to follow assessing for decline and/or increased symptom management needs.                    Discharge Codes    No documentation.                 Expected Discharge Date and Time       Expected Discharge Date Expected Discharge Time    Aug 10, 2024               Joanne Armando RN    "

## 2024-08-11 NOTE — PLAN OF CARE
"Goal Outcome Evaluation:  Plan of Care Reviewed With: patient, sibling        Progress: declining  Outcome Evaluation: Pt not as \"energetic\" today. Denies pain but wanted to stay laying down. Sisters noted that pt seems skinnier. Advised that pt did not want to change beds today and bed scale is not taking on this current bed. Pt had 3-4 bites of oatmeal for breakfast today today. Perdomo remains patent; fistula has very little yellow output in the bag.      Problem: Fall Injury Risk  Goal: Absence of Fall and Fall-Related Injury  Intervention: Promote Injury-Free Environment  Recent Flowsheet Documentation  Taken 8/11/2024 1000 by July Reeder, RN  Safety Promotion/Fall Prevention:   safety round/check completed   nonskid shoes/slippers when out of bed  Taken 8/11/2024 0800 by July Reeder, RN  Safety Promotion/Fall Prevention:   safety round/check completed   nonskid shoes/slippers when out of bed  Goal: Absence of Fall and Fall-Related Injury  Intervention: Promote Injury-Free Environment  Recent Flowsheet Documentation  Taken 8/11/2024 1000 by July Reeder, RN  Safety Promotion/Fall Prevention:   safety round/check completed   nonskid shoes/slippers when out of bed  Taken 8/11/2024 0800 by July Reeder, RN  Safety Promotion/Fall Prevention:   safety round/check completed   nonskid shoes/slippers when out of bed     Problem: Adult Inpatient Plan of Care  Goal: Absence of Hospital-Acquired Illness or Injury  Intervention: Prevent Skin Injury  Recent Flowsheet Documentation  Taken 8/11/2024 1000 by July Reeder, RN  Skin Protection: adhesive use limited                              "

## 2024-08-11 NOTE — PLAN OF CARE
Problem: Fall Injury Risk  Goal: Absence of Fall and Fall-Related Injury  Intervention: Identify and Manage Contributors  Recent Flowsheet Documentation  Taken 8/11/2024 0600 by Hanh Guzman RN  Medication Review/Management: medications reviewed  Taken 8/10/2024 2000 by Hanh Guzman RN  Medication Review/Management: medications reviewed  Intervention: Promote Injury-Free Environment  Recent Flowsheet Documentation  Taken 8/11/2024 0600 by Hanh Guzman RN  Safety Promotion/Fall Prevention:   safety round/check completed   nonskid shoes/slippers when out of bed   fall prevention program maintained   clutter free environment maintained   assistive device/personal items within reach   activity supervised  Taken 8/11/2024 0400 by Hanh Guzman RN  Safety Promotion/Fall Prevention:   safety round/check completed   fall prevention program maintained   clutter free environment maintained   assistive device/personal items within reach   activity supervised  Taken 8/11/2024 0200 by Hanh Guzman RN  Safety Promotion/Fall Prevention:   safety round/check completed   nonskid shoes/slippers when out of bed   fall prevention program maintained   clutter free environment maintained   assistive device/personal items within reach   activity supervised  Taken 8/11/2024 0000 by Hanh Guzman RN  Safety Promotion/Fall Prevention:   safety round/check completed   nonskid shoes/slippers when out of bed   fall prevention program maintained   clutter free environment maintained   assistive device/personal items within reach   activity supervised  Taken 8/10/2024 2200 by Hanh Guzman RN  Safety Promotion/Fall Prevention:   safety round/check completed   nonskid shoes/slippers when out of bed   fall prevention program maintained   clutter free environment maintained   assistive device/personal items within reach   activity supervised  Taken 8/10/2024 2000 by Hanh Guzman RN  Safety Promotion/Fall Prevention:   safety round/check  completed   nonskid shoes/slippers when out of bed   fall prevention program maintained   clutter free environment maintained   assistive device/personal items within reach   activity supervised     Problem: Adult Inpatient Plan of Care  Goal: Absence of Hospital-Acquired Illness or Injury  Intervention: Prevent Skin Injury  Recent Flowsheet Documentation  Taken 8/11/2024 0600 by Hanh Guzman RN  Skin Protection: adhesive use limited  Taken 8/11/2024 0400 by Hanh Guzman RN  Skin Protection: adhesive use limited  Taken 8/11/2024 0200 by Hanh Guzman RN  Skin Protection: adhesive use limited  Taken 8/11/2024 0000 by Hanh Guzman RN  Skin Protection:   adhesive use limited   tubing/devices free from skin contact  Taken 8/10/2024 2200 by Hanh Guzman RN  Skin Protection:   adhesive use limited   tubing/devices free from skin contact  Taken 8/10/2024 2000 by Hanh Guzman RN  Skin Protection:   adhesive use limited   tubing/devices free from skin contact   Goal Outcome Evaluation:

## 2024-08-12 PROCEDURE — 25010000002 HYDROMORPHONE PER 4 MG: Performed by: STUDENT IN AN ORGANIZED HEALTH CARE EDUCATION/TRAINING PROGRAM

## 2024-08-12 PROCEDURE — 25010000002 HALOPERIDOL LACTATE PER 5 MG

## 2024-08-12 PROCEDURE — 99231 SBSQ HOSP IP/OBS SF/LOW 25: CPT | Performed by: STUDENT IN AN ORGANIZED HEALTH CARE EDUCATION/TRAINING PROGRAM

## 2024-08-12 PROCEDURE — 25010000002 MIDAZOLAM PER 1 MG: Performed by: STUDENT IN AN ORGANIZED HEALTH CARE EDUCATION/TRAINING PROGRAM

## 2024-08-12 PROCEDURE — 25010000002 ONDANSETRON PER 1 MG

## 2024-08-12 RX ORDER — MIDAZOLAM HYDROCHLORIDE 1 MG/ML
0.5 INJECTION INTRAMUSCULAR; INTRAVENOUS EVERY 4 HOURS PRN
Status: DISCONTINUED | OUTPATIENT
Start: 2024-08-12 | End: 2024-08-14 | Stop reason: HOSPADM

## 2024-08-12 RX ORDER — HYDROMORPHONE HYDROCHLORIDE 1 MG/ML
0.25 INJECTION, SOLUTION INTRAMUSCULAR; INTRAVENOUS; SUBCUTANEOUS
Status: DISCONTINUED | OUTPATIENT
Start: 2024-08-12 | End: 2024-08-14 | Stop reason: HOSPADM

## 2024-08-12 RX ADMIN — ONDANSETRON 4 MG: 2 INJECTION INTRAMUSCULAR; INTRAVENOUS at 03:36

## 2024-08-12 RX ADMIN — ACETAMINOPHEN 650 MG: 325 TABLET ORAL at 13:18

## 2024-08-12 RX ADMIN — MIDAZOLAM HYDROCHLORIDE 0.5 MG: 1 INJECTION, SOLUTION INTRAMUSCULAR; INTRAVENOUS at 23:46

## 2024-08-12 RX ADMIN — HYDROMORPHONE HYDROCHLORIDE 0.25 MG: 1 INJECTION, SOLUTION INTRAMUSCULAR; INTRAVENOUS; SUBCUTANEOUS at 15:24

## 2024-08-12 RX ADMIN — HYDROMORPHONE HYDROCHLORIDE 0.25 MG: 1 INJECTION, SOLUTION INTRAMUSCULAR; INTRAVENOUS; SUBCUTANEOUS at 23:47

## 2024-08-12 RX ADMIN — MIRTAZAPINE 15 MG: 15 TABLET, FILM COATED ORAL at 21:40

## 2024-08-12 RX ADMIN — Medication 1 PATCH: at 10:00

## 2024-08-12 RX ADMIN — HALOPERIDOL LACTATE 0.5 MG: 5 INJECTION, SOLUTION INTRAMUSCULAR at 00:08

## 2024-08-12 NOTE — PROGRESS NOTES
"Continued Stay Note  [facility]     Patient Name: Arcelia Walter  MRN: [id number]  Today's Date: 8/12/2024    Admit Date: 6/28/2024    Plan: Remains comfort care, Palliative Care   Discharge Plan       Row Name 08/12/24 3997       Plan    Plan Comments Hospice visit to bedside with THELMA Vega RN. Patient observed awake, alert, oriented to self. She is drinking her coffee, holding cup per self. She is observed with relaxed face, jaw, body posture, respirations. Patient denies pain, dyspnea, n/v, anxiety, discomfort. Stated \"no\" to each. Stated, \"yes\" to comfortable. She states to Rn's to make the blinds \"slant them. I don't want no one looking in.\" Rn reassures patient that she can't be seen and blinds lowered slightly. Patient relays that \"I've got everything I need here, I may go there.\" Patient looking at slip of paper that comes on breakfast tray as she holds her cup of coffee. She states, \"Thanks for stopping by.\" Noted patient without symptoms. Updated VJoey Mays APRN. Per direction from Hospice provider 8/8/2024 hospice will sign off. Please contact [phone] with concerns.                    Discharge Codes    No documentation.                 Expected Discharge Date and Time       Expected Discharge Date Expected Discharge Time    Aug 10, 2024               Joanne Armando RN    "

## 2024-08-12 NOTE — PLAN OF CARE
"  Problem: Palliative Care  Goal: Enhanced Quality of Life  Intervention: Optimize Psychosocial Wellbeing  Recent Flowsheet Documentation  Taken 8/12/2024 1115 by Emily Lerner \"Hyacinth\" CHRISTIANO, [facility]  Supportive Measures:   active listening utilized   positive reinforcement provided  Visit with patient at bedside, patient awake, sipping coffee, pleasantly confused - initially denies pain, then in conversation complains of left shoulder/scapula pain.  Patient sipping today but not eating.  RN concerned for increased pain & patient unable to express needs adequately.  Haldol IV and zofran IV earlier today, then po tylenol and dilaudid .25mg for pain this afternoon.  Patient with continued fistula drainage to ostomy bag.  Palliative Care continues to follow for support and assist with plan of care and terminal symptom management.     "

## 2024-08-12 NOTE — PLAN OF CARE
Goal Outcome Evaluation:  Plan of Care Reviewed With: patient        Progress: no change  Outcome Evaluation: VSS on RA. PRN melatonin, haldol, and zofran given. No c/o pain or soa at this time. Poor PO intake. A&O to self only. Perdomo catheter and mucosa fistula patent. No family present at bedside. Pt resting well in between care at this time.

## 2024-08-12 NOTE — PROGRESS NOTES
"Palliative Care Daily Progress Note     S: Patient is confused, gets lost in conversation, and cannot stay in conversation. She appears comfortable and denies any pain or nausea. Fistula is draining well through the bag, and she is drinking coffee with other drinks on her table. She is in no distress.      O:   Palliative Performance Scale Score: 30%   /66 (BP Location: Right arm, Patient Position: Lying)   Pulse 63   Temp 96.3 °F (35.7 °C) (Temporal)   Resp 16   Ht 149.9 cm (59.02\")   Wt 30.7 kg (67 lb 11.2 oz)   SpO2 98%   BMI 13.67 kg/m²     Intake/Output Summary (Last 24 hours) at 8/12/2024 1144  Last data filed at 8/12/2024 0300  Gross per 24 hour   Intake 160 ml   Output 300 ml   Net -140 ml       PE:  General Appearance:    Chronically ill-appearing, alert, cooperative, pleasantly confused   HEENT:    NC/AT, dry mm   Neck:   supple   Lungs:     CTAB     Heart:    RRR   Abdomen:     Soft, NT, fistula draining   Extremities:   Moves all extremities, no edema   Pulses:   Pulses palpable and equal bilaterally   Skin:   Warm, dry   Neurologic:   Awake, cooperative, confused, gets lost in conversation   Psych:   Calm, appropriate         Meds: Reviewed     Labs:                 Invalid input(s): \"LABALBU\", \"PROT\"  Imaging Results (Last 72 Hours)       ** No results found for the last 72 hours. **              Diagnostics: Reviewed    A: Clinically is comfortable, no significant symptoms at this time. Confused, weak, frail.      P:Continue present management, will continue to follow and offer support. Will continue to assist with medical decisions, dc planning.      We will continue to follow along. Please do not hesitate to contact us regarding further sx mgmt or GOC needs, including after hours or on weekends via our on call provider at [phone].     Pankaj Gutierrez MD    8/12/2024  "

## 2024-08-12 NOTE — PLAN OF CARE
"Goal Outcome Evaluation:  Plan of Care Reviewed With: patient        Progress: no change  Outcome Evaluation: Palliative RN and Dr. DANIEL Xiao saw pt at 1059.  Pt. was sitting up in bed sipping on coffee hiccuping.  Pt. denied pain, nausea and shortness of breath on RA.  She was very pleasantly confused.  She is only taking a few bites/sips.  Pt. stated, \"Sometimes I don't want to look at anything irritable to be seen\".  Then she stated her coffee was still too hot but it was good.  Fistula draining into bag.  LBM 8/11.  Palliative care to continue to follow for support and ongoing POC.       1300 Palliative IDT meeting: JAGJIT Mejia RN, CHPN; DANIEL Gutierrez MD.; ELISABETH Trinh RN, CHPN; DAVIS Johnson MDiv, [facility] ; THELMA Lerner, [facility], [facility]; THELMA Cooley MD.    After hours, weekends and holidays, contact Palliative Provider by calling [phone].                              "

## 2024-08-12 NOTE — PROGRESS NOTES
[facility] Medicine Services  PROGRESS NOTE    Patient Name: Arcelia Walter  : 1945  MRN: [id number]    Date of Admission: 2024  Primary Care Physician: Provider, No Known    Subjective   Subjective     CC:  F/u abdominal fistula    HPI:   Fistula continues to drain on her abdomen. Denied pain. Not eating much at all.     Objective   Objective     Vital Signs:   Temp:  [96.3 °F (35.7 °C)-97.6 °F (36.4 °C)] 96.3 °F (35.7 °C)  Heart Rate:  [63-78] 63  Resp:  [16] 16  BP: (111-112)/(66-73) 112/66     Physical Exam:   Constitutional: No acute distress, awake, alert, frail, cachectic, chronically ill-appearing  HENT: NCAT, mucous membranes moist  Respiratory: Clear to auscultation bilaterally, respiratory effort normal   Cardiovascular: RRR, no murmurs, rubs, or gallops  Gastrointestinal: Positive bowel sounds, soft, nontender, nondistended, right lower quadrant fistula with bag in place draining brown secretions  Musculoskeletal: No bilateral ankle edema  Psychiatric: Appropriate affect, cooperative  Neurologic: Alert, confused, clear speech  Skin: No rashes    Results Reviewed:  LAB RESULTS:                              Brief Urine Lab Results  (Last result in the past 365 days)        Color   Clarity   Blood   Leuk Est   Nitrite   Protein   CREAT   Urine HCG        24 0038 Yellow   Clear   Negative   Negative   Negative   Trace                   Microbiology Results Abnormal       Procedure Component Value - Date/Time    Clostridioides difficile Toxin - Stool, Per Rectum [[id number]]  (Normal) Collected: 24 1024    Lab Status: Final result Specimen: Stool from Per Rectum Updated: 24 1150    Narrative:      The following orders were created for panel order Clostridioides difficile Toxin - Stool, Per Rectum.  Procedure                               Abnormality         Status                     ---------                               -----------         ------                      Clostridioides difficile...[[id number]]  Normal              Final result                 Please view results for these tests on the individual orders.    Clostridioides difficile Toxin, PCR - Stool, Per Rectum [[id number]]  (Normal) Collected: 07/18/24 1024    Lab Status: Final result Specimen: Stool from Per Rectum Updated: 07/18/24 1150     Toxigenic C. difficile by PCR Not Detected    Narrative:      The result indicates the absence of toxigenic C. difficile from stool specimen.     Blood Culture - Blood, Hand, Left [[id number]]  (Normal) Collected: 07/12/24 1033    Lab Status: Final result Specimen: Blood from Hand, Left Updated: 07/17/24 1245     Blood Culture No growth at 5 days    Narrative:      Less than seven (7) mL's of blood was collected.  Insufficient quantity may yield false negative results.    Blood Culture - Blood, Hand, Right [[id number]]  (Normal) Collected: 07/12/24 1033    Lab Status: Final result Specimen: Blood from Hand, Right Updated: 07/17/24 1245     Blood Culture No growth at 5 days    Narrative:      Less than seven (7) mL's of blood was collected.  Insufficient quantity may yield false negative results.    Blood Culture - Blood, Hand, Right [[id number]]  (Normal) Collected: 07/01/24 1558    Lab Status: Final result Specimen: Blood from Hand, Right Updated: 07/06/24 1731     Blood Culture No growth at 5 days    Narrative:      Aerobic bottle only  Less than seven (7) mL's of blood was collected.  Insufficient quantity may yield false negative results.    Blood Culture - Blood, Hand, Left [[id number]]  (Normal) Collected: 07/01/24 1558    Lab Status: Final result Specimen: Blood from Hand, Left Updated: 07/06/24 1731     Blood Culture No growth at 5 days            No radiology results from the last 24 hrs    Results for orders placed during the hospital encounter of 06/28/24    Adult Transthoracic Echo Complete W/ Cont if Necessary Per Protocol    Interpretation Summary    Left  ventricular systolic function is normal. Left ventricular ejection fraction appears to be 61 - 65%.    There is mild calcification of the aortic valve.    Mild aortic valve regurgitation is present.    Mild mitral annular calcification is present.    Mild to moderate mitral valve regurgitation is present.    Mild tricuspid valve regurgitation is present.      Current medications:  Scheduled Meds:mirtazapine, 15 mg, Oral, Nightly  nicotine, 1 patch, Transdermal, Q24H      Continuous Infusions:   PRN Meds:.  acetaminophen    senna-docusate sodium **AND** polyethylene glycol **AND** bisacodyl **AND** bisacodyl    haloperidol lactate    melatonin    ondansetron    prochlorperazine    sodium chloride    sodium chloride    Assessment & Plan   Assessment & Plan     Active Hospital Problems    Diagnosis  POA    **Failure to thrive in adult [R62.7]  Yes    Enterocutaneous fistula [K63.2]  Yes    Intra-abdominal fluid collection [R18.8]  Yes    Cognitive impairment [R41.89]  Yes    Severe malnutrition [E43]  Yes      Resolved Hospital Problems   No resolved problems to display.        Brief Hospital Course to date:  Arcelia Walter is a 79 y.o. female  w unknown PMH who presented after being found down by EMS on welfare check, initially no emergency contacts/collateral available in system. Patient's sisters and daughters subsequently located by MSW.     Area of concern in right lower abdomen noted 7/5, initially attributed to right femoral hernia per CT w/o contrast but then began to leak brown foul smelling fluid 7/12. 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 Given patient's severe malnutrition, chronic failure to thrive, extensive surgery not thought to be in her best interest and family in agreement.     Transitioned to comfort care goals mid July. Declining quickly, eating very little. Requiring IV meds from time to time. Continue to follow for  inpatient hospice appropriateness.    This patient's problems and plans were partially entered by my partner and updated as appropriate by me 08/12/24.    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/other GI perforation  -draining into bedside-placed ostomy bag, low volume  -palliative/hospice following  -supportive measures     Severe chronic malnutrition, BMI 13  Appears chronic dementia/FTT          Expected Discharge Location and Transportation: Inpatient hospice  Expected Discharge     VTE Prophylaxis:  Mechanical VTE prophylaxis orders are present.         AM-PAC 6 Clicks Score (PT): 6 (08/11/24 2000)    CODE STATUS:   Code Status and Medical Interventions:   Ordered at: 07/18/24 1522     Level Of Support Discussed With:    Next of Kin (If No Surrogate)     Code Status (Patient has no pulse and is not breathing):    No CPR (Do Not Attempt to Resuscitate)     Medical Interventions (Patient has pulse or is breathing):    Comfort Measures     Comments:    Three siblings (Frances, Caro, and Mahendra/Mary Ellen)       Carolyn Hamilton MD  08/12/24

## 2024-08-12 NOTE — CASE MANAGEMENT/SOCIAL WORK
Continued Stay Note  [facility]     Patient Name: Arcelia Walter  MRN: [id number]  Today's Date: 8/12/2024    Admit Date: 6/28/2024    Plan: Remains comfort care, Palliative Care   Discharge Plan       Row Name 08/12/24 1202       Plan    Plan Remains comfort care, Palliative Care    Plan Comments Sw'er met with patient at bedside. Discussed in MDS. Patient remains comfort care, Palliative Care Team and Hospice following. Case Management available for any needs. Ongoing                   Discharge Codes    No documentation.                 Expected Discharge Date and Time       Expected Discharge Date Expected Discharge Time    Aug 10, 2024               SHANTI Shaw (Kay)

## 2024-08-12 NOTE — PLAN OF CARE
Goal Outcome Evaluation:         Pt c/o back and shoulder pain relieved by PRN diluadid after tylenol was given with no relief. Scant UO and mucosal fistula drain output through out shift. Refused PO intake. Pleasantly confused. Will continue plan of care.

## 2024-08-13 PROCEDURE — 25010000002 HYDROMORPHONE PER 4 MG: Performed by: STUDENT IN AN ORGANIZED HEALTH CARE EDUCATION/TRAINING PROGRAM

## 2024-08-13 PROCEDURE — 25010000002 MIDAZOLAM PER 1 MG: Performed by: STUDENT IN AN ORGANIZED HEALTH CARE EDUCATION/TRAINING PROGRAM

## 2024-08-13 PROCEDURE — 99231 SBSQ HOSP IP/OBS SF/LOW 25: CPT | Performed by: STUDENT IN AN ORGANIZED HEALTH CARE EDUCATION/TRAINING PROGRAM

## 2024-08-13 PROCEDURE — 25010000002 HALOPERIDOL LACTATE PER 5 MG

## 2024-08-13 RX ADMIN — HALOPERIDOL LACTATE 0.5 MG: 5 INJECTION, SOLUTION INTRAMUSCULAR at 16:21

## 2024-08-13 RX ADMIN — Medication 1 PATCH: at 08:42

## 2024-08-13 RX ADMIN — Medication 5 MG: at 23:07

## 2024-08-13 RX ADMIN — HYDROMORPHONE HYDROCHLORIDE 0.25 MG: 1 INJECTION, SOLUTION INTRAMUSCULAR; INTRAVENOUS; SUBCUTANEOUS at 16:21

## 2024-08-13 RX ADMIN — MIDAZOLAM HYDROCHLORIDE 0.5 MG: 1 INJECTION, SOLUTION INTRAMUSCULAR; INTRAVENOUS at 13:47

## 2024-08-13 RX ADMIN — MIDAZOLAM HYDROCHLORIDE 0.5 MG: 1 INJECTION, SOLUTION INTRAMUSCULAR; INTRAVENOUS at 05:48

## 2024-08-13 RX ADMIN — HYDROMORPHONE HYDROCHLORIDE 0.25 MG: 1 INJECTION, SOLUTION INTRAMUSCULAR; INTRAVENOUS; SUBCUTANEOUS at 23:07

## 2024-08-13 RX ADMIN — HYDROMORPHONE HYDROCHLORIDE 0.25 MG: 1 INJECTION, SOLUTION INTRAMUSCULAR; INTRAVENOUS; SUBCUTANEOUS at 11:23

## 2024-08-13 RX ADMIN — MIRTAZAPINE 15 MG: 15 TABLET, FILM COATED ORAL at 20:43

## 2024-08-13 NOTE — PLAN OF CARE
Goal Outcome Evaluation:  Plan of Care Reviewed With: patient        Progress: no change  Outcome Evaluation: Pt. resting comfortably throughout shift. PRN Dilaudid and versed given for restlessness. F/C patent. Bilateral feet/ankles with moderate edema. Sub-Q access clean, dry and in tact. No known issues to report at this time. Will continue the plan of care.

## 2024-08-13 NOTE — PLAN OF CARE
"Goal Outcome Evaluation:  Plan of Care Reviewed With: patient        Progress: no change  Outcome Evaluation: Palliative RN saw pt at 1141.  Pt. was very dusky in appearance this afternoon.   Patient's room smelled of rotting flesh.  Very little output from fistula into ostomy bag noted today.  Asked pt about abdominal pain and she stated it was \"real bad\". Asked RN to administer prn SQ dilaudid.Pt. settled somewhat after administration of IV medicaiton.  Pt. gazing off into corner muttered on about her grandfather.  Asked pt where her grandfather is and pt stated, \" but I can see him here\".  Tears began to gather in the corner of the patient's eyes as her speech became more jumbled and confused.  She spoke of picking up her mail and stacking it up then she spoke of climbing on up to heaven and \"trying to get there\".  Patient's feet are cold, swollen and beginning to mottle.  Pt. is not eating today.  Warm blankets applied by nursing assistant Zenobia.  Inpatient hospice team to reassess patient tomorrow.  Palliative care to follow for support, POC and ongoing [facility].    0930 Palliative IDT meeting: JAGJIT Mejia RN, CHPN; CROW Olivia, APRN; ELISABETH Trinh RN, ADDI; DAVIS Johnson MDiv, [facility] ; THELMA Lerner, [place], [facility]    After hours, weekends and holidays, contact Palliative Provider by calling [phone].                                     "

## 2024-08-13 NOTE — PROGRESS NOTES
[facility] Medicine Services  PROGRESS NOTE    Patient Name: Arcelia Walter  : 1945  MRN: [id number]    Date of Admission: 2024  Primary Care Physician: Provider, No Known    Subjective   Subjective     CC:  F/u abdominal fistula    HPI:   Reports some neck discomfort.  Waxing and waning confusion.  Not eating.    Objective   Objective     Vital Signs:   Temp:  [97.9 °F (36.6 °C)] 97.9 °F (36.6 °C)  Heart Rate:  [68] 68  Resp:  [16] 16  BP: (113)/(70) 113/70     Physical Exam:   Constitutional: No acute distress, awake, alert, frail, cachectic, chronically ill-appearing  HENT: NCAT, mucous membranes moist  Respiratory: Respiratory effort normal   Gastrointestinal: Positive bowel sounds, soft, nontender, nondistended, right lower quadrant fistula with bag in place draining brown secretions  Musculoskeletal: No bilateral ankle edema  Psychiatric: Appropriate affect, cooperative  Neurologic: Alert, confused, clear speech  Skin: No rashes    Results Reviewed:  LAB RESULTS:                              Brief Urine Lab Results  (Last result in the past 365 days)        Color   Clarity   Blood   Leuk Est   Nitrite   Protein   CREAT   Urine HCG        24 0038 Yellow   Clear   Negative   Negative   Negative   Trace                   Microbiology Results Abnormal       Procedure Component Value - Date/Time    Clostridioides difficile Toxin - Stool, Per Rectum [[id number]]  (Normal) Collected: 24 1024    Lab Status: Final result Specimen: Stool from Per Rectum Updated: 24 1150    Narrative:      The following orders were created for panel order Clostridioides difficile Toxin - Stool, Per Rectum.  Procedure                               Abnormality         Status                     ---------                               -----------         ------                     Clostridioides difficile...[[id number]]  Normal              Final result                 Please view results  for these tests on the individual orders.    Clostridioides difficile Toxin, PCR - Stool, Per Rectum [[id number]]  (Normal) Collected: 07/18/24 1024    Lab Status: Final result Specimen: Stool from Per Rectum Updated: 07/18/24 1150     Toxigenic C. difficile by PCR Not Detected    Narrative:      The result indicates the absence of toxigenic C. difficile from stool specimen.     Blood Culture - Blood, Hand, Left [[id number]]  (Normal) Collected: 07/12/24 1033    Lab Status: Final result Specimen: Blood from Hand, Left Updated: 07/17/24 1245     Blood Culture No growth at 5 days    Narrative:      Less than seven (7) mL's of blood was collected.  Insufficient quantity may yield false negative results.    Blood Culture - Blood, Hand, Right [[id number]]  (Normal) Collected: 07/12/24 1033    Lab Status: Final result Specimen: Blood from Hand, Right Updated: 07/17/24 1245     Blood Culture No growth at 5 days    Narrative:      Less than seven (7) mL's of blood was collected.  Insufficient quantity may yield false negative results.    Blood Culture - Blood, Hand, Right [[id number]]  (Normal) Collected: 07/01/24 1558    Lab Status: Final result Specimen: Blood from Hand, Right Updated: 07/06/24 1731     Blood Culture No growth at 5 days    Narrative:      Aerobic bottle only  Less than seven (7) mL's of blood was collected.  Insufficient quantity may yield false negative results.    Blood Culture - Blood, Hand, Left [[id number]]  (Normal) Collected: 07/01/24 1558    Lab Status: Final result Specimen: Blood from Hand, Left Updated: 07/06/24 1731     Blood Culture No growth at 5 days            No radiology results from the last 24 hrs    Results for orders placed during the hospital encounter of 06/28/24    Adult Transthoracic Echo Complete W/ Cont if Necessary Per Protocol    Interpretation Summary    Left ventricular systolic function is normal. Left ventricular ejection fraction appears to be 61 - 65%.    There is mild  calcification of the aortic valve.    Mild aortic valve regurgitation is present.    Mild mitral annular calcification is present.    Mild to moderate mitral valve regurgitation is present.    Mild tricuspid valve regurgitation is present.      Current medications:  Scheduled Meds:mirtazapine, 15 mg, Oral, Nightly  nicotine, 1 patch, Transdermal, Q24H      Continuous Infusions:   PRN Meds:.  acetaminophen    senna-docusate sodium **AND** polyethylene glycol **AND** bisacodyl **AND** bisacodyl    haloperidol lactate    HYDROmorphone    melatonin    midazolam    ondansetron    prochlorperazine    sodium chloride    sodium chloride    Assessment & Plan   Assessment & Plan     Active Hospital Problems    Diagnosis  POA    **Failure to thrive in adult [R62.7]  Yes    Enterocutaneous fistula [K63.2]  Yes    Intra-abdominal fluid collection [R18.8]  Yes    Cognitive impairment [R41.89]  Yes    Severe malnutrition [E43]  Yes      Resolved Hospital Problems   No resolved problems to display.        Brief Hospital Course to date:  Arcelia Walter is a 79 y.o. female  w unknown PMH who presented after being found down by EMS on welfare check, initially no emergency contacts/collateral available in system. Patient's sisters and daughters subsequently located by MSW.     Area of concern in right lower abdomen noted 7/5, initially attributed to right femoral hernia per CT w/o contrast but then began to leak brown foul smelling fluid 7/12. 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 Given patient's severe malnutrition, chronic failure to thrive, extensive surgery not thought to be in her best interest and family in agreement.     Transitioned to comfort care goals mid July. Declining quickly, eating very little. Requiring IV meds from time to time. Continue to follow for inpatient hospice appropriateness.    This patient's problems and plans were partially  entered by my partner and updated as appropriate by me 08/13/24.    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/other GI perforation  -draining into bedside-placed ostomy bag, low volume  -palliative/hospice following  -supportive measures  -pain, anxiety medication prn     Severe chronic malnutrition, BMI 13  Appears chronic dementia/FTT          Expected Discharge Location and Transportation: Inpatient hospice  Expected Discharge     VTE Prophylaxis:  Mechanical VTE prophylaxis orders are present.         AM-PAC 6 Clicks Score (PT): 6 (08/12/24 2000)    CODE STATUS:   Code Status and Medical Interventions:   Ordered at: 07/18/24 1522     Level Of Support Discussed With:    Next of Kin (If No Surrogate)     Code Status (Patient has no pulse and is not breathing):    No CPR (Do Not Attempt to Resuscitate)     Medical Interventions (Patient has pulse or is breathing):    Comfort Measures     Comments:    Three siblings (Frances, Caro, and Mahendra/Mary Ellen)       Carolyn Hamliton MD  08/13/24

## 2024-08-14 VITALS
RESPIRATION RATE: 16 BRPM | DIASTOLIC BLOOD PRESSURE: 71 MMHG | HEIGHT: 59 IN | HEART RATE: 63 BPM | OXYGEN SATURATION: 91 % | SYSTOLIC BLOOD PRESSURE: 119 MMHG | TEMPERATURE: 97.1 F | WEIGHT: 67.6 LBS | BODY MASS INDEX: 13.63 KG/M2

## 2024-08-14 PROBLEM — T81.43XA POSTPROCEDURAL INTRAABDOMINAL ABSCESS: Status: ACTIVE | Noted: 2024-08-14

## 2024-08-14 PROCEDURE — 25010000002 HYDROMORPHONE PER 4 MG: Performed by: STUDENT IN AN ORGANIZED HEALTH CARE EDUCATION/TRAINING PROGRAM

## 2024-08-14 PROCEDURE — 99239 HOSP IP/OBS DSCHRG MGMT >30: CPT | Performed by: STUDENT IN AN ORGANIZED HEALTH CARE EDUCATION/TRAINING PROGRAM

## 2024-08-14 RX ORDER — ONDANSETRON 2 MG/ML
4 INJECTION INTRAMUSCULAR; INTRAVENOUS EVERY 6 HOURS PRN
Status: CANCELLED | OUTPATIENT
Start: 2024-08-14

## 2024-08-14 RX ORDER — HYDROMORPHONE HYDROCHLORIDE 1 MG/ML
0.25 INJECTION, SOLUTION INTRAMUSCULAR; INTRAVENOUS; SUBCUTANEOUS
Status: CANCELLED | OUTPATIENT
Start: 2024-08-14 | End: 2024-08-19

## 2024-08-14 RX ORDER — SODIUM CHLORIDE 9 MG/ML
40 INJECTION, SOLUTION INTRAVENOUS AS NEEDED
Status: CANCELLED | OUTPATIENT
Start: 2024-08-14

## 2024-08-14 RX ORDER — ACETAMINOPHEN 325 MG/1
650 TABLET ORAL EVERY 4 HOURS PRN
Status: CANCELLED | OUTPATIENT
Start: 2024-08-14

## 2024-08-14 RX ORDER — NICOTINE 21 MG/24HR
1 PATCH, TRANSDERMAL 24 HOURS TRANSDERMAL
Status: CANCELLED | OUTPATIENT
Start: 2024-08-15

## 2024-08-14 RX ORDER — BISACODYL 5 MG/1
5 TABLET, DELAYED RELEASE ORAL DAILY PRN
Status: CANCELLED | OUTPATIENT
Start: 2024-08-14

## 2024-08-14 RX ORDER — PROCHLORPERAZINE EDISYLATE 5 MG/ML
5 INJECTION INTRAMUSCULAR; INTRAVENOUS EVERY 6 HOURS PRN
Status: CANCELLED | OUTPATIENT
Start: 2024-08-14

## 2024-08-14 RX ORDER — POLYETHYLENE GLYCOL 3350 17 G/17G
17 POWDER, FOR SOLUTION ORAL DAILY PRN
Status: CANCELLED | OUTPATIENT
Start: 2024-08-14

## 2024-08-14 RX ORDER — AMOXICILLIN 250 MG
2 CAPSULE ORAL 2 TIMES DAILY PRN
Status: CANCELLED | OUTPATIENT
Start: 2024-08-14

## 2024-08-14 RX ORDER — MIRTAZAPINE 15 MG/1
15 TABLET, FILM COATED ORAL NIGHTLY
Status: CANCELLED | OUTPATIENT
Start: 2024-08-14

## 2024-08-14 RX ORDER — HALOPERIDOL 5 MG/ML
0.5 INJECTION INTRAMUSCULAR EVERY 6 HOURS PRN
Status: CANCELLED | OUTPATIENT
Start: 2024-08-14

## 2024-08-14 RX ORDER — SODIUM CHLORIDE 0.9 % (FLUSH) 0.9 %
10 SYRINGE (ML) INJECTION AS NEEDED
Status: CANCELLED | OUTPATIENT
Start: 2024-08-14

## 2024-08-14 RX ORDER — MIDAZOLAM HYDROCHLORIDE 1 MG/ML
0.5 INJECTION INTRAMUSCULAR; INTRAVENOUS EVERY 4 HOURS PRN
Status: CANCELLED | OUTPATIENT
Start: 2024-08-14

## 2024-08-14 RX ORDER — BISACODYL 10 MG
10 SUPPOSITORY, RECTAL RECTAL DAILY PRN
Status: CANCELLED | OUTPATIENT
Start: 2024-08-14

## 2024-08-14 RX ADMIN — Medication 1 PATCH: at 09:13

## 2024-08-14 RX ADMIN — HYDROMORPHONE HYDROCHLORIDE 0.25 MG: 1 INJECTION, SOLUTION INTRAMUSCULAR; INTRAVENOUS; SUBCUTANEOUS at 09:12

## 2024-08-14 NOTE — PROGRESS NOTES
Patient endorsing abdominal and BLE pain at time of visit. +3 BLE edema.  RN to medicate patient for endorsement of pain. Plan for inpatient hospice evaluation today at 1330.

## 2024-08-14 NOTE — PLAN OF CARE
Problem: Adult Inpatient Plan of Care  Goal: Plan of Care Review  Outcome: Ongoing, Progressing  Flowsheets (Taken 8/14/2024 0506)  Progress: no change  Plan of Care Reviewed With: patient  Outcome Evaluation: Alert to self. Awakens easily.  Ostomy bag changed. Melatonin prn x1. Dilaudid prn x1. Rested well. Inpatient hospice meeting with family at 1330.    Goal Outcome Evaluation:  Plan of Care Reviewed With: patient        Progress: no change  Outcome Evaluation: Alert to self. Awakens easily.  Ostomy bag changed. Melatonin prn x1. Dilaudid prn x1. Rested well.

## 2024-08-14 NOTE — PLAN OF CARE
Goal Outcome Evaluation:            Patient flipped to Hospice today. PRN for pain. Lozano in place. Ostomy pouch with lozano bag in place. Alert self only. Family at bedside. Comfort care maintained.

## 2024-08-14 NOTE — DISCHARGE SUMMARY
[facility] Medicine Services  DISCHARGE TO INPATIENT HOSPICE    Patient Name: Arcelia Walter  : 1945  MRN: [id number]    Date of Admission: 2024  Date of Discharge: 2024  Primary Care Physician: Provider, No Known    Consults       Date and Time Order Name Status Description    2024  9:24 AM Inpatient Palliative Care MD Consult Completed     2024 10:32 AM Inpatient General Surgery Consult Completed           Hospital Course     Presenting Problem:   Syncope [R55]  AMS (altered mental status) [R41.82]    Active Hospital Problems    Diagnosis  POA    **Failure to thrive in adult [R62.7]  Yes    Enterocutaneous fistula [K63.2]  Yes    Intra-abdominal fluid collection [R18.8]  Yes    Cognitive impairment [R41.89]  Yes    Severe malnutrition [E43]  Yes      Resolved Hospital Problems   No resolved problems to display.          Hospital Course:  Arcelia Walter is a 79 y.o. female w unknown PMH who presented after being found down by EMS on welfare check, initially no emergency contacts/collateral available in system. Patient's sisters and daughters subsequently located by MSW.      Area of concern in right lower abdomen noted , initially attributed to right femoral hernia per CT w/o contrast but then began to leak brown foul smelling fluid . 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 Given patient's severe malnutrition, chronic failure to thrive, extensive surgery not thought to be in her best interest and family in agreement. Surgery previously followed.      Transitioned to comfort care goals mid July. Declining quickly, eating very little. Requiring IV meds from time to time. Continue to follow for inpatient hospice appropriateness.     This patient's problems and plans were partially entered by my partner and updated as appropriate by me 24.     Spontaneous enterocutaneous fistula,  concern for appendiceal perforation/other GI perforation  -draining into bedside-placed ostomy bag, low volume  -palliative/hospice following  -supportive measures  -pain, anxiety medication prn  -Admit to inpatient hospice today     Severe chronic malnutrition, BMI 13  Appears chronic dementia/FTT      Day of Discharge     HPI:   Seen with palliative care nurse practitioner.  Patient endorses pain in her abdomen and legs.  No nausea.    ROS:  As above    Vital Signs:   Temp:  [97.1 °F (36.2 °C)] 97.1 °F (36.2 °C)  Heart Rate:  [63] 63  Resp:  [16] 16  BP: (119)/(71) 119/71     Physical Exam:  Constitutional: No acute distress, awake, alert, frail, cachectic, chronically ill-appearing  HENT: NCAT, mucous membranes moist  Respiratory: Respiratory effort normal   Gastrointestinal: Positive bowel sounds, soft, nondistended, right lower quadrant fistula with bag in place draining brown secretions with foul smell  Musculoskeletal: bilateral ankle edema  Psychiatric: Calm, cooperative  Neurologic: Alert, confused, clear speech  Skin: No rashes    Discharge Details     Discharge Disposition:  Transfer care to inpatient Hospice at [facility]    Time Spent on Discharge:  31 minutes    Carolyn Hamilton MD  08/14/24

## 2024-08-14 NOTE — PROGRESS NOTES
Continued Stay Note  [facility]     Patient Name: Arcelia Walter  MRN: [id number]  Today's Date: 8/14/2024    Admit Date: 8/14/2024    Plan: Inpatient hospice   Discharge Plan       Row Name 08/14/24 1345       Plan    Plan Inpatient hospice    Plan Comments Hospice RNs and SW met with family at bedside to discuss inpatient hospice services at [facility]. Present for meeting are 2 sisters, brother, sister-in-law. They are electing the inpatient hospice benefit. Patient is currently full comfort measures. Patient has been approved for inpatient hospice admission by Harriet WAGONER/Dr. Raisa Renner for skilled nursing assessment, medication titration, and injectable medication administration for palliation of pain and anxiety. Dr. Hamilton, palliative team, CM, and nurse aware of discharge/readmit to inpatient hospice.     Final Discharge Disposition Code 51 - hospice medical facility                   Discharge Codes    No documentation.                       Arely Vega RN

## 2024-08-15 NOTE — PROGRESS NOTES
Hospice Progress Note    Patient Name: Arcelia Walter   : 1945  Gender: female    Code Status: comfort measures    Date of Admission: 2024    Subjective:  Arcelia Walter is a 79 y.o. female admitted to inpatient hospice 2024 with diagnosis of Postprocedural intraabdominal abscess [T81.43XA]  for symptom management.     Sister Frances at bedside.  Patient resting on exam.  She denies pain at present.  BLE with +2 pitting edema noted to calf.  Pt requiring daily PRN's of dilaudid for pain management.     - Scheduled:  Remeron 15 mg daily @ HS.     - PRNs:  Dilaudid 0.25 mg x 2       - Intake/Output  Intake & Output (last 3 days)          07 07 07 07 0701  08/15 0700 08/15 07 0700    P.O.   59 350    Total Intake   59 350    Urine   150     Total Output   150     Net   -91 +350                       ROS:  Review of Systems   Unable to perform ROS: Acuity of condition       Reviewed current scheduled and prn medications for route, type, dose and frequency.       acetaminophen **OR** acetaminophen **OR** acetaminophen    senna-docusate sodium **AND** polyethylene glycol **AND** bisacodyl **AND** bisacodyl    furosemide    haloperidol lactate    HYDROmorphone    melatonin    midazolam    ondansetron    Polyvinyl Alcohol-Povidone PF    prochlorperazine    Scopolamine    sodium chloride    Objective:   There were no vitals taken for this visit.     PPS: Palliative Performance Scale score as of 8/15/2024, 15:42 EDT is 20% based on the following measures:   Ambulation: Totally bed bound  Activity and Evidence of Disease: Unable to do any work, extensive evidence of disease  Self-Care: Total care  Intake:Minimal sips  LOC: Full, drowsy or confusion     Physical Exam:  Physical Exam  Nursing note reviewed. Exam conducted with a chaperone present.   Constitutional:       General: She is not in acute distress.     Appearance: She is ill-appearing.   HENT:      Head:  Normocephalic.   Abdominal:      Comments: Fistula draining to ostomy bag.    Skin:     General: Skin is warm.      Coloration: Skin is pale.             Postprocedural intraabdominal abscess      Assessment/Plan:   Arcelia Walter is a 79 y.o. female admitted to inpatient hospice 08/14/2024 with diagnosis of Postprocedural intraabdominal abscess [T81.43XA]  for symptom management.     Symptoms:  Pain   Anxiety   Edema     Discharge Disposition: [facility]     Pain  -Continue dilaudid 0.25 mg q 1 hr PRN   -Add dilaudid 0.25 mg q 8 hrs scheduled     Anxiety   -Continue remeron 15 mg nightly     Edema   -Add lasix 20 mg PRN for edema     Total Visit Time:20 min   Face to Face Time: 10 min     Justification for care:  Patient meets criteria for acute in-patient care due to need for frequent skilled nursing assessments to determine patient comfort and medication effectiveness at end of life.  Frequent adjustments to medications and interventions for symptom management, including injectable medications.      Harriet Mays, MSN, APRN  [facility]  Hospice and Palliative Care Nurse Practitioner  08/15/24  13:22 EDT

## 2024-08-15 NOTE — PLAN OF CARE
Problem: Adult Inpatient Plan of Care  Goal: Plan of Care Review  Outcome: Ongoing, Progressing  Flowsheets (Taken 8/15/2024 3816)  Progress: no change  Plan of Care Reviewed With: patient  Outcome Evaluation: Remains alert to self.  Nonsensical conversation. Ostomy remains intake with minimal drainage.  Dilaudid prn x1. Rested well.   Goal Outcome Evaluation:  Plan of Care Reviewed With: patient        Progress: no change  Outcome Evaluation: Remains alert to self.  Nonsensical conversation. Ostomy remains intake with minimal drainage.  Dilaudid prn x1. Rested well.

## 2024-08-16 NOTE — PLAN OF CARE
Goal Outcome Evaluation:           Progress: no change  Outcome Evaluation: Comfort measures ongoing. Patient slept through much of shift, however at times was restless, agitated, speaking tangentially. Managed with scheduled meds and prns.  Minimal UOP via indwelling catheter. Sacral spine PI dressing changed. Care ongoing, continue plan of care.

## 2024-08-16 NOTE — PLAN OF CARE
Goal Outcome Evaluation:  Plan of Care Reviewed With: patient        Progress: no change  Outcome Evaluation: On RA. Oriented to self only. Nonsensical conversation noted. Urinary catheter in place and draining. Fistula draining. Dressing intact. PRN versed required x1 for anxiety. Turns conducted. Poor appetite. No PO intake. Rested well between care. Comfort measures continued.

## 2024-08-16 NOTE — PROGRESS NOTES
"Hospice Progress Note    Patient Name: Arcelia Walter   : 1945  Gender: female    Code Status: comfort measures    Date of Admission: 2024    Subjective:  Arcelia Walter is a 79 y.o. female admitted to inpatient hospice 2024 with diagnosis of Postprocedural intraabdominal abscess [T81.43XA]  for symptom management.     Sister Frances at bedside.  Patient denies pain on exam.  She is confused, repeats the word \"Deloris\" throughout exam.  Visual hallucinations noted.  +2 pitting edema noted to BLE. Increased anxiety.  Not tolerating turns and repositioning well, cries out in pain when moved.     - Scheduled:  Remeron 15 mg daily @ HS.     - PRNs:  Dilaudid 0.25 mg x 2       - Intake/Output  Intake & Output (last 3 days)          07 07 07 07 0701  08/15 0700 08/15 0701   0700    P.O.   59 350    Total Intake   59 350    Urine   150     Total Output   150     Net   -91 +350                       ROS:  Review of Systems   Unable to perform ROS: Acuity of condition       Reviewed current scheduled and prn medications for route, type, dose and frequency.       acetaminophen **OR** acetaminophen **OR** acetaminophen    senna-docusate sodium **AND** polyethylene glycol **AND** bisacodyl **AND** bisacodyl    furosemide    haloperidol lactate    HYDROmorphone    melatonin    midazolam    ondansetron    Polyvinyl Alcohol-Povidone PF    prochlorperazine    Scopolamine    sodium chloride    Objective:   There were no vitals taken for this visit.     PPS: Palliative Performance Scale score as of 2024, 08:51 EDT is 20% based on the following measures:   Ambulation: Totally bed bound  Activity and Evidence of Disease: Unable to do any work, extensive evidence of disease  Self-Care: Total care  Intake:Minimal sips  LOC: Full, drowsy or confusion     Physical Exam:  Physical Exam  Nursing note reviewed. Exam conducted with a chaperone present.   Constitutional:       General: " She is not in acute distress.     Appearance: She is ill-appearing.      Comments: Frail, thin female    HENT:      Head: Normocephalic.      Comments: Temporal wasting present.      Mouth/Throat:      Mouth: Mucous membranes are moist.      Pharynx: Oropharyngeal exudate present.      Comments: Odor  Cardiovascular:      Rate and Rhythm: Normal rate.   Pulmonary:      Effort: Pulmonary effort is normal.   Abdominal:      General: Bowel sounds are normal.      Tenderness: There is abdominal tenderness.      Comments: Fistula draining to ostomy bag.    Skin:     General: Skin is warm.      Coloration: Skin is pale.             Postprocedural intraabdominal abscess      Assessment/Plan:   Arcelia Walter is a 79 y.o. female admitted to inpatient hospice 08/14/2024 with diagnosis of Postprocedural intraabdominal abscess [T81.43XA]  for symptom management.     Symptoms:  Pain   Anxiety   Edema     Discharge Disposition: [facility]     Pain  -Increase dilaudid 0.5 mg  scheduled q 6 hr   -Increase dilaudid 0.5 mg q 1 hr PRN for BT pain or dyspnea      Anxiety   -Continue remeron 15 mg nightly   -Add versed 1 mg q 4 hrs PRN   -Increase haldol to 1 mg q 6 hrs PRN     Edema   -Continue lasix 20 mg PRN for edema     Total Visit Time:20 min   Face to Face Time: 10 min     Justification for care:  Patient meets criteria for acute in-patient care due to need for frequent skilled nursing assessments to determine patient comfort and medication effectiveness at end of life.  Frequent adjustments to medications and interventions for symptom management, including injectable medications.      Harriet Mays, MSN, APRN  [facility]  Hospice and Palliative Care Nurse Practitioner  08/16/24  08:51 EDT

## 2024-08-16 NOTE — PROGRESS NOTES
"Continued Stay Note  [facility]     Patient Name: Arcelia Walter  MRN: [id number]  Today's Date: 8/16/2024    Admit Date: 8/14/2024    Plan: Inpatient hospice   Discharge Plan       Row Name 08/16/24 1612       Plan    Plan Inpatient hospice    Plan Comments SA 20% pps is a 78yo female with a terminal diagnosis of Postprocedural intra abdominal abscess. Chart reviewed, visit to bedside, assess completed. Patient observed resting in bed. Awake, alert, pleasantly confused.  Patient states, \"It's time to go wee wee wee all the way home.\" Rn gently reminds patient that she has f/c. Patient relays pain at \"my stomach\". States mild. Rn reassures patient that she is safe, cared for. Edema 3+ melonie l.e. today. Rn requests patient be medicated with dilaudid and then repositioned. Georgina BARAHONA [facility] to medicate. Updated MARTIN Mays APRN. Patient requires injectable medication for symptom palliation necessitating skilled nursing care.    Final Discharge Disposition Code 51 - hospice medical facility                   Discharge Codes    No documentation.                 Expected Discharge Date and Time       Expected Discharge Date Expected Discharge Time    Aug 14, 2024               Joanne Armando RN    "

## 2024-08-17 NOTE — PLAN OF CARE
Goal Outcome Evaluation:  Plan of Care Reviewed With: patient        Progress: declining  Outcome Evaluation: On RA. No PO intake this shift. PRNs given for pain and anxiety. Minimal UOP. No BM. Comfort measures continued.

## 2024-08-17 NOTE — PROGRESS NOTES
"Continued Stay Note  [facility]     Patient Name: Arcelia Walter  MRN: [id number]  Today's Date: 8/17/2024    Admit Date: 8/14/2024    Plan: Inpatient hospice   Discharge Plan       Row Name 08/17/24 1035       Plan    Plan Inpatient hospice    Plan Comments         78yo female with a terminal diagnosis of Postprocedural intra abdominal abscess. PPS 20%. Assessment completed, chart reviewed. Patient observed lying in bed with eyes closed, opened eyes and verbally responded when spoken to. Patient talking randomly, saying letters and repeated \"thank you\". Patient was unable to state or rate pain.  3+  edema in bilateral lower extremities today. Patient requires injectable medication for symptom management necessitating skilled nursing care.                            Discharge Codes    No documentation.                 Expected Discharge Date and Time        Expected Discharge Time                   Mildred Samayoa    "

## 2024-08-17 NOTE — PROGRESS NOTES
Hospice Progress Note    Patient Name: Arcelia Walter   : 1945  Gender: female    Code Status: comfort measures    Date of Admission: 2024    Subjective:  Arcelia Walter is a 79 y.o. female admitted to inpatient hospice 2024 with diagnosis of Postprocedural intraabdominal abscess [T81.43XA]  for symptom management.     Multiple family members present at bedside. Patient very alert today.  She denies pain when asked.  PO intake very minimal, sister reports cough with liquids today.     - Scheduled:  Remeron 15 mg daily @ HS.     - PRNs:  Dilaudid 1 mg x 1  Versed 1 mg x 1   Haldol 2 mg x 1        - Intake/Output  Intake & Output (last 3 days)          07 07 07 07 0701  08/15 0700 08/15 07 0700    P.O.   59 350    Total Intake   59 350    Urine   150     Total Output   150     Net   -91 +350                       ROS:  Review of Systems   Unable to perform ROS: Acuity of condition       Reviewed current scheduled and prn medications for route, type, dose and frequency.       acetaminophen **OR** acetaminophen **OR** acetaminophen    furosemide    haloperidol lactate    HYDROmorphone    midazolam    ondansetron    Polyvinyl Alcohol-Povidone PF    Scopolamine    sodium chloride    Objective:   There were no vitals taken for this visit.     PPS: Palliative Performance Scale score as of 2024, 11:03 EDT is 20% based on the following measures:   Ambulation: Totally bed bound  Activity and Evidence of Disease: Unable to do any work, extensive evidence of disease  Self-Care: Total care  Intake:Minimal sips  LOC: Full, drowsy or confusion     Physical Exam:  Physical Exam  Nursing note reviewed. Exam conducted with a chaperone present.   Constitutional:       General: She is not in acute distress.     Appearance: She is ill-appearing.      Comments: Frail, thin female    HENT:      Head: Normocephalic.      Comments: Temporal wasting present.      Mouth/Throat:       Mouth: Mucous membranes are moist.      Pharynx: Oropharyngeal exudate present.      Comments: Odor  Cardiovascular:      Rate and Rhythm: Normal rate.   Pulmonary:      Effort: Pulmonary effort is normal.   Abdominal:      General: Bowel sounds are normal.      Tenderness: There is abdominal tenderness.      Comments: Fistula draining to ostomy bag.    Skin:     General: Skin is warm.      Coloration: Skin is pale.             Postprocedural intraabdominal abscess      Assessment/Plan:   Arcelia Walter is a 79 y.o. female admitted to inpatient hospice 08/14/2024 with diagnosis of Postprocedural intraabdominal abscess [T81.43XA]  for symptom management.     Symptoms:  Pain   Anxiety   Edema     Discharge Disposition: [facility]     Pain  -Continue dilaudid 0.5 mg  scheduled q 6 hr   -Continue dilaudid 0.5 mg q 1 hr PRN for BT pain or dyspnea      Anxiety   -Continue remeron 15 mg nightly   -Continue versed 1 mg q 4 hrs PRN   -Continue haldol to 1 mg q 6 hrs PRN     Edema   -Continue lasix 20 mg PRN for edema     Total Visit Time:20 min   Face to Face Time: 10 min     Justification for care:  Patient meets criteria for acute in-patient care due to need for frequent skilled nursing assessments to determine patient comfort and medication effectiveness at end of life.  Frequent adjustments to medications and interventions for symptom management, including injectable medications.      Harriet Mays, MSN, APRN  [facility]  Hospice and Palliative Care Nurse Practitioner  08/23/24  11:03 EDT

## 2024-08-18 NOTE — PLAN OF CARE
Goal Outcome Evaluation:       Prn med given for anxiety.  Family has been at bedside today assisting with care. Rectal suppository given.Comfort care continued.

## 2024-08-18 NOTE — PROGRESS NOTES
Hospice Progress Note    Patient Name: Arcelia Walter   : 1945  Gender: female    Code Status: comfort measures    Date of Admission: 2024    Subjective:  Arcelia Walter is a 79 y.o. female admitted to inpatient hospice 2024 with diagnosis of Postprocedural intraabdominal abscess [T81.43XA]  for symptom management.     No family present on exam.  Pt resting today.  Confused.  Continues to have visual hallucinations.   Edema worse in BLE.  PO intake minimal.     - Scheduled:  Dilaudid 0.5 mg q 6   Remeron @ bedtime   Nicoderm patch     - PRNs:  Dulcolax x 1   Versed 1 mg x 2   Haldol 1 mg x 1        - Intake/Output  Intake & Output (last 3 days)          07 07 07 0701  08/15 0700 08/15 07 0700    P.O.   59 350    Total Intake   59 350    Urine   150     Total Output   150     Net   -91 +350                       ROS:  Review of Systems   Unable to perform ROS: Acuity of condition       Reviewed current scheduled and prn medications for route, type, dose and frequency.       acetaminophen **OR** acetaminophen **OR** acetaminophen    furosemide    haloperidol lactate    HYDROmorphone    midazolam    ondansetron    Polyvinyl Alcohol-Povidone PF    Scopolamine    sodium chloride    Objective:   There were no vitals taken for this visit.     PPS: Palliative Performance Scale score as of 2024, 11:05 EDT is 20% based on the following measures:   Ambulation: Totally bed bound  Activity and Evidence of Disease: Unable to do any work, extensive evidence of disease  Self-Care: Total care  Intake:Minimal sips  LOC: Full, drowsy or confusion     Physical Exam:  Physical Exam  Nursing note reviewed. Exam conducted with a chaperone present.   Constitutional:       General: She is not in acute distress.     Appearance: She is ill-appearing.      Comments: Frail, thin female    HENT:      Head: Normocephalic.      Comments: Temporal wasting present.      Mouth/Throat:       Mouth: Mucous membranes are moist.      Pharynx: Oropharyngeal exudate present.      Comments: Odor  Cardiovascular:      Rate and Rhythm: Normal rate.   Pulmonary:      Effort: Pulmonary effort is normal.   Abdominal:      General: Bowel sounds are normal.      Tenderness: There is abdominal tenderness.      Comments: Fistula draining to ostomy bag.    Skin:     General: Skin is warm.      Coloration: Skin is pale.             Postprocedural intraabdominal abscess      Assessment/Plan:   Arcelia Walter is a 79 y.o. female admitted to inpatient hospice 08/14/2024 with diagnosis of Postprocedural intraabdominal abscess [T81.43XA]  for symptom management.     Symptoms:  Pain   Anxiety   Edema     Discharge Disposition: [facility]     Pain  -Continue dilaudid 0.5 mg  scheduled q 6 hr   -Continue dilaudid 0.5 mg q 1 hr PRN for BT pain or dyspnea      Anxiety   -Continue remeron 15 mg nightly   -Continue versed 1 mg q 4 hrs PRN   -Continue haldol to 1 mg q 6 hrs PRN     Edema   -Continue lasix 20 mg PRN for edema     -Transition to [facility] on Monday 8/19/24.     Total Visit Time:20 min   Face to Face Time: 10 min     Justification for care:  Patient meets criteria for acute in-patient care due to need for frequent skilled nursing assessments to determine patient comfort and medication effectiveness at end of life.  Frequent adjustments to medications and interventions for symptom management, including injectable medications.      Harriet Mays, MSN, APRN  [facility]  Hospice and Palliative Care Nurse Practitioner  08/23/24  11:05 EDT

## 2024-08-18 NOTE — CASE MANAGEMENT/SOCIAL WORK
Continued Stay Note   [place]     Patient Name: Arcelia Walter  MRN: [id number]  Today's Date: 8/18/2024    Admit Date: 8/14/2024    Plan: Inpatient hospice   Discharge Plan       Row Name 08/18/24 0950       Patient awake and alert, confused, saying random words. Patient sitting up taking sips of ensure. Staff states that patient has eaten a small snack this shift. Patient complained of pain when I assessed her feet, but then stopped when I left them alone. 3+ edema in bilateral lower extremities. Still having urine output this shift. Still no BM. I requested to staff nurse to give Ducolax suppository. Sylvia RN updated                        Discharge Codes    No documentation.                 Expected Discharge Date and Time       Expected Discharge Date Expected Discharge Time    Aug 14, 2024               Mildred Samayoa

## 2024-08-18 NOTE — PLAN OF CARE
Goal Outcome Evaluation:  Plan of Care Reviewed With: patient        Progress: declining  Outcome Evaluation: On RA. Awake at the start of the hit. Ate a small snack. PRNs given for pain and anxiety. Minimal UOP. No BM. Comfort measures continued.

## 2024-08-19 NOTE — PROGRESS NOTES
Hospice Progress Note    Patient Name: Arcelia Walter   : 1945  Gender: female    Code Status: comfort measures    Date of Admission: 2024    Subjective:  Arcelia Walter is a 79 y.o. female admitted to inpatient hospice 2024 with diagnosis of Postprocedural intraabdominal abscess [T81.43XA]  for symptom management.     No family present on exam.  Confused.  Continues to have visual hallucinations.   Increased anxiety and restlessness overnight, may be transitioning to actively dying phase of care.  Multiple PRN's required.     - Scheduled:  Dilaudid 0.5 mg q 6   Remeron @ bedtime   Nicoderm patch     - PRNs:  Dulcolax x 1   Versed 1 mg x 2   Haldol 1 mg x 1    Dilaudid 0.5 mg x 1  Lasix 20 mg x 1       - Intake/Output  Intake & Output (last 3 days)          07 07 07 07 0701  08/15 0700 08/15 07 0700    P.O.   59 350    Total Intake   59 350    Urine   150     Total Output   150     Net   -91 +350                       ROS:  Review of Systems   Unable to perform ROS: Acuity of condition       Reviewed current scheduled and prn medications for route, type, dose and frequency.       acetaminophen **OR** acetaminophen **OR** acetaminophen    furosemide    haloperidol lactate    HYDROmorphone    midazolam    ondansetron    Polyvinyl Alcohol-Povidone PF    Scopolamine    sodium chloride    Objective:   There were no vitals taken for this visit.     PPS: Palliative Performance Scale score as of 2024, 11:08 EDT is 20% based on the following measures:   Ambulation: Totally bed bound  Activity and Evidence of Disease: Unable to do any work, extensive evidence of disease  Self-Care: Total care  Intake:Minimal sips  LOC: Full, drowsy or confusion     Physical Exam:  Physical Exam  Nursing note reviewed. Exam conducted with a chaperone present.   Constitutional:       General: She is not in acute distress.     Appearance: She is ill-appearing.      Comments: Frail,  thin female    HENT:      Head: Normocephalic.      Comments: Temporal wasting present.      Mouth/Throat:      Mouth: Mucous membranes are moist.      Pharynx: Oropharyngeal exudate present.      Comments: Odor  Cardiovascular:      Rate and Rhythm: Normal rate.   Pulmonary:      Effort: Pulmonary effort is normal.   Abdominal:      General: Bowel sounds are normal.      Tenderness: There is abdominal tenderness.      Comments: Fistula draining to ostomy bag.    Skin:     General: Skin is warm.      Coloration: Skin is pale.             Postprocedural intraabdominal abscess      Assessment/Plan:   Arcelia Walter is a 79 y.o. female admitted to inpatient hospice 08/14/2024 with diagnosis of Postprocedural intraabdominal abscess [T81.43XA]  for symptom management.     Symptoms:  Pain   Anxiety   Edema     Discharge Disposition: [facility]     Pain  -Continue dilaudid 0.5 mg  scheduled q 6 hr   -Continue dilaudid 0.5 mg q 1 hr PRN for BT pain or dyspnea      Anxiety   -Continue remeron 15 mg nightly   -Continue versed 1 mg q 4 hrs PRN   -Continue haldol to 1 mg q 6 hrs PRN     Edema   -Continue lasix 20 mg PRN for edema     -Increased PRN use overnight.  May be transitioning.  Monitor for 24 hours, if no change transition to [facility] level of care on Tuesday 8/20/24.      Total Visit Time:20 min   Face to Face Time: 10 min     Justification for care:  Patient meets criteria for acute in-patient care due to need for frequent skilled nursing assessments to determine patient comfort and medication effectiveness at end of life.  Frequent adjustments to medications and interventions for symptom management, including injectable medications.      Harriet Mays, MSN, APRN  [facility]  Hospice and Palliative Care Nurse Practitioner  08/23/24  11:08 EDT

## 2024-08-19 NOTE — PROGRESS NOTES
Chart reviewed and assessment completed.  Ms Walter is awake, alert to person.  She appears comfortable with relaxed face and regular, unlabored respirations.  Coordinated care with bedside RN.  Family not at bedside.  She is requiring GIP level of care for skilled nursing assessments and frequent interventions to promote comfort.

## 2024-08-19 NOTE — PLAN OF CARE
Goal Outcome Evaluation:  Plan of Care Reviewed With: patient        Progress: no change  Outcome Evaluation: PRN dilaudid, lasix, and melatonin given. No n/v or soa at this time. Poor PO intake. A&O to self only. Perdomo catheter and mucosa fistula patent. No family present at bedside. Pt resting well in between care at this time.

## 2024-08-20 NOTE — PLAN OF CARE
Goal Outcome Evaluation:  Plan of Care Reviewed With: patient        Progress: declining  Outcome Evaluation: PRN lasix, versed, and melatonin given. No n/v or soa at this time. Reduced alertness noted. Poor PO intake, productive coughing noted following PO fluids. A&O to self only. Perdomo catheter and mucosa fistula patent. No family present at bedside. Pt resting well in between care.

## 2024-08-20 NOTE — PROGRESS NOTES
Continued Stay Note  [facility]     Patient Name: Arcelia Walter  MRN: [id number]  Today's Date: 8/20/2024    Admit Date: 8/14/2024    Plan: Inpatient hospice   Discharge Plan       Row Name 08/20/24 1306       Plan    Plan Inpatient hospice    Plan Comments SA 20% pps is a 80yo female with a terminal diagnosis of Postprocedural Intraabdominal abscess. Chart reviewed ([facility] 8/20/24, PRN's: none). Visit to bedside, assess completed. Rn notes patient is awake, eyes/stare fixed to ceiling unless spoken to. Patient with eye contact x1. Toes/knees are cool. Fistula drainage bag intact-tan malodorous drainage. Answers symptom questions appropriately. Oriented to self only. Denies discomfort. Yes to comfort. Rn notes patient with belching/burping motion. Rn reassures patient that she is safe and cared for. Requested [facility]ahona [facility] administer prn medication for nausea in case patient with symptoms. Rn contacted patient's sister, Frances, to update. Discussed assess, condition, eol sign/symptoms, changes in condition, medications, comfort, eol communication, and self care. Discussed with X[facility]itl BARAHONA [facility] premedication prior to reposition. Updated MARTIN WAGONER. Patient changed to [facility] today.    Final Discharge Disposition Code 51 - hospice medical facility                   Discharge Codes    No documentation.                 Expected Discharge Date and Time       Expected Discharge Date Expected Discharge Time    Aug 14, 2024               Joanne Armando RN

## 2024-08-20 NOTE — PROGRESS NOTES
Hospice level of care is [facility].  Chart reviewed, medications noted.  Clinical status and needs discussed with Hospice RN.  No medication adjustments required today.      Harriet Mays, MSN, APRN, ACHPN  [facility]ators  Hospice and Palliative Care Nurse Practitioner  08/20/24  13:04 EDT

## 2024-08-20 NOTE — PLAN OF CARE
Problem: End-of-Life Care  Goal: Comfort, Peace and Preserved Dignity  Outcome: Ongoing, Progressing  Intervention: Promote Physical Comfort  Recent Flowsheet Documentation  Taken 8/20/2024 0800 by Xochitl Alex RN  Sensory Stimulation Regulation: (CARE channel playing during day)   lighting decreased   care clustered   quiet environment promoted   other (see comments)  Environmental Support:   calm environment promoted   rest periods encouraged   environmental consistency promoted  Intervention: Promote Peace and Maintain Dignity  Recent Flowsheet Documentation  Taken 8/20/2024 0800 by Xochitl Alex RN  Supportive Measures:   active listening utilized   relaxation techniques promoted     Problem: Skin Injury Risk Increased  Goal: Skin Health and Integrity  Outcome: Ongoing, Progressing  Intervention: Optimize Skin Protection  Recent Flowsheet Documentation  Taken 8/20/2024 1400 by Xochitl Alex RN  Head of Bed (HOB) Positioning: HOB at 20-30 degrees  Taken 8/20/2024 1205 by Xochitl Alex RN  Head of Bed (HOB) Positioning: HOB at 20-30 degrees  Taken 8/20/2024 1030 by Xochitl Alex RN  Head of Bed (HOB) Positioning: HOB at 20-30 degrees  Taken 8/20/2024 0800 by Xochitl Alex RN  Pressure Reduction Techniques:   heels elevated off bed   positioned off wounds   pressure points protected   weight shift assistance provided  Head of Bed (HOB) Positioning: HOB at 20-30 degrees  Pressure Reduction Devices:   foam padding utilized   heel offloading device utilized   pressure-redistributing mattress utilized   specialty bed utilized   positioning supports utilized   elbow protectors utilized  Skin Protection:   hydrocolloids used   drying agents applied   incontinence pads utilized   protective footwear used   silicone foam dressing in place   transparent dressing maintained   tubing/devices free from skin contact     Problem: Fall Injury Risk  Goal: Absence of  Fall and Fall-Related Injury  Outcome: Ongoing, Progressing  Intervention: Promote Injury-Free Environment  Recent Flowsheet Documentation  Taken 8/20/2024 1400 by Xochitl Alex RN  Safety Promotion/Fall Prevention:   assistive device/personal items within reach   clutter free environment maintained   safety round/check completed   room organization consistent  Taken 8/20/2024 1205 by Xochitl Alex RN  Safety Promotion/Fall Prevention:   assistive device/personal items within reach   clutter free environment maintained   safety round/check completed   room organization consistent  Taken 8/20/2024 1030 by Xochitl Alex RN  Safety Promotion/Fall Prevention:   assistive device/personal items within reach   clutter free environment maintained   room organization consistent   safety round/check completed  Taken 8/20/2024 0800 by Xochitl Alex RN  Safety Promotion/Fall Prevention:   assistive device/personal items within reach   clutter free environment maintained   room organization consistent   safety round/check completed     Problem: Adult Inpatient Plan of Care  Goal: Plan of Care Review  Outcome: Ongoing, Progressing  Flowsheets (Taken 8/20/2024 1540)  Progress: no change  Plan of Care Reviewed With: patient  Goal: Patient-Specific Goal (Individualized)  Outcome: Ongoing, Progressing  Flowsheets (Taken 8/20/2024 1540)  Individualized Care Needs: prioritization of comfort  Goal: Absence of Hospital-Acquired Illness or Injury  Outcome: Ongoing, Progressing  Intervention: Identify and Manage Fall Risk  Recent Flowsheet Documentation  Taken 8/20/2024 1400 by Xochitl Alex RN  Safety Promotion/Fall Prevention:   assistive device/personal items within reach   clutter free environment maintained   safety round/check completed   room organization consistent  Taken 8/20/2024 1205 by Xochitl Alex RN  Safety Promotion/Fall Prevention:   assistive device/personal  items within reach   clutter free environment maintained   safety round/check completed   room organization consistent  Taken 8/20/2024 1030 by Xochitl Alex RN  Safety Promotion/Fall Prevention:   assistive device/personal items within reach   clutter free environment maintained   room organization consistent   safety round/check completed  Taken 8/20/2024 0800 by Xochitl Alex RN  Safety Promotion/Fall Prevention:   assistive device/personal items within reach   clutter free environment maintained   room organization consistent   safety round/check completed  Intervention: Prevent Skin Injury  Recent Flowsheet Documentation  Taken 8/20/2024 1400 by Xochitl Alex RN  Body Position:   right   turned   legs elevated  Taken 8/20/2024 1205 by Xochitl Alex RN  Body Position:   left   tilted   legs elevated   head facing, left  Taken 8/20/2024 1030 by Xochitl Alex RN  Body Position:   tilted   supine, legs elevated  Taken 8/20/2024 0800 by Xochitl Alex RN  Body Position:   turned   left   legs elevated  Skin Protection:   hydrocolloids used   drying agents applied   incontinence pads utilized   protective footwear used   silicone foam dressing in place   transparent dressing maintained   tubing/devices free from skin contact  Intervention: Prevent and Manage VTE (Venous Thromboembolism) Risk  Recent Flowsheet Documentation  Taken 8/20/2024 1400 by Xochitl Alex RN  Activity Management: bedrest  Taken 8/20/2024 1205 by Xochitl Alex RN  Activity Management: bedrest  Taken 8/20/2024 1030 by Xochitl Alex RN  Activity Management: bedrest  Taken 8/20/2024 0800 by Xochitl Alex RN  Activity Management: bedrest  VTE Prevention/Management: (comfort care) other (see comments)  Intervention: Prevent Infection  Recent Flowsheet Documentation  Taken 8/20/2024 0800 by Xcohitl Alex RN  Infection Prevention:    environmental surveillance performed   rest/sleep promoted   single patient room provided  Goal: Optimal Comfort and Wellbeing  Outcome: Ongoing, Progressing  Intervention: Monitor Pain and Promote Comfort  Recent Flowsheet Documentation  Taken 8/20/2024 1030 by Xochitl Alex, RN  Pain Management Interventions: see MAR  Intervention: Provide Person-Centered Care  Recent Flowsheet Documentation  Taken 8/20/2024 0800 by Xochitl Alex, RN  Trust Relationship/Rapport: care explained  Goal: Readiness for Transition of Care  Outcome: Ongoing, Progressing   Goal Outcome Evaluation:  Plan of Care Reviewed With: patient        Progress: no change

## 2024-08-21 NOTE — PROGRESS NOTES
Hospice level of care is [facility].  Chart reviewed, medications noted.  Clinical status and needs discussed with Hospice RN.  Orders placed as necessary.    Harriet Mays, MSN, APRN, [facility]  [facility] Nav[facility]ators  Hospice and Palliative Care Nurse Practitioner  08/21/24  13:16 EDT

## 2024-08-21 NOTE — PLAN OF CARE
Goal Outcome Evaluation:         Pt restlessness/pain relieved by PRN meds. UO decreasing and fistula output moderate,  brown/watery. No PO intake. Family at bedside. Will continue plan of care.

## 2024-08-21 NOTE — PLAN OF CARE
Goal Outcome Evaluation:  Plan of Care Reviewed With: family        Progress: declining  Outcome Evaluation: Pt rested well throughout shift. PRN medications used to maintain pt comfort. Pt turns minimized due to increasing discomfort. Patent lozano in place. Comfort measures maintained. No other concerns noted at this time.

## 2024-08-22 NOTE — PLAN OF CARE
Goal Outcome Evaluation:            Patient has ostomy to right lower abdomen, draining light brown stool to bedside drainage bag. She is on room air. Vital signs stable, family was at bedside today. Will continue to monitor.

## 2024-08-22 NOTE — PROGRESS NOTES
"Continued Stay Note  [facility]     Patient Name: Arcelia Walter  MRN: [id number]  Today's Date: 8/22/2024    Admit Date: 8/14/2024    Plan: Inpatient hospice   Discharge Plan       Row Name 08/22/24 1241       Plan    Plan Inpatient hospice    Plan Comments SA 20% pps 78yo female with terminal diagnosis of Post procedural intraabdominal abscess. Chart reviewed ([facility] 8/18/24, PRN's: dilaudid x4, versed x4, scope patch). Assess completed. Patient can be heard speaking loudly from nurses' station \"8 8 8 a b c\" repeatedly with gurgling voice. Rn enters room and finds patient with eyes wide, distressed facial expression. [facility] Top[facility] to administer prn medications for comfort. Patient able to cough and expectorate secretions to oral cavity but unable to remove from mouth. Halle Topete set up suction for use with jermain as patient remains able to expectorate at this time. Rn reassures patient that she is safe and cared for. Patient's sister, Frances, arrives later in morning. Rn updates, discusses decline, increased symptoms, medications, what to possibly expect from this point forward, and self care. Frances verbalizes understanding. Rn offers support and open communication. [facility] status. Care coordinated with [facility]. Updated VJoey WAGONER.     Final Discharge Disposition Code 51 - hospice medical facility                   Discharge Codes    No documentation.                 Expected Discharge Date and Time       Expected Discharge Date Expected Discharge Time    Aug 14, 2024               Joanne Armando RN    "

## 2024-08-22 NOTE — PROGRESS NOTES
"Hospice level of care is [facility].  Chart reviewed, medications noted.  Clinical status and needs discussed with Hospice RN.    Patient with increase in symptoms today.  She has been crying out, appears fearful at times.  States \"I ache\".  Unable to rate or describe pain further.  Increase PRN use.     Increase scheduled dilaudid to 0.5 mg q 4 hrs   Continue dilaudid q 1 hr PRN   Add scheduled versed q 6 hrs   Continue PRN versed   Continue PRN haldol   Discontinue PO medications.     Harriet Mays, MSN, APRN, [facility]  [facility]  Hospice and Palliative Care Nurse Practitioner  08/22/24  16:26 EDT   "

## 2024-08-23 NOTE — PROGRESS NOTES
Continued Stay Note  [facility]     Patient Name: Arcelia Walter  MRN: [id number]  Today's Date: 8/23/2024    Admit Date: 8/14/2024    Plan: Inpatient hospice   Discharge Plan       Row Name 08/23/24 1050       Plan    Plan Inpatient hospice    Plan Comments 10% PPS. Patient lying in bed with eyes open. Does not track or respond when spoken to. She moans with assessment. Nurse at bedside and has just given scheduled Dilaudid. Patient has required 5 prns of Versed 1mg, 1 prn of Dilaudid 0.5mg, and 1 prn of Lasix 20mg in the past 24 hours. She has had an increase in prns needs and her medications will be adjusted today by the hospice provider. Her level of care is being changed from routine home care to GIP effective today, 8/23 per Harriet WAGONER.                   Discharge Codes    No documentation.                 Expected Discharge Date and Time       Expected Discharge Date Expected Discharge Time    Aug 14, 2024               Arely Vega RN

## 2024-08-23 NOTE — PLAN OF CARE
Goal Outcome Evaluation:            Patient not awakening as much today. Family at bedside today. She has lozano cath intact, draining dark urine to bedside drainage bag. She had small liquid bowel movement today. Feet are cold to touch. She is resting in bed, will continue to monitor

## 2024-08-23 NOTE — H&P
Hospice History and Physical     Patient Name:  Arcelia Walter   : 1945   Sex: female    Patient Care Team:  Provider, No Known as PCP - General    Code Status: Comfort measures     Subjective     Hospital Course:   Extended hospital course.     Arcelia Walter is a 79 y.o. female w unknown PMH who presented after being found down by EMS on welfare check, initially no emergency contacts/collateral available in system. Patient's sisters and daughters subsequently located by MSW.      Area of concern in right lower abdomen noted , initially attributed to right femoral hernia per CT w/o contrast but then began to leak brown foul smelling fluid . 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 Given patient's severe malnutrition, chronic failure to thrive, extensive surgery not thought to be in her best interest and family in agreement. Surgery previously followed.      Transitioned to comfort care goals mid July. Declining quickly, eating very little. Requiring IV meds from time to time. Continue to follow for inpatient hospice appropriateness.    Inpatient hospice team met with sister Frances at bedside.  Inpatient team reviewed inpatient hospice service, medication management, and goals of care.  Family elected comfort focused POC.      Patient admitted to inpatient hospice on 2024 with terminal diagnosis of Postprocedural intraabdominal abscess [T81.43XA] for management of severe pain, anxiety and dyspnea that will require injectable medications and frequent skilled nursing assessments to manage.  Prognosis poor.      Review of Systems:  Review of Systems   Unable to perform ROS: Acuity of condition       History:  No past medical history on file.  Past Surgical History:   Procedure Laterality Date    HYSTERECTOMY       Current Facility-Administered Medications   Medication Dose Route Frequency Provider Last Rate Last Admin     acetaminophen (TYLENOL) tablet 650 mg  650 mg Oral Q4H PRN Harriet Mays APRN        Or    acetaminophen (TYLENOL) 160 MG/5ML oral solution 650 mg  650 mg Oral Q4H PRN Harriet Mays APRN        Or    acetaminophen (TYLENOL) suppository 650 mg  650 mg Rectal Q4H PRN Harriet Mays APRN        furosemide (LASIX) injection 20 mg  20 mg Intravenous Q6H PRN Hariret Mays APRN   20 mg at 08/22/24 1956    haloperidol lactate (HALDOL) injection 2 mg  2 mg Intravenous Q6H PRN Harriet Mays APRN        HYDROmorphone (DILAUDID) injection 0.5 mg  0.5 mg Intravenous Q1H PRN Harriet Mays APRN   0.5 mg at 08/23/24 0928    HYDROmorphone (DILAUDID) injection 0.5 mg  0.5 mg Intravenous Q4H Harriet Mays APRN   0.5 mg at 08/23/24 1039    midazolam (VERSED) injection 1 mg  1 mg Intravenous Q6H Harriet Mays APRN        midazolam (VERSED) injection 1 mg  1 mg Intravenous Q2H PRN Harriet Mays APRN        ondansetron (ZOFRAN) injection 4 mg  4 mg Intravenous Q6H PRN Harriet Mays APRN   4 mg at 08/20/24 1042    Polyvinyl Alcohol-Povidone PF (HYPOTEARS) 1.4-0.6 % ophthalmic solution 1 drop  1 drop Both Eyes Q30 Min PRN Harriet Mays APRN        scopolamine patch 1 mg/72 hr  1 patch Transdermal Q72H PRN Harriet Mays APRN   1 patch at 08/21/24 1327    sodium chloride 0.9 % flush 10 mL  10 mL Intravenous PRN Harriet Mays APRN              acetaminophen **OR** acetaminophen **OR** acetaminophen    furosemide    haloperidol lactate    HYDROmorphone    midazolam    ondansetron    Polyvinyl Alcohol-Povidone PF    Scopolamine    sodium chloride  No Known Allergies  Family History   Problem Relation Age of Onset    Stroke Mother     Hypertension Mother     Coronary artery disease Mother     Colon cancer Father     Colon cancer Sister     Parkinsonism Brother      Social History     Socioeconomic History    Marital status: Single   Tobacco Use    Smoking status: Never    Smokeless  tobacco: Never   Vaping Use    Vaping status: Never Used   Substance and Sexual Activity    Alcohol use: Never    Drug use: Never    Sexual activity: Defer       Objective     Vital Signs:       PPS: Palliative Performance Scale score as of 8/23/2024, 10:54 EDT is 30% based on the following measures:   Ambulation: Totally bed bound  Activity and Evidence of Disease: Unable to do any work, extensive evidence of disease  Self-Care: Total care  Intake: Reduced   LOC: Full, drowsy or confusion     Physical Exam:  Physical Exam  Vitals and nursing note reviewed. Exam conducted with a chaperone present.   Constitutional:       Comments: Thin.  Temporal wasting.    HENT:      Head: Normocephalic.      Mouth/Throat:      Mouth: Mucous membranes are moist.      Pharynx: Oropharynx is clear.   Cardiovascular:      Rate and Rhythm: Normal rate.   Pulmonary:      Effort: Pulmonary effort is normal.      Breath sounds: Rales present.   Abdominal:      General: Bowel sounds are normal.      Tenderness: There is abdominal tenderness. There is guarding.      Comments: Fistula with ostomy bag in place.  Small amount of thick drainage present.  Malodorous    Musculoskeletal:      Right lower leg: Edema present.      Left lower leg: Edema present.   Skin:     General: Skin is warm.   Neurological:      Mental Status: She is alert. She is disoriented and confused.   Psychiatric:         Mood and Affect: Mood is anxious.         Cognition and Memory: Cognition is impaired.         Results Reviewed:  LAB RESULTS:                              Brief Urine Lab Results  (Last result in the past 365 days)        Color   Clarity   Blood   Leuk Est   Nitrite   Protein   CREAT   Urine HCG        06/29/24 0038 Yellow   Clear   Negative   Negative   Negative   Trace                   Microbiology Results Abnormal       None              Postprocedural intraabdominal abscess      Assessment & Plan     Assessment/Plan:   -Admit to inpatient hospice  service 08/14/2024 with Postprocedural intraabdominal abscess [T81.43XA].     -Coordination of care with nursing staff and BCN IDT.     -Pain: Dilaudid 0.25 mg q 2 hrs PRN pain or dyspnea     -Dyspnea:  Dilaudid as above, oxygen via NC PRN     -Nausea/Vomiting: Zofran 4 mg scheduled q 6 hr PRN       -Anxiety/Agitation: Versed 0.5 mg q 4 hrs PRN; haldol 0.5 mg q 4 hrs PRN      -Bowel/bladder: bisacodyl supp q day PRN     -Nutrition: Comfort diet as tolerated     -ADLs: total care     -Terminal fever: tylenol supp 650 mg q 6 hr PRN. tordal 15 mg IV q 6 hr PRN     -Terminal secretions:  May start PRN robinul/scop patch/lasix if needed     -Patient comfort: palliative oral rinse PRN, liquifilm PRN      -Goals of Care: achieve comfortable death, educate and support family through this process.           Total Visit Time: 50 min   Face to Face Time: 30 min   Total time spent includes time reviewing chart, face-to-face time, counseling patient/family/caregiver, ordering medications/tests/procedures, communicating with other health care professionals, documenting clinical information in the electronic health record, and coordination of care with facility staff and BCN IDT.      Verbal certification obtained from Dr Renner who has determined patient's eligibility with terminal diagnosis of Postprocedural intraabdominal abscess [T81.43XA].  Medications and diagnosis determined to be related to terminal prognosis. See physician's CTI for information on eligibility determination.      Justification for care:  Patient meets criteria for acute in-patient care with required nursing assessment and interventions for symptoms with IV medications.      BHUMI GARDNER, MSN, APRN  [facility] Nav[facility]ators  Hospice and Palliative Care Nurse Practitioner  08/23/24  10:52 EDT

## 2024-08-23 NOTE — PROGRESS NOTES
11:58 EDT Hospice Progress Note    Patient Name: Arcelia Walter   : 1945  Gender: female    Code Status: comfort measures    Date of Admission: 2024    Subjective:  Arcelia Walter is a 79 y.o. female admitted to inpatient hospice 2024 with diagnosis of Postprocedural intraabdominal abscess [T81.43XA]  for symptom management.     No family present on exam.  Confused.  Continues to have visual hallucinations.   Increased anxiety and restlessness overnight, may be transitioning to actively dying phase of care.  Multiple PRN's required.     - Scheduled:  Dilaudid 0.5 mg q 6   Remeron @ bedtime   Nicoderm patch     - PRNs:  Dulcolax x 1   Versed 1 mg x 2   Haldol 1 mg x 1    Dilaudid 0.5 mg x 1  Lasix 20 mg x 1       - Intake/Output  Intake & Output (last 3 days)          07 07 0701   07 0701  08/15 0700 08/15 0701   0700    P.O.   59 350    Total Intake   59 350    Urine   150     Total Output   150     Net   -91 +350                       ROS:  Review of Systems   Unable to perform ROS: Acuity of condition       Reviewed current scheduled and prn medications for route, type, dose and frequency.       acetaminophen **OR** acetaminophen **OR** acetaminophen    furosemide    haloperidol lactate    HYDROmorphone    midazolam    ondansetron    Polyvinyl Alcohol-Povidone PF    Scopolamine    sodium chloride    Objective:   There were no vitals taken for this visit.     PPS: Palliative Performance Scale score as of 2024, 12:03 EDT is 10% based on the following measures:   Ambulation: Totally bed bound  Activity and Evidence of Disease: Unable to do any work, extensive evidence of disease  Self-Care: Total care  Intake:  Mouth care only  LOC: Drowsy or coma     Physical Exam:  Physical Exam  Nursing note reviewed. Exam conducted with a chaperone present.   Constitutional:       General: She is not in acute distress.     Appearance: She is ill-appearing.      Comments:  Frail, thin female    HENT:      Head: Normocephalic.      Comments: Temporal wasting present.      Mouth/Throat:      Mouth: Mucous membranes are moist.      Pharynx: Oropharyngeal exudate present.      Comments: Odor  Cardiovascular:      Rate and Rhythm: Normal rate.   Pulmonary:      Effort: Pulmonary effort is normal.   Abdominal:      General: Bowel sounds are normal.      Tenderness: There is abdominal tenderness.      Comments: Fistula draining to ostomy bag.    Skin:     General: Skin is warm.      Coloration: Skin is pale.             Postprocedural intraabdominal abscess      Assessment/Plan:   Arcelia Walter is a 79 y.o. female admitted to inpatient hospice 08/14/2024 with diagnosis of Postprocedural intraabdominal abscess [T81.43XA]  for symptom management.     Symptoms:  Pain   Anxiety   Edema     Discharge Disposition: [facility]     Pain  -Continue dilaudid 0.5 mg  scheduled q 4 hr   -Continue dilaudid 0.5 mg q 1 hr PRN for BT pain or dyspnea      Anxiety   -Continue versed 0.5 mg q 6 hrs   -Continue versed 1 mg q 2 hrs PRN   -Increase haldol to 2 mg q 4 hrs PRN     Edema   -Continue lasix 20 mg PRN for edema     Total Visit Time:20 min   Face to Face Time: 10 min     Justification for care:  Patient meets criteria for acute in-patient care due to need for frequent skilled nursing assessments to determine patient comfort and medication effectiveness at end of life.  Frequent adjustments to medications and interventions for symptom management, including injectable medications.      Harriet Mays, MSN, APRN  [facility]  Hospice and Palliative Care Nurse Practitioner  08/23/24  11:58 EDT

## 2024-08-24 NOTE — PLAN OF CARE
Goal Outcome Evaluation:         Patient respirations at 7 per minute this shift. Has apneic breathing. Perdomo cath remains intact. Room air. Family at bedside today. Feet starting to mottle noted. No other changes, will continue to monitor.

## 2024-08-24 NOTE — PROGRESS NOTES
Continued Stay Note   [facility]     Patient Name: Arcelia Walter  MRN: [id number]  Today's Date: 8/24/2024    Admit Date: 8/14/2024    Plan: Inpatient hospice      79 yr old female with diagnosis of postprocedural intraabdominal abscess. PPS 10%. Patient lying in bed with eyes closed, sleeping. She did not wake up during assessment. Patient's finger tips and top tips are mottled. Minimal urine output. Last BM 08/23/24. PRNs given in the past 24hrs: dilaudid 0.5mg  x1 (at 0754), versed 1mg x1 (at 0150).       Discharge Codes    No documentation.                 Expected Discharge Date and Time       Expected Discharge Date Expected Discharge Time    Aug 14, 2024               Mildred Samayoa

## 2024-08-24 NOTE — PROGRESS NOTES
16:18 EDT Hospice Progress Note    Patient Name: Arcelia Walter   : 1945  Gender: female    Code Status: comfort measures    Date of Admission: 2024    Subjective:  Arcelia Walter is a 79 y.o. female admitted to inpatient hospice 2024 with diagnosis of Postprocedural intraabdominal abscess [T81.43XA]  for symptom management.     Patient's sister Frances and nephew at bedside.  Pt unresponsive to exam today.  Moans with turns and repositioning.  NPO DAY # 4. Odor present in room.  Minimal urine output.     Reviewed end of life care with family.  We discussed food and water.  We also discussed hours to days prognosis.  Pt changing, appears to be actively dying.     - Scheduled:  Dilaudid 0.5 mg q 4 hrs   Versed 1 mg q 6 hrs      - PRNs:  Versed 1 mg x 1   Dilaudid 0.5 mg x 1        - Intake/Output  Intake & Output (last 3 days)          07 07 0701   07 0701  08/15 0700 08/15 0701   0700    P.O.   59 350    Total Intake   59 350    Urine   150     Total Output   150     Net   -91 +350                       ROS:  Review of Systems   Unable to perform ROS: Acuity of condition       Reviewed current scheduled and prn medications for route, type, dose and frequency.       acetaminophen **OR** acetaminophen **OR** acetaminophen    furosemide    haloperidol lactate    HYDROmorphone    midazolam    ondansetron    Polyvinyl Alcohol-Povidone PF    Scopolamine    sodium chloride    Objective:   There were no vitals taken for this visit.     PPS: Palliative Performance Scale score as of 2024, 16:18 EDT is 10% based on the following measures:   Ambulation: Totally bed bound  Activity and Evidence of Disease: Unable to do any work, extensive evidence of disease  Self-Care: Total care  Intake:  Mouth care only  LOC: Drowsy or coma     Physical Exam:  Physical Exam  Nursing note reviewed. Exam conducted with a chaperone present.   Constitutional:       General: She is not in  acute distress.     Appearance: She is ill-appearing.      Comments: Frail, thin female    HENT:      Head: Normocephalic.      Comments: Temporal wasting present.      Mouth/Throat:      Mouth: Mucous membranes are moist.      Pharynx: Oropharyngeal exudate present.      Comments: Odor  Cardiovascular:      Rate and Rhythm: Normal rate.   Pulmonary:      Effort: Pulmonary effort is normal.   Abdominal:      General: Bowel sounds are normal.      Tenderness: There is abdominal tenderness.      Comments: Fistula draining to ostomy bag.    Genitourinary:     Comments: Last BM noted 8/23/24   Musculoskeletal:      Right foot: Swelling present.      Left foot: Swelling present.   Skin:     General: Skin is warm.      Coloration: Skin is pale.             Postprocedural intraabdominal abscess      Assessment/Plan:   Arcelia Walter is a 79 y.o. female admitted to inpatient hospice 08/14/2024 with diagnosis of Postprocedural intraabdominal abscess [T81.43XA]  for symptom management.     Symptoms:  Pain   Anxiety   Edema     Discharge Disposition: [facility]     Pain  -Continue dilaudid 0.5 mg  scheduled q 4 hr   -Continue dilaudid 0.5 mg q 1 hr PRN for BT pain or dyspnea      Anxiety   -Continue versed 0.5 mg q 6 hrs   -Continue versed 1 mg q 2 hrs PRN   -Increase haldol to 2 mg q 4 hrs PRN     Edema   -Continue lasix 20 mg PRN for edema     Total Visit Time:20 min   Face to Face Time: 10 min     Justification for care:  Patient meets criteria for acute in-patient care due to need for frequent skilled nursing assessments to determine patient comfort and medication effectiveness at end of life.  Frequent adjustments to medications and interventions for symptom management, including injectable medications.      Harriet Mays, MSN, APRN  [facility]  Hospice and Palliative Care Nurse Practitioner  08/24/24  16:18 EDT

## 2024-08-25 NOTE — PROGRESS NOTES
PPS 10%, appears to be actively dying. Patient lying in bed with eyes open, very shallow breathing. Patient's finger tips and toes are mottled. Minimal urine output. Last BM 08/23/24. PRNs given in the past 24hrs: dilaudid 0.5mg x2, versed 1mg x2.

## 2024-08-25 NOTE — PROGRESS NOTES
13:03 EDT Hospice Progress Note    Patient Name: Arcelia Walter   : 1945  Gender: female    Code Status: comfort measures    Date of Admission: 2024    Subjective:  Arcelia Walter is a 79 y.o. female admitted to inpatient hospice 2024 with diagnosis of Postprocedural intraabdominal abscess [T81.43XA]  for symptom management.     Actively dying. 2 sisters and a nephew at bedside.  Day #5 NPO.  Shallow respirations.      - Scheduled:  Dilaudid 0.5 mg q 4 hrs   Versed 1 mg q 6 hrs      - PRNs:  Versed 1 mg x 2   Dilaudid 0.5 mg x 2        - Intake/Output  Intake & Output (last 3 days)          07 07 07 07 0701  08/15 0700 08/15 07 0700    P.O.   59 350    Total Intake   59 350    Urine   150     Total Output   150     Net   -91 +350                       ROS:  Review of Systems   Unable to perform ROS: Acuity of condition       Reviewed current scheduled and prn medications for route, type, dose and frequency.       acetaminophen **OR** acetaminophen **OR** acetaminophen    furosemide    haloperidol lactate    HYDROmorphone    midazolam    ondansetron    palliative care oral rinse    Polyvinyl Alcohol-Povidone PF    Scopolamine    sodium chloride    Objective:   There were no vitals taken for this visit.     PPS: Palliative Performance Scale score as of 2024, 13:03 EDT is 10% based on the following measures:   Ambulation: Totally bed bound  Activity and Evidence of Disease: Unable to do any work, extensive evidence of disease  Self-Care: Total care  Intake:  Mouth care only  LOC: Drowsy or coma     Physical Exam:  Physical Exam  Nursing note reviewed. Exam conducted with a chaperone present.   Constitutional:       General: She is not in acute distress.     Appearance: She is ill-appearing.      Comments: Frail, thin female    HENT:      Head: Normocephalic.      Comments: Temporal wasting present.      Mouth/Throat:      Mouth: Mucous membranes are moist.       Pharynx: Oropharyngeal exudate present.      Comments: Odor  Cardiovascular:      Rate and Rhythm: Normal rate.   Pulmonary:      Effort: Pulmonary effort is normal.   Abdominal:      General: Bowel sounds are normal.      Tenderness: There is abdominal tenderness.      Comments: Fistula draining to ostomy bag.    Genitourinary:     Comments: Last BM noted 8/23/24   Musculoskeletal:      Right foot: Swelling present.      Left foot: Swelling present.   Skin:     General: Skin is warm.      Coloration: Skin is pale.             Postprocedural intraabdominal abscess      Assessment/Plan:   Arcelia Walter is a 79 y.o. female admitted to inpatient hospice 08/14/2024 with diagnosis of Postprocedural intraabdominal abscess [T81.43XA]  for symptom management.     Symptoms:  Pain   Anxiety   Edema     Discharge Disposition: EOLC     Pain  -Continue dilaudid 0.5 mg  scheduled q 4 hr   -Continue dilaudid 0.5 mg q 1 hr PRN for BT pain or dyspnea      Anxiety   -Continue versed 0.5 mg q 6 hrs   -Continue versed 1 mg q 2 hrs PRN   -Increase haldol to 2 mg q 4 hrs PRN     Edema   -Continue lasix 20 mg PRN for edema     4. Patient comfort  -Palliative oral rinse q 6 hrs   -Liquifilm PRN     -No medication adjustments required today.   -Pt continues to require frequent skilled nursing assessments to ensure comfort and medication efficacy at end of life.     Total Visit Time:20 min   Face to Face Time: 10 min     Justification for care:  Patient meets criteria for acute in-patient care due to need for frequent skilled nursing assessments to determine patient comfort and medication effectiveness at end of life.  Frequent adjustments to medications and interventions for symptom management, including injectable medications.      Harriet Mays, MSN, APRN  [facility]  Hospice and Palliative Care Nurse Practitioner  08/25/24  13:03 EDT

## 2024-08-25 NOTE — PLAN OF CARE
Goal Outcome Evaluation: 78 y/o female admitted to hospice/comfort care on 8/14/24, on day 12 of this encounter. Pt has rested well this shift with one episode of rapid breathing requiring PRN dilaudid to be administered once. Scheduled medications given. Q4 hr turns, oral care, and eye care conducted for comfort. Family at bedside much of shift, attentive to pt. Patent bi-lat sub-q Ivs in place in upper arms, saline locked. Patent lozano cath in place to standard drainage bag. Urostomy bag in place with scant output. Continuing POC and reporting as needed.

## 2024-08-26 NOTE — SIGNIFICANT NOTE
Exam confirms with auscultation zero audible heart tones and zero audible respirations. Ms.Sara JUSTINO Walter was pronounced dead at 0103.  MD notified by Patient's RN.    Latisha Gaffney RN  Clinical House Supervisor  8/26/2024 01:44 EDT

## 2024-08-26 NOTE — DISCHARGE SUMMARY
Date of Admission: 08/14/2024   Date and Time of Death: 8/26/2024 at 1:03 AM    Hospital Course:  Arcelia Walter is a 79 y.o. female admitted to inpatient hospice with diagnosis of Postprocedural intraabdominal abscess [T81.43XA]  for symptom management. Medications were available throughout admission for symptom management and comfort. Patient continued to decline after admission as expected ultimately to their death on 8/26/2024 at 1:03 AM. Patient was pronounced dead by Clinical House Supervisor. Spiritual and psychosocial support were available to patient and family throughout admission. Patient's remains were released per [facility] protocol.       Harriet Mays, MSN, APRN, ACHPN  [facility]  Hospice and Palliative Care Nurse Practitioner  08/26/24  16:06 EDT